# Patient Record
Sex: MALE | Race: WHITE | NOT HISPANIC OR LATINO | Employment: FULL TIME | URBAN - METROPOLITAN AREA
[De-identification: names, ages, dates, MRNs, and addresses within clinical notes are randomized per-mention and may not be internally consistent; named-entity substitution may affect disease eponyms.]

---

## 2021-01-21 ENCOUNTER — IN HOME VISIT (OUTPATIENT)
Dept: FAMILY MEDICINE CLINIC | Facility: CLINIC | Age: 56
End: 2021-01-21

## 2021-01-21 VITALS
TEMPERATURE: 98.1 F | BODY MASS INDEX: 27.59 KG/M2 | DIASTOLIC BLOOD PRESSURE: 94 MMHG | HEIGHT: 74 IN | OXYGEN SATURATION: 99 % | SYSTOLIC BLOOD PRESSURE: 162 MMHG | WEIGHT: 215 LBS | HEART RATE: 107 BPM

## 2021-01-21 DIAGNOSIS — Z00.00 ANNUAL PHYSICAL EXAM: Primary | ICD-10-CM

## 2021-01-21 PROBLEM — R03.0 ELEVATED BLOOD PRESSURE READING: Status: ACTIVE | Noted: 2021-01-21

## 2021-01-21 PROCEDURE — 99386 PREV VISIT NEW AGE 40-64: CPT | Performed by: FAMILY MEDICINE

## 2021-01-21 NOTE — PROGRESS NOTES
ADULT ANNUAL [facility]    NAME: Ishan Valdez  AGE: 63 y o  SEX: male  : 1965     DATE: 2021     Employee physical exam completed today at [facility]  The patient was advised to follow-up with their PCP, or establish with a PCP to address any acute complaints that we discussed today and to follow-up any chronic problems  The purpose of our visit today was to complete an annual physical only  Assessment and Plan:     Problem List Items Addressed This Visit     None      Visit Diagnoses     Annual physical exam    -  Primary          Immunizations and preventive care screenings were discussed with patient today  Appropriate education was printed on patient's after visit summary  Counseling:  Alcohol/drug use: discussed moderation in alcohol intake, the recommendations for healthy alcohol use, and avoidance of illicit drug use  Dental Health: discussed importance of regular tooth brushing, flossing, and dental visits  Injury prevention: discussed safety/seat belts, safety helmets, smoke detectors, carbon dioxide detectors, and smoking near bedding or upholstery  · Exercise: the importance of regular exercise/physical activity was discussed  Recommend exercise 3-5 times per week for at least 30 minutes  BMI Counseling: Body mass index is 27 6 kg/m²  The BMI is above normal  Nutrition recommendations include decreasing portion sizes, encouraging healthy choices of fruits and vegetables, decreasing fast food intake, consuming healthier snacks, moderation in carbohydrate intake and increasing intake of lean protein  Exercise recommendations include moderate physical activity 150 minutes/week  No follow-ups on file       Chief Complaint:     Chief Complaint   Patient presents with    Physical Exam      History of Present Illness:     Adult Annual Physical   Patient here for a comprehensive physical exam  The patient reports no problems  Follows with Dr Yen Dobbs PCP  Has not seen him recently  Recent shoulder/arm lifting injury but has been improving overall  Elevated blood pressure- Was given blood pressure medication, however ran out many months ago  He does not monitor his blood pressure at home  I advised him to do so and reach out to his PCP for an appointment  Diet and Physical Activity  · Diet/Nutrition: well balanced diet, limited junk food and consuming 3-5 servings of fruits/vegetables daily  · Exercise: teaches scuba diving  Depression Screening  PHQ-9 Depression Screening    PHQ-9:   Frequency of the following problems over the past two weeks:      Little interest or pleasure in doing things: 0 - not at all  Feeling down, depressed, or hopeless: 0 - not at all  PHQ-2 Score: 0       General Health  · Sleep: sleeps well  · Hearing: decreased - bilateral  For a few years trouble hearing, works as   · Vision: no vision problems and wears reading glasses  · Dental: regular dental visits  Review of Systems:     Review of Systems   Constitutional: Negative for chills and fever  HENT: Positive for hearing loss  Eyes: Negative for visual disturbance  Respiratory: Negative for cough, chest tightness and shortness of breath  Cardiovascular: Negative for chest pain and palpitations  Gastrointestinal: Negative for abdominal pain, blood in stool, constipation and diarrhea  Genitourinary: Negative for decreased urine volume, difficulty urinating and dysuria  Neurological: Negative for dizziness and headaches  Past Medical History:     No past medical history on file  Past Surgical History:     No past surgical history on file  Family History:     No family history on file     Social History:        Social History     Socioeconomic History    Marital status: Not on file     Spouse name: Not on file    Number of children: Not on file    Years of education: Not on file    Highest education level: Not on file   Occupational History    Not on file   Social Needs    Financial resource strain: Not on file    Food insecurity     Worry: Not on file     Inability: Not on file    Transportation needs     Medical: Not on file     Non-medical: Not on file   Tobacco Use    Smoking status: Current Every Day Smoker     Types: Cigars   Substance and Sexual Activity    Alcohol use: Yes    Drug use: Yes    Sexual activity: Not on file   Lifestyle    Physical activity     Days per week: Not on file     Minutes per session: Not on file    Stress: Not on file   Relationships    Social connections     Talks on phone: Not on file     Gets together: Not on file     Attends Oriental orthodox service: Not on file     Active member of club or organization: Not on file     Attends meetings of clubs or organizations: Not on file     Relationship status: Not on file    Intimate partner violence     Fear of current or ex partner: Not on file     Emotionally abused: Not on file     Physically abused: Not on file     Forced sexual activity: Not on file   Other Topics Concern    Not on file   Social History Narrative    Not on file      Current Medications:     No current outpatient medications on file  No current facility-administered medications for this visit  Allergies:     Not on File   Physical Exam:     /94   Pulse (!) 107   Temp 98 1 °F (36 7 °C)   Ht 6' 2" (1 88 m)   Wt 97 5 kg (215 lb)   SpO2 99%   BMI 27 60 kg/m²     Physical Exam  Vitals signs reviewed  Constitutional:       General: He is not in acute distress  Appearance: Normal appearance  He is not ill-appearing, toxic-appearing or diaphoretic  Cardiovascular:      Rate and Rhythm: Normal rate and regular rhythm  Heart sounds: Normal heart sounds  No murmur  No friction rub  No gallop  Pulmonary:      Effort: Pulmonary effort is normal  No respiratory distress        Breath sounds: Normal breath sounds  No stridor  No wheezing or rhonchi  Abdominal:      General: There is no distension  Palpations: Abdomen is soft  There is no mass  Tenderness: There is no abdominal tenderness  There is no guarding or rebound  Skin:     General: Skin is warm  Findings: No erythema or rash  Neurological:      Mental Status: He is alert and oriented to person, place, and time  Motor: No weakness     Psychiatric:         Mood and Affect: Mood normal          Behavior: Behavior normal           DO Moi Penaloza 73

## 2021-01-21 NOTE — PATIENT INSTRUCTIONS

## 2021-04-01 DIAGNOSIS — Z23 ENCOUNTER FOR IMMUNIZATION: ICD-10-CM

## 2022-02-02 ENCOUNTER — OFFICE VISIT (OUTPATIENT)
Dept: URGENT CARE | Facility: CLINIC | Age: 57
End: 2022-02-02
Payer: OTHER MISCELLANEOUS

## 2022-02-02 ENCOUNTER — APPOINTMENT (OUTPATIENT)
Dept: RADIOLOGY | Facility: CLINIC | Age: 57
End: 2022-02-02
Payer: OTHER MISCELLANEOUS

## 2022-02-02 ENCOUNTER — TELEPHONE (OUTPATIENT)
Dept: URGENT CARE | Facility: CLINIC | Age: 57
End: 2022-02-02

## 2022-02-02 VITALS
OXYGEN SATURATION: 96 % | WEIGHT: 215 LBS | SYSTOLIC BLOOD PRESSURE: 184 MMHG | TEMPERATURE: 99 F | RESPIRATION RATE: 18 BRPM | HEIGHT: 74 IN | BODY MASS INDEX: 27.59 KG/M2 | HEART RATE: 108 BPM | DIASTOLIC BLOOD PRESSURE: 118 MMHG

## 2022-02-02 DIAGNOSIS — S61.412A LACERATION OF LEFT HAND WITHOUT FOREIGN BODY, INITIAL ENCOUNTER: ICD-10-CM

## 2022-02-02 DIAGNOSIS — S69.92XA INJURY OF LEFT HAND, INITIAL ENCOUNTER: Primary | ICD-10-CM

## 2022-02-02 DIAGNOSIS — S69.92XA INJURY OF LEFT HAND, INITIAL ENCOUNTER: ICD-10-CM

## 2022-02-02 PROCEDURE — 73130 X-RAY EXAM OF HAND: CPT

## 2022-02-02 PROCEDURE — 12001 RPR S/N/AX/GEN/TRNK 2.5CM/<: CPT

## 2022-02-02 PROCEDURE — 99213 OFFICE O/P EST LOW 20 MIN: CPT

## 2022-02-02 RX ORDER — CEPHALEXIN 500 MG/1
500 CAPSULE ORAL EVERY 8 HOURS SCHEDULED
Qty: 15 CAPSULE | Refills: 0 | Status: SHIPPED | OUTPATIENT
Start: 2022-02-02 | End: 2022-02-07

## 2022-02-02 NOTE — TELEPHONE ENCOUNTER
Called patient to inform him of his positive x-ray findings  Patient reports he has a h/o metallic foreign bodies in that finger from a prior injury  We discussed fracture of his 2nd finger and Orthopedic referral  Patient feels it will get better on its own and declined a referral at this time  Instructed to monitor for any new or worsening symptoms  Patient verbalized understanding

## 2022-02-02 NOTE — PROGRESS NOTES
[facility]        NAME: China Ribera is a 61 y o  male  : 1965    MRN: [id number]  DATE: 2022  TIME: 2:30 PM    Assessment and Plan   Injury of left hand, initial encounter [S69 [id number]]  1  Injury of left hand, initial encounter  XR hand 3+ vw left   2  Laceration of left hand without foreign body, initial encounter  cephalexin (KEFLEX) 500 mg capsule    Laceration repair     X-ray of left hand shows irregularity at the base of the 2nd phalanx  Suspicious for fracture  Await final radiology report  Patient declined brace for his hand  Antibiotics given, bacitracin, nonstick, and Tori dressing were applied to the laceration  Patient's blood pressure elevated in office today  He denies headaches, dizziness, chest pain, shortness of breath or weakness  Reports he was evaluated for high blood pressure last year but never followed up  Recommended patient follow-up with his PCP  Discussed monitoring for any worsening signs or symptoms such as headaches, chest pain, or weakness  Patient verbalized understanding  Patient Instructions     Patient Instructions   Rest, ice, and elevate the area when possible  Tylenol and Motrin for pain  Start cephalexin  Change wound dressing daily or when wet or dirty   1  Wash lightly around sutures with soap and water   2  Apply small amount of topical antibiotic  Discontinue this when wound scabs over   3  Place non-adherent pad over the wound   4  Wrap gauze roll a few times over the non-adherent pad and secure with tape    Monitor for signs of infection   1  Redness   2  Increased pain   3  Increased swelling   4  Area feels hot to touch   5  Wound drainage   6  Fever    Return to clinic or follow up with your PCP for suture removal in 7-10 days  Follow up with Primary Care Physician  Return to clinic or go to the nearest Emergency Department with new or worsening symptoms, or if signs of infection occur      Laceration   WHAT YOU NEED TO KNOW:   A laceration is an injury to the skin and the soft tissue underneath it  Lacerations can happen anywhere on the body  DISCHARGE INSTRUCTIONS:   Return to the emergency department if:   · You have heavy bleeding or bleeding that does not stop after 10 minutes of holding firm, direct pressure over the wound  · Your wound opens up  Call your doctor if:   · You have a fever or chills  · Your laceration is red, warm, or swollen  · You have red streaks on your skin coming from your wound  · You have white or yellow drainage from the wound that smells bad  · You have pain that gets worse, even after treatment  · You have questions or concerns about your condition or care  Medicines: You may need any of the following:  · Prescription pain medicine  may be given  Ask your healthcare provider how to take this medicine safely  Some prescription pain medicines contain acetaminophen  Do not take other medicines that contain acetaminophen without talking to your healthcare provider  Too much acetaminophen may cause liver damage  Prescription pain medicine may cause constipation  Ask your healthcare provider how to prevent or treat constipation  · Antibiotics  help treat or prevent a bacterial infection  · Take your medicine as directed  Contact your healthcare provider if you think your medicine is not helping or if you have side effects  Tell him or her if you are allergic to any medicine  Keep a list of the medicines, vitamins, and herbs you take  Include the amounts, and when and why you take them  Bring the list or the pill bottles to follow-up visits  Carry your medicine list with you in case of an emergency  Care for your wound as directed:   · Do not get your wound wet  until your healthcare provider says it is okay  Do not soak your wound in water  Do not go swimming until your healthcare provider says it is okay  Carefully wash the wound with soap and water   Gently pat the area dry or allow it to air dry  · Change your bandages  when they get wet, dirty, or after washing  Apply new, clean bandages as directed  Do not apply elastic bandages or tape too tight  Do not put powders or lotions over your incision  · Apply antibiotic ointment as directed  Your healthcare provider may give you antibiotic ointment to put over your wound if you have stitches  If you have strips of tape over your incision, let them dry up and fall off on their own  If they do not fall off within 14 days, gently remove them  If you have glue over your wound, do not remove or pick at it  If your glue comes off, do not replace it with glue that you have at home  · Check your wound every day for signs of infection, such as swelling, redness, or pus  Self-care:   · Apply ice  on your wound for 15 to 20 minutes every hour or as directed  Use an ice pack, or put crushed ice in a plastic bag  Cover it with a towel  Ice helps prevent tissue damage and decreases swelling and pain  · Use a splint as directed  A splint will decrease movement and stress on your wound  It may help it heal faster  A splint may be used for lacerations over joints or areas of your body that bend  Ask your healthcare provider how to apply and remove a splint  · Decrease scarring of your wound  by applying ointments as directed  Do not apply ointments until your healthcare provider says it is okay  You may need to wait until your wound is healed  Ask which ointment to buy and how often to use it  After your wound is healed, use sunscreen over the area when you are out in the sun  You should do this for at least 6 months to 1 year after your injury  Follow up with your doctor as directed: You may need to follow up in 24 to 48 hours to have your wound checked for infection  You will need to return in 3 to 14 days if you have stitches or staples so they can be removed   Care for your wound as directed to prevent infection and help it heal  Write down your questions so you remember to ask them during your visits  © Copyright [employer] 2021 Information is for End User's use only and may not be sold, redistributed or otherwise used for commercial purposes  All illustrations and images included in CareNotes® are the copyrighted property of A D A M , Inc  or Adelina Amador  The above information is an  only  It is not intended as medical advice for individual conditions or treatments  Talk to your doctor, nurse or pharmacist before following any medical regimen to see if it is safe and effective for you  Follow up with PCP in 3-5 days  Proceed to  ER if symptoms worsen  Chief Complaint     Chief Complaint   Patient presents with    Hand Laceration     Patient reports smashing left hand on shaft at work that happened 45 minutes ago  History of Present Illness       HPI  Shadia Lang is a 61 y o  male who presents today with left hand pain and a laceration on the bottom of his left 2nd finger in the web space between the 2nd and 3rd finger  He reports his hand got hit with a metal shaft earlier today  The shaft hit him on the dorsal side of the 2nd finger by the proximal interphalangeal joint  He reports his last tetanus was 2 years ago  Denies being on any blood thinners  Review of Systems   Review of Systems   Constitutional: Negative for chills and fever  Respiratory: Negative  Cardiovascular: Negative  Musculoskeletal: Positive for joint swelling  Skin: Positive for color change and wound  Negative for rash  Hematological: Does not bruise/bleed easily           Current Medications       Current Outpatient Medications:     cephalexin (KEFLEX) 500 mg capsule, Take 1 capsule (500 mg total) by mouth every 8 (eight) hours for 5 days, Disp: 15 capsule, Rfl: 0    Current Allergies     Allergies as of 02/02/2022    (No Known Allergies)            The following portions of the patient's history were reviewed and updated as appropriate: allergies, current medications, past family history, past medical history, past social history, past surgical history and problem list      History reviewed  No pertinent past medical history  History reviewed  No pertinent surgical history  History reviewed  No pertinent family history  Medications have been verified  Objective   BP (!) 184/118 Comment: manual  Pulse (!) 108   Temp 99 °F (37 2 °C) (Temporal)   Resp 18   Ht 6' 2" (1 88 m)   Wt 97 5 kg (215 lb)   SpO2 96%   BMI 27 60 kg/m²        Physical Exam     Physical Exam  Vitals and nursing note reviewed  Constitutional:       General: He is not in acute distress  Appearance: Normal appearance  He is well-developed  Cardiovascular:      Rate and Rhythm: Normal rate and regular rhythm  Heart sounds: Normal heart sounds, S1 normal and S2 normal    Pulmonary:      Effort: Pulmonary effort is normal       Breath sounds: Normal breath sounds  Musculoskeletal:      Left hand: Laceration present  Normal capillary refill  Hands:       Comments: Patient has some numbness of the 2nd finger along both sides of finger  Able to bend finger without difficulty  Good cap refill  Skin:     General: Skin is warm and dry  Capillary Refill: Capillary refill takes less than 2 seconds  Findings: Erythema present  Psychiatric:         Behavior: Behavior is cooperative  Laceration repair    Date/Time: 2/2/2022 2:27 PM  Performed by: MEGHANN Hernandez  Authorized by: MEGHANN Hernandez   Consent: Verbal consent obtained    Risks and benefits: risks, benefits and alternatives were discussed  Consent given by: patient  Patient understanding: patient states understanding of the procedure being performed  Patient consent: the patient's understanding of the procedure matches consent given  Procedure consent: procedure consent matches procedure scheduled  Relevant documents: relevant documents present and verified  Test results: test results available and properly labeled  Site marked: the operative site was marked  Radiology Images displayed and confirmed  If images not available, report reviewed: imaging studies available  Patient identity confirmed: verbally with patient  Time out: Immediately prior to procedure a "time out" was called to verify the correct patient, procedure, equipment, support staff and site/side marked as required  Body area: upper extremity  Location details: left hand  Laceration length: 1 5 cm  Foreign bodies: no foreign bodies  Tendon involvement: none  Nerve involvement: none  Vascular damage: no  Anesthesia: local infiltration    Anesthesia:  Local Anesthetic: lidocaine 1% without epinephrine  Anesthetic total: 3 mL    Sedation:  Patient sedated: no      Wound Dehiscence:  Superficial Wound Dehiscence: simple closure      Procedure Details:  Preparation: Patient was prepped and draped in the usual sterile fashion    Irrigation solution: saline  Irrigation method: syringe  Amount of cleaning: standard  Debridement: none  Degree of undermining: none  Skin closure: 4-0 nylon  Number of sutures: 6  Technique: simple  Approximation: close  Approximation difficulty: simple  Dressing: antibiotic ointment and 4x4 sterile gauze  Patient tolerance: patient tolerated the procedure well with no immediate complications  Cleaning details: other organic matter

## 2022-02-02 NOTE — PATIENT INSTRUCTIONS
Rest, ice, and elevate the area when possible  Tylenol and Motrin for pain  Start cephalexin  Change wound dressing daily or when wet or dirty   1  Wash lightly around sutures with soap and water   2  Apply small amount of topical antibiotic  Discontinue this when wound scabs over   3  Place non-adherent pad over the wound   4  Wrap gauze roll a few times over the non-adherent pad and secure with tape    Monitor for signs of infection   1  Redness   2  Increased pain   3  Increased swelling   4  Area feels hot to touch   5  Wound drainage   6  Fever    Return to clinic or follow up with your PCP for suture removal in 7-10 days  Follow up with Primary Care Physician  Return to clinic or go to the nearest Emergency Department with new or worsening symptoms, or if signs of infection occur  Laceration   WHAT YOU NEED TO KNOW:   A laceration is an injury to the skin and the soft tissue underneath it  Lacerations can happen anywhere on the body  DISCHARGE INSTRUCTIONS:   Return to the emergency department if:   · You have heavy bleeding or bleeding that does not stop after 10 minutes of holding firm, direct pressure over the wound  · Your wound opens up  Call your doctor if:   · You have a fever or chills  · Your laceration is red, warm, or swollen  · You have red streaks on your skin coming from your wound  · You have white or yellow drainage from the wound that smells bad  · You have pain that gets worse, even after treatment  · You have questions or concerns about your condition or care  Medicines: You may need any of the following:  · Prescription pain medicine  may be given  Ask your healthcare provider how to take this medicine safely  Some prescription pain medicines contain acetaminophen  Do not take other medicines that contain acetaminophen without talking to your healthcare provider  Too much acetaminophen may cause liver damage   Prescription pain medicine may cause constipation  Ask your healthcare provider how to prevent or treat constipation  · Antibiotics  help treat or prevent a bacterial infection  · Take your medicine as directed  Contact your healthcare provider if you think your medicine is not helping or if you have side effects  Tell him or her if you are allergic to any medicine  Keep a list of the medicines, vitamins, and herbs you take  Include the amounts, and when and why you take them  Bring the list or the pill bottles to follow-up visits  Carry your medicine list with you in case of an emergency  Care for your wound as directed:   · Do not get your wound wet  until your healthcare provider says it is okay  Do not soak your wound in water  Do not go swimming until your healthcare provider says it is okay  Carefully wash the wound with soap and water  Gently pat the area dry or allow it to air dry  · Change your bandages  when they get wet, dirty, or after washing  Apply new, clean bandages as directed  Do not apply elastic bandages or tape too tight  Do not put powders or lotions over your incision  · Apply antibiotic ointment as directed  Your healthcare provider may give you antibiotic ointment to put over your wound if you have stitches  If you have strips of tape over your incision, let them dry up and fall off on their own  If they do not fall off within 14 days, gently remove them  If you have glue over your wound, do not remove or pick at it  If your glue comes off, do not replace it with glue that you have at home  · Check your wound every day for signs of infection, such as swelling, redness, or pus  Self-care:   · Apply ice  on your wound for 15 to 20 minutes every hour or as directed  Use an ice pack, or put crushed ice in a plastic bag  Cover it with a towel  Ice helps prevent tissue damage and decreases swelling and pain  · Use a splint as directed  A splint will decrease movement and stress on your wound   It may help it heal faster  A splint may be used for lacerations over joints or areas of your body that bend  Ask your healthcare provider how to apply and remove a splint  · Decrease scarring of your wound  by applying ointments as directed  Do not apply ointments until your healthcare provider says it is okay  You may need to wait until your wound is healed  Ask which ointment to buy and how often to use it  After your wound is healed, use sunscreen over the area when you are out in the sun  You should do this for at least 6 months to 1 year after your injury  Follow up with your doctor as directed: You may need to follow up in 24 to 48 hours to have your wound checked for infection  You will need to return in 3 to 14 days if you have stitches or staples so they can be removed  Care for your wound as directed to prevent infection and help it heal  Write down your questions so you remember to ask them during your visits  © Copyright [employer] 2021 Information is for End User's use only and may not be sold, redistributed or otherwise used for commercial purposes  All illustrations and images included in CareNotes® are the copyrighted property of A D A M , Inc  or Adelina Shi   The above information is an  only  It is not intended as medical advice for individual conditions or treatments  Talk to your doctor, nurse or pharmacist before following any medical regimen to see if it is safe and effective for you

## 2024-06-22 ENCOUNTER — APPOINTMENT (EMERGENCY)
Dept: CT IMAGING | Facility: HOSPITAL | Age: 59
End: 2024-06-22
Payer: COMMERCIAL

## 2024-06-22 ENCOUNTER — HOSPITAL ENCOUNTER (OUTPATIENT)
Facility: HOSPITAL | Age: 59
Setting detail: OBSERVATION
Discharge: HOME | End: 2024-06-23
Attending: SURGERY | Admitting: SURGERY
Payer: COMMERCIAL

## 2024-06-22 ENCOUNTER — APPOINTMENT (EMERGENCY)
Dept: RADIOLOGY | Facility: HOSPITAL | Age: 59
End: 2024-06-22
Payer: COMMERCIAL
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
## 2024-06-22 DIAGNOSIS — S62.102A CLOSED FRACTURE OF LEFT WRIST, INITIAL ENCOUNTER: ICD-10-CM

## 2024-06-22 DIAGNOSIS — S62.101A CLOSED FRACTURE OF RIGHT WRIST, INITIAL ENCOUNTER: ICD-10-CM

## 2024-06-22 DIAGNOSIS — S22.42XA CLOSED FRACTURE OF MULTIPLE RIBS OF LEFT SIDE, INITIAL ENCOUNTER: ICD-10-CM

## 2024-06-22 PROBLEM — G89.11 ACUTE PAIN DUE TO TRAUMA: Status: ACTIVE | Noted: 2024-06-22

## 2024-06-22 PROBLEM — E87.6 HYPOKALEMIA: Status: ACTIVE | Noted: 2024-06-22

## 2024-06-22 PROBLEM — Z86.79 HISTORY OF HYPERTENSION: Status: ACTIVE | Noted: 2024-06-22

## 2024-06-22 PROBLEM — S00.01XA SCALP ABRASION: Status: ACTIVE | Noted: 2024-06-22

## 2024-06-22 PROBLEM — N17.9 AKI (ACUTE KIDNEY INJURY) (HCC): Status: ACTIVE | Noted: 2024-06-22

## 2024-06-22 LAB
ABO GROUP BLD: NORMAL
ABO GROUP BLD: NORMAL
ANION GAP SERPL CALCULATED.3IONS-SCNC: 7 MMOL/L (ref 4–13)
ANION GAP SERPL CALCULATED.3IONS-SCNC: 7 MMOL/L (ref 4–13)
BASE EXCESS BLDA CALC-SCNC: 2 MMOL/L (ref -2–3)
BASE EXCESS BLDA CALC-SCNC: 2 MMOL/L (ref -2–3)
BASOPHILS # BLD AUTO: 0.04 THOUSANDS/ÂΜL (ref 0–0.1)
BASOPHILS # BLD AUTO: 0.04 THOUSANDS/ÂΜL (ref 0–0.1)
BASOPHILS NFR BLD AUTO: 1 % (ref 0–1)
BASOPHILS NFR BLD AUTO: 1 % (ref 0–1)
BLD GP AB SCN SERPL QL: NEGATIVE
BLD GP AB SCN SERPL QL: NEGATIVE
BUN SERPL-MCNC: 23 MG/DL (ref 5–25)
BUN SERPL-MCNC: 23 MG/DL (ref 5–25)
CA-I BLD-SCNC: 1.2 MMOL/L (ref 1.12–1.32)
CA-I BLD-SCNC: 1.2 MMOL/L (ref 1.12–1.32)
CALCIUM SERPL-MCNC: 9 MG/DL (ref 8.4–10.2)
CALCIUM SERPL-MCNC: 9 MG/DL (ref 8.4–10.2)
CHLORIDE SERPL-SCNC: 107 MMOL/L (ref 96–108)
CHLORIDE SERPL-SCNC: 107 MMOL/L (ref 96–108)
CO2 SERPL-SCNC: 26 MMOL/L (ref 21–32)
CO2 SERPL-SCNC: 26 MMOL/L (ref 21–32)
CREAT SERPL-MCNC: 1.58 MG/DL (ref 0.6–1.3)
CREAT SERPL-MCNC: 1.58 MG/DL (ref 0.6–1.3)
EOSINOPHIL # BLD AUTO: 0.12 THOUSAND/ÂΜL (ref 0–0.61)
EOSINOPHIL # BLD AUTO: 0.12 THOUSAND/ÂΜL (ref 0–0.61)
EOSINOPHIL NFR BLD AUTO: 2 % (ref 0–6)
EOSINOPHIL NFR BLD AUTO: 2 % (ref 0–6)
ERYTHROCYTE [DISTWIDTH] IN BLOOD BY AUTOMATED COUNT: 14.4 % (ref 11.6–15.1)
ERYTHROCYTE [DISTWIDTH] IN BLOOD BY AUTOMATED COUNT: 14.4 % (ref 11.6–15.1)
GFR SERPL CREATININE-BSD FRML MDRD: 47 ML/MIN/1.73SQ M
GFR SERPL CREATININE-BSD FRML MDRD: 47 ML/MIN/1.73SQ M
GLUCOSE SERPL-MCNC: 86 MG/DL (ref 65–140)
GLUCOSE SERPL-MCNC: 86 MG/DL (ref 65–140)
GLUCOSE SERPL-MCNC: 87 MG/DL (ref 65–140)
GLUCOSE SERPL-MCNC: 87 MG/DL (ref 65–140)
HCO3 BLDA-SCNC: 27.9 MMOL/L (ref 24–30)
HCO3 BLDA-SCNC: 27.9 MMOL/L (ref 24–30)
HCT VFR BLD AUTO: 37.7 % (ref 36.5–49.3)
HCT VFR BLD AUTO: 37.7 % (ref 36.5–49.3)
HCT VFR BLD CALC: 37 % (ref 36.5–49.3)
HCT VFR BLD CALC: 37 % (ref 36.5–49.3)
HGB BLD-MCNC: 12.8 G/DL (ref 12–17)
HGB BLD-MCNC: 12.8 G/DL (ref 12–17)
HGB BLDA-MCNC: 12.6 G/DL (ref 12–17)
HGB BLDA-MCNC: 12.6 G/DL (ref 12–17)
IMM GRANULOCYTES # BLD AUTO: 0.04 THOUSAND/UL (ref 0–0.2)
IMM GRANULOCYTES # BLD AUTO: 0.04 THOUSAND/UL (ref 0–0.2)
IMM GRANULOCYTES NFR BLD AUTO: 1 % (ref 0–2)
IMM GRANULOCYTES NFR BLD AUTO: 1 % (ref 0–2)
LYMPHOCYTES # BLD AUTO: 1.4 THOUSANDS/ÂΜL (ref 0.6–4.47)
LYMPHOCYTES # BLD AUTO: 1.4 THOUSANDS/ÂΜL (ref 0.6–4.47)
LYMPHOCYTES NFR BLD AUTO: 21 % (ref 14–44)
LYMPHOCYTES NFR BLD AUTO: 21 % (ref 14–44)
MAGNESIUM SERPL-MCNC: 2 MG/DL (ref 1.9–2.7)
MAGNESIUM SERPL-MCNC: 2 MG/DL (ref 1.9–2.7)
MCH RBC QN AUTO: 30.1 PG (ref 26.8–34.3)
MCH RBC QN AUTO: 30.1 PG (ref 26.8–34.3)
MCHC RBC AUTO-ENTMCNC: 34 G/DL (ref 31.4–37.4)
MCHC RBC AUTO-ENTMCNC: 34 G/DL (ref 31.4–37.4)
MCV RBC AUTO: 89 FL (ref 82–98)
MCV RBC AUTO: 89 FL (ref 82–98)
MONOCYTES # BLD AUTO: 0.78 THOUSAND/ÂΜL (ref 0.17–1.22)
MONOCYTES # BLD AUTO: 0.78 THOUSAND/ÂΜL (ref 0.17–1.22)
MONOCYTES NFR BLD AUTO: 12 % (ref 4–12)
MONOCYTES NFR BLD AUTO: 12 % (ref 4–12)
NEUTROPHILS # BLD AUTO: 4.39 THOUSANDS/ÂΜL (ref 1.85–7.62)
NEUTROPHILS # BLD AUTO: 4.39 THOUSANDS/ÂΜL (ref 1.85–7.62)
NEUTS SEG NFR BLD AUTO: 63 % (ref 43–75)
NEUTS SEG NFR BLD AUTO: 63 % (ref 43–75)
NRBC BLD AUTO-RTO: 0 /100 WBCS
NRBC BLD AUTO-RTO: 0 /100 WBCS
PCO2 BLD: 29 MMOL/L (ref 21–32)
PCO2 BLD: 29 MMOL/L (ref 21–32)
PCO2 BLD: 46.7 MM HG (ref 42–50)
PCO2 BLD: 46.7 MM HG (ref 42–50)
PH BLD: 7.38 [PH] (ref 7.3–7.4)
PH BLD: 7.38 [PH] (ref 7.3–7.4)
PHOSPHATE SERPL-MCNC: 3.2 MG/DL (ref 2.7–4.5)
PHOSPHATE SERPL-MCNC: 3.2 MG/DL (ref 2.7–4.5)
PLATELET # BLD AUTO: 247 THOUSANDS/UL (ref 149–390)
PLATELET # BLD AUTO: 247 THOUSANDS/UL (ref 149–390)
PMV BLD AUTO: 9.9 FL (ref 8.9–12.7)
PMV BLD AUTO: 9.9 FL (ref 8.9–12.7)
PO2 BLD: 36 MM HG (ref 35–45)
PO2 BLD: 36 MM HG (ref 35–45)
POTASSIUM BLD-SCNC: 3.2 MMOL/L (ref 3.5–5.3)
POTASSIUM BLD-SCNC: 3.2 MMOL/L (ref 3.5–5.3)
POTASSIUM SERPL-SCNC: 3.3 MMOL/L (ref 3.5–5.3)
POTASSIUM SERPL-SCNC: 3.3 MMOL/L (ref 3.5–5.3)
RBC # BLD AUTO: 4.25 MILLION/UL (ref 3.88–5.62)
RBC # BLD AUTO: 4.25 MILLION/UL (ref 3.88–5.62)
RH BLD: POSITIVE
RH BLD: POSITIVE
SAO2 % BLD FROM PO2: 67 % (ref 60–85)
SAO2 % BLD FROM PO2: 67 % (ref 60–85)
SODIUM BLD-SCNC: 143 MMOL/L (ref 136–145)
SODIUM BLD-SCNC: 143 MMOL/L (ref 136–145)
SODIUM SERPL-SCNC: 140 MMOL/L (ref 135–147)
SODIUM SERPL-SCNC: 140 MMOL/L (ref 135–147)
SPECIMEN EXPIRATION DATE: NORMAL
SPECIMEN EXPIRATION DATE: NORMAL
SPECIMEN SOURCE: ABNORMAL
SPECIMEN SOURCE: ABNORMAL
WBC # BLD AUTO: 6.77 THOUSAND/UL (ref 4.31–10.16)
WBC # BLD AUTO: 6.77 THOUSAND/UL (ref 4.31–10.16)

## 2024-06-22 PROCEDURE — 70498 CT ANGIOGRAPHY NECK: CPT

## 2024-06-22 PROCEDURE — 86850 RBC ANTIBODY SCREEN: CPT | Performed by: SURGERY

## 2024-06-22 PROCEDURE — 71045 X-RAY EXAM CHEST 1 VIEW: CPT

## 2024-06-22 PROCEDURE — 73080 X-RAY EXAM OF ELBOW: CPT

## 2024-06-22 PROCEDURE — 82947 ASSAY GLUCOSE BLOOD QUANT: CPT

## 2024-06-22 PROCEDURE — 72125 CT NECK SPINE W/O DYE: CPT

## 2024-06-22 PROCEDURE — 82803 BLOOD GASES ANY COMBINATION: CPT

## 2024-06-22 PROCEDURE — 99223 1ST HOSP IP/OBS HIGH 75: CPT | Performed by: SURGERY

## 2024-06-22 PROCEDURE — 85014 HEMATOCRIT: CPT

## 2024-06-22 PROCEDURE — 29125 APPL SHORT ARM SPLINT STATIC: CPT | Performed by: PHYSICIAN ASSISTANT

## 2024-06-22 PROCEDURE — 84295 ASSAY OF SERUM SODIUM: CPT

## 2024-06-22 PROCEDURE — 84100 ASSAY OF PHOSPHORUS: CPT | Performed by: SURGERY

## 2024-06-22 PROCEDURE — 90715 TDAP VACCINE 7 YRS/> IM: CPT | Performed by: SURGERY

## 2024-06-22 PROCEDURE — 74177 CT ABD & PELVIS W/CONTRAST: CPT

## 2024-06-22 PROCEDURE — 70450 CT HEAD/BRAIN W/O DYE: CPT

## 2024-06-22 PROCEDURE — 90471 IMMUNIZATION ADMIN: CPT

## 2024-06-22 PROCEDURE — 36415 COLL VENOUS BLD VENIPUNCTURE: CPT | Performed by: SURGERY

## 2024-06-22 PROCEDURE — 99244 OFF/OP CNSLTJ NEW/EST MOD 40: CPT | Performed by: PHYSICIAN ASSISTANT

## 2024-06-22 PROCEDURE — 73100 X-RAY EXAM OF WRIST: CPT

## 2024-06-22 PROCEDURE — 73110 X-RAY EXAM OF WRIST: CPT

## 2024-06-22 PROCEDURE — 80048 BASIC METABOLIC PNL TOTAL CA: CPT | Performed by: SURGERY

## 2024-06-22 PROCEDURE — 84132 ASSAY OF SERUM POTASSIUM: CPT

## 2024-06-22 PROCEDURE — 82330 ASSAY OF CALCIUM: CPT

## 2024-06-22 PROCEDURE — 86901 BLOOD TYPING SEROLOGIC RH(D): CPT | Performed by: SURGERY

## 2024-06-22 PROCEDURE — 96365 THER/PROPH/DIAG IV INF INIT: CPT

## 2024-06-22 PROCEDURE — EDAIR PR ED AIR: Performed by: EMERGENCY MEDICINE

## 2024-06-22 PROCEDURE — 85025 COMPLETE CBC W/AUTO DIFF WBC: CPT | Performed by: SURGERY

## 2024-06-22 PROCEDURE — NC001 PR NO CHARGE: Performed by: SURGERY

## 2024-06-22 PROCEDURE — 71260 CT THORAX DX C+: CPT

## 2024-06-22 PROCEDURE — 96375 TX/PRO/DX INJ NEW DRUG ADDON: CPT

## 2024-06-22 PROCEDURE — 83735 ASSAY OF MAGNESIUM: CPT | Performed by: SURGERY

## 2024-06-22 PROCEDURE — 99284 EMERGENCY DEPT VISIT MOD MDM: CPT

## 2024-06-22 PROCEDURE — 86900 BLOOD TYPING SEROLOGIC ABO: CPT | Performed by: SURGERY

## 2024-06-22 RX ORDER — HYDRALAZINE HYDROCHLORIDE 20 MG/ML
5 INJECTION INTRAMUSCULAR; INTRAVENOUS EVERY 4 HOURS PRN
Status: DISCONTINUED | OUTPATIENT
Start: 2024-06-22 | End: 2024-06-22

## 2024-06-22 RX ORDER — ACETAMINOPHEN 325 MG/1
975 TABLET ORAL EVERY 8 HOURS
Status: DISCONTINUED | OUTPATIENT
Start: 2024-06-22 | End: 2024-06-23 | Stop reason: HOSPADM

## 2024-06-22 RX ORDER — LIDOCAINE HYDROCHLORIDE 10 MG/ML
20 INJECTION, SOLUTION EPIDURAL; INFILTRATION; INTRACAUDAL; PERINEURAL ONCE
Status: COMPLETED | OUTPATIENT
Start: 2024-06-22 | End: 2024-06-22

## 2024-06-22 RX ORDER — OXYCODONE HYDROCHLORIDE 5 MG/1
5 TABLET ORAL EVERY 4 HOURS PRN
Status: DISCONTINUED | OUTPATIENT
Start: 2024-06-22 | End: 2024-06-23 | Stop reason: HOSPADM

## 2024-06-22 RX ORDER — HYDROMORPHONE HCL/PF 1 MG/ML
0.5 SYRINGE (ML) INJECTION EVERY 2 HOUR PRN
Status: DISCONTINUED | OUTPATIENT
Start: 2024-06-22 | End: 2024-06-23 | Stop reason: HOSPADM

## 2024-06-22 RX ORDER — HEPARIN SODIUM 5000 [USP'U]/ML
5000 INJECTION, SOLUTION INTRAVENOUS; SUBCUTANEOUS EVERY 8 HOURS SCHEDULED
Status: DISCONTINUED | OUTPATIENT
Start: 2024-06-22 | End: 2024-06-22

## 2024-06-22 RX ORDER — LOSARTAN POTASSIUM 100 MG/1
25 TABLET ORAL DAILY
COMMUNITY

## 2024-06-22 RX ORDER — HYDRALAZINE HYDROCHLORIDE 20 MG/ML
5 INJECTION INTRAMUSCULAR; INTRAVENOUS EVERY 6 HOURS PRN
Status: DISCONTINUED | OUTPATIENT
Start: 2024-06-22 | End: 2024-06-23 | Stop reason: HOSPADM

## 2024-06-22 RX ORDER — ENOXAPARIN SODIUM 100 MG/ML
30 INJECTION SUBCUTANEOUS EVERY 12 HOURS
Status: DISCONTINUED | OUTPATIENT
Start: 2024-06-22 | End: 2024-06-23 | Stop reason: HOSPADM

## 2024-06-22 RX ORDER — METHOCARBAMOL 500 MG/1
500 TABLET, FILM COATED ORAL EVERY 6 HOURS PRN
Status: DISCONTINUED | OUTPATIENT
Start: 2024-06-22 | End: 2024-06-23 | Stop reason: HOSPADM

## 2024-06-22 RX ORDER — SODIUM CHLORIDE, SODIUM GLUCONATE, SODIUM ACETATE, POTASSIUM CHLORIDE, MAGNESIUM CHLORIDE, SODIUM PHOSPHATE, DIBASIC, AND POTASSIUM PHOSPHATE .53; .5; .37; .037; .03; .012; .00082 G/100ML; G/100ML; G/100ML; G/100ML; G/100ML; G/100ML; G/100ML
75 INJECTION, SOLUTION INTRAVENOUS CONTINUOUS
Status: DISCONTINUED | OUTPATIENT
Start: 2024-06-22 | End: 2024-06-23

## 2024-06-22 RX ORDER — POTASSIUM CHLORIDE 20 MEQ/1
40 TABLET, EXTENDED RELEASE ORAL ONCE
Status: COMPLETED | OUTPATIENT
Start: 2024-06-22 | End: 2024-06-22

## 2024-06-22 RX ORDER — OXYCODONE HYDROCHLORIDE 10 MG/1
10 TABLET ORAL EVERY 4 HOURS PRN
Status: DISCONTINUED | OUTPATIENT
Start: 2024-06-22 | End: 2024-06-23 | Stop reason: HOSPADM

## 2024-06-22 RX ADMIN — POTASSIUM CHLORIDE 40 MEQ: 1500 TABLET, EXTENDED RELEASE ORAL at 18:55

## 2024-06-22 RX ADMIN — OXYCODONE HYDROCHLORIDE 5 MG: 5 TABLET ORAL at 18:55

## 2024-06-22 RX ADMIN — IOHEXOL 100 ML: 350 INJECTION, SOLUTION INTRAVENOUS at 16:52

## 2024-06-22 RX ADMIN — SODIUM CHLORIDE, SODIUM GLUCONATE, SODIUM ACETATE, POTASSIUM CHLORIDE, MAGNESIUM CHLORIDE, SODIUM PHOSPHATE, DIBASIC, AND POTASSIUM PHOSPHATE 75 ML/HR: .53; .5; .37; .037; .03; .012; .00082 INJECTION, SOLUTION INTRAVENOUS at 19:11

## 2024-06-22 RX ADMIN — ENOXAPARIN SODIUM 30 MG: 30 INJECTION SUBCUTANEOUS at 20:52

## 2024-06-22 RX ADMIN — HYDRALAZINE HYDROCHLORIDE 5 MG: 20 INJECTION INTRAMUSCULAR; INTRAVENOUS at 22:14

## 2024-06-22 RX ADMIN — HYDROMORPHONE HYDROCHLORIDE 0.5 MG: 1 INJECTION, SOLUTION INTRAMUSCULAR; INTRAVENOUS; SUBCUTANEOUS at 19:42

## 2024-06-22 RX ADMIN — TETANUS TOXOID, REDUCED DIPHTHERIA TOXOID AND ACELLULAR PERTUSSIS VACCINE, ADSORBED 0.5 ML: 5; 2.5; 8; 8; 2.5 SUSPENSION INTRAMUSCULAR at 16:33

## 2024-06-22 RX ADMIN — ACETAMINOPHEN 975 MG: 325 TABLET, FILM COATED ORAL at 18:55

## 2024-06-22 RX ADMIN — LIDOCAINE HYDROCHLORIDE 10 ML: 10 INJECTION, SOLUTION EPIDURAL; INFILTRATION; INTRACAUDAL; PERINEURAL at 19:26

## 2024-06-22 NOTE — ED PROVIDER NOTES
Emergency Department Airway Evaluation and Management Form    History  Obtained from: EMS  Patient has no allergy information on record.  Chief Complaint   Patient presents with    Trauma     Fall 10 feet. +neck pain +R wrist immobilized. 5-8 minutes unconscious after fall. Now GCS 15.      HPI    58-year-old male presenting after falling 10 feet out of a bucket truck onto a pile of loose gravel.  Per bystanders report to EMS, the patient reportedly lost consciousness for approximately 5 to 8 minutes after the fall and appeared to have difficulty breathing.  At the time of EMS arrival patient was GCS 14.  He is now amnestic to the event but has a GCS of 15.  His only complaint is pain in his right wrist.  No blood thinners.    No past medical history on file.  No past surgical history on file.  No family history on file.     I have reviewed and agree with the history as documented.    Review of Systems  Positive for pain in the right wrist  Please see trauma team note for full review of systems    Physical Exam  BP (!) 188/100   Pulse 102   Temp 98 °F (36.7 °C) (Oral)   Resp 18   Wt 97.6 kg (215 lb 2.7 oz)   SpO2 96%     Physical Exam  Airway intact with bilateral breath sounds present  No emergent airway intervention required  Please see trauma team note for full physical exam    ED Medications  Medications   tetanus-diphtheria-acellular pertussis (BOOSTRIX) IM injection 0.5 mL (0.5 mL Intramuscular Given 6/22/24 1633)   iohexol (OMNIPAQUE) 350 MG/ML injection (MULTI-DOSE) 100 mL (100 mL Intravenous Given 6/22/24 1652)       Intubation  Procedures    Notes  I performed a limited evaluation of this patient solely to ensure that the airway was intact prior to trauma surgery evaluation per trauma center ATLS protocol. My examination was focused solely on the airway exam, after which the patient was managed independently by the trauma team. Please see their documentation for full history, ROS, physical exam, and  medical decision making.       Final Diagnosis  Final diagnoses:   Fall, initial encounter       ED Provider  Electronically Signed by     Roshni Larry MD  06/22/24 2613

## 2024-06-22 NOTE — H&P
[facility]  H&P  Name: Ez Dsouza 58 y.o. male I MRN: [id number]  Unit/Bed#: ED-42 I Date of Admission: 6/22/2024   Date of Service: 6/22/2024 I Hospital Day: 0      Assessment & Plan   Hypokalemia  Assessment & Plan  - replete    -  Assessment & Plan  -Local wound care  -Tetanus updated    History of hypertension  Assessment & Plan  -Hold home losartan    MARIA TERESA (acute kidney injury) (HCC)  Assessment & Plan  -MARIA TERESA on presentation  -Continue IV fluids  -Hold nephrotoxic medications    Closed fracture of right wrist  Assessment & Plan  - left wrist fracture, present on admission.  - Appreciate Orthopedic surgery evaluation, recommendations and interventions as noted.  - Maintain non weightbearing status on the left upper extremity in splint.  - Monitor left upper extremity neurovascular exam.  - Continue multimodal analgesic regimen.  - Continue DVT prophylaxis.  - PT and OT evaluation and treatment as indicated.  - Outpatient follow up with Orthopedic surgery for re-evaluation.      Acute pain due to trauma  Assessment & Plan  - Acute pain secondary to traumatic injuries.  - Continue multimodal analgesic regimen.  - Bowel regimen as long as using opioids.  - Continue to monitor pain and adjust regimen as indicated.      Fall  Assessment & Plan  - Status post fall with the below noted injuries.  - Fall precautions.  - PT and OT evaluation and treatment as indicated.  - Case Management consultation for disposition planning.      * Closed fracture of multiple ribs of left side  Assessment & Plan  - Multiple left-sided rib fractures (3-4), present on admission.  - Continue rib fracture protocol.  - Continue to encourage incentive spirometer use and adequate pulmonary hygiene.   - PIC score is pending.  - Appreciate APS evaluation and recommendations.  - Continue multimodal analgesic regimen.  - Supplemental oxygen via nasal cannula as needed to maintain saturations greater than or equal to  "94%.  - PT and OT evaluation and treatment as indicated.  - Outpatient follow-up in the trauma clinic for re-evaluation in approximately 2 weeks.      DVT prophylaxis: SCDs and SQH  PT and OT: eval and treat    Disposition: Admit to trauma on MedSurg.  Refracture protocol.  Follow-up orthopedics recommendations for right wrist fracture.  Follow-up right elbow x-ray.    Trauma Alert: Level B   Model of Arrival: Ambulance    Trauma Team: Attending Jhony and CHASITY Toth PA-C  Consultants:     Orthopedics: routine consult; Epic consult order placed;     History of Present Illness     Chief Complaint: \" I fell 10 feet.\"  Mechanism:Fall     HPI:    Ez Dsouza is a 58 y.o. male who presents with status post fall approximately 10 feet.  Patient was cutting trees down and subsequently fell and blacked out on the ground.  He was then found by bystanders.  Reportedly the patient was not breathing however he quickly came through.  He improved GCS 14 to a GCS 15.  He came to the trauma bay with only complaints of right wrist and right elbow.  Otherwise no complaints on presentation.  Reports he only has a history of hypertension and he takes 1 medications.  He has no anticoagulants or antiplatelet medications.  He is otherwise moving all extremities equally.  No numbness or tingling.  No other chest pain or shortness of breath.  No nausea or vomiting.  No fevers, chills, sweats.    Review of Systems   All other systems reviewed and are negative.    12-point, complete review of systems was reviewed and negative except as stated above.     Historical Information     Past Medical History:   Diagnosis Date    Hypertension      History reviewed. No pertinent surgical history.     Social History     Tobacco Use    Smoking status: Never    Smokeless tobacco: Never   Substance Use Topics    Alcohol use: Not Currently    Drug use: Never     Immunization History   Administered Date(s) Administered    COVID-19 MODERNA VACC 0.5 ML IM " 03/25/2021, 04/22/2021, 12/29/2021    Tdap 06/22/2024     Last Tetanus: updated today  Family History: Non-contributory     Meds/Allergies   PTA meds:   None     Allergies have not been reviewed;  Not on File    Objective   Initial Vitals:   Temperature: 98 °F (36.7 °C) (06/22/24 1628)  Pulse: 105 (06/22/24 1628)  Respirations: 18 (06/22/24 1628)  Blood Pressure: (!) 197/99 (06/22/24 1628)    Primary Survey:   Airway:        Status: patent;        Pre-hospital Interventions: none        Hospital Interventions: none  Breathing:        Pre-hospital Interventions: none       Effort: normal       Right breath sounds: normal       Left breath sounds: normal  Circulation:        Rhythm: regular       Rate: regular   Right Pulses Left Pulses    R radial: 2+  R femoral: 2+  R pedal: 2+  R carotid: 2+  R popliteal: 2+ L radial: 2+  L femoral: 2+  L pedal: 2+  L carotid: 2+  L popliteal: 2+   Disability:        GCS: Eye: 4; Verbal: 5 Motor: 6 Total: 15       Right Pupil: round;  reactive         Left Pupil:  round;  reactive      R Motor Strength L Motor Strength    R : 5/5  R dorsiflex: 5/5  R plantarflex: 5/5 L : 5/5  L dorsiflex: 5/5  L plantarflex: 5/5        Sensory:  No sensory deficit  Exposure:       Completed: Yes      Secondary Survey:  Physical Exam  Vitals reviewed.   Constitutional:       Appearance: Normal appearance.   HENT:      Head:      Comments: Left-sided parietal scalp abrasion     Right Ear: External ear normal.      Left Ear: External ear normal.      Nose: Nose normal.      Mouth/Throat:      Mouth: Mucous membranes are dry.      Pharynx: Oropharynx is clear.   Eyes:      Pupils: Pupils are equal, round, and reactive to light.   Cardiovascular:      Rate and Rhythm: Normal rate and regular rhythm.      Pulses: Normal pulses.      Heart sounds: Normal heart sounds.   Pulmonary:      Effort: Pulmonary effort is normal. No respiratory distress.      Breath sounds: Normal breath sounds.    Abdominal:      General: There is no distension.      Palpations: Abdomen is soft.      Tenderness: There is no abdominal tenderness. There is no guarding.   Musculoskeletal:         General: Tenderness present. Normal range of motion.      Cervical back: Normal range of motion. No tenderness.      Comments: Tenderness to the right wrist.   Skin:     General: Skin is warm and dry.      Comments: Right elbow abrasion   Neurological:      General: No focal deficit present.      Mental Status: He is alert and oriented to person, place, and time.         Invasive Devices       Peripheral Intravenous Line  Duration             Peripheral IV 06/22/24 Left;Ventral (anterior) Forearm <1 day                  Lab Results: I have personally reviewed all pertinent laboratory/test results 06/22/24 and in the preceding 24 hours.  Recent Labs     06/22/24  1632 06/22/24  1633   WBC 6.77  --    HGB 12.8 12.6   HCT 37.7 37     --    SODIUM 140  --    K 3.3*  --      --    CO2 26 29   BUN 23  --    CREATININE 1.58*  --    GLUC 86  --    CAIONIZED  --  1.20   MG 2.0  --    PHOS 3.2  --        Imaging Results: I have personally reviewed pertinent images saved in PACS. CT scan findings (and other pertinent positive findings on images) were discussed with radiology. My interpretation of the images/reports are as follows:  Chest Xray(s): negative for acute findings   FAST exam(s): negative for acute findings   CT Scan(s): positive for acute findings: right wrist fracture, multiple rib fractures   Additional Xray(s): negative for acute findings     Other Studies: no other studies    Code Status: No Order  Advance Directive and Living Will:      Power of :    POLST:

## 2024-06-22 NOTE — PROGRESS NOTES
Cervical Collar Clearance:    The patient had a CT scan of the cervical spine demonstrating no acute injury. On exam, the patient had no midline point tenderness or paresthesias/numbness/weakness in the extremities. The patient had full range of motion (was then able to flex, extend, and rotate head laterally) without pain. There were no distracting injuries and the patient was not intoxicated.      The patient's cervical spine was cleared radiologically and clinically. Cervical collar removed at this time.     Lelia Booker MD  6/22/2024 5:55 PM

## 2024-06-22 NOTE — ASSESSMENT & PLAN NOTE
- Multiple left-sided rib fractures (3-4), present on admission.  - Continue rib fracture protocol.  - Continue to encourage incentive spirometer use and adequate pulmonary hygiene.   - PIC score is pending.  - Appreciate APS evaluation and recommendations.  - Continue multimodal analgesic regimen.  - Supplemental oxygen via nasal cannula as needed to maintain saturations greater than or equal to 94%.  - PT and OT evaluation and treatment as indicated.  - Outpatient follow-up in the trauma clinic for re-evaluation in approximately 2 weeks.

## 2024-06-22 NOTE — ASSESSMENT & PLAN NOTE
- Status post fall with the below noted injuries.  - Fall precautions.  - PT and OT evaluation and treatment as indicated.  - Case Management consultation for disposition planning.

## 2024-06-22 NOTE — PROCEDURES
POC FAST US    Date/Time: 6/22/2024 4:35 PM    Performed by: Wilbert Toth PA-C  Authorized by: Wilbert Toth PA-C    Patient location:  Trauma  Procedure details:     Exam Type:  Diagnostic    Indications: blunt abdominal trauma and blunt chest trauma      Assess for:  Intra-abdominal fluid, pericardial effusion and pneumothorax    Technique: extended FAST      Views obtained:  Heart - Pericardial sac, LUQ - Splenorenal space, Suprapubic - Pouch of Brian, RUQ - Leon's Pouch, Left thorax and Right thorax    Image quality: diagnostic      Image availability:  Images available in PACS and video obtained  FAST Findings:     RUQ (Hepatorenal) free fluid: absent      LUQ (Splenorenal) free fluid: absent      Suprapubic free fluid: absent      Cardiac wall motion: identified      Pericardial effusion: absent    extended FAST (Pulmonary) findings:     Left lung sliding: Present      Right lung sliding: Present    Interpretation:     Impressions: negative

## 2024-06-22 NOTE — ASSESSMENT & PLAN NOTE
- left wrist fracture, present on admission.  - Appreciate Orthopedic surgery evaluation, recommendations and interventions as noted.  - Maintain non weightbearing status on the left upper extremity in splint.  - Monitor left upper extremity neurovascular exam.  - Continue multimodal analgesic regimen.  - Continue DVT prophylaxis.  - PT and OT evaluation and treatment as indicated.  - Outpatient follow up with Orthopedic surgery for re-evaluation.

## 2024-06-22 NOTE — CONSULTS
Orthopedics   Ez Dsouza 58 y.o. male MRN: [id number]  Unit/Bed#: ED-42      Chief Complaint:   right wrist pain    HPI:   58 y.o. who presents today after falling out of the back of a truck while cutting down tree limbs. He noted immediate onset of right wrist pain. He has no pain in any other location. He has been found on admission to have rib fractures and MARIA TERESA and is being admitted to the hospital for observation. Orthopedics engaged for evaluation of his right wrist fracture.   He has no numbness/tingling.   No other complaints.     Review Of Systems:   Skin: Normal  Neuro: See HPI  Musculoskeletal: See HPI  14 point review of systems negative except as stated above     Past Medical History:   Past Medical History:   Diagnosis Date    Hypertension        Past Surgical History:   History reviewed. No pertinent surgical history.    Family History:  Family history reviewed and non-contributory  History reviewed. No pertinent family history.    Social History:  Social History     Socioeconomic History    Marital status: /Civil Union     Spouse name: None    Number of children: None    Years of education: None    Highest education level: None   Occupational History    None   Tobacco Use    Smoking status: Never    Smokeless tobacco: Never   Substance and Sexual Activity    Alcohol use: Not Currently    Drug use: Never    Sexual activity: None   Other Topics Concern    None   Social History Narrative    None     Social Determinants of Health     Financial Resource Strain: Not on file   Food Insecurity: Not on file   Transportation Needs: Not on file   Physical Activity: Not on file   Stress: Not on file   Social Connections: Not on file   Intimate Partner Violence: Not on file   Housing Stability: Not on file       Allergies:   Not on File        Labs:  0   Lab Value Date/Time    HCT 37 06/22/2024 1633    HCT 37.7 06/22/2024 1632    HGB 12.6 06/22/2024 1633    HGB 12.8 06/22/2024 1632    WBC 6.77  "06/22/2024 1632       Meds:    Current Facility-Administered Medications:     acetaminophen (TYLENOL) tablet 975 mg, 975 mg, Oral, Q8H, Wilbert Toth PA-C, 975 mg at 06/22/24 1855    heparin (porcine) subcutaneous injection 5,000 Units, 5,000 Units, Subcutaneous, Q8H TIEN, Wilbert Toth PA-C    hydrALAZINE (APRESOLINE) injection 5 mg, 5 mg, Intravenous, Q6H PRN, Wilbert Toth PA-C    HYDROmorphone (DILAUDID) injection 0.5 mg, 0.5 mg, Intravenous, Q2H PRN, Wilbert Toth PA-C    methocarbamol (ROBAXIN) tablet 500 mg, 500 mg, Oral, Q6H PRN, Wilbert Toth PA-C    multi-electrolyte (PLASMALYTE-A/ISOLYTE-S PH 7.4) IV solution, 75 mL/hr, Intravenous, Continuous, Wilbert Toth PA-C, Last Rate: 75 mL/hr at 06/22/24 1911, 75 mL/hr at 06/22/24 1911    oxyCODONE (ROXICODONE) immediate release tablet 10 mg, 10 mg, Oral, Q4H PRN, Wilbert Toth PA-C    oxyCODONE (ROXICODONE) IR tablet 5 mg, 5 mg, Oral, Q4H PRN, Wilbert Toth PA-C, 5 mg at 06/22/24 1855  No current outpatient medications on file.    Blood Culture:   No results found for: \"BLOODCX\"    Wound Culture:   No results found for: \"WOUNDCULT\"    Ins and Outs:  No intake/output data recorded.          Physical Exam:   BP (!) 200/115   Pulse 82   Temp 98 °F (36.7 °C) (Oral)   Resp 18   Wt 97.6 kg (215 lb 2.7 oz)   SpO2 96%   Gen: No acute distress, resting comfortably in bed  HEENT: Eyes clear, moist mucus membranes, hearing intact  Respiratory: No audible wheezing or stridor  Cardiovascular: Well Perfused peripherally, 2+ distal pulse  Abdomen: nondistended, no peritoneal signs  Musculoskeletal: right upper extremity  Skin intact  Palpable deformity over the distal right wrist with edema present. No significant ecchymosis.   Pain to palpation over the distal right wrist.   Sensation intact to median, radial, and ulnar distributions  Motor intact to ain/pin/m/r/u  Finger tips warm and well perfused, 2+ radial and ulnar pulse  Musculature is soft and " compressible, no pain with passive stretch  General MSK  No pain over bilateral clavicles, shoulders, upper arms or elbows. No pain to the left forearm, wrist or hand. ROM full and SILT to the left upper extremity.   No pain in the bilateral hips, femurs, knees, lower legs, feet or ankles. SILT, ROM full.     Radiology:   I personally reviewed the films.  X-rays AP/Lateral right wrist: distal radius fracture.     _*_*_*_*_*_*_*_*_*_*_*_*_*_*_*_*_*_*_*_*_*_*_*_*_*_*_*_*_*_*_*_*_*_*_*_*_*_*_*_*_*    Procedure: Distal radius reduction and splint application    A hematoma block was given with 10cc of 1% lidocaine without epi. Once adequate anesthesia was obtained, a gentle closed reduction maneuver was performed and pt was placed in a well padded sugartong splint. Pt tolerated the procedure well and was neurovascularly intact both pre and post procedure. Post reduction orthogonal x rays will be reviewed upon completion    Assessment:  58 y.o.male S/P fall with right distal radius fracture    Plan:   Non weight bearing right upper extremity in sugartong splint  No immediate orthopedic intervention planned.   Pain control per primary team  Medical management per primary team.   DVT ppx per primary team.   Dispo: Ok for discharge from ortho perspective. Ortho signing off at this time.   Patient should follow up with Dr. Kowalski upon discharge.   Case reviewed and discussed with Dr. Kowalski.     Carmelita Garrido PA-C

## 2024-06-23 VITALS
DIASTOLIC BLOOD PRESSURE: 98 MMHG | RESPIRATION RATE: 18 BRPM | OXYGEN SATURATION: 97 % | SYSTOLIC BLOOD PRESSURE: 163 MMHG | HEART RATE: 75 BPM | WEIGHT: 215.17 LBS | TEMPERATURE: 98.2 F

## 2024-06-23 PROBLEM — E87.6 HYPOKALEMIA: Status: RESOLVED | Noted: 2024-06-22 | Resolved: 2024-06-23

## 2024-06-23 LAB
ABO GROUP BLD: NORMAL
ANION GAP SERPL CALCULATED.3IONS-SCNC: 5 MMOL/L (ref 4–13)
BUN SERPL-MCNC: 20 MG/DL (ref 5–25)
CALCIUM SERPL-MCNC: 8.7 MG/DL (ref 8.4–10.2)
CHLORIDE SERPL-SCNC: 108 MMOL/L (ref 96–108)
CO2 SERPL-SCNC: 23 MMOL/L (ref 21–32)
CREAT SERPL-MCNC: 1.03 MG/DL (ref 0.6–1.3)
ERYTHROCYTE [DISTWIDTH] IN BLOOD BY AUTOMATED COUNT: 14.6 % (ref 11.6–15.1)
GFR SERPL CREATININE-BSD FRML MDRD: 79 ML/MIN/1.73SQ M
GLUCOSE P FAST SERPL-MCNC: 106 MG/DL (ref 65–99)
GLUCOSE SERPL-MCNC: 106 MG/DL (ref 65–140)
HCT VFR BLD AUTO: 40.4 % (ref 36.5–49.3)
HGB BLD-MCNC: 13.4 G/DL (ref 12–17)
MCH RBC QN AUTO: 30 PG (ref 26.8–34.3)
MCHC RBC AUTO-ENTMCNC: 33.2 G/DL (ref 31.4–37.4)
MCV RBC AUTO: 90 FL (ref 82–98)
PLATELET # BLD AUTO: 238 THOUSANDS/UL (ref 149–390)
PMV BLD AUTO: 10.2 FL (ref 8.9–12.7)
POTASSIUM SERPL-SCNC: 3.4 MMOL/L (ref 3.5–5.3)
RBC # BLD AUTO: 4.47 MILLION/UL (ref 3.88–5.62)
RH BLD: POSITIVE
SODIUM SERPL-SCNC: 136 MMOL/L (ref 135–147)
WBC # BLD AUTO: 7.55 THOUSAND/UL (ref 4.31–10.16)

## 2024-06-23 PROCEDURE — 99238 HOSP IP/OBS DSCHRG MGMT 30/<: CPT | Performed by: PHYSICIAN ASSISTANT

## 2024-06-23 PROCEDURE — 85027 COMPLETE CBC AUTOMATED: CPT | Performed by: NURSE PRACTITIONER

## 2024-06-23 PROCEDURE — NC001 PR NO CHARGE: Performed by: PHYSICIAN ASSISTANT

## 2024-06-23 PROCEDURE — 80048 BASIC METABOLIC PNL TOTAL CA: CPT | Performed by: NURSE PRACTITIONER

## 2024-06-23 RX ORDER — HYDRALAZINE HYDROCHLORIDE 20 MG/ML
10 INJECTION INTRAMUSCULAR; INTRAVENOUS ONCE
Status: COMPLETED | OUTPATIENT
Start: 2024-06-23 | End: 2024-06-23

## 2024-06-23 RX ORDER — OXYCODONE HYDROCHLORIDE 5 MG/1
5 TABLET ORAL EVERY 4 HOURS PRN
Qty: 18 TABLET | Refills: 0 | Status: SHIPPED | OUTPATIENT
Start: 2024-06-23 | End: 2024-07-03

## 2024-06-23 RX ORDER — METHOCARBAMOL 500 MG/1
500 TABLET, FILM COATED ORAL EVERY 6 HOURS PRN
Qty: 18 TABLET | Refills: 0 | Status: SHIPPED | OUTPATIENT
Start: 2024-06-23

## 2024-06-23 RX ORDER — ACETAMINOPHEN 325 MG/1
650 TABLET ORAL EVERY 6 HOURS PRN
Start: 2024-06-23

## 2024-06-23 RX ADMIN — ACETAMINOPHEN 975 MG: 325 TABLET, FILM COATED ORAL at 02:28

## 2024-06-23 RX ADMIN — OXYCODONE HYDROCHLORIDE 10 MG: 10 TABLET ORAL at 09:01

## 2024-06-23 RX ADMIN — OXYCODONE HYDROCHLORIDE 10 MG: 10 TABLET ORAL at 00:29

## 2024-06-23 RX ADMIN — ENOXAPARIN SODIUM 30 MG: 30 INJECTION SUBCUTANEOUS at 09:00

## 2024-06-23 RX ADMIN — HYDRALAZINE HYDROCHLORIDE 10 MG: 20 INJECTION, SOLUTION INTRAMUSCULAR; INTRAVENOUS at 00:28

## 2024-06-23 RX ADMIN — ACETAMINOPHEN 975 MG: 325 TABLET, FILM COATED ORAL at 09:42

## 2024-06-23 NOTE — INCIDENTAL FINDINGS
The following findings require follow up:  Radiographic finding   Findin. Atherosclerotic stenosis in bilateral proximal cervical ICAs, estimated 60% on the left and 40% on the right.     2.  Incidental 4 cm paramedian right dorsal neck subcutaneous cystic appearing lesion, likely a sebaceous cyst. Correlate with direct clinical assessment.     3. Mild bilateral gynecomastia     4. GALLBLADDER: Cholelithiasis without findings of acute cholecystitis     5. KIDNEYS/URETERS: 10 mm calculus in the left upper pole. No hydronephrosis. Subcentimeter hypoattenuating renal lesion(s), too small to characterize but statistically likely benign, which do not warrant follow-up (Radiology 2019).     6. REPRODUCTIVE ORGANS: Mild prostatomegaly     7. ABDOMINAL WALL/INGUINAL REGIONS: Small fat-containing umbilical hernia.     8. 1.4 cm sclerotic lesion in the posterior left first rib without aggressive features, likely an osteoma (series 308 image 36).    Follow up required: Yes   Follow up should be done within 2-4 week(s)    Please notify the following clinician to assist with the follow up:   Primary Care Provider.    Incidental finding results were discussed with the Patient and Patient's POA by Wilbert Toth PA-C on 24.   They expressed understanding and all questions answered.

## 2024-06-23 NOTE — PLAN OF CARE
Problem: PAIN - ADULT  Goal: Verbalizes/displays adequate comfort level or baseline comfort level  Description: Interventions:  - Encourage patient to monitor pain and request assistance  - Assess pain using appropriate pain scale  - Administer analgesics based on type and severity of pain and evaluate response  - Implement non-pharmacological measures as appropriate and evaluate response  - Consider cultural and social influences on pain and pain management  - Notify physician/advanced practitioner if interventions unsuccessful or patient reports new pain  Outcome: Progressing     Problem: INFECTION - ADULT  Goal: Absence or prevention of progression during hospitalization  Description: INTERVENTIONS:  - Assess and monitor for signs and symptoms of infection  - Monitor lab/diagnostic results  - Monitor all insertion sites, i.e. indwelling lines, tubes, and drains  - Monitor endotracheal if appropriate and nasal secretions for changes in amount and color  - [place] appropriate cooling/warming therapies per order  - Administer medications as ordered  - Instruct and encourage patient and family to use good hand hygiene technique  - Identify and instruct in appropriate isolation precautions for identified infection/condition  Outcome: Progressing     Problem: DISCHARGE PLANNING  Goal: Discharge to home or other facility with appropriate resources  Description: INTERVENTIONS:  - Identify barriers to discharge w/patient and caregiver  - Arrange for needed discharge resources and transportation as appropriate  - Identify discharge learning needs (meds, wound care, etc.)  - Arrange for interpretive services to assist at discharge as needed  - Refer to Case Management Department for coordinating discharge planning if the patient needs post-hospital services based on physician/advanced practitioner order or complex needs related to functional status, cognitive ability, or social support system  Outcome: Progressing      Problem: SAFETY ADULT  Goal: Patient will remain free of falls  Description: INTERVENTIONS:  - Educate patient/family on patient safety including physical limitations  - Instruct patient to call for assistance with activity   - Consult OT/PT to assist with strengthening/mobility   - Keep Call bell within reach  - Keep bed low and locked with side rails adjusted as appropriate  - Keep care items and personal belongings within reach  - Initiate and maintain comfort rounds  - Make Fall Risk Sign visible to staff  - Offer Toileting every 2 Hours, in advance of need  - Apply yellow socks and bracelet for high fall risk patients  - Consider moving patient to room near nurses station  Outcome: Progressing

## 2024-06-23 NOTE — ASSESSMENT & PLAN NOTE
- Multiple left-sided rib fractures (3-4), present on admission.  - Continue rib fracture protocol.  - Continue to encourage incentive spirometer use and adequate pulmonary hygiene.   - PIC score is pending.  - Appreciate APS evaluation and recommendations.  - Continue multimodal analgesic regimen.  - Supplemental oxygen via nasal cannula as needed to maintain saturations greater than or equal to 94%.  - PT and OT evaluation and treatment as indicated.

## 2024-06-23 NOTE — PROGRESS NOTES
[facility]  Progress Note  Name: Ez Dsouza I  MRN: [id number]  Unit/Bed#: W -01 I Date of Admission: 6/22/2024   Date of Service: 6/23/2024 I Hospital Day: 0    Assessment & Plan   Scalp abrasion  Assessment & Plan  -Local wound care  -Tetanus updated    History of hypertension  Assessment & Plan  -Hold home losartan    MARIA TERESA (acute kidney injury) (HCC)  Assessment & Plan  -MARIA TERESA on presentation, improved on 6/23  -Continue IV fluids  -Hold nephrotoxic medications    Closed fracture of right wrist  Assessment & Plan  - left wrist fracture, present on admission.  - Appreciate Orthopedic surgery evaluation, recommendations and interventions as noted.  - Maintain non weightbearing status on the left upper extremity in splint.  - Monitor left upper extremity neurovascular exam.  - Continue multimodal analgesic regimen.  - Continue DVT prophylaxis.  - PT and OT evaluation and treatment as indicated.  - Outpatient follow up with Orthopedic surgery for re-evaluation.      Acute pain due to trauma  Assessment & Plan  - Acute pain secondary to traumatic injuries.  - Continue multimodal analgesic regimen.  - Bowel regimen as long as using opioids.  - Continue to monitor pain and adjust regimen as indicated.      Fall  Assessment & Plan  - Status post fall with the below noted injuries.  - Fall precautions.  - PT and OT evaluation and treatment as indicated.  - Case Management consultation for disposition planning.      * Closed fracture of multiple ribs of left side  Assessment & Plan  - Multiple left-sided rib fractures (3-4), present on admission.  - Continue rib fracture protocol.  - Continue to encourage incentive spirometer use and adequate pulmonary hygiene.   - PIC score is pending.  - Appreciate APS evaluation and recommendations.  - Continue multimodal analgesic regimen.  - Supplemental oxygen via nasal cannula as needed to maintain saturations greater than or equal to 94%.  - PT and  OT evaluation and treatment as indicated.      Hypokalemia-resolved as of 6/23/2024  Assessment & Plan  - repleted       DVT prophylaxis: SCDs and SQH  PT and OT: eval and treat    Disposition: DC home today.  Outpatient follow-up with primary care provider and orthopedics. Can follow-up with trauma as needed.    TRAUMA TERTIARY SURVEY NOTE    Code status:  Level 1 - Full Code    Consultants: IP CONSULT TO ORTHOPEDIC SURGERY    Subjective   Transfer from: not a transfer    Mechanism of Injury:Fall     Chief Complaint: No complaints    HPI/Last 24 hour events: Patient is offering no new complaints he reports he is feeling well.  Denying any worsening pain.  Reports that he would like to go home.     Objective   Vitals:   Temp:  [98 °F (36.7 °C)-98.2 °F (36.8 °C)] 98.2 °F (36.8 °C)  HR:  [] 75  Resp:  [17-19] 18  BP: (141-204)/() 163/98    I/O       None             Physical Exam:   GENERAL APPEARANCE: NAD  NEURO: GCS 15, no focal deficits  HEENT: Normocephalic  CV: RRR  LUNGS: CTA b/l  GI: non-tender, non-distended  : no serrato  MSK: moving all extremities; neurovascularly intact  SKIN: warm, dry, intact    Invasive Devices       Peripheral Intravenous Line  Duration             Peripheral IV 06/22/24 Left;Ventral (anterior) Forearm <1 day                          PIC Score  PIC Pain Score: 2 (6/23/2024  9:42 AM)  PIC Incentive Spirometry Score: 4 (6/23/2024 12:29 AM)  PIC Cough Description: 3 (6/23/2024 12:29 AM)  PIC Total Score: 8 (6/23/2024 12:29 AM)       If the Total PIC Score </=5, did you consult APS and evaluate patient for further intervention?: no      Pain:    Incentive Spirometry  Cough  3 = Controlled  4 = Above goal volume 3 = Strong  2 = Moderate  3 = Goal to alert volume 2 = Weak  1 = Severe  2 = Below alert volume 1 = Absent     1 = Unable to perform IS      Lab Results: Results: I have personally reviewed all pertinent laboratory/tests results, BMP/CMP:   Lab Results   Component Value  Date    SODIUM 136 06/23/2024    K 3.4 (L) 06/23/2024     06/23/2024    CO2 23 06/23/2024    CO2 29 06/22/2024    BUN 20 06/23/2024    CREATININE 1.03 06/23/2024    GLUCOSE 87 06/22/2024    CALCIUM 8.7 06/23/2024    EGFR 79 06/23/2024   , and CBC:   Lab Results   Component Value Date    WBC 7.55 06/23/2024    HGB 13.4 06/23/2024    HCT 40.4 06/23/2024    MCV 90 06/23/2024     06/23/2024    RBC 4.47 06/23/2024    MCH 30.0 06/23/2024    MCHC 33.2 06/23/2024    RDW 14.6 06/23/2024    MPV 10.2 06/23/2024    NRBC 0 06/22/2024       Imaging Results: I have personally reviewed pertinent reports.    Chest Xray(s): negative for acute findings   FAST exam(s): negative for acute findings   CT Scan(s): positive for acute findings: Right wrist fracture, right rib fractures   Additional Xray(s): negative for acute findings     Other Studies: no other studies

## 2024-06-23 NOTE — DISCHARGE SUMMARY
"  Discharge Summary - Ez Dsouza 58 y.o. male MRN: [id number]    Unit/Bed#: W -01 Encounter: [id number]    Admission Date:   Admission Orders (From admission, onward)       Ordered        06/22/24 2018  Place in Observation  Once                            Admitting Diagnosis: Fall, initial encounter [W19.XXXA]  Multiple injuries due to trauma [T07.XXXA]  Closed fracture of left wrist, initial encounter [S62.102A]    HPI: Per Wilbert Toth, \"Ez Dsouza is a 58 y.o. male who presents with status post fall approximately 10 feet.  Patient was cutting trees down and subsequently fell and blacked out on the ground.  He was then found by bystanders.  Reportedly the patient was not breathing however he quickly came through.  He improved GCS 14 to a GCS 15.  He came to the trauma bay with only complaints of right wrist and right elbow.  Otherwise no complaints on presentation.  Reports he only has a history of hypertension and he takes 1 medications.  He has no anticoagulants or antiplatelet medications.  He is otherwise moving all extremities equally.  No numbness or tingling.  No other chest pain or shortness of breath.  No nausea or vomiting.  No fevers, chills, sweats\"    Procedures Performed:   Orders Placed This Encounter   Procedures    Fast Ultrasound       Summary of Hospital Course: Patient is a 58-year-old male comes in for evaluation for a fall approximately 10 feet.  Patient with significant past medical history of hypertension.  He was noted to have an MARIA TERESA and multiple left-sided rib fractures.  He required admission overnight.  He was also noted to have a right wrist fracture.  Splinted.  Orthopedics signed off and recommended outpatient follow-up in 1 to 2 weeks.  He was monitored overnight and his MARIA TERESA improved.  He was recommended to discharge home with outpatient follow-up with his primary care provider and orthopedics.  Incidental findings discussed extensively at bedside with patient and " patient wife.  Stressed the importance of establishing care with primary care provider to undergo preventative medicine testing as well as routine yearly labs.  He will follow-up with his PCP to discuss incidentals as well.    Significant Findings, Care, Treatment and Services Provided:   XR wrist 2 vw right    Result Date: 6/23/2024  Impression: Post reduction Workstation performed: [id number]     XR elbow 3+ vw right    Result Date: 6/23/2024  Impression: No acute osseous abnormality. Workstation performed: [id number]     CTA neck w wo contrast    Result Date: 6/22/2024  Impression: Somewhat limited assessment due to artifactually distorted image quality. 1.  No evidence of acute traumatic injury in the major cervical vasculature. 2.  Atherosclerotic stenosis in bilateral proximal cervical ICAs, estimated 60% on the left and 40% on the right. 3.  Patent dominant right vertebral artery without stenosis. 4.  Relatively hypoplastic left vertebral artery (normal variant). Atherosclerotic change at the origin. Cannot reliably evaluate the degree of stenosis at the origin and the vessel in the V1 segment due to artifact from motion and adjacent dense IV contrast. The remainder the vessel is patent throughout the neck. 5.  Incidental 4 cm paramedian right dorsal neck subcutaneous cystic appearing lesion, likely a sebaceous cyst. Correlate with direct clinical assessment. I personally discussed this study with Dr. Booker on 6/22/2024 5:35 PM. Workstation performed: [id number]     CT spine cervical wo contrast    Result Date: 6/22/2024  Impression: 1.  No acute cervical spine fracture or traumatic malalignment. 2.  Incidental 4 cm paramedian right dorsal neck subcutaneous cystic appearing lesion likely a sebaceous cyst. Correlate with direct clinical assessment. I personally discussed this study with Dr. Booker on 6/22/2024 5:35 PM. Workstation performed: [id number]     CT head wo contrast    Result Date:  6/22/2024  Impression: 1.  No acute intracranial CT abnormality. 2.  Nonspecific white matter change likely indicating chronic small vessel ischemic disease I personally discussed this study with Dr. Blunt on 6/22/2024 5:35 PM. Workstation performed: [id number]     XR Trauma multiple (SLB/SLRA trauma bay ONLY)    Result Date: 6/22/2024  Impression: No acute cardiopulmonary disease within limitations of supine imaging. Fractures of the left 3rd and 4th ribs seen on the concurrent CT scan are not well visualized on this study. Comminuted intra-articular fracture of the distal radius. Workstation performed: [id number]     CT chest abdomen pelvis w contrast    Result Date: 6/22/2024  Impression: Nondisplaced fractures of the left 3rd and 4th ribs. No other findings of acute traumatic injury in the chest, abdomen or pelvis. Cholelithiasis. Left nephrolithiasis. I personally discussed this study with HARRY BLUNT on 6/22/2024 5:35 PM. Workstation performed: [id number]        Complications: no complications    Discharge Diagnosis:   Patient Active Problem List   Diagnosis    Fall    Acute pain due to trauma    Closed fracture of multiple ribs of left side    Closed fracture of right wrist    MARIA TERESA (acute kidney injury) (HCC)    History of hypertension    Scalp abrasion         Medical Problems       Resolved Problems  Date Reviewed: 6/23/2024            Resolved    Hypokalemia 6/23/2024     Resolved by  Wilbert Toth PA-C          Condition at Discharge: good         Discharge instructions/Information to patient and family:   See after visit summary for information provided to patient and family.      Provisions for Follow-Up Care:  See after visit summary for information related to follow-up care and any pertinent home health orders.      PCP: No primary care provider on file.    Disposition: Home    Planned Readmission: No      Discharge Statement   I spent 23 minutes discharging the patient. This time was spent  on the day of discharge. I had direct contact with the patient on the day of discharge. Additional documentation is required if more than 30 minutes were spent on discharge.     Discharge Medications:  See after visit summary for reconciled discharge medications provided to patient and family.

## 2024-06-23 NOTE — UTILIZATION REVIEW
Initial Clinical Review    Admission: Date/Time/Statement:   Admission Orders (From admission, onward)       Ordered        06/22/24 2018  Place in Observation  Once                          Orders Placed This Encounter   Procedures    Place in Observation     Standing Status:   Standing     Number of Occurrences:   1     Order Specific Question:   Level of Care     Answer:   Med Surg [16]     ED Arrival Information       Expected   -    Arrival   6/22/2024 16:23    Acuity   Emergent              Means of arrival   Ambulance    Escorted by   [facility]    Service   Trauma    Admission type   Emergency              Arrival complaint   -             Chief Complaint   Patient presents with    Trauma     Fall 10 feet. +neck pain +R wrist immobilized. 5-8 minutes unconscious after fall. Now GCS 15.        Initial Presentation: 58 y.o. male with hx HTN who presents to ED via EMS s/p fall approximately 10 feet. Patient was cutting trees down and subsequently fell and blacked out on the ground. He was then found by bystanders. Reportedly the patient was not breathing however he quickly came to. He improved GCS 14 to a GCS 15. Not on AC/AP . Pt c/o right wrist and right elbow pain . On exam, BP elevated RUANO well, 2 + distal pulses .  Left-sided parietal scalp and r elbow abrasions. Tenderness R wrist . Dry mucous membranes. Alert, oriented.ON RA, normal breath sounds .  Labs  : Creat 1.58, K 3.3 . Imaging shows R wrist fx, Multiple L sided rib fx (3-4 ). Pt given IVF, IV Apresoline,K repletion ,  Iv analgesic in ED . Admitted as OBS  with multiple L sided rib fx, R wrist fx. S/p fall. MARIA TERESA . HTN . Plan - Ortho consult, pain control. NWB RUE in splint . Neurovasc checks .  IVF,  trend creat . Hold home losartan. Monitor BP . PT/OT . DVT ppx . O2 as needed to maintain sat >94 % . Pulm toilet with ICS/acapella      Orthopedic consult- Pt fell out of the back of a truck while cutting down tree limbs. He noted  immediate onset of right wrist pain. He has no pain in any other location . Palpable deformity over the distal right wrist with edema present. No significant ecchymosis. Pain R wrist, fingers warm, weel perfused . Pt given block and R distal radius reduced, splinted . Plan- LJ HUGHES in sugartong splint . No immediate orthopedic intervention planned.       Date: 6/23     ED Triage Vitals   Temperature Pulse Respirations Blood Pressure SpO2 Pain Score   06/22/24 1628 06/22/24 1628 06/22/24 1628 06/22/24 1628 06/22/24 1628 06/22/24 1855   98 °F (36.7 °C) 105 18 (!) 197/99 96 % 5     Weight (last 2 days)       Date/Time Weight    06/22/24 16:28:27 97.6 (215.17)            Vital Signs (last 3 days)       Date/Time Temp Pulse Resp BP MAP (mmHg) SpO2 O2 Device Patient Position - Orthostatic VS [place] Coma Scale Score Pain    06/23/24 0901 -- -- -- -- -- -- -- -- -- 7    06/23/24 07:19:59 98.2 °F (36.8 °C) 75 18 141/101 114 98 % -- -- -- --    06/23/24 0228 -- -- -- -- -- -- -- -- -- 5    06/23/24 02:27:37 98.2 °F (36.8 °C) 81 -- 152/93 113 96 % -- -- -- --    06/23/24 0029 -- -- -- -- -- -- -- -- -- 8    06/23/24 00:21:18 -- 67 -- 192/114 140 96 % -- -- -- --    06/22/24 2359 -- 75 18 195/117 148 99 % None (Room air) Sitting -- --    06/22/24 2200 -- 70 18 186/111 142 96 % None (Room air) Lying -- --    06/22/24 2130 -- 71 18 177/103 133 95 % None (Room air) Lying -- --    06/22/24 2115 -- 73 17 168/100 129 97 % None (Room air) Lying -- No Pain    06/22/24 2100 -- 74 -- 179/99 131 93 % None (Room air) -- -- --    06/22/24 2030 -- 75 18 168/92 122 93 % None (Room air) Lying -- --    06/22/24 1915 -- 80 18 181/109 139 96 % None (Room air) Lying -- --    06/22/24 1900 -- 82 18 200/115 151 96 % None (Room air) Lying 15 --    06/22/24 1855 -- -- -- -- -- -- -- -- -- 5    06/22/24 1830 -- 83 19 193/110 -- 96 % None (Room air) Lying 15 --    06/22/24 1800 -- 88 18 190/109 -- 95 % None (Room air) Lying 15 --    06/22/24 17:52:58  -- 100 18 192/109 -- 96 % None (Room air) -- 15 --    06/22/24 1745 -- 90 19 189/111 -- 97 % None (Room air) Lying 15 --    06/22/24 1730 -- 93 18 170/94 126 97 % None (Room air) Lying 15 --    06/22/24 1715 -- 99 19 204/111 -- 98 % None (Room air) Lying 15 --    06/22/24 1700 -- 100 18 195/114 -- 98 % None (Room air) Lying 15 --    06/22/24 1630 -- 102 18 188/100 -- 96 % -- Lying 15 --    06/22/24 16:28:27 98 °F (36.7 °C) 105 18 197/99 -- 96 % None (Room air) -- 15 --            Date and Time R Radial Pulse L Radial Pulse R Pedal Pulse L Pedal Pulse   06/23/24 0200 -- +2 +2 +2   06/22/24 1624 +2 +2 +2 +2       Pertinent Labs/Diagnostic Test Results:   Radiology:  CT chest abdomen pelvis w contrast   Final Interpretation by Armadno Macias MD (06/22 1735)      Nondisplaced fractures of the left 3rd and 4th ribs. No other findings of acute traumatic injury in the chest, abdomen or pelvis.      Cholelithiasis.      Left nephrolithiasis.         I personally discussed this study with HARRY BLUNT on 6/22/2024 5:35 PM.               Workstation performed: [id number]         CTA neck w wo contrast   Final Interpretation by Joel Combs MD (06/22 5356)   Somewhat limited assessment due to artifactually distorted image quality.      1.  No evidence of acute traumatic injury in the major cervical vasculature.   2.  Atherosclerotic stenosis in bilateral proximal cervical ICAs, estimated 60% on the left and 40% on the right.   3.  Patent dominant right vertebral artery without stenosis.   4.  Relatively hypoplastic left vertebral artery (normal variant). Atherosclerotic change at the origin. Cannot reliably evaluate the degree of stenosis at the origin and the vessel in the V1 segment due to artifact from motion and adjacent dense IV    contrast. The remainder the vessel is patent throughout the neck.   5.  Incidental 4 cm paramedian right dorsal neck subcutaneous cystic appearing lesion, likely a sebaceous cyst.  Correlate with direct clinical assessment.               I personally discussed this study with Dr. Booker on 6/22/2024 5:35 PM.                  Workstation performed: [id number]         CT head wo contrast   Final Interpretation by Joel Combs MD (06/22 1752)      1.  No acute intracranial CT abnormality.   2.  Nonspecific white matter change likely indicating chronic small vessel ischemic disease                  I personally discussed this study with Dr. Booker on 6/22/2024 5:35 PM.         Workstation performed: [id number]         CT spine cervical wo contrast   Final Interpretation by Joel Combs MD (06/22 1754)      1.  No acute cervical spine fracture or traumatic malalignment.   2.  Incidental 4 cm paramedian right dorsal neck subcutaneous cystic appearing lesion likely a sebaceous cyst. Correlate with direct clinical assessment.            I personally discussed this study with Dr. Booker on 6/22/2024 5:35 PM.                     Workstation performed: [id number]         XR Trauma multiple (SLB/RA trauma bay ONLY)   Final Interpretation by Armando Macias MD (06/22 1739)      No acute cardiopulmonary disease within limitations of supine imaging. Fractures of the left 3rd and 4th ribs seen on the concurrent CT scan are not well visualized on this study.         Comminuted intra-articular fracture of the distal radius.         Workstation performed: [id number]         XR chest 1 view    (Results Pending)   XR wrist 3+ vw right    (Results Pending)   XR elbow 3+ vw right    (Results Pending)   XR wrist 2 vw right    (Results Pending)     Cardiology:  No orders to display     GI:  No orders to display           Results from last 7 days   Lab Units 06/23/24  0524 06/22/24  1633 06/22/24  1632   WBC Thousand/uL 7.55  --  6.77   HEMOGLOBIN g/dL 13.4  --  12.8   I STAT HEMOGLOBIN g/dl  --  12.6  --    HEMATOCRIT % 40.4  --  37.7   HEMATOCRIT, ISTAT %  --  37  --    PLATELETS Thousands/uL 238  --  247  "  TOTAL NEUT ABS Thousands/µL  --   --  4.39         Results from last 7 days   Lab Units 06/23/24  0524 06/22/24  1633 06/22/24  1632   SODIUM mmol/L 136  --  140   POTASSIUM mmol/L 3.4*  --  3.3*   CHLORIDE mmol/L 108  --  107   CO2 mmol/L 23  --  26   CO2, I-STAT mmol/L  --  29  --    ANION GAP mmol/L 5  --  7   BUN mg/dL 20  --  23   CREATININE mg/dL 1.03  --  1.58*   EGFR ml/min/1.73sq m 79  --  47   CALCIUM mg/dL 8.7  --  9.0   CALCIUM, IONIZED, ISTAT mmol/L  --  1.20  --    MAGNESIUM mg/dL  --   --  2.0   PHOSPHORUS mg/dL  --   --  3.2             Results from last 7 days   Lab Units 06/23/24  0524 06/22/24  1632   GLUCOSE RANDOM mg/dL 106 86             No results found for: \"BETA-HYDROXYBUTYRATE\"           Results from last 7 days   Lab Units 06/22/24  1633   PH, LEIGH ANN I-STAT  7.384   PCO2, LEIGH ANN ISTAT mm HG 46.7   PO2, LEIGH ANN ISTAT mm HG 36.0   HCO3, LEIGH ANN ISTAT mmol/L 27.9   I STAT BASE EXC mmol/L 2   I STAT O2 SAT % 67                                                                                                                               ED Treatment-Medication Administration from 06/22/2024 1619 to 06/23/2024 0008         Date/Time Order Dose Route Action     06/22/2024 1633 tetanus-diphtheria-acellular pertussis (BOOSTRIX) IM injection 0.5 mL 0.5 mL Intramuscular Given     06/22/2024 1652 iohexol (OMNIPAQUE) 350 MG/ML injection (MULTI-DOSE) 100 mL 100 mL Intravenous Given     06/22/2024 1911 multi-electrolyte (PLASMALYTE-A/ISOLYTE-S PH 7.4) IV solution 75 mL/hr Intravenous New Bag     06/22/2024 1855 oxyCODONE (ROXICODONE) IR tablet 5 mg 5 mg Oral Given     06/22/2024 1942 HYDROmorphone (DILAUDID) injection 0.5 mg 0.5 mg Intravenous Given     06/22/2024 1855 acetaminophen (TYLENOL) tablet 975 mg 975 mg Oral Given     06/22/2024 1855 potassium chloride (Klor-Con M20) CR tablet 40 mEq 40 mEq Oral Given     06/22/2024 2214 hydrALAZINE (APRESOLINE) injection 5 mg 5 mg Intravenous Given     06/22/2024 1926 " lidocaine (PF) (XYLOCAINE-MPF) 1 % injection 20 mL 10 mL Infiltration Given by Other     06/22/2024 2052 enoxaparin (LOVENOX) subcutaneous injection 30 mg 30 mg Subcutaneous Given            Past Medical History:   Diagnosis Date    Hypertension      Present on Admission:  **None**      Admitting Diagnosis: Fall, initial encounter [W19.XXXA]  Multiple injuries due to trauma [T07.XXXA]  Closed fracture of left wrist, initial encounter [S62.102A]  Age/Sex: 58 y.o. male  Admission Orders:  Scheduled Medications:  acetaminophen, 975 mg, Oral, Q8H  enoxaparin, 30 mg, Subcutaneous, Q12H      hydrALAZINE (APRESOLINE) injection 10 mg  Dose: 10 mg  Freq: Once Route: IV x1 6/23 @ 0028      Continuous IV Infusions:   multi-electrolyte (PLASMALYTE-A/ISOLYTE-S PH 7.4) IV solution  Rate: 75 mL/hr Dose: 75 mL/hr  Freq: Continuous Route: IV  Indications of Use: IV Hydration,IV Resuscitation  Last Dose: 75 mL/hr (06/22/24 1911)  Start: 06/22/24 1800 End: 06/23/24 0706  PRN Meds:  hydrALAZINE, 5 mg, Intravenous, Q6H PRN  HYDROmorphone, 0.5 mg, Intravenous, Q2H PRN  methocarbamol, 500 mg, Oral, Q6H PRN  oxyCODONE, 10 mg, Oral, Q4H PRN x2 6/23   oxyCODONE, 5 mg, Oral, Q4H PRN    Reg diet    Rib fx monitoring      SCD   OOB to chair TID    spirometry    IP CONSULT TO ORTHOPEDIC SURGERY    Network Utilization Review Department  ATTENTION: Please call with any questions or concerns to [phone] and carefully listen to the prompts so that you are directed to the right person. All voicemails are confidential.   For Discharge needs, contact Care Management DC Support Team at [phone] opt. [phone]  Send all requests for admission clinical reviews, approved or denied determinations and any other requests to dedicated fax number below belonging to the campus where the patient is receiving treatment. List of dedicated fax numbers for the Facilities:  FACILITY NAME UR FAX NUMBER   ADMISSION DENIALS (Administrative/Medical Necessity) [phone]    DISCHARGE SUPPORT TEAM (NETWORK) [phone]   PARENT CHILD HEALTH (Maternity/NICU/Pediatrics) [phone]   [facility] [phone]   [facility] [phone]   [facility] [phone]   [facility] [phone]   [facility] [phone]   [facility] [phone]   [facility] [phone]   [facility] [phone]   [facility] [phone]   Critical access hospital [phone]   [facility] [phone]   [facility] [phone]

## 2024-06-23 NOTE — PLAN OF CARE
Problem: PAIN - ADULT  Goal: Verbalizes/displays adequate comfort level or baseline comfort level  Description: Interventions:  - Encourage patient to monitor pain and request assistance  - Assess pain using appropriate pain scale  - Administer analgesics based on type and severity of pain and evaluate response  - Implement non-pharmacological measures as appropriate and evaluate response  - Consider cultural and social influences on pain and pain management  - Notify physician/advanced practitioner if interventions unsuccessful or patient reports new pain  6/23/2024 0626 by Elizabeth Jose RN  Outcome: Progressing  6/23/2024 0626 by Elizabeth Jose RN  Outcome: Progressing     Problem: INFECTION - ADULT  Goal: Absence or prevention of progression during hospitalization  Description: INTERVENTIONS:  - Assess and monitor for signs and symptoms of infection  - Monitor lab/diagnostic results  - Monitor all insertion sites, i.e. indwelling lines, tubes, and drains  - Monitor endotracheal if appropriate and nasal secretions for changes in amount and color  - [place] appropriate cooling/warming therapies per order  - Administer medications as ordered  - Instruct and encourage patient and family to use good hand hygiene technique  - Identify and instruct in appropriate isolation precautions for identified infection/condition  6/23/2024 0626 by Elizabeth Jose RN  Outcome: Progressing  6/23/2024 0626 by Elizabeth Jose RN  Outcome: Progressing  Goal: Absence of fever/infection during neutropenic period  Description: INTERVENTIONS:  - Monitor WBC    6/23/2024 0626 by Elizabeth Jose RN  Outcome: Progressing  6/23/2024 0626 by Elizabeth Jose RN  Outcome: Progressing     Problem: SAFETY ADULT  Goal: Patient will remain free of falls  Description: INTERVENTIONS:  - Educate patient/family on patient safety including physical limitations  - Instruct patient to call for assistance with  activity   - Consult OT/PT to assist with strengthening/mobility   - Keep Call bell within reach  - Keep bed low and locked with side rails adjusted as appropriate  - Keep care items and personal belongings within reach  - Initiate and maintain comfort rounds  - Make Fall Risk Sign visible to staff  - Offer Toileting every  Hours, in advance of need  - Initiate/Maintain alarm  - Obtain necessary fall risk management equipment:   - Apply yellow socks and bracelet for high fall risk patients  - Consider moving patient to room near nurses station  6/23/2024 0626 by Elizabeth Jose RN  Outcome: Progressing  6/23/2024 0626 by Elizabeth Jose RN  Outcome: Progressing  Goal: Maintain or return to baseline ADL function  Description: INTERVENTIONS:  -  Assess patient's ability to carry out ADLs; assess patient's baseline for ADL function and identify physical deficits which impact ability to perform ADLs (bathing, care of mouth/teeth, toileting, grooming, dressing, etc.)  - Assess/evaluate cause of self-care deficits   - Assess range of motion  - Assess patient's mobility; develop plan if impaired  - Assess patient's need for assistive devices and provide as appropriate  - Encourage maximum independence but intervene and supervise when necessary  - Involve family in performance of ADLs  - Assess for home care needs following discharge   - Consider OT consult to assist with ADL evaluation and planning for discharge  - Provide patient education as appropriate  6/23/2024 0626 by Elizabeth Jose RN  Outcome: Progressing  6/23/2024 0626 by Elizabeth Jose RN  Outcome: Progressing  Goal: Maintains/Returns to pre admission functional level  Description: INTERVENTIONS:  - Perform AM-PAC 6 Click Basic Mobility/ Daily Activity assessment daily.  - Set and communicate daily mobility goal to care team and patient/family/caregiver.   - Collaborate with rehabilitation services on mobility goals if consulted  -  Perform Range of Motion 3 times a day.  - Reposition patient every 3 hours.  - Dangle patient 3 times a day  - Stand patient 3 times a day  - Ambulate patient 3 times a day  - Out of bed to chair 3 times a day   - Out of bed for meals 3 times a day  - Out of bed for toileting  - Record patient progress and toleration of activity level   6/23/2024 0626 by Elizabeth Jose, AMBER  Outcome: Progressing  6/23/2024 0626 by Elizabeth Jose, AMBER  Outcome: Progressing

## 2024-06-23 NOTE — DISCHARGE INSTR - AVS FIRST PAGE
Discharge Instructions - Orthopedics  Ez Dsouza 58 y.o. male MRN: [id number]  Unit/Bed#: ED-42    Weight Bearing Status:                                           Non weight bearing to the right upper extremity in splint.     Pain:  Continue analgesics as directed    Dressing Instructions:   Please keep clean, dry and intact until follow up     Appt Instructions:   If you do not have your appointment, please call the clinic at [phone] to schedule with Dr. Kowalski.   Otherwise follow up as scheduled.    Contact the office sooner if you experience any increased numbness/tingling in the extremities.      Rib fractures:  Seek medical attention if you develop worsening chest pain or shortness of breath, dizziness/lightheadness, fevers/chills or sweats.   No heavy lifting, pushing or pulling >10 pounds and no strenuous physical activity until cleared by trauma.   No work or driving while taking narcotic pain medications and until cleared by trauma.   Use your incentive spirometer at least hourly while awake.     Narcotic Pain medications:  Take narcotic pain medications only as prescribed.   Consult with your doctor prior to starting a new medication while on narcotic pain medications. Do not drink alcohol while taking narcotic pain medications.   No working, driving, or hazardous activity while taking narcotics.   If you need to request a refill, please contact the office 48 hours prior to running out of your medications.

## 2024-06-23 NOTE — ASSESSMENT & PLAN NOTE
-MARIA TEERSA on presentation, improved on 6/23  -Continue IV fluids  -Hold nephrotoxic medications

## 2024-06-24 ENCOUNTER — TELEPHONE (OUTPATIENT)
Age: 59
End: 2024-06-24

## 2024-06-24 ENCOUNTER — OFFICE VISIT (OUTPATIENT)
Dept: OBGYN CLINIC | Facility: CLINIC | Age: 59
End: 2024-06-24
Payer: COMMERCIAL

## 2024-06-24 VITALS
WEIGHT: 215 LBS | DIASTOLIC BLOOD PRESSURE: 100 MMHG | BODY MASS INDEX: 27.59 KG/M2 | HEIGHT: 74 IN | SYSTOLIC BLOOD PRESSURE: 150 MMHG

## 2024-06-24 DIAGNOSIS — S52.601A CLOSED FRACTURE OF DISTAL ENDS OF RIGHT RADIUS AND ULNA, INITIAL ENCOUNTER: Primary | ICD-10-CM

## 2024-06-24 DIAGNOSIS — S52.501A CLOSED FRACTURE OF DISTAL ENDS OF RIGHT RADIUS AND ULNA, INITIAL ENCOUNTER: Primary | ICD-10-CM

## 2024-06-24 PROCEDURE — 99203 OFFICE O/P NEW LOW 30 MIN: CPT | Performed by: SURGERY

## 2024-06-24 NOTE — TELEPHONE ENCOUNTER
Patient wife called the RX Refill Line. Message is being forwarded to the office.     Patients wife  is requesting the prescription for Tylenol be resent to the pharmacy due to it did not go through.  She was able to to  the other medication just not the Tylenol.       Please review and send if appropriate       Please contact patient directly at [phone]

## 2024-06-24 NOTE — PROGRESS NOTES
ORTHOPAEDIC HAND, WRIST, AND ELBOW OFFICE  VISIT       ASSESSMENT/PLAN:      58 y.o. year old male who presents with a Right Distal radius fracture DOI 6/22/2024    XR were reviewed and discussed with the patient.  Extraarticular fracture with reduced radial inclination, mild dorsal translation, dorsal tilt neutral  Discussed surgical and conservative options along with the long term data associated with each option.  Discussed treatment course and healing along with protocols for each option.     Patient would prefer to continue with closed immobilization in the sugar tong with repeat images in 7-10 days along with further discussion of treatment options based on stability of his fracture  NSAID's as needed for pain  Encourage finger motion    The patient verbalized understanding of exam findings and treatment plan. We engaged in the shared decision-making process and treatment options were discussed at length with the patient. Surgical and conservative management discussed today along with risks and benefits.    Diagnoses and all orders for this visit:    Closed fracture of distal ends of right radius and ulna, initial encounter        Follow Up:  Return in about 9 days (around 7/3/2024) for Recheck with X-rays.    To Do Next Visit:  Re-evaluation of current issue and X-rays of the  right  wrist Splint off      ____________________________________________________________________________________________________________________________________________      CHIEF COMPLAINT:  Chief Complaint   Patient presents with    Right Wrist - Fracture     Fall 6/22          SUBJECTIVE:  Ez Dsouza is a 58 y.o. year old  male who presents with Right wrist pain     Patient states he was cutting trees and branches down when he fell approx 10 feet on to the ground DOI 6/22/2024  He was seen in the ED. ED note was reviewed  He presents in a sugartong with minimal wrist pain      I have personally reviewed all the relevant PMH,  PSH, SH, FH, Medications and allergies      PAST MEDICAL HISTORY:  Past Medical History:   Diagnosis Date    Hypertension        PAST SURGICAL HISTORY:  Past Surgical History:   Procedure Laterality Date    MOLE REMOVAL  2024    Back       FAMILY HISTORY:  Family History   Problem Relation Age of Onset    Hypertension Mother     Hypertension Father     Heart disease Father        SOCIAL HISTORY:  Social History     Tobacco Use    Smoking status: Every Day     Current packs/day: 0.50     Types: Cigarettes    Smokeless tobacco: Never   Vaping Use    Vaping status: Never Used   Substance Use Topics    Alcohol use: Yes     Comment: social    Drug use: Never       MEDICATIONS:    Current Outpatient Medications:     acetaminophen (TYLENOL) 325 mg tablet, Take 2 tablets (650 mg total) by mouth every 6 (six) hours as needed for mild pain, Disp: , Rfl:     losartan (COZAAR) 100 MG tablet, Take 25 mg by mouth daily, Disp: , Rfl:     oxyCODONE (ROXICODONE) 5 immediate release tablet, Take 1 tablet (5 mg total) by mouth every 4 (four) hours as needed for moderate pain for up to 10 days Max Daily Amount: 30 mg, Disp: 18 tablet, Rfl: 0    methocarbamol (ROBAXIN) 500 mg tablet, Take 1 tablet (500 mg total) by mouth every 6 (six) hours as needed for muscle spasms (Patient not taking: Reported on 6/24/2024), Disp: 18 tablet, Rfl: 0    ALLERGIES:  No Known Allergies        REVIEW OF SYSTEMS:  Review of Systems   Constitutional:  Negative for chills and fever.   HENT:  Negative for ear pain and sore throat.    Eyes:  Negative for pain and visual disturbance.   Respiratory:  Negative for cough and shortness of breath.    Cardiovascular:  Negative for chest pain and palpitations.   Gastrointestinal:  Negative for abdominal pain and vomiting.   Genitourinary:  Negative for dysuria and hematuria.   Musculoskeletal:  Negative for arthralgias and back pain.   Skin:  Negative for color change and rash.   Neurological:  Negative for seizures  "and syncope.   All other systems reviewed and are negative.      VITALS:  Vitals:    06/24/24 1514   BP: 150/100       LABS:  HgA1c: No results found for: \"HGBA1C\"  BMP:   Lab Results   Component Value Date    GLUCOSE 87 06/22/2024    CALCIUM 8.7 06/23/2024    K 3.4 (L) 06/23/2024    CO2 23 06/23/2024     06/23/2024    BUN 20 06/23/2024    CREATININE 1.03 06/23/2024       _____________________________________________________  PHYSICAL EXAMINATION:  General: well developed and well nourished, alert, oriented times 3, and appears comfortable  Psychiatric: Normal  HEENT: Normocephalic, Atraumatic Trachea Midline, No torticollis  Pulmonary: No audible wheezing or respiratory distress   Abdomen/GI: Non tender, non distended   Cardiovascular: No pitting edema, 2+ radial pulse   Skin: No masses, erythema, lacerations, fluctation, ulcerations  Neurovascular: Sensation Intact to the Median, Ulnar, Radial Nerve, Motor Intact to the Median, Ulnar, Radial Nerve, and Pulses Intact  Musculoskeletal: Normal, except as noted in detailed exam and in HPI.      MUSCULOSKELETAL EXAMINATION:  Right hand:  SILT  Sugartong left on  Mild swelling of all digits  Able to move all fingers    Left hand:    ___________________________________________________  STUDIES REVIEWED:  I have personally reviewed AP lateral and oblique radiographs of Right wrist   which demonstrate        RIGHT WRIST     3 views reviewed.     Comminuted intra-articular fracture of the distal radius with slight posterior angulation and displacement. No dislocation.     Diffuse soft tissue swelling.    FINDINGS:     Distal radius intra-articular fracture post reduction improved alignment. Splint present.     No significant degenerative changes.     No lytic or blastic osseous lesion.     Unremarkable soft tissues.     IMPRESSION:     Post reduction           PROCEDURES PERFORMED:  Procedures  No Procedures performed " today    _____________________________________________________      Scribe Attestation      I,:  Trey Daugherty am acting as a scribe while in the presence of the attending physician.:       I,:  Andrade Kowalski MD personally performed the services described in this documentation    as scribed in my presence.:

## 2024-06-24 NOTE — TELEPHONE ENCOUNTER
Hello,  Please advise if the following patient can be forced onto the schedule:    Patient: Ez Dsouza    : 63    MRN: [id number]     Call back #: [phone] (N[place])     Insurance: [employer]     Reason for appointment: Closed fracture of right wrist     Requested doctor and/or location: [place]       Thank you.

## 2024-06-26 ENCOUNTER — APPOINTMENT (OUTPATIENT)
Dept: LAB | Facility: CLINIC | Age: 59
End: 2024-06-26
Payer: COMMERCIAL

## 2024-06-26 DIAGNOSIS — E78.2 MIXED HYPERLIPIDEMIA: ICD-10-CM

## 2024-06-26 DIAGNOSIS — I10 ESSENTIAL HYPERTENSION, MALIGNANT: ICD-10-CM

## 2024-06-26 LAB
ALBUMIN SERPL BCG-MCNC: 4.2 G/DL (ref 3.5–5)
ALP SERPL-CCNC: 98 U/L (ref 34–104)
ALT SERPL W P-5'-P-CCNC: 29 U/L (ref 7–52)
ANION GAP SERPL CALCULATED.3IONS-SCNC: 7 MMOL/L (ref 4–13)
AST SERPL W P-5'-P-CCNC: 29 U/L (ref 13–39)
BACTERIA UR QL AUTO: NORMAL /HPF
BASOPHILS # BLD AUTO: 0.07 THOUSANDS/ÂΜL (ref 0–0.1)
BASOPHILS NFR BLD AUTO: 1 % (ref 0–1)
BILIRUB SERPL-MCNC: 0.55 MG/DL (ref 0.2–1)
BILIRUB UR QL STRIP: NEGATIVE
BUN SERPL-MCNC: 22 MG/DL (ref 5–25)
CALCIUM SERPL-MCNC: 9 MG/DL (ref 8.4–10.2)
CHLORIDE SERPL-SCNC: 102 MMOL/L (ref 96–108)
CHOLEST SERPL-MCNC: 236 MG/DL
CLARITY UR: CLEAR
CO2 SERPL-SCNC: 28 MMOL/L (ref 21–32)
COLOR UR: ABNORMAL
CREAT SERPL-MCNC: 1.1 MG/DL (ref 0.6–1.3)
EOSINOPHIL # BLD AUTO: 0.19 THOUSAND/ÂΜL (ref 0–0.61)
EOSINOPHIL NFR BLD AUTO: 2 % (ref 0–6)
ERYTHROCYTE [DISTWIDTH] IN BLOOD BY AUTOMATED COUNT: 14.4 % (ref 11.6–15.1)
ERYTHROCYTE [SEDIMENTATION RATE] IN BLOOD: 64 MM/HOUR (ref 0–19)
GFR SERPL CREATININE-BSD FRML MDRD: 73 ML/MIN/1.73SQ M
GLUCOSE P FAST SERPL-MCNC: 113 MG/DL (ref 65–99)
GLUCOSE UR STRIP-MCNC: NEGATIVE MG/DL
HCT VFR BLD AUTO: 42.7 % (ref 36.5–49.3)
HDLC SERPL-MCNC: 39 MG/DL
HGB BLD-MCNC: 14 G/DL (ref 12–17)
HGB UR QL STRIP.AUTO: ABNORMAL
IMM GRANULOCYTES # BLD AUTO: 0.04 THOUSAND/UL (ref 0–0.2)
IMM GRANULOCYTES NFR BLD AUTO: 1 % (ref 0–2)
KETONES UR STRIP-MCNC: NEGATIVE MG/DL
LDH SERPL-CCNC: 200 U/L (ref 140–271)
LDLC SERPL CALC-MCNC: 171 MG/DL (ref 0–100)
LEUKOCYTE ESTERASE UR QL STRIP: NEGATIVE
LYMPHOCYTES # BLD AUTO: 1.22 THOUSANDS/ÂΜL (ref 0.6–4.47)
LYMPHOCYTES NFR BLD AUTO: 15 % (ref 14–44)
MCH RBC QN AUTO: 29.8 PG (ref 26.8–34.3)
MCHC RBC AUTO-ENTMCNC: 32.8 G/DL (ref 31.4–37.4)
MCV RBC AUTO: 91 FL (ref 82–98)
MONOCYTES # BLD AUTO: 0.79 THOUSAND/ÂΜL (ref 0.17–1.22)
MONOCYTES NFR BLD AUTO: 10 % (ref 4–12)
NEUTROPHILS # BLD AUTO: 5.63 THOUSANDS/ÂΜL (ref 1.85–7.62)
NEUTS SEG NFR BLD AUTO: 71 % (ref 43–75)
NITRITE UR QL STRIP: NEGATIVE
NON-SQ EPI CELLS URNS QL MICRO: NORMAL /HPF
NONHDLC SERPL-MCNC: 197 MG/DL
NRBC BLD AUTO-RTO: 0 /100 WBCS
PH UR STRIP.AUTO: 6.5 [PH]
PLATELET # BLD AUTO: 277 THOUSANDS/UL (ref 149–390)
PMV BLD AUTO: 10.4 FL (ref 8.9–12.7)
POTASSIUM SERPL-SCNC: 3.8 MMOL/L (ref 3.5–5.3)
PROT SERPL-MCNC: 7.6 G/DL (ref 6.4–8.4)
PROT UR STRIP-MCNC: ABNORMAL MG/DL
PSA SERPL-MCNC: 1.4 NG/ML (ref 0–4)
RBC # BLD AUTO: 4.7 MILLION/UL (ref 3.88–5.62)
RBC #/AREA URNS AUTO: NORMAL /HPF
SODIUM SERPL-SCNC: 137 MMOL/L (ref 135–147)
SP GR UR STRIP.AUTO: 1.02 (ref 1–1.03)
TRIGL SERPL-MCNC: 132 MG/DL
TSH SERPL DL<=0.05 MIU/L-ACNC: 0.97 UIU/ML (ref 0.45–4.5)
UROBILINOGEN UR STRIP-ACNC: <2 MG/DL
WBC # BLD AUTO: 7.94 THOUSAND/UL (ref 4.31–10.16)
WBC #/AREA URNS AUTO: NORMAL /HPF

## 2024-06-26 PROCEDURE — 36415 COLL VENOUS BLD VENIPUNCTURE: CPT

## 2024-06-26 PROCEDURE — G0103 PSA SCREENING: HCPCS

## 2024-06-26 PROCEDURE — 81001 URINALYSIS AUTO W/SCOPE: CPT

## 2024-06-26 PROCEDURE — 80053 COMPREHEN METABOLIC PANEL: CPT

## 2024-06-26 PROCEDURE — 83615 LACTATE (LD) (LDH) ENZYME: CPT

## 2024-06-26 PROCEDURE — 84443 ASSAY THYROID STIM HORMONE: CPT

## 2024-06-26 PROCEDURE — 85652 RBC SED RATE AUTOMATED: CPT

## 2024-06-26 PROCEDURE — 85025 COMPLETE CBC W/AUTO DIFF WBC: CPT

## 2024-06-26 PROCEDURE — 80061 LIPID PANEL: CPT

## 2024-07-03 ENCOUNTER — HOSPITAL ENCOUNTER (OUTPATIENT)
Dept: CT IMAGING | Facility: HOSPITAL | Age: 59
Discharge: HOME | End: 2024-07-03
Attending: INTERNAL MEDICINE
Payer: COMMERCIAL

## 2024-07-03 ENCOUNTER — OFFICE VISIT (OUTPATIENT)
Dept: OBGYN CLINIC | Facility: CLINIC | Age: 59
End: 2024-07-03
Payer: COMMERCIAL

## 2024-07-03 VITALS — WEIGHT: 215 LBS | HEIGHT: 74 IN | BODY MASS INDEX: 27.59 KG/M2

## 2024-07-03 DIAGNOSIS — S52.501A CLOSED FRACTURE OF DISTAL ENDS OF RIGHT RADIUS AND ULNA, INITIAL ENCOUNTER: Primary | ICD-10-CM

## 2024-07-03 DIAGNOSIS — S52.601A CLOSED FRACTURE OF DISTAL ENDS OF RIGHT RADIUS AND ULNA, INITIAL ENCOUNTER: Primary | ICD-10-CM

## 2024-07-03 DIAGNOSIS — S62.101D CLOSED FRACTURE OF RIGHT WRIST WITH ROUTINE HEALING, SUBSEQUENT ENCOUNTER: ICD-10-CM

## 2024-07-03 DIAGNOSIS — I25.10 ATHEROSCLEROTIC HEART DISEASE OF NATIVE CORONARY ARTERY WITHOUT ANGINA PECTORIS: ICD-10-CM

## 2024-07-03 PROCEDURE — 75571 CT HRT W/O DYE W/CA TEST: CPT

## 2024-07-03 PROCEDURE — 99213 OFFICE O/P EST LOW 20 MIN: CPT | Performed by: SURGERY

## 2024-07-03 NOTE — PROGRESS NOTES
Assessment    Right distal radius fracture  DOI:  6/22/2024  X-rays performed today demonstrate stable extra-articular fracture with shortening, unchanged alignment compared to prior      Plan    Continue non-operative management.  NWB RUE, transition to removable Velcro wrist brace dispensed in the office today which is to be removed for hygiene only.  Elevation for swelling.  Over-the-counter pain medications as needed.  Follow-up in 2 weeks for re-evaluation.        Subjective     HPI    Patient ID:  Ez Dsouza is a right hand dominant 58 y.o. male here for follow-up.  He states today he still has pain in the right wrist but overall it is better.  He has maintained splint and restrictions.  No numbness tingling into the fingers.  Has been taking Tylenol as needed for the pain.  No acute complaints offered today.    The following portions of the patient's history were reviewed and updated as appropriate: allergies, current medications, past family history, past medical history, past social history, past surgical history, and problem list.    Review of Systems     Objective    Imaging:  Right wrist x-rays performed in the office today 7/3/2024 demonstrate stable extra-articular distal radius fracture with shortening, largely unchanged compared to prior x-ray.    Physical Exam     General appearance:  NAD   Cardiac:  Regular rate  Lungs:  Unlabored breathing  Abdomen:  Non-distended    Orthopedic Examination:  Right wrist     Inspection: No open wounds or erythema.  There is dorsal wrist swelling and resolving ecchymosis.    Palpation: Tender to palpation distal radius after site    Range-of-motion: Deferred    Strength: Deferred    Sensation: Intact to light touch median radial ulnar nerve distribution    Special Tests: Palpable radial pulse

## 2024-07-12 ENCOUNTER — HOSPITAL ENCOUNTER (OUTPATIENT)
Dept: NON INVASIVE DIAGNOSTICS | Facility: CLINIC | Age: 59
Discharge: HOME | End: 2024-07-12
Attending: INTERNAL MEDICINE

## 2024-07-12 DIAGNOSIS — Z86.79 PERSONAL HISTORY OF CAROTID STENOSIS: ICD-10-CM

## 2024-07-17 ENCOUNTER — OFFICE VISIT (OUTPATIENT)
Dept: OBGYN CLINIC | Facility: CLINIC | Age: 59
End: 2024-07-17
Payer: COMMERCIAL

## 2024-07-17 VITALS — BODY MASS INDEX: 27.6 KG/M2 | SYSTOLIC BLOOD PRESSURE: 180 MMHG | DIASTOLIC BLOOD PRESSURE: 100 MMHG | WEIGHT: 215 LBS

## 2024-07-17 DIAGNOSIS — S62.101D CLOSED FRACTURE OF RIGHT WRIST WITH ROUTINE HEALING, SUBSEQUENT ENCOUNTER: Primary | ICD-10-CM

## 2024-07-17 PROCEDURE — 99213 OFFICE O/P EST LOW 20 MIN: CPT | Performed by: SURGERY

## 2024-07-17 NOTE — PROGRESS NOTES
Assessment    Right distal radius fracture  DOI:  6/22/2024  X-rays performed today demonstrate stable extra-articular fracture with shortening, unchanged alignment compared to prior, callous formation starting      Plan    May start increasing motion of the wrist. Exercises shown  May wean from the splint but should wear for sleep. After another week, may remove more often. Start activities as tolerated at that point  No heavy lifting, pushing, pulling or WB  NSAID's as needed  F/U 4 weeks        Subjective     HPI    Patient ID:  Ez Dsouza is a right hand dominant 58 y.o. male here for follow-up.    He states he is still doing well with minimal pain. He has been splint complaint. No numbness or tingling.      The following portions of the patient's history were reviewed and updated as appropriate: allergies, current medications, past family history, past medical history, past social history, past surgical history, and problem list.    Review of Systems     Objective    7/17/2024 Imagining: Stable alignment compared to prior films with callous bone formation    Imaging:  Right wrist x-rays performed in the office today 7/3/2024 demonstrate stable extra-articular distal radius fracture with shortening, largely unchanged compared to prior x-ray.    Physical Exam     General appearance:  NAD   Cardiac:  Regular rate  Lungs:  Unlabored breathing  Abdomen:  Non-distended    Orthopedic Examination:  Right wrist     Inspection: No open wounds or erythema.  There is dorsal wrist swelling and resolving ecchymosis.    Palpation: Tender to palpation distal radius after site    Range-of-motion: AROM Flexion: 30 deg  Extension 30 deg  Supination Full  Pronation Full    Strength: Deferred    Sensation: Intact to light touch median radial ulnar nerve distribution    Special Tests: Palpable radial pulse

## 2024-08-07 RX ORDER — METOPROLOL SUCCINATE 100 MG/1
100 TABLET, EXTENDED RELEASE ORAL DAILY
COMMUNITY
Start: 2024-07-29

## 2024-08-09 ENCOUNTER — HOSPITAL ENCOUNTER (OUTPATIENT)
Dept: CT IMAGING | Facility: HOSPITAL | Age: 59
Discharge: HOME | End: 2024-08-09

## 2024-08-14 ENCOUNTER — OFFICE VISIT (OUTPATIENT)
Dept: OBGYN CLINIC | Facility: CLINIC | Age: 59
End: 2024-08-14
Payer: COMMERCIAL

## 2024-08-14 VITALS
SYSTOLIC BLOOD PRESSURE: 168 MMHG | WEIGHT: 215 LBS | DIASTOLIC BLOOD PRESSURE: 110 MMHG | BODY MASS INDEX: 27.59 KG/M2 | HEIGHT: 74 IN

## 2024-08-14 DIAGNOSIS — S62.101D CLOSED FRACTURE OF RIGHT WRIST WITH ROUTINE HEALING, SUBSEQUENT ENCOUNTER: Primary | ICD-10-CM

## 2024-08-14 PROCEDURE — 99213 OFFICE O/P EST LOW 20 MIN: CPT | Performed by: SURGERY

## 2024-08-14 RX ORDER — VALSARTAN AND HYDROCHLOROTHIAZIDE 320; 25 MG/1; MG/1
TABLET, FILM COATED ORAL
COMMUNITY
Start: 2024-07-29 | End: 2024-08-21

## 2024-08-14 NOTE — PROGRESS NOTES
HPI:  Pt is a 59 yo male who sustained a right distal radius fracture on 6/22/24.  He has been working on ROM exercises, and has weaned the splint off.  He has been performing relatively normal activities of late without significant discomfort.     PE:  RUE:  mild edema of wrist, wrist flexion:  32 degrees, wrist extension:  52 degrees, normal pronation/supination, able to make a full fist complex, SILT    A/P:  Pt is a 59 yo male who sustained a right distal radius fracture on 6/22/24.   -Cont with activity as tolerated.  May advance.  -NSAIDs as needed for discomfort.  -Increase activities as tolerated.  -F/u PRN at patient preference.

## 2024-08-21 ENCOUNTER — OFFICE VISIT (OUTPATIENT)
Dept: CARDIOLOGY CLINIC | Facility: CLINIC | Age: 59
End: 2024-08-21
Payer: COMMERCIAL

## 2024-08-21 VITALS
HEIGHT: 74 IN | WEIGHT: 215 LBS | OXYGEN SATURATION: 100 % | SYSTOLIC BLOOD PRESSURE: 188 MMHG | BODY MASS INDEX: 27.59 KG/M2 | DIASTOLIC BLOOD PRESSURE: 108 MMHG | HEART RATE: 74 BPM

## 2024-08-21 DIAGNOSIS — I25.10 CORONARY ARTERY DISEASE INVOLVING NATIVE CORONARY ARTERY OF NATIVE HEART WITHOUT ANGINA PECTORIS: Primary | ICD-10-CM

## 2024-08-21 DIAGNOSIS — E78.2 MIXED HYPERLIPIDEMIA: ICD-10-CM

## 2024-08-21 DIAGNOSIS — F17.200 SMOKING: ICD-10-CM

## 2024-08-21 DIAGNOSIS — I1A.0 RESISTANT HYPERTENSION: ICD-10-CM

## 2024-08-21 DIAGNOSIS — I1A.0 RESISTANT HYPERTENSION: Primary | ICD-10-CM

## 2024-08-21 PROCEDURE — 93000 ELECTROCARDIOGRAM COMPLETE: CPT | Performed by: STUDENT IN AN ORGANIZED HEALTH CARE EDUCATION/TRAINING PROGRAM

## 2024-08-21 PROCEDURE — 99244 OFF/OP CNSLTJ NEW/EST MOD 40: CPT | Performed by: STUDENT IN AN ORGANIZED HEALTH CARE EDUCATION/TRAINING PROGRAM

## 2024-08-21 RX ORDER — AMLODIPINE BESYLATE 5 MG/1
TABLET ORAL
COMMUNITY
Start: 2024-07-29

## 2024-08-21 RX ORDER — ATORVASTATIN CALCIUM 40 MG/1
40 TABLET, FILM COATED ORAL DAILY
Qty: 90 TABLET | Refills: 3 | Status: SHIPPED | OUTPATIENT
Start: 2024-08-21

## 2024-08-21 RX ORDER — HYDROCHLOROTHIAZIDE 25 MG/1
25 TABLET ORAL DAILY
Qty: 90 TABLET | Refills: 3 | Status: SHIPPED | OUTPATIENT
Start: 2024-08-21

## 2024-08-21 RX ORDER — SPIRONOLACTONE 25 MG/1
25 TABLET ORAL DAILY
Qty: 90 TABLET | Refills: 3 | Status: SHIPPED | OUTPATIENT
Start: 2024-08-21

## 2024-08-21 NOTE — PATIENT INSTRUCTIONS
-Take 81 mg aspirin a day  -Take atorvastatin 40 mg every night  -Start spironolactone 25 mg a day  -Ultrasound of your kidneys  -Check labs in one week  -Stop valsartan  -Check your blood pressures at home      --------    Your coronary artery calcium score is significantly elevated.   This implies there is hardening of the coronary arteries noted with possible plaque buildup.  This suggests that there is an elevated future risk of adverse cardiovascular events (such as heart attack).  Cardiac risk can be lowered with lifestyle and risk factor modification (BP, glucose, cholesterol and weight management).  Regular exercise, Mediterranean style plant-based diet and cholesterol lowering with statin drugs has shown proven benefits in lowering risk of heart attack and stroke.    Medical therapy to lower risk should include:  Enteric coated aspirin 81mg daily.  Statins: Atorvastatin 40 mg at bedtime to lower risk of progression of coronary plaque.   Beta-blockers: Metoprolol succinate (Toprol XL) 100mg a day to lower risk of heart attack.

## 2024-08-21 NOTE — PROGRESS NOTES
[facility] [place]  [address] [facility]  [place] [address]  Phone#  [phone]  Fax#  [phone]  [facility] Consultation             NAME: Ez Dsouza  AGE: 59 y.o. SEX: male   : 1965   MRN: [id number]    DATE: 2024  TIME: 9:51 AM    Assessment and recommendations    1. Coronary artery disease involving native coronary artery of native heart without angina pectoris  POCT ECG    Echo complete w/ contrast if indicated      2. Resistant hypertension  spironolactone (ALDACTONE) 25 mg tablet    Basic metabolic panel    Magnesium    Echo complete w/ contrast if indicated    hydroCHLOROthiazide 25 mg tablet      3. Mixed hyperlipidemia  atorvastatin (LIPITOR) 40 mg tablet    Basic metabolic panel      4. Smoking  atorvastatin (LIPITOR) 40 mg tablet        Coronary artery disease based on elevated coronary artery calcium score  Patient presented for evaluation of elevated coronary artery calcium score as well as evidence of nonsignificant carotid artery stenosis found incidentally during hospitalization with trauma workup.  We discussed what coronary artery calcium scoring means in terms of atherosclerotic disease.  I recommended that he start aspirin 81 mg daily as well as a high intensity statin, atorvastatin 40 mg daily.  We will continue to do aggressively manage his blood pressure as below    Resistant hypertension  Patient reports that he has had a lot of medication changes recently regarding his elevated blood pressures that range anywhere from the 170s to 200s over 90s to 100s at home.  He does not believe that valsartan is doing anything for him.  Although I would not use metoprolol as a first-line medication for hypertension, I will keep it on given his atherosclerotic disease.  We will continue amlodipine 5 mg daily, HCTZ 25 mg daily, metoprolol succinate 100 mg daily.  I have stopped his valsartan, and I switched him to spironolactone 25 mg daily.   Will check BMP in 1 week to assess for electrolyte abnormalities.  Given his significant hypertension and evidence of LVH on EKG, I ordered a transthoracic echocardiogram, and encouraged him to keep his renal artery duplex ultrasound.  This is scheduled for 9/16/2024.  If blood pressure not well-controlled at next visit, we will consider additional workup for secondary causes of hypertension. I asked his to keep a log of his home blood presures.    Mixed hyperlipidemia  ASCVD risk score 38.4%  -Recommended lifestyle changes including healthy diet and exercise  -Start aspirin 81 mg daily and atorvastatin 40 mg daily as above    Smoking  We discussed that smoking is a risk factor for coronary artery disease, and I encouraged him to quit smoking.  Patient has successfully quit smoking cigarettes in the past.  He reports that he will try to quit smoking on his own, and he will send me a [employer] message if he needs prescription for nicotine replacement therapy or Chantix.    Total time spent caring for this patient today was 50 minutes.  This includes time spent for visit reviewing the chart, time spent during the visit addressing patient and his wife's questions and counseling on risk factor modification and smoking cessation.    Chief Complaint   Patient presents with    New Patient Visit     Per pcp for HTN and CAD. Had CT Cor. Calcium Score: 7/3.     Edema     Off and on, both ankles by the end of the day.        HPI:    Ez Dsouza is a 59 y.o.-year-old male who presents to the cardiology clinic for initial consultation.  He presents for hypertension and elevated coronary artery calcium score.  Past medical history is significant for elevated blood pressure and hypertension.    He reports that his blood pressures have been elevated to the 180s to 200s systolics since approximately June.  Has undergone a lot of medication changes with his primary care physician, and is unable to find me with details.  He states  that he has been increasing his dosage of valsartan, and he does not think that it is helping.  He very rarely checks his blood pressures at home.  It is unclear if he is taking all of his medications as prescribed.    He reports that this coronary artery calcium scan was done due to the incidental findings of carotid stenosis on his trauma scans from a hospitalization back in 2024.  He denies any chest pain, chest discomfort, shortness of breath at rest, or dyspnea on exertion, lightheadedness, dizziness, palpitations.  He reports that he is fairly active, and does not notice any limitations    I reviewed his coronary artery calcium scan, and included his summary report below.  I also reviewed his CTA is showing stenosis of his carotid arteries.    His maternal grandfather  of a heart attack at age 50.  He has uncles on his mother side that also had coronary artery disease.  His father had open heart surgery at age 72.    His EKG today shows normal sinus rhythm with LVH.        Coronary artery calcium score breakdown is as follows:     Left main coronary artery: 8  Left anterior descending coronary artery and diagonal branches: 564  Left circumflex coronary artery and left marginal branches: 124  Right coronary artery and right marginal branches: 0  Posterior descending coronary artery: 0     TOTAL coronary calcium score: 696    Past history, family history, social history, current medications, vital signs, recent lab and imaging studies and  prior cardiology studies reviewed independently on this visit.    No Known Allergies    Current Outpatient Medications:     amLODIPine (NORVASC) 5 mg tablet, , Disp: , Rfl:     atorvastatin (LIPITOR) 40 mg tablet, Take 1 tablet (40 mg total) by mouth daily, Disp: 90 tablet, Rfl: 3    hydroCHLOROthiazide 25 mg tablet, Take 1 tablet (25 mg total) by mouth daily, Disp: 90 tablet, Rfl: 3    metoprolol succinate (TOPROL-XL) 100 mg 24 hr tablet, Take 100 mg by mouth daily,  Disp: , Rfl:     spironolactone (ALDACTONE) 25 mg tablet, Take 1 tablet (25 mg total) by mouth daily, Disp: 90 tablet, Rfl: 3    Past Medical History:   Diagnosis Date    Hypertension      Past Surgical History:   Procedure Laterality Date    KNEE SURGERY  2008    MOLE REMOVAL  2024    Back     Family History   Problem Relation Age of Onset    Hypertension Mother     Hypertension Father     Heart disease Father        Social History   reports that he has been smoking cigars and cigarettes. He has a 7.5 pack-year smoking history. He has never used smokeless tobacco. He reports current alcohol use of about 2.0 standard drinks of alcohol per week. He reports that he does not currently use drugs after having used the following drugs: Marijuana.     Review of Systems   Constitutional: Negative.    HENT: Negative.     Respiratory: Negative.     Cardiovascular: Negative.    Gastrointestinal: Negative.    Genitourinary: Negative.    Musculoskeletal: Negative.    Skin: Negative.    Neurological: Negative.    Psychiatric/Behavioral: Negative.         Objective:     Vitals:    08/21/24 0846   BP: (!) 188/108   Pulse: 74   SpO2: 100%     Physical Exam  Vitals and nursing note reviewed.   Constitutional:       General: He is not in acute distress.     Appearance: Normal appearance. He is well-developed.   HENT:      Head: Normocephalic and atraumatic.   Cardiovascular:      Rate and Rhythm: Normal rate and regular rhythm.      Heart sounds: No murmur heard.  Pulmonary:      Effort: Pulmonary effort is normal. No respiratory distress.      Breath sounds: Normal breath sounds.   Abdominal:      General: There is no distension.   Musculoskeletal:         General: No swelling.      Cervical back: Neck supple.   Skin:     General: Skin is warm and dry.   Neurological:      Mental Status: He is alert.   Psychiatric:         Mood and Affect: Mood normal.         Pertinent Laboratory/Diagnostic Studies:    Laboratory studies reviewed  "personally by Eloise Mccurdy MD    BMP:   Lab Results   Component Value Date    SODIUM 137 06/26/2024    K 3.8 06/26/2024     06/26/2024    CO2 28 06/26/2024    BUN 22 06/26/2024    CREATININE 1.10 06/26/2024    GLUC 106 06/23/2024    CALCIUM 9.0 06/26/2024     CBC:  Lab Results   Component Value Date    WBC 7.94 06/26/2024    HGB 14.0 06/26/2024    HCT 42.7 06/26/2024    MCV 91 06/26/2024     06/26/2024     Lipid Profile:   Lab Results   Component Value Date    HDL 39 (L) 06/26/2024     Lab Results   Component Value Date    LDLCALC 171 (H) 06/26/2024     Lab Results   Component Value Date    TRIG 132 06/26/2024      Other labs:  Lab Results   Component Value Date    AIB1WIZNYXOC 0.967 06/26/2024     Lab Results   Component Value Date    ALT 29 06/26/2024    AST 29 06/26/2024       Imaging Studies:     Pertinent cardiac studies and imaging studies were personally reviewed on this visit and results summarized.    Eloise Mccurdy MD, PhD    Portions of the record may have been created with voice recognition software.  Occasional wrong word or \"sound alike\" substitutions may have occurred due to the inherent limitations of voice recognition software.  Read the chart carefully and recognize, using context, where substitutions have occurred. Please reach out to me directly for any clarifications.    "

## 2024-09-13 ENCOUNTER — HOSPITAL ENCOUNTER (OUTPATIENT)
Dept: NON INVASIVE DIAGNOSTICS | Facility: CLINIC | Age: 59
Discharge: HOME | End: 2024-09-13
Payer: COMMERCIAL

## 2024-09-13 VITALS
DIASTOLIC BLOOD PRESSURE: 110 MMHG | SYSTOLIC BLOOD PRESSURE: 168 MMHG | BODY MASS INDEX: 27.59 KG/M2 | HEART RATE: 74 BPM | HEIGHT: 74 IN | WEIGHT: 214.95 LBS

## 2024-09-13 DIAGNOSIS — I25.10 CORONARY ARTERY DISEASE INVOLVING NATIVE CORONARY ARTERY OF NATIVE HEART WITHOUT ANGINA PECTORIS: ICD-10-CM

## 2024-09-13 DIAGNOSIS — I1A.0 RESISTANT HYPERTENSION: ICD-10-CM

## 2024-09-13 LAB
AORTIC ROOT: 3.2 CM
APICAL FOUR CHAMBER EJECTION FRACTION: 69 %
ASCENDING AORTA: 3.7 CM
BSA FOR ECHO PROCEDURE: 2.24 M2
DOP CALC LVOT AREA: 3.46 CM2
DOP CALC LVOT DIAMETER: 2.1 CM
E WAVE DECELERATION TIME: 259 MS
E/A RATIO: 0.6
FRACTIONAL SHORTENING: 31 (ref 28–44)
INTERVENTRICULAR SEPTUM IN DIASTOLE (PARASTERNAL SHORT AXIS VIEW): 1 CM
INTERVENTRICULAR SEPTUM: 1 CM (ref 0.6–1.1)
LAAS-AP2: 23 CM2
LAAS-AP4: 21.9 CM2
LEFT ATRIUM AREA SYSTOLE SINGLE PLANE A4C: 19.9 CM2
LEFT ATRIUM SIZE: 5.1 CM
LEFT ATRIUM VOLUME (MOD BIPLANE): 74 ML
LEFT ATRIUM VOLUME INDEX (MOD BIPLANE): 33 ML/M2
LEFT INTERNAL DIMENSION IN SYSTOLE: 3.5 CM (ref 2.1–4)
LEFT VENTRICULAR INTERNAL DIMENSION IN DIASTOLE: 5.1 CM (ref 3.5–6)
LEFT VENTRICULAR POSTERIOR WALL IN END DIASTOLE: 1 CM
LEFT VENTRICULAR STROKE VOLUME: 71 ML
LVSV (TEICH): 71 ML
MV E'TISSUE VEL-SEP: 7 CM/S
MV PEAK A VEL: 0.89 M/S
MV PEAK E VEL: 53 CM/S
MV STENOSIS PRESSURE HALF TIME: 75 MS
MV VALVE AREA P 1/2 METHOD: 2.93
RIGHT ATRIUM AREA SYSTOLE A4C: 16.7 CM2
RIGHT VENTRICLE ID DIMENSION: 3 CM
SINOTUBULAR JUNCTION: 2.8 CM
SL CV LEFT ATRIUM LENGTH A2C: 6.2 CM
SL CV LV EF: 60
SL CV PED ECHO LEFT VENTRICLE DIASTOLIC VOLUME (MOD BIPLANE) 2D: 122 ML
SL CV PED ECHO LEFT VENTRICLE SYSTOLIC VOLUME (MOD BIPLANE) 2D: 51 ML
SL CV SINUS OF VALSALVA 2D: 3.4 CM
STJ: 2.8 CM
TRICUSPID ANNULAR PLANE SYSTOLIC EXCURSION: 2.3 CM

## 2024-09-13 PROCEDURE — 93306 TTE W/DOPPLER COMPLETE: CPT | Performed by: INTERNAL MEDICINE

## 2024-09-13 PROCEDURE — 93306 TTE W/DOPPLER COMPLETE: CPT

## 2024-09-16 ENCOUNTER — HOSPITAL ENCOUNTER (OUTPATIENT)
Dept: RADIOLOGY | Facility: HOSPITAL | Age: 59
Discharge: HOME | End: 2024-09-16
Payer: COMMERCIAL

## 2024-09-16 DIAGNOSIS — I10 HYPERTENSION, UNSPECIFIED TYPE: ICD-10-CM

## 2024-09-16 PROCEDURE — 93975 VASCULAR STUDY: CPT

## 2024-09-16 PROCEDURE — NC001 PR NO CHARGE: Performed by: INTERNAL MEDICINE

## 2024-11-20 DIAGNOSIS — E78.2 MIXED HYPERLIPIDEMIA: ICD-10-CM

## 2024-11-20 DIAGNOSIS — I1A.0 RESISTANT HYPERTENSION: ICD-10-CM

## 2024-11-20 DIAGNOSIS — F17.200 SMOKING: ICD-10-CM

## 2024-11-20 RX ORDER — ATORVASTATIN CALCIUM 40 MG/1
40 TABLET, FILM COATED ORAL DAILY
Qty: 90 TABLET | Refills: 0 | Status: CANCELLED | OUTPATIENT
Start: 2024-11-20

## 2024-11-20 RX ORDER — SPIRONOLACTONE 25 MG/1
25 TABLET ORAL DAILY
Qty: 90 TABLET | Refills: 0 | Status: CANCELLED | OUTPATIENT
Start: 2024-11-20

## 2024-11-20 RX ORDER — HYDROCHLOROTHIAZIDE 25 MG/1
25 TABLET ORAL DAILY
Qty: 90 TABLET | Refills: 0 | Status: CANCELLED | OUTPATIENT
Start: 2024-11-20

## 2024-11-20 NOTE — PROGRESS NOTES
[facility] [place]  [address] [facility]  [place] [address]  Phone#  [phone]  Fax#  [phone]  [facility] Cardiology Office Follow-up Visit             NAME: Ez Dsouza  AGE: 59 y.o. SEX: male   : 1965   MRN: [id number]    DATE: 2024  TIME: 9:19 AM      Assessment & Plan    Coronary artery disease based on elevated coronary artery calcium score  Stable. Patient presented for evaluation of elevated coronary artery calcium score as well as evidence of nonsignificant carotid artery stenosis found incidentally during hospitalization with trauma workup.  We discussed what coronary artery calcium scoring means in terms of atherosclerotic disease.    Continue aspirin 81 mg daily  Continue atorvastatin 40 mg daily     Resistant hypertension  Improving. Patient reports that he has had a lot of medication changes recently regarding his elevated blood pressures that range anywhere from the 170s to 200s over 90s to 100s at home.  He does not believe that valsartan is doing anything for him.  The importance of continuing to take blood pressure medications even though he feels amphetamine.  Discussed potential increased risk of developing (contributing to his blood pressure remains uncontrolled.  Continue amlodipine 5 mg daily  Continue HCTZ 25 mg daily,  Continue spironolactone 25 mg daily  Stop metoprolol   Start carvedilol 25 mg bis  If BP remain elevated in 2 weeks, will increase amlodipine to 10 mg daily     Mixed hyperlipidemia  ASCVD risk score 38.4%  Recommended lifestyle changes including healthy diet and exercise  Continue aspirin 81 mg daily and atorvastatin 40 mg daily as above  Recheck lipid panel     Smoking  We discussed that smoking is a risk factor for coronary artery disease, and I encouraged him to quit smoking.  Patient has successfully quit smoking cigarettes in the past.  He reports that he will try to quit smoking on his own, and he will send me a [employer] message  if he needs prescription for nicotine replacement therapy or Chantix.    Follow up 6 mo for BP, lipid follow up      -----    Chief Complaint   Patient presents with    Follow-up       HPI:    Ez Dsouza is a 59 y.o.-year-old male who presents to the cardiology clinic for follow up for the above-listed problems.     Past medical history significant for hypertension and CACS 696.    Initially established care in our office 2024. Blood pressures elevated to the 180s to 200s systolics.  Has undergone a lot of medication changes with his primary care physician, and is unable to find me with details.  He states that he has been increasing his dosage of valsartan, and he does not think that it is helping.  He very rarely checks his blood pressures at home.  It is unclear if he is taking all of his medications as prescribed.     He reports that this coronary artery calcium scan was done due to the incidental findings of carotid stenosis on his trauma scans from a hospitalization back in 2024.  He denies any chest pain, chest discomfort, shortness of breath at rest, or dyspnea on exertion, lightheadedness, dizziness, palpitations.  He reports that he is fairly active, and does not notice any limitations     His maternal grandfather  of a heart attack at age 50.  He has uncles on his mother side that also had coronary artery disease.  His father had open heart surgery at age 72.     Doing well today. No chest pain, chest discomfort.  He denies lightheadedness and dizziness.  States he does not understand why he needs to take blood pressure medication including the asymptomatic.      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    No Known Allergies    Current Outpatient Medications:     amLODIPine (NORVASC) 5 mg tablet, 5 mg daily, Disp: , Rfl:     atorvastatin (LIPITOR) 40 mg tablet, Take 1 tablet (40 mg total) by mouth daily, Disp: 90 tablet, Rfl: 3    carvedilol (COREG) 25 mg tablet, Take 1 tablet (25 mg total) by mouth 2 (two) times a day with meals, Disp: 60 tablet, Rfl: 11    hydroCHLOROthiazide 25 mg tablet, Take 1 tablet (25 mg total) by mouth daily, Disp: 90 tablet, Rfl: 3    spironolactone (ALDACTONE) 25 mg tablet, Take 1 tablet (25 mg total) by mouth daily, Disp: 90 tablet, Rfl: 3    Review of Systems   Respiratory:  Negative for chest tightness and shortness of breath.    Cardiovascular:  Negative for chest pain, palpitations and leg swelling.       Objective:     Vitals:    11/21/24 0815   BP: 152/82   Pulse: (!) 51   SpO2: 99%     Wt Readings from Last 3 Encounters:   11/21/24 100 kg (220 lb 8 oz)   09/13/24 97.5 kg (214 lb 15.2 oz)   08/21/24 97.5 kg (215 lb)     Pulse Readings from Last 3 Encounters:   11/21/24 (!) 51   09/13/24 74   08/21/24 74     BP Readings from Last 3 Encounters:   11/21/24 152/82   09/13/24 (!) 168/110   08/21/24 (!) 188/108     Physical Exam  Vitals reviewed.   Constitutional:       General: He is not in acute distress.     Appearance: Normal appearance. He is well-developed.   HENT:      Head: Normocephalic and atraumatic.   Cardiovascular:      Rate and Rhythm: Normal rate and regular rhythm.      Heart sounds: No murmur heard.  Pulmonary:      Effort: Pulmonary effort is normal. No respiratory distress.      Breath sounds: Normal breath sounds.   Abdominal:      General: There is no distension.   Musculoskeletal:         General: No swelling.      Cervical back: Neck supple.   Skin:     General: Skin is warm and dry.   Neurological:      Mental  "Status: He is alert.   Psychiatric:         Mood and Affect: Mood normal.         Pertinent Laboratory/Diagnostic Studies:    Laboratory studies reviewed personally by Eloise Mccurdy MD    BMP:   Lab Results   Component Value Date    SODIUM 137 06/26/2024    K 3.8 06/26/2024     06/26/2024    CO2 28 06/26/2024    BUN 22 06/26/2024    CREATININE 1.10 06/26/2024    GLUC 106 06/23/2024    CALCIUM 9.0 06/26/2024     CBC:  Lab Results   Component Value Date    WBC 7.94 06/26/2024    HGB 14.0 06/26/2024    HCT 42.7 06/26/2024    MCV 91 06/26/2024     06/26/2024     Lipid Profile:   Lab Results   Component Value Date    HDL 39 (L) 06/26/2024     Lab Results   Component Value Date    LDLCALC 171 (H) 06/26/2024     Lab Results   Component Value Date    TRIG 132 06/26/2024      Other labs:  Lab Results   Component Value Date    RPR0FQXQOZJE 0.967 06/26/2024     Lab Results   Component Value Date    ALT 29 06/26/2024    AST 29 06/26/2024     No results found for: \"HGBA1C\"      Imaging Studies:     Pertinent imaging studies and cardiac studies were independently reviewed on this visit and findings summarized.    Eloise Mccurdy MD, PhD    Portions of the record may have been created with voice recognition software.  Occasional wrong word or \"sound alike\" substitutions may have occurred due to the inherent limitations of voice recognition software.  Read the chart carefully and recognize, using context, where substitutions have occurred. Please reach out to me directly for any clarifications.   "

## 2024-11-21 ENCOUNTER — OFFICE VISIT (OUTPATIENT)
Dept: CARDIOLOGY CLINIC | Facility: CLINIC | Age: 59
End: 2024-11-21
Payer: COMMERCIAL

## 2024-11-21 VITALS
HEIGHT: 74 IN | DIASTOLIC BLOOD PRESSURE: 82 MMHG | BODY MASS INDEX: 28.3 KG/M2 | OXYGEN SATURATION: 99 % | SYSTOLIC BLOOD PRESSURE: 152 MMHG | WEIGHT: 220.5 LBS | HEART RATE: 51 BPM

## 2024-11-21 DIAGNOSIS — E78.2 MIXED HYPERLIPIDEMIA: ICD-10-CM

## 2024-11-21 DIAGNOSIS — I10 PRIMARY HYPERTENSION: Primary | ICD-10-CM

## 2024-11-21 PROCEDURE — 99214 OFFICE O/P EST MOD 30 MIN: CPT | Performed by: STUDENT IN AN ORGANIZED HEALTH CARE EDUCATION/TRAINING PROGRAM

## 2024-11-21 RX ORDER — CARVEDILOL 25 MG/1
25 TABLET ORAL 2 TIMES DAILY WITH MEALS
Qty: 60 TABLET | Refills: 11 | Status: SHIPPED | OUTPATIENT
Start: 2024-11-21

## 2024-11-21 NOTE — PATIENT INSTRUCTIONS
-stop metoprolol  -start carvedilol 25 mg twice daily  -call the office in 2 weeks  -if blood pressure still elevated, I'll send in a prescription for amlodipine 10 mg daily

## 2025-03-10 ENCOUNTER — APPOINTMENT (INPATIENT)
Dept: NON INVASIVE DIAGNOSTICS | Facility: HOSPITAL | Age: 60
DRG: 064 | End: 2025-03-10
Payer: COMMERCIAL

## 2025-03-10 ENCOUNTER — APPOINTMENT (EMERGENCY)
Dept: VASCULAR ULTRASOUND | Facility: HOSPITAL | Age: 60
DRG: 064 | End: 2025-03-10
Payer: COMMERCIAL

## 2025-03-10 ENCOUNTER — APPOINTMENT (EMERGENCY)
Dept: CT IMAGING | Facility: HOSPITAL | Age: 60
DRG: 064 | End: 2025-03-10
Payer: COMMERCIAL

## 2025-03-10 ENCOUNTER — HOSPITAL ENCOUNTER (INPATIENT)
Facility: HOSPITAL | Age: 60
LOS: 3 days | DRG: 064 | End: 2025-03-13
Attending: EMERGENCY MEDICINE | Admitting: INTERNAL MEDICINE
Payer: COMMERCIAL

## 2025-03-10 ENCOUNTER — APPOINTMENT (EMERGENCY)
Dept: MRI IMAGING | Facility: HOSPITAL | Age: 60
DRG: 064 | End: 2025-03-10
Payer: COMMERCIAL

## 2025-03-10 DIAGNOSIS — I63.9 CVA (CEREBRAL VASCULAR ACCIDENT) (HCC): ICD-10-CM

## 2025-03-10 DIAGNOSIS — I65.22 CAROTID STENOSIS, LEFT: ICD-10-CM

## 2025-03-10 DIAGNOSIS — R29.90 STROKE-LIKE SYMPTOMS: Primary | ICD-10-CM

## 2025-03-10 DIAGNOSIS — I10 PRIMARY HYPERTENSION: ICD-10-CM

## 2025-03-10 DIAGNOSIS — I63.232 ACUTE CEREBROVASCULAR ACCIDENT (CVA) DUE TO STENOSIS OF LEFT CAROTID ARTERY (HCC): ICD-10-CM

## 2025-03-10 PROBLEM — E78.5 HYPERLIPIDEMIA: Status: ACTIVE | Noted: 2025-03-10

## 2025-03-10 PROBLEM — R93.1 ELEVATED CORONARY ARTERY CALCIUM SCORE: Status: ACTIVE | Noted: 2025-03-10

## 2025-03-10 PROBLEM — Z01.810 PRE-OPERATIVE CARDIOVASCULAR EXAMINATION: Status: ACTIVE | Noted: 2025-03-10

## 2025-03-10 LAB
2HR DELTA HS TROPONIN: -1 NG/L
4HR DELTA HS TROPONIN: 0 NG/L
ANION GAP SERPL CALCULATED.3IONS-SCNC: 7 MMOL/L (ref 4–13)
AORTIC ROOT: 3.7 CM
APTT PPP: 31 SECONDS (ref 23–34)
ASCENDING AORTA: 3.3 CM
ATRIAL RATE: 68 BPM
BACTERIA UR QL AUTO: ABNORMAL /HPF
BILIRUB UR QL STRIP: NEGATIVE
BSA FOR ECHO PROCEDURE: 2.22 M2
BUN SERPL-MCNC: 27 MG/DL (ref 5–25)
CALCIUM SERPL-MCNC: 9.2 MG/DL (ref 8.4–10.2)
CARDIAC TROPONIN I PNL SERPL HS: 6 NG/L (ref ?–50)
CARDIAC TROPONIN I PNL SERPL HS: 7 NG/L (ref ?–50)
CARDIAC TROPONIN I PNL SERPL HS: 7 NG/L (ref ?–50)
CHLORIDE SERPL-SCNC: 102 MMOL/L (ref 96–108)
CLARITY UR: CLEAR
CO2 SERPL-SCNC: 26 MMOL/L (ref 21–32)
COLOR UR: ABNORMAL
CREAT SERPL-MCNC: 1.63 MG/DL (ref 0.6–1.3)
E WAVE DECELERATION TIME: 295 MS
E/A RATIO: 0.53
ERYTHROCYTE [DISTWIDTH] IN BLOOD BY AUTOMATED COUNT: 14 % (ref 11.6–15.1)
FRACTIONAL SHORTENING: 27 (ref 28–44)
GFR SERPL CREATININE-BSD FRML MDRD: 45 ML/MIN/1.73SQ M
GLUCOSE SERPL-MCNC: 117 MG/DL (ref 65–140)
GLUCOSE SERPL-MCNC: 140 MG/DL (ref 65–140)
GLUCOSE UR STRIP-MCNC: NEGATIVE MG/DL
HCT VFR BLD AUTO: 41.4 % (ref 36.5–49.3)
HGB BLD-MCNC: 13.8 G/DL (ref 12–17)
HGB UR QL STRIP.AUTO: ABNORMAL
HYALINE CASTS #/AREA URNS LPF: ABNORMAL /LPF
INR PPP: 1.03 (ref 0.85–1.19)
INTERVENTRICULAR SEPTUM IN DIASTOLE (PARASTERNAL SHORT AXIS VIEW): 1.1 CM
INTERVENTRICULAR SEPTUM: 1.1 CM (ref 0.6–1.1)
KETONES UR STRIP-MCNC: NEGATIVE MG/DL
LAAS-AP2: 20.5 CM2
LAAS-AP4: 22.1 CM2
LEFT ATRIUM SIZE: 3.7 CM
LEFT ATRIUM VOLUME (MOD BIPLANE): 64 ML
LEFT ATRIUM VOLUME INDEX (MOD BIPLANE): 28.8 ML/M2
LEFT INTERNAL DIMENSION IN SYSTOLE: 3.5 CM (ref 2.1–4)
LEFT VENTRICULAR INTERNAL DIMENSION IN DIASTOLE: 4.8 CM (ref 3.5–6)
LEFT VENTRICULAR POSTERIOR WALL IN END DIASTOLE: 1.1 CM
LEFT VENTRICULAR STROKE VOLUME: 58 ML
LEUKOCYTE ESTERASE UR QL STRIP: NEGATIVE
LV EF US.2D.A4C+ESTIMATED: 58 %
LVSV (TEICH): 58 ML
MCH RBC QN AUTO: 30.7 PG (ref 26.8–34.3)
MCHC RBC AUTO-ENTMCNC: 33.3 G/DL (ref 31.4–37.4)
MCV RBC AUTO: 92 FL (ref 82–98)
MUCOUS THREADS UR QL AUTO: ABNORMAL
MV E'TISSUE VEL-LAT: 14 CM/S
MV E'TISSUE VEL-SEP: 11 CM/S
MV PEAK A VEL: 0.87 M/S
MV PEAK E VEL: 46 CM/S
MV STENOSIS PRESSURE HALF TIME: 86 MS
MV VALVE AREA P 1/2 METHOD: 2.56
NITRITE UR QL STRIP: NEGATIVE
NON-SQ EPI CELLS URNS QL MICRO: ABNORMAL /HPF
P AXIS: 67 DEGREES
PH UR STRIP.AUTO: 5.5 [PH]
PLATELET # BLD AUTO: 278 THOUSANDS/UL (ref 149–390)
PMV BLD AUTO: 10.6 FL (ref 8.9–12.7)
POTASSIUM SERPL-SCNC: 4.2 MMOL/L (ref 3.5–5.3)
PR INTERVAL: 162 MS
PROT UR STRIP-MCNC: ABNORMAL MG/DL
PROTHROMBIN TIME: 14.2 SECONDS (ref 12.3–15)
QRS AXIS: 60 DEGREES
QRSD INTERVAL: 98 MS
QT INTERVAL: 428 MS
QTC INTERVAL: 455 MS
RBC # BLD AUTO: 4.5 MILLION/UL (ref 3.88–5.62)
RBC #/AREA URNS AUTO: ABNORMAL /HPF
RIGHT ATRIAL 2D VOLUME: 46 ML
RIGHT ATRIUM AREA SYSTOLE A4C: 17.9 CM2
RIGHT VENTRICLE ID DIMENSION: 4.1 CM
SL CV LEFT ATRIUM LENGTH A2C: 5.3 CM
SL CV LV EF: 60
SL CV PED ECHO LEFT VENTRICLE DIASTOLIC VOLUME (MOD BIPLANE) 2D: 108 ML
SL CV PED ECHO LEFT VENTRICLE SYSTOLIC VOLUME (MOD BIPLANE) 2D: 50 ML
SODIUM SERPL-SCNC: 135 MMOL/L (ref 135–147)
SP GR UR STRIP.AUTO: 1.04 (ref 1–1.03)
T WAVE AXIS: 71 DEGREES
TRICUSPID ANNULAR PLANE SYSTOLIC EXCURSION: 2.1 CM
TSH SERPL DL<=0.05 MIU/L-ACNC: 0.54 UIU/ML (ref 0.45–4.5)
UROBILINOGEN UR STRIP-ACNC: <2 MG/DL
VENTRICULAR RATE: 68 BPM
WBC # BLD AUTO: 8.7 THOUSAND/UL (ref 4.31–10.16)
WBC #/AREA URNS AUTO: ABNORMAL /HPF

## 2025-03-10 PROCEDURE — 99255 IP/OBS CONSLTJ NEW/EST HI 80: CPT | Performed by: PSYCHIATRY & NEUROLOGY

## 2025-03-10 PROCEDURE — 85610 PROTHROMBIN TIME: CPT | Performed by: EMERGENCY MEDICINE

## 2025-03-10 PROCEDURE — 84484 ASSAY OF TROPONIN QUANT: CPT

## 2025-03-10 PROCEDURE — 99285 EMERGENCY DEPT VISIT HI MDM: CPT

## 2025-03-10 PROCEDURE — 96360 HYDRATION IV INFUSION INIT: CPT

## 2025-03-10 PROCEDURE — 85027 COMPLETE CBC AUTOMATED: CPT | Performed by: EMERGENCY MEDICINE

## 2025-03-10 PROCEDURE — 70551 MRI BRAIN STEM W/O DYE: CPT

## 2025-03-10 PROCEDURE — 70498 CT ANGIOGRAPHY NECK: CPT

## 2025-03-10 PROCEDURE — 81001 URINALYSIS AUTO W/SCOPE: CPT | Performed by: EMERGENCY MEDICINE

## 2025-03-10 PROCEDURE — 84443 ASSAY THYROID STIM HORMONE: CPT

## 2025-03-10 PROCEDURE — 93880 EXTRACRANIAL BILAT STUDY: CPT | Performed by: SURGERY

## 2025-03-10 PROCEDURE — 70496 CT ANGIOGRAPHY HEAD: CPT

## 2025-03-10 PROCEDURE — 99223 1ST HOSP IP/OBS HIGH 75: CPT | Performed by: INTERNAL MEDICINE

## 2025-03-10 PROCEDURE — 36415 COLL VENOUS BLD VENIPUNCTURE: CPT | Performed by: EMERGENCY MEDICINE

## 2025-03-10 PROCEDURE — 93005 ELECTROCARDIOGRAM TRACING: CPT

## 2025-03-10 PROCEDURE — 83036 HEMOGLOBIN GLYCOSYLATED A1C: CPT

## 2025-03-10 PROCEDURE — 99291 CRITICAL CARE FIRST HOUR: CPT | Performed by: EMERGENCY MEDICINE

## 2025-03-10 PROCEDURE — NC001 PR NO CHARGE: Performed by: SURGERY

## 2025-03-10 PROCEDURE — 85730 THROMBOPLASTIN TIME PARTIAL: CPT | Performed by: EMERGENCY MEDICINE

## 2025-03-10 PROCEDURE — 82948 REAGENT STRIP/BLOOD GLUCOSE: CPT

## 2025-03-10 PROCEDURE — 94664 DEMO&/EVAL PT USE INHALER: CPT

## 2025-03-10 PROCEDURE — 93306 TTE W/DOPPLER COMPLETE: CPT | Performed by: INTERNAL MEDICINE

## 2025-03-10 PROCEDURE — 74177 CT ABD & PELVIS W/CONTRAST: CPT

## 2025-03-10 PROCEDURE — 84484 ASSAY OF TROPONIN QUANT: CPT | Performed by: EMERGENCY MEDICINE

## 2025-03-10 PROCEDURE — 99222 1ST HOSP IP/OBS MODERATE 55: CPT | Performed by: INTERNAL MEDICINE

## 2025-03-10 PROCEDURE — 80048 BASIC METABOLIC PNL TOTAL CA: CPT | Performed by: EMERGENCY MEDICINE

## 2025-03-10 PROCEDURE — 93880 EXTRACRANIAL BILAT STUDY: CPT

## 2025-03-10 PROCEDURE — 80061 LIPID PANEL: CPT

## 2025-03-10 PROCEDURE — 93010 ELECTROCARDIOGRAM REPORT: CPT | Performed by: INTERNAL MEDICINE

## 2025-03-10 PROCEDURE — 71260 CT THORAX DX C+: CPT

## 2025-03-10 PROCEDURE — 99205 OFFICE O/P NEW HI 60 MIN: CPT | Performed by: SURGERY

## 2025-03-10 PROCEDURE — 93306 TTE W/DOPPLER COMPLETE: CPT

## 2025-03-10 RX ORDER — ACETAMINOPHEN 10 MG/ML
1000 INJECTION, SOLUTION INTRAVENOUS ONCE
Status: COMPLETED | OUTPATIENT
Start: 2025-03-10 | End: 2025-03-10

## 2025-03-10 RX ORDER — ENOXAPARIN SODIUM 100 MG/ML
40 INJECTION SUBCUTANEOUS DAILY
Status: DISCONTINUED | OUTPATIENT
Start: 2025-03-10 | End: 2025-03-13

## 2025-03-10 RX ORDER — ASPIRIN 81 MG/1
81 TABLET, CHEWABLE ORAL DAILY
Status: DISCONTINUED | OUTPATIENT
Start: 2025-03-11 | End: 2025-03-13

## 2025-03-10 RX ORDER — LABETALOL HYDROCHLORIDE 5 MG/ML
10 INJECTION, SOLUTION INTRAVENOUS EVERY 6 HOURS PRN
Status: DISCONTINUED | OUTPATIENT
Start: 2025-03-10 | End: 2025-03-12

## 2025-03-10 RX ORDER — ACETAMINOPHEN 325 MG/1
650 TABLET ORAL EVERY 6 HOURS PRN
Status: DISCONTINUED | OUTPATIENT
Start: 2025-03-10 | End: 2025-03-13 | Stop reason: HOSPADM

## 2025-03-10 RX ORDER — SODIUM CHLORIDE, SODIUM GLUCONATE, SODIUM ACETATE, POTASSIUM CHLORIDE, MAGNESIUM CHLORIDE, SODIUM PHOSPHATE, DIBASIC, AND POTASSIUM PHOSPHATE .53; .5; .37; .037; .03; .012; .00082 G/100ML; G/100ML; G/100ML; G/100ML; G/100ML; G/100ML; G/100ML
75 INJECTION, SOLUTION INTRAVENOUS CONTINUOUS
Status: CANCELLED | OUTPATIENT
Start: 2025-03-10 | End: 2025-03-23

## 2025-03-10 RX ORDER — SPIRONOLACTONE 25 MG/1
25 TABLET ORAL DAILY
Status: DISCONTINUED | OUTPATIENT
Start: 2025-03-10 | End: 2025-03-13 | Stop reason: HOSPADM

## 2025-03-10 RX ORDER — CARVEDILOL 12.5 MG/1
25 TABLET ORAL 2 TIMES DAILY WITH MEALS
Status: DISCONTINUED | OUTPATIENT
Start: 2025-03-10 | End: 2025-03-13 | Stop reason: HOSPADM

## 2025-03-10 RX ORDER — ATORVASTATIN CALCIUM 40 MG/1
40 TABLET, FILM COATED ORAL
Status: DISCONTINUED | OUTPATIENT
Start: 2025-03-10 | End: 2025-03-13 | Stop reason: HOSPADM

## 2025-03-10 RX ORDER — ATORVASTATIN CALCIUM 40 MG/1
40 TABLET, FILM COATED ORAL EVERY EVENING
Status: DISCONTINUED | OUTPATIENT
Start: 2025-03-10 | End: 2025-03-10

## 2025-03-10 RX ORDER — ONDANSETRON 2 MG/ML
4 INJECTION INTRAMUSCULAR; INTRAVENOUS EVERY 6 HOURS PRN
Status: DISCONTINUED | OUTPATIENT
Start: 2025-03-10 | End: 2025-03-13 | Stop reason: HOSPADM

## 2025-03-10 RX ORDER — CLOPIDOGREL BISULFATE 75 MG/1
75 TABLET ORAL DAILY
Status: DISCONTINUED | OUTPATIENT
Start: 2025-03-11 | End: 2025-03-11

## 2025-03-10 RX ORDER — ASPIRIN 325 MG
325 TABLET ORAL ONCE
Status: COMPLETED | OUTPATIENT
Start: 2025-03-10 | End: 2025-03-10

## 2025-03-10 RX ORDER — HYDROCHLOROTHIAZIDE 25 MG/1
25 TABLET ORAL DAILY
Status: DISCONTINUED | OUTPATIENT
Start: 2025-03-10 | End: 2025-03-13 | Stop reason: HOSPADM

## 2025-03-10 RX ORDER — CLOPIDOGREL BISULFATE 75 MG/1
300 TABLET ORAL ONCE
Status: COMPLETED | OUTPATIENT
Start: 2025-03-10 | End: 2025-03-10

## 2025-03-10 RX ORDER — AMLODIPINE BESYLATE 5 MG/1
5 TABLET ORAL DAILY
Status: DISCONTINUED | OUTPATIENT
Start: 2025-03-10 | End: 2025-03-11

## 2025-03-10 RX ADMIN — ENOXAPARIN SODIUM 40 MG: 40 INJECTION SUBCUTANEOUS at 11:26

## 2025-03-10 RX ADMIN — ACETAMINOPHEN 650 MG: 325 TABLET, FILM COATED ORAL at 20:29

## 2025-03-10 RX ADMIN — CLOPIDOGREL BISULFATE 300 MG: 75 TABLET ORAL at 08:52

## 2025-03-10 RX ADMIN — ACETAMINOPHEN 1000 MG: 10 INJECTION INTRAVENOUS at 09:15

## 2025-03-10 RX ADMIN — IOHEXOL 85 ML: 350 INJECTION, SOLUTION INTRAVENOUS at 07:35

## 2025-03-10 RX ADMIN — ASPIRIN 325 MG ORAL TABLET 325 MG: 325 PILL ORAL at 08:52

## 2025-03-10 RX ADMIN — SODIUM CHLORIDE 1000 ML: 0.9 INJECTION, SOLUTION INTRAVENOUS at 08:07

## 2025-03-10 RX ADMIN — ATORVASTATIN CALCIUM 40 MG: 40 TABLET, FILM COATED ORAL at 16:17

## 2025-03-10 NOTE — ASSESSMENT & PLAN NOTE
Presenting with Right sided weakness and word finding difficulty. Presented after a fall this morning.     3/10 CTA neck/brain: Worsening severe stenosis in the proximal left internal carotid artery. No significant stenosis of the cervical right carotid or vertebral arteries No significant intracranial stenosis, large vessel occlusion or aneurysm.  3/10 MRI brain: Multiple small scattered acute infarcts in the left cerebral hemisphere. Several of the smaller infarcts are hyperintense on FLAIR.  Tiny focus of left parietal occipital petechial hemorrhage.  Tiny focus of mild DWI signal in the right centrum semiovale may also present recent infarct. No acute space-occupying hematoma.    3/10 VAS carotid duplex: RIGHT: <50% stenosis in the ICA. LEFT: >70% stenosis in the ICA.    -Discussed with  recommend carotid intervention when cleared by neurology  -Neurology following; recs appreciated  -Recommend cards consult for risk stratification   -Continue ASA/statin

## 2025-03-10 NOTE — PROGRESS NOTES
Progress Note - Vascular Surgery   Name: Ez Dsouza 59 y.o. male I MRN: [id number]  Unit/Bed#: ED-36 I Date of Admission: 3/10/2025   Date of Service: 3/10/2025 I Hospital Day: 0     Formal consultation by AP to follow:    Briefly, pt is a 60 yo M w/ HTN, CAD, HLD, tobacco abuse,  hx of fall '24 w/ rib fxs and R wrist fx, woke up this AM and noted R side weakness.  Presented by EMS w/ R side weakness, altered mental status and altered speech.    Vascular consultation for carotid stenosis.    Carotid DU: R ICA <50% stenosis, L ICA >70% stenosis w/ pilar 259/82 and ratio 3.2  MRI brain: multiple small acute CVAs in the L periventricular, subcotical, frontal and parietal/occipital lobes; small petechial hemorrhage  CTA neck: R ICA midl stenosis; L ICA w/ ~60-70% stenosis w/ soft plaque and increased irregularity compared to prior study in '24; there is a long area of irregularity, about 3.5cm in length  TTE: pending  EKG: NSR    Cr: 1.63  WBC: 8.7  Hgb: 13.8  Plt: 278  INR: 1.0    -acute L hemispheric strokes in the setting of significant L ICA stenosis and soft, irregular appearing plaque  -recommend L ICA intervention; anatomically, he is a candidate for CEA and carotid stent, although would likely require two stents due to length  -timing of intervention pending neurology recommendations, particularly given hemorrhage noted on MRI  -recommend cardiology consultation for risk stratification; TTE pending;   -cont home statin  -agree with starting ASA 81mg once daily  -okay with any other antiplatelet/anticoagulation regimen that neurology recommends/prefers  -recommend smoking cessation

## 2025-03-10 NOTE — ASSESSMENT & PLAN NOTE
Home regimen, amlodipine, carvedilol, hydrochlorothiazide, spironolactone.  Home BP generally around systolic 150's    Home antihypertensives held to allow permissive hypertension for 24 hours  PRN labetalol for SBP >200

## 2025-03-10 NOTE — ASSESSMENT & PLAN NOTE
-6 am stroke like symptoms with right sided weakness, and dysarthria.  -Stroke alert called, age determinate lacunar infarcts in the left centrum semiovale. Severe stenosis of left carotid artery.

## 2025-03-10 NOTE — CONSULTS
"Consultation - Vascular Surgery   Name: Ez Dsouza 59 y.o. male I MRN: [id number]  Unit/Bed#: ED-36 I Date of Admission: 3/10/2025   Date of Service: 3/10/2025 I Hospital Day: 0   Inpatient consult to Vascular Surgery  Consult performed by: Kristy Patel PA-C  Consult ordered by: Serenity Glass DO        Physician Requesting Evaluation: Erica Reinoso MD   Reason for Evaluation / Principal Problem: carotid stenosis    Assessment & Plan  Acute cerebrovascular accident (CVA) due to stenosis of left carotid artery (HCC)  Presenting with Right sided weakness and word finding difficulty. Presented after a fall this morning.     3/10 CTA neck/brain: Worsening severe stenosis in the proximal left internal carotid artery. No significant stenosis of the cervical right carotid or vertebral arteries No significant intracranial stenosis, large vessel occlusion or aneurysm.  3/10 MRI brain: Multiple small scattered acute infarcts in the left cerebral hemisphere. Several of the smaller infarcts are hyperintense on FLAIR.  Tiny focus of left parietal occipital petechial hemorrhage.  Tiny focus of mild DWI signal in the right centrum semiovale may also present recent infarct. No acute space-occupying hematoma.    3/10 VAS carotid duplex: RIGHT: <50% stenosis in the ICA. LEFT: >70% stenosis in the ICA.    -Discussed with  recommend carotid intervention when cleared by neurology  -Neurology following; recs appreciated  -Recommend cards consult for risk stratification   -Continue ASA/statin     Please contact the SecureChat role for the Vascular Surgery service with any questions/concerns.    History of Present Illness   Ez Dsouza is a 59 y.o. male with pmhx of HTN, CAD, smoking who presents with right sided weakness. Patient and wife bedside, patient reports \"I passed out\".  Patient reports he woke up this morning, does not clearly remember the episode, but reports he then came to and came to the ER.  Wife " "reports she woke up to large noise and found her  on the floor next to the bed.  She she had difficulty getting him up to a chair and she called EMS.  Patient does report he had blurry/loss of vision in his right eye which is improving since coming to the ER.  Wife reports he complained of a headache behind his left eye, which resolved with IV Tylenol here in the ER.  Patient also reports he has difficulty moving his right arm \"it is just not moving right and it is cold\".  He denies any lower extremity weakness.  He denies any changes in his speech, however he does have difficulty with word finding at times.  Wife reports it comes and goes.    Review of Systems   Constitutional:  Negative for activity change.   Neurological:  Positive for weakness.   Psychiatric/Behavioral:  Positive for confusion.      Historical Information   Past Medical History:   Diagnosis Date    Hypertension      Past Surgical History:   Procedure Laterality Date    KNEE SURGERY  2008    MOLE REMOVAL  2024    Back     Social History     Tobacco Use    Smoking status: Every Day     Current packs/day: 0.50     Average packs/day: 0.5 packs/day for 15.0 years (7.5 ttl pk-yrs)     Types: Cigars, Cigarettes    Smokeless tobacco: Never   Vaping Use    Vaping status: Never Used   Substance and Sexual Activity    Alcohol use: Yes     Alcohol/week: 2.0 standard drinks of alcohol     Types: 2 Standard drinks or equivalent per week     Comment: social    Drug use: Not Currently     Types: Marijuana    Sexual activity: Yes     Partners: Female     Birth control/protection: None     E-Cigarette/Vaping    E-Cigarette Use Never User      E-Cigarette/Vaping Substances     Family History   Problem Relation Age of Onset    Hypertension Mother     Hypertension Father     Heart disease Father        Objective :  Temp:  [98.3 °F (36.8 °C)] 98.3 °F (36.8 °C)  HR:  [65-81] 79  BP: (165-190)/() 187/96  Resp:  [18-20] 18  SpO2:  [97 %-100 %] 100 %  O2 " Device: None (Room air)    I/O       None            Physical Exam  Constitutional:       General: He is not in acute distress.     Appearance: He is normal weight. He is ill-appearing. He is not toxic-appearing.   Eyes:      Extraocular Movements: Extraocular movements intact.      Conjunctiva/sclera: Conjunctivae normal.   Cardiovascular:      Rate and Rhythm: Normal rate.   Pulmonary:      Effort: Pulmonary effort is normal. No respiratory distress.   Musculoskeletal:      Right lower leg: No edema.      Left lower leg: No edema.      Comments: RUE warm to touch, 2+ radial pulse.    Skin:     General: Skin is warm and dry.   Neurological:      Mental Status: He is alert.      Cranial Nerves: No dysarthria.      Sensory: Sensation is intact.      Motor: Weakness (RUE) present.      Comments: Somewhat delayed speech, difficulty with word finding. Following commands. Abnormal coordination with right upper extremity.    Psychiatric:         Mood and Affect: Mood normal.            Lab Results: I have reviewed the following results:  Recent Labs     03/10/25  0727 03/10/25  0915   WBC 8.70  --    HGB 13.8  --    HCT 41.4  --      --    SODIUM 135  --    K 4.2  --      --    CO2 26  --    BUN 27*  --    CREATININE 1.63*  --    GLUC 140  --    PTT 31  --    INR 1.03  --    HSTNI0 7  --    HSTNI2  --  6       Imaging Results Review: I reviewed radiology reports from this admission including: CT head, MRI brain, and Ultrasound(s).  Other Study Results Review: No additional pertinent studies reviewed.    VTE Prophylaxis: VTE covered by:  enoxaparin, Subcutaneous, 40 mg at 03/10/25 1126

## 2025-03-10 NOTE — ASSESSMENT & PLAN NOTE
Outpatient regiment carvedilol 25 mg twice daily, Norvasc 5 mg daily, hydrochlorothiazide 25 mg daily, spironolactone 25 mg daily  Has not taken his medications for the past 5 days; variable compliance otherwise.  Hypertensive on presentation with blood pressure systolic 1 80-1 90s in the emergency department  Most recent blood pressure 157/99.   allowing for permissive hypertension in the setting of acute CVA for 24 hours.  As needed labetalol available for systolic blood pressure greater than 200

## 2025-03-10 NOTE — CONSULTS
Consultation - Cardiology Team One  Ez Dsouza 59 y.o. male MRN: [id number]  Unit/Bed#: ED-36 Encounter: [id number]    Inpatient consult to Cardiology  Consult performed by: MEGHANN Grove  Consult ordered by: Pablo Ellison MD          Physician Requesting Consult: Erica Reinoso MD    Reason for Consult / Principal Problem: pre-operative exam      Assessment/ Plan:    Assessment & Plan  Acute cerebrovascular accident (CVA) due to stenosis of left carotid artery (HCC)  Reason for admission.  Patient had a fall with right-sided weakness and dysarthria today  MRI showed multiple small scattered acute infarcts in the left cerebral hemisphere, tiny focus of left parietal occipital petechial hemorrhage.  Carotid ultrasound showed greater than 70% stenosis of the left ICA with Hydro genus in the regular plaque.  Evaluated by vascular surgery and recommending left carotid intervention, likely CEA.  Timing to be determined per neurology  Loaded with aspirin and Plavix.  Continue statin.  MARIA TERESA (acute kidney injury) (HCC)  Possibly due to dehydration with recent illness  Primary hypertension  Outpatient regiment carvedilol 25 mg twice daily, Norvasc 5 mg daily, hydrochlorothiazide 25 mg daily, spironolactone 25 mg daily  Has not taken his medications for the past 5 days; variable compliance otherwise.  Hypertensive on presentation with blood pressure systolic 1 80-1 90s in the emergency department  Most recent blood pressure 157/99.   allowing for permissive hypertension in the setting of acute CVA for 24 hours.  As needed labetalol available for systolic blood pressure greater than 200  Hyperlipidemia  On atorvastatin 40 mg daily;   Last lipid panel 6/2024 with an LDL of 171, total cholesterol 236 triglycerides 132; this was prior to statin initiation.  Recheck lipid panel in a.m.  Elevated coronary artery calcium score  696.   Respecters of hypertension and tobacco use  On aspirin 81 mg daily and  atorvastatin 40 mg daily,  EKG today without ischemic changes  Echocardiogram 9/2024 with normal EF normal wall motion  He is asymptomatic.    Pre-operative cardiovascular examination  Cardiology asked to evaluate for preoperative clearance for possible left CEA in the setting of acute stroke.  Patient has an elevated coronary artery calcium score of 696  Echo 9/2024 showed normal EF with normal wall motion no significant valvular disease  EKG showing normal sinus rhythm without any ischemic changes  Asymptomatic.  No complaints of chest discomfort  He is not physically limited by any exertional chest discomfort or shortness of breath.  No signs or symptoms of heart failure on exam    Repeat echocardiogram was ordered by primary team and will be reviewed when available.  As long as echocardiogram does not show any significant changes patient can proceed with planned carotid intervention without any additional cardiac testing.      History of Present Illness     HPI: Ez Dsouza is a 59 y.o. year old male who has a history of HTN, HLD, carotid stenosis, tobacco use (long hx of 1 ppd more recent 8 cigars/day). He follows with cardiologist Dr. Eloise Mccurdy.             Pt presented to ED today after sustaining a fall at home and noted to have slurred speech and R sided weakness.   Prior episode similar in december that he did not seek medical treatment. Had been feeling unwell for 5-6 days and has not taken any of his medications since 3/7.  He has been found ot have acute left hemisphere strokes in the setting of significant L ICA stenosis.   Vascular surgery evaluated and recommending L ICA intervention; they are recommending CEA; timing to be determined pending neurology recommendations.     Cardiology asked to see for pre-operative clearance.     At time of my exam pt reports feeling ok.  He still has weakness in his right upper extremity.  He denies any chest pain pressure or discomfort.  No shortness of  breath.  He is not limited physically by any shortness of breath or chest discomfort.  He continues to work as a .  He has a full flight of stairs in his home when he can go up this without any difficulty.    He carries a past history of CAD based on a coronary artery calcium score of 696  Left main coronary artery: 8  Left anterior descending coronary artery and diagonal branches: 564  Left circumflex coronary artery and left marginal branches: 124  Right coronary artery and right marginal branches: 0  Posterior descending coronary artery: 0    This is ordered by his PCP he was referred to cardiology who placed him on aspirin and atorvastatin 40 mg at the time.  Echocardiogram 9/2024 showed normal LV systolic function with EF of 60%, grade 1 diastolic dysfunction, normal RV size and function without any significant valvular disease.    He also has difficult to control hypertension.  Renal artery duplex negative for any significant arterial occlusive disease.   His most recent outpatient regiment is amlodipine 5 mg daily, HCTZ 25 mg daily, spironolactone 25 mg daily, carvedilol 25 mg twice daily.   Patient has not been taking any of his antihypertensive medications for the past 5 days.  He and his wife do note that he has varied compliance and frequently misses doses.  They are unable to tell me which medications he is prescribed.    Longtime smoker; a pack a day for many years.  More recently he is smoking 6 cigars a day.  He has not had any cigar or cigarette use in the last 5 days.    No significant family history of early CAD.    EKG reviewed personally:   3/10/2025  Normal sinus rhythm    Telemetry reviewed personally:   Sinus rhythm, no significant events    Review of Systems   Constitutional: Negative for decreased appetite and fever.   Cardiovascular:  Negative for chest pain, dyspnea on exertion, leg swelling, orthopnea and palpitations.   Respiratory:  Negative for cough, shortness of breath and  "wheezing.    Gastrointestinal:  Negative for nausea and vomiting.   Genitourinary:  Negative for dysuria.   Neurological:  Negative for dizziness and light-headedness.   Psychiatric/Behavioral:  Negative for altered mental status.    All other systems reviewed and are negative.      Historical Information   Past Medical History:   Diagnosis Date    Hypertension      Past Surgical History:   Procedure Laterality Date    KNEE SURGERY  2008    MOLE REMOVAL  2024    Back     Social History     Substance and Sexual Activity   Alcohol Use Yes    Alcohol/week: 2.0 standard drinks of alcohol    Types: 2 Standard drinks or equivalent per week    Comment: social     Social History     Substance and Sexual Activity   Drug Use Not Currently    Types: Marijuana     Social History     Tobacco Use   Smoking Status Every Day    Current packs/day: 0.50    Average packs/day: 0.5 packs/day for 15.0 years (7.5 ttl pk-yrs)    Types: Cigars, Cigarettes   Smokeless Tobacco Never     Family History: Family history non-contributory    Meds/Allergies   current meds:   Current Facility-Administered Medications:     acetaminophen (TYLENOL) tablet 650 mg, Q6H PRN    [Held by provider] amLODIPine (NORVASC) tablet 5 mg, Daily    [START ON 3/11/2025] aspirin chewable tablet 81 mg, Daily    atorvastatin (LIPITOR) tablet 40 mg, Daily With Dinner    [Held by provider] carvedilol (COREG) tablet 25 mg, BID With Meals    [START ON 3/11/2025] clopidogrel (PLAVIX) tablet 75 mg, Daily    enoxaparin (LOVENOX) subcutaneous injection 40 mg, Daily    [Held by provider] hydroCHLOROthiazide tablet 25 mg, Daily    labetalol (NORMODYNE) injection 10 mg, Q6H PRN    ondansetron (ZOFRAN) injection 4 mg, Q6H PRN    [Held by provider] spironolactone (ALDACTONE) tablet 25 mg, Daily     No Known Allergies    Objective   Vitals: Blood pressure (!) 168/105, pulse 88, temperature 98.3 °F (36.8 °C), temperature source Oral, resp. rate 18, height 6' 2\" (1.88 m), weight 95.2 " kg (209 lb 14.1 oz), SpO2 99%.,     Body mass index is 26.95 kg/m².,     Systolic (24hrs), Av , Min:165 , Max:190     Diastolic (24hrs), Av, Min:89, Max:115    No intake or output data in the 24 hours ending 03/10/25 1343  Weight (last 2 days)       Date/Time Weight    03/10/25 1210 95.2 (209.88)    03/10/25 0722 95.2 (209.88)          Invasive Devices       Peripheral Intravenous Line  Duration             Peripheral IV 03/10/25 Left Antecubital <1 day                  Physical Exam  Vitals reviewed.   Constitutional:       Appearance: Normal appearance.   HENT:      Head: Normocephalic.      Mouth/Throat:      Mouth: Mucous membranes are moist.   Cardiovascular:      Rate and Rhythm: Normal rate and regular rhythm.      Heart sounds: S1 normal and S2 normal. No murmur heard.  Pulmonary:      Effort: Pulmonary effort is normal.      Breath sounds: Normal breath sounds. No wheezing or rales.   Abdominal:      Palpations: Abdomen is soft.   Musculoskeletal:      Right lower leg: No edema.      Left lower leg: No edema.   Skin:     General: Skin is warm.   Neurological:      Mental Status: He is alert and oriented to person, place, and time.      Comments: Full neuro exam not performed   Psychiatric:         Mood and Affect: Mood normal.       LABORATORY RESULTS:      CBC with diff:   Results from last 7 days   Lab Units 03/10/25  0727   WBC Thousand/uL 8.70   HEMOGLOBIN g/dL 13.8   HEMATOCRIT % 41.4   MCV fL 92   PLATELETS Thousands/uL 278   RBC Million/uL 4.50   MCH pg 30.7   MCHC g/dL 33.3   RDW % 14.0   MPV fL 10.6     CMP:  Results from last 7 days   Lab Units 03/10/25  0727   POTASSIUM mmol/L 4.2   CHLORIDE mmol/L 102   CO2 mmol/L 26   BUN mg/dL 27*   CREATININE mg/dL 1.63*   CALCIUM mg/dL 9.2   EGFR ml/min/1.73sq m 45     BMP:  Results from last 7 days   Lab Units 03/10/25  0727   POTASSIUM mmol/L 4.2   CHLORIDE mmol/L 102   CO2 mmol/L 26   BUN mg/dL 27*   CREATININE mg/dL 1.63*   CALCIUM mg/dL 9.2  "    Results from last 7 days   Lab Units 03/10/25  0727   INR  1.03     Lipid Profile:   No results found for: \"CHOL\"  Lab Results   Component Value Date    HDL 39 (L) 06/26/2024     Lab Results   Component Value Date    LDLCALC 171 (H) 06/26/2024     Lab Results   Component Value Date    TRIG 132 06/26/2024     Echo complete w/ contrast if indicated  Result Date: 3/10/2025  Narrative:   Left Ventricle: Left ventricular cavity size is normal. Wall thickness is upper normal. The left ventricular ejection fraction is 60%. Systolic function is normal. Wall motion is normal. Diastolic function is mildly abnormal, consistent with grade I (abnormal) relaxation.   Mitral Valve: There is mild annular calcification. There is trace regurgitation.   Prior TTE study available for comparison. Prior study date: 9/13/2024. No significant changes noted compared to the prior study.     VAS carotid complete study  Result Date: 3/10/2025  Narrative:  [facility] REPORT CLINICAL: Indications: Patient presents to evaluate for carotid artery stenosis s/p recent TIA/CVA. The patient admits symptoms of right sided weakness and confusion. Operative History: No prior cardiovascular surgeries Risk Factors The patient has history of HTN, Hyperlipidemia and smoking (current) 0.5 ppd. Clinical Right Pressure:  155/94 mm Hg, Left Pressure:  160/91 mm Hg.  FINDINGS:  Right        Impression  PSV  EDV (cm/s)  Direction of Flow  Ratio  Dist. ICA                 62          19                      0.76  Mid. ICA                  97          36                      1.18  Prox. ICA    1 - 49%      66          20                      0.80  Dist CCA                 100          29                            Mid CCA                   82          20                      0.67  Prox CCA                 122          31                      0.60  Ext Carotid              152          33                      1.85  Prox Vert                 48          " 16  Antegrade                 Subclavian               235           0                            Innominate               202          25                             Left         Impression  PSV  EDV (cm/s)  Direction of Flow  Ratio  Dist. ICA                 67          22                      0.85  Mid. ICA                  93          26                      1.17  Prox. ICA    70%+        259          82                      3.25  Dist CCA                  73          24                            Mid CCA                   80          25                      0.57  Prox CCA                 140          39                            Ext Carotid              136          36                      1.71  Prox Vert                 52          10  Antegrade                 Subclavian               175           0                               CONCLUSION: Impression RIGHT: There is <50% stenosis noted in the internal carotid artery. Plaque is heterogenous and irregular. Vertebral artery flow is antegrade. There is no significant subclavian artery disease.  LEFT: There is >70% stenosis noted in the internal carotid artery. Plaque is heterogenous and irregular. Vertebral artery flow is antegrade. There is no significant subclavian artery disease.  Recommend repeat testing in 6 months as per protocol unless otherwise indicated.  SIGNATURE: Electronically Signed by: ERNESTINA TAMAYO on 2025-03-10 01:07:16 PM    MRI brain wo contrast  Result Date: 3/10/2025  Narrative: MRI BRAIN WITHOUT CONTRAST INDICATION: wake up. COMPARISON:   CT/CTA from earlier today TECHNIQUE: Limited MRI of the brain was performed using the rapid stroke protocol with acquisition of axial diffusion, FLAIR and SWAN  sequences. IMAGE QUALITY:  Diagnostic. FINDINGS: BRAIN PARENCHYMA: Multiple small acute infarcts in the left periventricular and subcortical white matter with few small cortical foci of restricted diffusion in the left frontal and  parieto-occipital lobes. Some of the infarcts are hyperintense on FLAIR including the 2 lacunar infarcts in the left centrum semiovale identified on CT. Tiny focus of susceptibility within the left parieto-occipital infarct suspicious for petechial hemorrhage. There is also a punctate focus of mild DWI signal in the right corona radiata (image 8 series 3) also concerning for recent infarct. No acute space-occupying hematoma. No mass, mass effect, shift or herniation. Mild chronic microangiopathy. VENTRICLES:  Normal for the patient's age. SELLA AND PITUITARY GLAND: Limited ORBITS:  Normal. PARANASAL SINUSES: Unremarkable VASCULATURE: See earlier CTA CALVARIUM AND SKULL BASE:  Normal. EXTRACRANIAL SOFT TISSUES:  Normal.     Impression: Multiple small scattered acute infarcts in the left cerebral hemisphere. Several of the smaller infarcts are hyperintense on FLAIR. Tiny focus of left parietal occipital petechial hemorrhage. Tiny focus of mild DWI signal in the right centrum semiovale may also present recent infarct. No acute space-occupying hematoma. I personally discussed this study with Leo Burgos on 3/10/2025 8:28 AM. Workstation performed: [id number]     CT chest abdomen pelvis w contrast  Result Date: 3/10/2025  Narrative: CT CHEST, ABDOMEN AND PELVIS WITH IV CONTRAST INDICATION: Stroke like symptoms. Fall, abrasions overlying the right anterior chest.. COMPARISON: June 22, 2024 CT of the chest, abdomen, and pelvis TECHNIQUE: CT examination of the chest, abdomen and pelvis was performed. Multiplanar 2D reformatted images were created from the source data. This examination, like all CT scans performed in the [facility], was performed utilizing techniques to minimize radiation dose exposure, including the use of iterative reconstruction and automated exposure control. Radiation dose length product (DLP) for this visit: 687 mGy-cm IV Contrast: 85 mL of iohexol Enteric Contrast: Not  administered. FINDINGS: CHEST LUNGS: Slight paraseptal emphysema and scarring of the lung apices. No suspicious pulmonary nodule or mass. No evidence of pneumonia. No tracheal or endobronchial lesion. PLEURA: Unremarkable. HEART/GREAT VESSELS: Heart is unremarkable for patient's age. No thoracic aortic aneurysm. Moderate coronary artery calcifications. MEDIASTINUM AND ARELI: Unremarkable. CHEST WALL AND LOWER NECK: Moderate gynecomastia bilaterally. ABDOMEN LIVER/BILIARY TREE: Unremarkable. GALLBLADDER: Cholelithiasis without findings of acute cholecystitis. SPLEEN: Unremarkable. PANCREAS: Unremarkable. ADRENAL GLANDS: Unremarkable. KIDNEYS/URETERS: No hydronephrosis. Subcentimeter hypoattenuating renal lesion within the lower pole of the right kidney is too small to characterize but statistically likely benign, which do not warrant follow-up (Radiology June 2019). There is an approximately 9 mm nonobstructing calculus within the left renal pelvis. STOMACH AND BOWEL: Unremarkable. APPENDIX: Normal. ABDOMINOPELVIC CAVITY: No ascites. No pneumoperitoneum. No lymphadenopathy. VESSELS: Mild atherosclerosis. Normal caliber of the abdominal aorta. PELVIS REPRODUCTIVE ORGANS: Enlarged prostate. URINARY BLADDER: Unremarkable. ABDOMINAL WALL/INGUINAL REGIONS: Small fat-containing inguinal hernias bilaterally, right larger than left. Small fat-containing umbilical hernia. BONES: No acute fracture or suspicious osseous lesion. Healed chronic lateral left third and fourth rib fractures. Mild multilevel spinal degenerative changes.     Impression: 1.  No acute pathology within the chest, abdomen, or pelvis. 2.  Approximately 9 mm nonobstructing calculus within the left renal pelvis, previously within a left upper pole calyx. 3.  Chronic findings, detailed above. The study was marked in EPIC for immediate notification. Workstation performed: [id number]     CT stroke alert brain  Result Date: 3/10/2025  Narrative: CT BRAIN - STROKE  ALERT PROTOCOL INDICATION:   Stroke Alert. COMPARISON: CT dated 6/22/2024. TECHNIQUE:  CT examination of the brain was performed.  In addition to axial images, coronal reformatted images were created and submitted for interpretation. Radiation dose length product (DLP) for this visit:  1003 mGy-cm .  This examination, like all CT scans performed in the [facility], was performed utilizing techniques to minimize radiation dose exposure, including the use of iterative reconstruction and automated exposure control. IMAGE QUALITY:  Diagnostic. FINDINGS: PARENCHYMA: No acute large vessel distribution infarct, hemorrhage, mass effect, shift or herniation. 2 new age-indeterminate lacunar infarcts in the left centrum semiovale. Diminished attenuation in the periventricular and subcortical matter due to chronic microangiopathy. VENTRICLES AND EXTRA-AXIAL SPACES:  Normal for the patient's age. VISUALIZED ORBITS: Normal visualized orbits. PARANASAL SINUSES: Normal visualized paranasal sinuses. CALVARIUM AND EXTRACRANIAL SOFT TISSUES:   Normal.     Impression: No acute large vessel distribution infarct, hemorrhage or mass effect New age-indeterminate lacunar infarcts in the left centrum semiovale. Findings were directly discussed with Leo Burgos at 7:49 a.m. Workstation performed: [id number]     CTA stroke alert (head/neck)  Result Date: 3/10/2025  Narrative: CTA NECK AND BRAIN WITH CONTRAST INDICATION: Stroke Alert COMPARISON:   CTA neck dated 6/22/2024 TECHNIQUE:  Post contrast imaging was performed after administration of iodinated contrast through the neck and brain. Post contrast axial 0.625 mm images timed to opacify the arterial system.  3D rendering was performed on an independent workstation.   MIP reconstructions performed. Coronal and sagittal reconstructions were performed of the non contrast portion of the brain. Radiation dose length product (DLP) for this visit:  1381 mGy-cm .  This  examination, like all CT scans performed in the [facility], was performed utilizing techniques to minimize radiation dose exposure, including the use of iterative reconstruction and automated exposure control. IV Contrast:  85 mL of iohexol IMAGE QUALITY:   Diagnostic FINDINGS: CTA NECK ARCH AND GREAT VESSELS: Mild atherosclerotic changes with no severe stenosis. VERTEBRAL ARTERIES: Patent extracranial segments. Left vertebral is hypoplastic RIGHT CAROTID: Atherosclerotic plaque at the bifurcation and proximal ICA. Less than 50% stenosis.   No dissection. LEFT CAROTID: Worsening stenosis in the proximal left internal carotid artery due to increased noncalcified plaque with 70-99% stenosis.   No dissection. NASCET criteria was used to determine the degree of internal carotid artery diameter stenosis. CTA BRAIN: INTERNAL CAROTID ARTERIES: Calcified plaque in the cavernous and supraclinoid internal carotid arteries with mild narrowing. No high-grade stenosis. No stenosis or occlusion. ANTERIOR CEREBRAL ARTERY CIRCULATION:  No stenosis or occlusion. MIDDLE CEREBRAL ARTERY CIRCULATION:  No stenosis or occlusion. DISTAL VERTEBRAL ARTERIES:  No stenosis or occlusion. Left vertebral artery is hypoplastic. BASILAR ARTERY:  No stenosis or occlusion. POSTERIOR CEREBRAL ARTERIES: No stenosis or occlusion. VENOUS STRUCTURES:  Normal. NON VASCULAR ANATOMY BRAIN: Post contrast imaging in the arterial phase is unremarkable.  No delayed imaging of the brain was obtained. BONY STRUCTURES:  No acute osseous abnormality. SOFT TISSUES OF THE NECK: Stable subcutaneous sebaceous cyst in the posterior neck at the level of C2 measuring up to 3.8 cm THORACIC INLET:  Unremarkable.     Impression: Worsening severe stenosis in the proximal left internal carotid artery. No significant stenosis of the cervical right carotid or vertebral arteries No significant intracranial stenosis, large vessel occlusion or aneurysm. Findings  were directly discussed with Loe Burgos at 7:49 a.m. Workstation performed: [id number]     Assessment  Principal Problem:    Acute cerebrovascular accident (CVA) due to stenosis of left carotid artery (HCC)  Active Problems:    MARIA TERESA (acute kidney injury) (HCC)    Primary hypertension    Hyperlipidemia    Thank you for allowing us to participate in this patient's care. This pt will follow up with Dr Mccurdy once discharged.     Counseling / Coordination of Care  Total floor / unit time spent today 45 minutes.  Greater than 50% of total time was spent with the patient and / or family counseling and / or coordination of care.  A description of the counseling / coordination of care: Review of history, current assessment, development of a plan.      Code Status: Level 1 - Full Code    ** Please Note: Dragon 360 Dictation voice to text software may have been used in the creation of this document. **

## 2025-03-10 NOTE — H&P
H&P - Heart Failure   Name: Ez Dsouza 59 y.o. male I MRN: [id number]  Unit/Bed#: ED-36 I Date of Admission: 3/10/2025   Date of Service: 3/10/2025 I Hospital Day: 0     Assessment & Plan  Acute cerebrovascular accident (CVA) due to stenosis of left carotid artery (HCC)  -6 am stroke like symptoms with right sided weakness, and dysarthria.  -Stroke alert called, age determinate lacunar infarcts in the left centrum semiovale. Severe stenosis of left carotid artery. Patient loaded with aspirin and plavix. Was on aspirin outpatient but has not taken any of his meds for the past 5  days.  -MRI: Multiple small scattered acute infarct in the left cerebral hemisphere, tiny focus of left parietal occipital petechial hemorrhage.  Carotid US: >70% stenosis of left carotid with an heterogenous and irregular clot.    Plan:  Admit to Stroke pathway  Continue Aspirin and Plavix  continue atorvastatin 40 mg  Permissive HTN, labetalol if SBP >200  Neurology Consulted, appreciate recommendations  Vascular surgery consulted: recommend L ICA intervention, recommend cardiology consult for risk stratification.  Cardiology consulted, appreciate recommendations.  Echocardiogram, Continue telemetry  Lipid Panel, Hemoglobin A1c, TSH  PT/OT/ST  Stroke education  Frequent neuro checks. Continue to monitor and notify neurology with any changes.  STAT CT head for any acute change in neuro exam    Primary hypertension  Home regimen, amlodipine, carvedilol, hydrochlorothiazide, spironolactone.  Home BP generally around systolic 150's    Home antihypertensives held to allow permissive hypertension for 24 hours  PRN labetalol for SBP >200  Hyperlipidemia  Continue atorvastatin 40 mg daily.  MARIA TERESA (acute kidney injury) (HCC)  Patient has had poor oral intake over the last week.  Cr elevated on admission, baseline around 1.1.  UA: increased specific gravity, hyaline cast indicative of dehydration. Moderate occult blood RBC 30-35, likely in the  setting of non-obstructive renal stone and dehydration.  Received 1L NS    Plan:  Encourage oral hydration  Avoid nephrotoxic agents  Monitor Cr.      History of Present Illness   zE Dsouza is a 59 y.o. male with PMH of HTN, HLD, tobacco use and CAD who presents with right sided weakness. Patient went to sleep around 10 pm and woke up around 6 am, felt dizzy and weak on his right side resulting in a fall. His wife woke up to the noise and noted that the patient's speech was not clear and he was weak on his right side. She called EMS and after a few minutes he was able to get up with her support and walk to the couch.    Wife reports similar episode in December which self resolved and patient refused to go to the hospital at that time. As per wife patient has been fatigued and having low appetite since last Wednesday and has not taken his medications since then. He denies any nausea, vomiting, or abdominal pain, he is not clear why his appetite was decreased and stated he did not find it odd that he didn't feel like eating much. Patient has been smoking cigarettes about a pack a day since he was a teenager, he then switched to Cigars. He is currently smoking 8 cigars a day, last cigar was on Thursday. He denies any shortness of breath or chest pain. Currently reports improvement in symptoms but still has right sided weakness and mild dysarthria.      Review of Systems   Constitutional:  Negative for chills and fever.   HENT:  Positive for hearing loss (hard of hearing, chronic). Negative for sore throat and trouble swallowing.    Eyes:  Negative for pain and visual disturbance.   Respiratory:  Negative for cough and shortness of breath.    Cardiovascular:  Negative for chest pain, palpitations and leg swelling.   Gastrointestinal:  Negative for abdominal pain, blood in stool, constipation, diarrhea, nausea and vomiting.   Genitourinary:  Negative for dysuria and hematuria.   Musculoskeletal:  Negative for  arthralgias, back pain and myalgias.   Skin:  Negative for color change and rash.   Neurological:  Positive for speech difficulty (improving), weakness (right side, reports that it is more noticable in the RUE) and headaches (improved with tylenol). Negative for seizures and syncope.     Medical History Review: I have reviewed the patient's PMH, PSH, Social History, Family History, Meds, and Allergies   Historical Information   Past Medical History:   Diagnosis Date    Hypertension      Past Surgical History:   Procedure Laterality Date    KNEE SURGERY  2008    MOLE REMOVAL  2024    Back     Social History     Tobacco Use    Smoking status: Every Day     Current packs/day: 0.50     Average packs/day: 0.5 packs/day for 15.0 years (7.5 ttl pk-yrs)     Types: Cigars, Cigarettes    Smokeless tobacco: Never   Vaping Use    Vaping status: Never Used   Substance and Sexual Activity    Alcohol use: Yes     Alcohol/week: 2.0 standard drinks of alcohol     Types: 2 Standard drinks or equivalent per week     Comment: social    Drug use: Not Currently     Types: Marijuana    Sexual activity: Yes     Partners: Female     Birth control/protection: None     E-Cigarette/Vaping    E-Cigarette Use Never User      E-Cigarette/Vaping Substances     Family History   Problem Relation Age of Onset    Hypertension Mother     Hypertension Father     Heart disease Father      Social History     Tobacco Use    Smoking status: Every Day     Current packs/day: 0.50     Average packs/day: 0.5 packs/day for 15.0 years (7.5 ttl pk-yrs)     Types: Cigars, Cigarettes    Smokeless tobacco: Never   Vaping Use    Vaping status: Never Used   Substance and Sexual Activity    Alcohol use: Yes     Alcohol/week: 2.0 standard drinks of alcohol     Types: 2 Standard drinks or equivalent per week     Comment: social    Drug use: Not Currently     Types: Marijuana    Sexual activity: Yes     Partners: Female     Birth control/protection: None       Current  Facility-Administered Medications:     acetaminophen (TYLENOL) tablet 650 mg, Q6H PRN    [Held by provider] amLODIPine (NORVASC) tablet 5 mg, Daily    [START ON 3/11/2025] aspirin chewable tablet 81 mg, Daily    atorvastatin (LIPITOR) tablet 40 mg, Daily With Dinner    [Held by provider] carvedilol (COREG) tablet 25 mg, BID With Meals    [START ON 3/11/2025] clopidogrel (PLAVIX) tablet 75 mg, Daily    enoxaparin (LOVENOX) subcutaneous injection 40 mg, Daily    [Held by provider] hydroCHLOROthiazide tablet 25 mg, Daily    ondansetron (ZOFRAN) injection 4 mg, Q6H PRN    [Held by provider] spironolactone (ALDACTONE) tablet 25 mg, Daily  Prior to Admission Medications   Prescriptions Last Dose Informant Patient Reported? Taking?   amLODIPine (NORVASC) 5 mg tablet Past Week Self Yes Yes   Si mg daily   atorvastatin (LIPITOR) 40 mg tablet Past Week Self No Yes   Sig: Take 1 tablet (40 mg total) by mouth daily   carvedilol (COREG) 25 mg tablet Past Week  No Yes   Sig: Take 1 tablet (25 mg total) by mouth 2 (two) times a day with meals   hydroCHLOROthiazide 25 mg tablet Past Week Self No Yes   Sig: Take 1 tablet (25 mg total) by mouth daily   spironolactone (ALDACTONE) 25 mg tablet Past Week Self No Yes   Sig: Take 1 tablet (25 mg total) by mouth daily      Facility-Administered Medications: None     Patient has no known allergies.    Objective :  Temp:  [98.3 °F (36.8 °C)] 98.3 °F (36.8 °C)  HR:  [65-81] 79  BP: (165-190)/() 187/96  Resp:  [18-20] 18  SpO2:  [97 %-100 %] 100 %  O2 Device: None (Room air)    Physical Exam  Vitals reviewed.   Constitutional:       General: He is not in acute distress.     Appearance: He is well-developed. He is not ill-appearing.   HENT:      Head: Normocephalic and atraumatic.      Mouth/Throat:      Mouth: Mucous membranes are moist.   Eyes:      Extraocular Movements: Extraocular movements intact.      Conjunctiva/sclera: Conjunctivae normal.   Cardiovascular:      Rate and  Rhythm: Normal rate and regular rhythm.      Heart sounds: No murmur heard.  Pulmonary:      Effort: Pulmonary effort is normal. No respiratory distress.      Breath sounds: Normal breath sounds.   Abdominal:      General: Bowel sounds are normal.      Palpations: Abdomen is soft.      Tenderness: There is no abdominal tenderness.   Musculoskeletal:         General: No swelling.      Cervical back: Neck supple.      Right lower leg: No edema.      Left lower leg: No edema.   Skin:     General: Skin is warm and dry.      Capillary Refill: Capillary refill takes less than 2 seconds.   Neurological:      Mental Status: He is alert.      GCS: GCS eye subscore is 4. GCS verbal subscore is 5. GCS motor subscore is 6.      Cranial Nerves: Dysarthria present. No cranial nerve deficit or facial asymmetry.      Motor: Weakness (RUE 4/5) present. No tremor.   Psychiatric:         Mood and Affect: Mood normal.         Lab Results: I have reviewed the following results:CBC/BMP:   .     03/10/25  0727   WBC 8.70   HGB 13.8   HCT 41.4      SODIUM 135   K 4.2      CO2 26   BUN 27*   CREATININE 1.63*   GLUC 140    , LFTs: No new results in last 24 hours. , Urinalysis:   Lab Results   Component Value Date    COLORU Light Yellow 03/10/2025    CLARITYU Clear 03/10/2025    SPECGRAV 1.043 (H) 03/10/2025    PHUR 5.5 03/10/2025    LEUKOCYTESUR Negative 03/10/2025    NITRITE Negative 03/10/2025    GLUCOSEU Negative 03/10/2025    KETONESU Negative 03/10/2025    BILIRUBINUR Negative 03/10/2025    BLOODU Moderate (A) 03/10/2025     Imaging Results Review: I reviewed radiology reports from this admission including: CT chest, CT abdomen/pelvis, CT head, MRI brain, and Ultrasound(s).  Other Study Results Review: EKG was reviewed.

## 2025-03-10 NOTE — CONSULTS
"  NEUROLOGY RESIDENCY - STROKE CONSULT NOTE     Assessment & Plan  Acute cerebrovascular accident (CVA) due to stenosis of left carotid artery (HCC)  Ez Dsouza is a 58YO R-handed male with history of HTN and active smoker who presented to the hospital on 3/10 with RUE weakness and confusion. LKN 10pm on 3/9. Woke up this morning at 7am and tried to get up, but pt's wife heard a thud and was awoken from her sleep to find him on the ground, conscious, but with L-sided weakness, reportedly moreso arm > leg. At baseline, he has no neurological deficits, MRS 0. Initial /115. Glucose 117.   NIHSS 2 for month (\"December\") and moderate aphasia (receptive > expressive, no issue with repetition).   Neuro examination significant for RUE weakness 4+/5 compared to L.  CTH showed new age-indeterminate lacunar infarcts in L centrum semiovale.   CTA H/N showed worsening severe stenosis in the proximal L ICA.   CT CAP showed no acute pathology within chest, abdomen, pelvis; though there does appear to be 9mm nonobstructing calculus in L renal pelvis.   Wake-up stroke MRI showed multiple small scattered acute infarcts in L cerebral hemisphere. Several of the smaller infarcts are hyperintense on FLAIR. Tiny focus of L parietal occipital petechial hemorrhage. Tiny focus of mild DWI signal in R central semiovale may also represent recent infarct. No acute space-occupying hematoma.   Given evidence of acute strokes on DWI with FLAIR changes, pt was deemed not a candidate for TNK.   No thromboectomy due to no LVO.   Does take ASA 81mg daily at home. Pt was loaded with ASA 325mg x1 and Plavix 300mg x1 and admitted on stroke pathway.     Carotid US showed >70% stenosis in L ICA, <50% stenosis in R ICA. Vascular surgery team was consulted.   TTE: EF 60%, G1DD, LA normal size, no major valvular dysfunction, IVC normal size    Lab Results   Component Value Date    CHOLESTEROL 236 (H) 06/26/2024    TRIG 132 06/26/2024    HDL 39 (L) " "06/26/2024    LDLCALC 171 (H) 06/26/2024     No results found for: \"HGBA1C\"    Lab Results   Component Value Date    [id number] 0.543 03/10/2025     Assessment: Multifocal strokes primarily in L ICA territory but also in R hemisphere and possibly in L PCA territory suggestive of embolic strokes. No e/o malignancy on CT CAP. Carotid US significant for >70% stenosis of L ICA.    Plan:   Discussed with neurology attending, Dr. Burgos  TTE pending  Updated lipid panel, A1c pending  Permissive HTN <220/120 for first 24 hours, and then gradual return to normotension  Bedside swallow eval  S/p ASA and Plavix load, followed by ASA 81mg daily and Plavix 75mg daily  Given plan for surgical intervention, will defer duration of DAPT to vascular surgery team.  Pending P2Y12 level in the AM.  Atorvastatin 40mg QHS  Glucose <180   Neuro checks- Every 1 hour x 4 hours, then every 2 hours x 4 hours, then every 4 hours x 72 hours     Stat CT Head for change in neuro status.  Monitor on telemetry  DVT ppx per primary team, SCDs  PT/OT/ST/PM&R evaluations   Medical management as per primary team appreciated.   Vascular surgery consulted, recs appreciated    MARIA TERESA (acute kidney injury) (HCC)    Primary hypertension    Hyperlipidemia    Elevated coronary artery calcium score    Pre-operative cardiovascular examination         Thrombolytic Decision: Patient not a candidate. Bleeding risk.    Ez Dsouza will need follow-up in in 6 weeks with neurovascular team for Other in 60 minute appointment. They will not require outpatient neurological testing. Msg sent to scheduling team.      History of Present Illness   Hx and PE limited by: aphasia (receptive > expressive)  Patient last known well: 3/9 2200  Stroke alert called: 9124  Neurology time of arrival: immediate  HPI: Ez Dsouza is a 58YO R-handed male with history of HTN and active smoker who presented to the hospital on 3/10 with RUE weakness and confusion. LKN 10pm on 3/9. Woke " "up this morning at 7am and tried to get up, but pt's wife heard a thud and was awoken from her sleep to find him on the ground, conscious, but with L-sided weakness, reportedly moreso arm > leg. At baseline, he has no neurological deficits, MRS 0. Initial /115. Glucose 117. NIHSS 2 for month (\"December\") and moderate aphasia (receptive > expressive, no issue with repetition). Neuro examination significant for RUE weakness 4+/5 compared to L. CTH showed new age-indeterminate lacunar infarcts in L centrum semiovale. CTA H/N showed worsening severe stenosis in the proximal L ICA. CT CAP showed no acute pathology within chest, abdomen, pelvis; though there does appear to be 9mm nonobstructing calculus in L renal pelvis. Wake-up stroke MRI showed multiple small scattered acute infarcts in L cerebral hemisphere. Several of the smaller infarcts are hyperintense on FLAIR. Tiny focus of L parietal occipital petechial hemorrhage. Tiny focus of mild DWI signal in R central semiovale may also represent recent infarct. No acute space-occupying hematoma. Given evidence of acute strokes on DWI with FLAIR changes, pt was deemed not a candidate for TNK. No thromboectomy due to no LVO. Does take ASA 81mg daily at home. Pt was loaded with ASA 325mg x1 and Plavix 300mg x1 and admitted on stroke pathway.     Carotid US showed >70% stenosis in L ICA, <50% stenosis in R ICA. Vascular surgery team was consulted.     Review of Systems   Constitutional:  Negative for chills, diaphoresis and fever.   Eyes:  Negative for photophobia and visual disturbance.   Respiratory:  Negative for shortness of breath.    Cardiovascular:  Negative for chest pain.   Gastrointestinal:  Negative for abdominal pain.   Neurological:  Positive for speech difficulty and weakness. Negative for dizziness, facial asymmetry, light-headedness, numbness and headaches.   Psychiatric/Behavioral:  Positive for confusion.    All other systems reviewed and are " negative.    Medical History Review: I have reviewed the patient's PMH, PSH, Social History, Family History, Meds, and Allergies   Historical Information   Past Medical History:   Diagnosis Date    Hypertension      Past Surgical History:   Procedure Laterality Date    KNEE SURGERY  2008    MOLE REMOVAL  2024    Back     Social History     Tobacco Use    Smoking status: Every Day     Current packs/day: 0.50     Average packs/day: 0.5 packs/day for 15.0 years (7.5 ttl pk-yrs)     Types: Cigars, Cigarettes    Smokeless tobacco: Never   Vaping Use    Vaping status: Never Used   Substance and Sexual Activity    Alcohol use: Yes     Alcohol/week: 2.0 standard drinks of alcohol     Types: 2 Standard drinks or equivalent per week     Comment: social    Drug use: Not Currently     Types: Marijuana    Sexual activity: Yes     Partners: Female     Birth control/protection: None     E-Cigarette/Vaping    E-Cigarette Use Never User      E-Cigarette/Vaping Substances     Family History   Problem Relation Age of Onset    Hypertension Mother     Hypertension Father     Heart disease Father      Social History     Tobacco Use    Smoking status: Every Day     Current packs/day: 0.50     Average packs/day: 0.5 packs/day for 15.0 years (7.5 ttl pk-yrs)     Types: Cigars, Cigarettes    Smokeless tobacco: Never   Vaping Use    Vaping status: Never Used   Substance and Sexual Activity    Alcohol use: Yes     Alcohol/week: 2.0 standard drinks of alcohol     Types: 2 Standard drinks or equivalent per week     Comment: social    Drug use: Not Currently     Types: Marijuana    Sexual activity: Yes     Partners: Female     Birth control/protection: None       Current Facility-Administered Medications:     acetaminophen (TYLENOL) tablet 650 mg, Q6H PRN    [Held by provider] amLODIPine (NORVASC) tablet 5 mg, Daily    [START ON 3/11/2025] aspirin chewable tablet 81 mg, Daily    atorvastatin (LIPITOR) tablet 40 mg, Daily With Dinner    [Held by  provider] carvedilol (COREG) tablet 25 mg, BID With Meals    [START ON 3/11/2025] clopidogrel (PLAVIX) tablet 75 mg, Daily    enoxaparin (LOVENOX) subcutaneous injection 40 mg, Daily    [Held by provider] hydroCHLOROthiazide tablet 25 mg, Daily    labetalol (NORMODYNE) injection 10 mg, Q6H PRN    ondansetron (ZOFRAN) injection 4 mg, Q6H PRN    [Held by provider] spironolactone (ALDACTONE) tablet 25 mg, Daily  Prior to Admission Medications   Prescriptions Last Dose Informant Patient Reported? Taking?   ASPIRIN 81 PO 3/9/2025  Yes Yes   Sig: Take 81 mg by mouth in the morning   amLODIPine (NORVASC) 5 mg tablet 3/9/2025 Self Yes Yes   Si mg daily   atorvastatin (LIPITOR) 40 mg tablet 3/9/2025 Self No Yes   Sig: Take 1 tablet (40 mg total) by mouth daily   carvedilol (COREG) 25 mg tablet 3/9/2025  No Yes   Sig: Take 1 tablet (25 mg total) by mouth 2 (two) times a day with meals   hydroCHLOROthiazide 25 mg tablet 3/9/2025 Self No Yes   Sig: Take 1 tablet (25 mg total) by mouth daily   spironolactone (ALDACTONE) 25 mg tablet 3/9/2025 Self No Yes   Sig: Take 1 tablet (25 mg total) by mouth daily      Facility-Administered Medications: None     Patient has no known allergies.  I have reviewed the patient's PMH, PSH, Social History, Family History, Meds, and Allergies    Objective :    Temp:  [98 °F (36.7 °C)-98.3 °F (36.8 °C)] 98 °F (36.7 °C)  HR:  [65-88] 73  BP: (154-190)/() 159/100  Resp:  [18-20] 18  SpO2:  [96 %-100 %] 97 %  O2 Device: None (Room air)    Physical Exam   Vitals reviewed.   Constitutional:    Not in acute distress. Normal appearance. Not ill-appearing, toxic-appearing or diaphoretic.   HENT:   Normocephalic and atraumatic.  External ear normal b/l. Nose normal. No congestion or rhinorrhea. Mucous membranes are moist.  Oropharynx is clear. No oropharyngeal exudate or posterior oropharyngeal erythema.   Eyes:    No scleral icterus.  No discharge b/l.  Conjunctivae normal.   Cardiovascular: no  clear edema  Pulmonary:  No respiratory distress.   Musculoskeletal: no gross deformities  Skin:    Skin is not pale.     Neurologic Exam   MENTAL STATUS: AAOx3. Follows simple commands, though did struggle with comprehension.     LANGUAGE: Receptive > expressive aphasia, no issues with repetition. No dysarthria - normal volume and intonation.    CRANIAL NERVES:  CN II: Visual acuity grossly intact. No visual field deficit. PERRLA.   CN III, IV, VI: EOM's intact w/o nystagmus, gaze palsy, or preference.   CN V: Normal masseter bulk. V1-V3 sensation intact and symmetric bilaterally.   CN VII: Face movement symmetric. Smile, eyebrow raise, eyelid bury symmetric. Nasolabial folds and palpebral folds symmetric.   CN VIII: Hearing grossly intact bilaterally.   CN IX-X: No dysarthria. Palate elevates symmetrically. Uvula midline.   CN XI: Shoulder shrug symmetric.   CN XII: Tongue midline. No deviation, atrophy, or fasciculations.    MOTOR: Normal muscle bulk. Normal tone. No pronator drift or orbiting. No tremors or abnormal involuntary movements appreciated. Formal strength testing as follows:  Upper extremity:   Shoulder abduction Elbow flexion Elbow extension Wrist flexion Wrist extension Finger abduction   Right 4+/5 5-/5 5-/5 5-/5 5-/5 5-/5   Left 5/5 5/5 5/5 5/5 5/5 5/5     Lower extremity:   Hip flexion Knee flexion Knee extension Ankle dorsiflexion Ankle plantarflexion   Right 5/5 5/5 5/5 5/5 5/5   Left 5/5 5/5 5/5 5/5 5/5     SENSORY:  Light touch: Intact and symmetric throughout.  Pinprick: Intact and symmetric throughout.    REFLEXES:   Biceps Brachioradialis Patellar Plantar   Right 2+ 2+ 2+ Flexor   Left 2+ 2+ 2+ Flexor     COORDINATION: Pt too confused to understand command of FNF or HTS. However, no obvious ataxia or dysmetria noted while pt attempted to follow.    GAIT: Deferred    NIHSS:  1a.Level of Consciousness: 0 = Alert   1b. LOC Questions: 1 = Answers one correctly   1c. LOC Commands: 0 = Obeys  both correctly   2. Best Gaze: 0 = Normal   3. Visual: 0 = No visual field loss   4. Facial Palsy: 0=Normal symmetric movement   5a. Motor Right Arm: 0=No drift, limb holds 90 (or 45) degrees for full 10 seconds   5b. Motor Left Arm: 0=No drift, limb holds 90 (or 45) degrees for full 10 seconds   6a. Motor Right Le=No drift, limb holds 90 (or 45) degrees for full 10 seconds   6b. Motor Left Le=No drift, limb holds 90 (or 45) degrees for full 10 seconds   7. Limb Ataxia:  0=Absent   8. Sensory: 0=Normal; no sensory loss   9. Best Language:  1=Mild to moderate aphasia; some obvious loss of fluency or facility of comprehension without significant limitation on ideas expressed or form of expression.   10. Dysarthria: 0=Normal articulation   11. Extinction and Inattention (formerly Neglect): 0=No abnormality   Total Score: 2     Time NIHSS was completed: 0730    Modified Kerry Score:  0 (No baseline symptoms/disability)      Lab Results: I have reviewed the following results:  Results from last 7 days   Lab Units 03/10/25  0727   WBC Thousand/uL 8.70   HEMOGLOBIN g/dL 13.8   HEMATOCRIT % 41.4   PLATELETS Thousands/uL 278      Results from last 7 days   Lab Units 03/10/25  0727   POTASSIUM mmol/L 4.2   CHLORIDE mmol/L 102   CO2 mmol/L 26   BUN mg/dL 27*   CREATININE mg/dL 1.63*   CALCIUM mg/dL 9.2              Results from last 7 days   Lab Units 03/10/25  0727   INR  1.03   PTT seconds 31               VAS carotid complete study   Final Result by Hesham Jordan MD (03/10 6585)      MRI brain wo contrast   Final Result by E. Alec Schoenberger, MD (03/10 6350)      Multiple small scattered acute infarcts in the left cerebral hemisphere. Several of the smaller infarcts are hyperintense on FLAIR.   Tiny focus of left parietal occipital petechial hemorrhage.   Tiny focus of mild DWI signal in the right centrum semiovale may also present recent infarct.   No acute space-occupying hematoma.         I  personally discussed this study with Leo Burgos on 3/10/2025 8:28 AM.            Workstation performed: [id number]         CT stroke alert brain   Final Result by E. Alec Schoenberger, MD (03/10 0802)   No acute large vessel distribution infarct, hemorrhage or mass effect   New age-indeterminate lacunar infarcts in the left centrum semiovale.      Findings were directly discussed with Leo Burgos at 7:49 a.m.      Workstation performed: [id number]         CTA stroke alert (head/neck)   Final Result by E. Alec Schoenberger, MD (03/10 0801)   Worsening severe stenosis in the proximal left internal carotid artery.   No significant stenosis of the cervical right carotid or vertebral arteries   No significant intracranial stenosis, large vessel occlusion or aneurysm.               Findings were directly discussed with Leo Burgos at 7:49 a.m.      Workstation performed: [id number]         CT chest abdomen pelvis w contrast   Final Result by Presley Moore MD (03/10 0813)      1.  No acute pathology within the chest, abdomen, or pelvis.   2.  Approximately 9 mm nonobstructing calculus within the left renal pelvis, previously within a left upper pole calyx.   3.  Chronic findings, detailed above.      The study was marked in EPIC for immediate notification.      Workstation performed: [id number]             VTE Prophylaxis: VTE covered by:  enoxaparin, Subcutaneous, 40 mg at 03/10/25 1126       Code Status: Level 1 - Full Code  Advance Directive and Living Will:      Power of :    POLST:      Serenity Glass DO  [facility] Neurology Resident PGY3

## 2025-03-10 NOTE — ED PROVIDER NOTES
Time reflects when diagnosis was documented in both MDM as applicable and the Disposition within this note       Time User Action Codes Description Comment    3/10/2025  7:26 AM Marky Meyers Add [R29.90] Stroke-like symptoms     3/10/2025  8:53 AM patricia Liang Marky MCCLELLAND Add [I63.9] CVA (cerebral vascular accident) (HCC)     3/10/2025 11:53 AM Ellison, Pablo Add [I63.232] Acute cerebrovascular accident (CVA) due to stenosis of left carotid artery (HCC)     3/10/2025 11:53 AM Ellison, Pablo Add [I10] Primary hypertension           ED Disposition       ED Disposition   Admit    Condition   Stable    Date/Time   Mon Mar 10, 2025  8:53 AM    Comment   Case was discussed with Dr. Rios and the patient's admission status was agreed to be Admission Status: inpatient status to the service of Dr. Rios.               Assessment & Plan       Medical Decision Making  59-year-old male presents to the emergency department with concern for right-sided weakness and confusion.  Patient seen and examined noted to have right-sided  deficit as well as initial drift of the right upper extremity in addition to confusion.  Stroke alert called.  Differential diagnosis also includes but is not limited to traumatic injury given abrasion of the chest, arrhythmias.  laboratory analysis showed no acute pertinent findings.  EKG interpreted by myself as below.  New age-indeterminate lacunar infarcts were appreciated on CT imaging.  Discussed with neurology who recommended patient be admitted under stroke pathway, discussed with Dr. Reinoso who accepted the patient for further evaluation and management.    Amount and/or Complexity of Data Reviewed  Labs: ordered.  Radiology: ordered.    Risk  OTC drugs.  Prescription drug management.  Decision regarding hospitalization.             Medications   aspirin chewable tablet 81 mg (has no administration in time range)   clopidogrel (PLAVIX) tablet 75 mg (has no administration in time range)    amLODIPine (NORVASC) tablet 5 mg ( Oral Held Dose 3/10/25 1100)   atorvastatin (LIPITOR) tablet 40 mg (has no administration in time range)   carvedilol (COREG) tablet 25 mg ( Oral Held by provider 3/10/25 1055)   hydroCHLOROthiazide tablet 25 mg ( Oral Held Dose 3/10/25 1100)   spironolactone (ALDACTONE) tablet 25 mg ( Oral Held Dose 3/10/25 1100)   ondansetron (ZOFRAN) injection 4 mg (has no administration in time range)   enoxaparin (LOVENOX) subcutaneous injection 40 mg (40 mg Subcutaneous Given 3/10/25 1126)   acetaminophen (TYLENOL) tablet 650 mg (has no administration in time range)   labetalol (NORMODYNE) injection 10 mg (has no administration in time range)   iohexol (OMNIPAQUE) 350 MG/ML injection (MULTI-DOSE) 85 mL (85 mL Intravenous Given 3/10/25 0735)   sodium chloride 0.9 % bolus 1,000 mL (0 mL Intravenous Stopped 3/10/25 1130)   aspirin tablet 325 mg (325 mg Oral Given 3/10/25 0852)   clopidogrel (PLAVIX) tablet 300 mg (300 mg Oral Given 3/10/25 0852)   acetaminophen (Ofirmev) injection 1,000 mg (0 mg Intravenous Stopped 3/10/25 1034)       ED Risk Strat Scores         Stroke Assessment       Row Name 03/10/25 0750             NIH Stroke Scale    Interval Baseline      Level of Consciousness (1a.) 0      LOC Questions (1b.) 1      LOC Commands (1c.) 0      Best Gaze (2.) 0      Visual (3.) 0      Facial Palsy (4.) 0      Motor Arm, Left (5a.) 0      Motor Arm, Right (5b.) 1      Motor Leg, Left (6a.) 0      Motor Leg, Right (6b.) 0      Limb Ataxia (7.) 0      Sensory (8.) 0      Best Language (9.) 0      Dysarthria (10.) 0      Extinction and Inattention (11.) (Formerly Neglect) 0      Total 2                    Flowsheet Row Most Recent Value   Thrombolytic Decision Options    Thrombolytic Decision Patient not a candidate.   Patient is not a candidate options Unclear time of onset outside appropriate time window.                                                History of Present Illness       Chief  Complaint   Patient presents with    CVA/TIA-like Symptoms     Pt woke up this am and went to get oob and right side was totally weak. EMS arrived pt dazed and confused. Some confusion when putting shoes on. Wife is on route. AO to person and place only       Past Medical History:   Diagnosis Date    Hypertension       Past Surgical History:   Procedure Laterality Date    KNEE SURGERY  2008    MOLE REMOVAL  2024    Back      Family History   Problem Relation Age of Onset    Hypertension Mother     Hypertension Father     Heart disease Father       Social History     Tobacco Use    Smoking status: Every Day     Current packs/day: 0.50     Average packs/day: 0.5 packs/day for 15.0 years (7.5 ttl pk-yrs)     Types: Cigars, Cigarettes    Smokeless tobacco: Never   Vaping Use    Vaping status: Never Used   Substance Use Topics    Alcohol use: Yes     Alcohol/week: 2.0 standard drinks of alcohol     Types: 2 Standard drinks or equivalent per week     Comment: social    Drug use: Not Currently     Types: Marijuana      E-Cigarette/Vaping    E-Cigarette Use Never User       E-Cigarette/Vaping Substances      I have reviewed and agree with the history as documented.     (Ez Dsouza) Ez Dsouza is a 59 y.o. male     They presented to the emergency department on March 10, 2025. Patient presents with:  CVA/TIA-like Symptoms: Pt woke up this am and went to get oob and right side was totally weak. EMS arrived pt dazed and confused. Some confusion when putting shoes on. Wife is on route. AO to person and place only.    History is limited at this time secondary to patient's confusion.  EMS was called to patient's home by patient's wife after he reportedly woke up and fell.  Patient complained at that time of the he had right-sided arm weakness and appeared confused to EMS.  EMS also reports patient had difficulty with putting on his left shoe.  Patient was brought immediately to the emergency department.  Per EMS patient was  hypertensive for which she has a history of, and had a sugar of 94.                Review of Systems   Unable to perform ROS: Mental status change           Objective       ED Triage Vitals   Temperature Pulse Blood Pressure Respirations SpO2 Patient Position - Orthostatic VS   03/10/25 0725 03/10/25 0722 03/10/25 0722 03/10/25 0722 03/10/25 0722 03/10/25 0722   98.3 °F (36.8 °C) 67 (!) 180/115 20 99 % Lying      Temp Source Heart Rate Source BP Location FiO2 (%) Pain Score    03/10/25 0725 03/10/25 0730 03/10/25 0722 -- 03/10/25 1100    Oral Monitor Right arm  3      Vitals      Date and Time Temp Pulse SpO2 Resp BP Pain Score FACES Pain Rating User   03/10/25 1510 -- -- -- 18 -- -- -- CK   03/10/25 1510 98 °F (36.7 °C) 73 97 % -- 159/100 -- -- DII   03/10/25 1437 -- -- -- -- -- No Pain -- JR   03/10/25 1408 98.1 °F (36.7 °C) 73 96 % -- 157/99 -- -- DII   03/10/25 1400 98.1 °F (36.7 °C) 73 -- 18 157/99 -- -- JR   03/10/25 1355 -- 78 99 % 18 154/93 -- -- EV   03/10/25 1300 -- 88 -- 18 168/105 -- -- LA   03/10/25 1210 -- 81 -- -- 166/95 -- -- MT   03/10/25 1200 -- 83 99 % 18 166/95 3 -- LA   03/10/25 1100 -- 79 100 % 18 187/96 3 -- LA   03/10/25 1000 -- 72 100 % 18 169/96 -- -- TB   03/10/25 0900 -- 72 100 % -- 172/102 -- -- TB   03/10/25 0855 -- 74 99 % -- -- -- -- TB   03/10/25 0850 -- 77 98 % -- -- -- -- TB   03/10/25 0845 -- 81 100 % 18 172/93 -- -- TB   03/10/25 0840 -- 73 99 % -- -- -- -- TB   03/10/25 0839 -- -- -- -- 190/94 -- -- TB   03/10/25 0835 -- 72 99 % -- -- -- -- TB   03/10/25 0833 -- -- -- -- 165/106 -- -- TB   03/10/25 0830 -- 67 98 % 18 172/101 -- -- TB   03/10/25 0815 -- 70 99 % 18 180/89 -- -- TB   03/10/25 0810 -- 73 99 % -- -- -- -- TB   03/10/25 0806 -- -- 98 % -- -- -- -- TB   03/10/25 0805 -- 75 -- -- -- -- -- TB   03/10/25 0800 -- 75 100 % 18 183/100 -- -- TB   03/10/25 0755 -- 75 97 % -- -- -- -- TB   03/10/25 0750 -- 77 99 % -- -- -- -- TB   03/10/25 0745 -- 78 98 % 18 174/91 -- -- TB    03/10/25 0741 -- -- 99 % -- -- -- -- TB   03/10/25 0740 -- 73 -- -- -- -- -- TB   03/10/25 0735 -- 65 99 % -- -- -- -- TB   03/10/25 0730 -- 80 98 % 18 174/105 -- -- TB   03/10/25 0729 -- 71 -- -- -- -- -- TB   03/10/25 0726 -- -- -- -- 182/98 -- -- TB   03/10/25 0725 98.3 °F (36.8 °C) 66 99 % -- -- -- -- TB   03/10/25 0722 -- 67 99 % 20 180/115 -- -- TB            Physical Exam  Vitals and nursing note reviewed.   Constitutional:       General: He is in acute distress (Moderate).      Appearance: Normal appearance.   HENT:      Head: Normocephalic and atraumatic.      Right Ear: External ear normal.      Left Ear: External ear normal.      Nose: Nose normal.      Mouth/Throat:      Mouth: Mucous membranes are moist.   Eyes:      Conjunctiva/sclera: Conjunctivae normal.   Cardiovascular:      Rate and Rhythm: Normal rate and regular rhythm.   Pulmonary:      Effort: Pulmonary effort is normal. No respiratory distress.      Breath sounds: Normal breath sounds.   Abdominal:      General: Abdomen is flat. Bowel sounds are normal.      Tenderness: There is no abdominal tenderness. There is no guarding or rebound.   Musculoskeletal:         General: No tenderness.      Cervical back: Normal range of motion.   Skin:     General: Skin is warm and dry.      Capillary Refill: Capillary refill takes less than 2 seconds.      Comments: Large superficial abrasion overlying right anterior chest   Neurological:      Mental Status: He is alert.      GCS: GCS eye subscore is 4. GCS verbal subscore is 5. GCS motor subscore is 6.      Cranial Nerves: No dysarthria or facial asymmetry.      Sensory: No sensory deficit.      Motor: Weakness (Right upper extremity,  3 out of 5.  Left upper extremity without weakness,  5 out of 5) present.      Coordination: Coordination normal.      Comments: Oriented to self and place, unable to recall time   Psychiatric:         Mood and Affect: Mood normal.         Results Reviewed        Procedure Component Value Units Date/Time    TSH, 3rd generation with Free T4 reflex [[id number]]  (Normal) Collected: 03/10/25 1123    Lab Status: Final result Specimen: Blood from Arm, Left Updated: 03/10/25 1424     TSH 3RD GENERATON 0.543 uIU/mL     HS Troponin I 4hr [[id number]]  (Normal) Collected: 03/10/25 1123    Lab Status: Final result Specimen: Blood from Arm, Left Updated: 03/10/25 1203     hs TnI 4hr 7 ng/L      Delta 4hr hsTnI 0 ng/L     HS Troponin I 2hr [[id number]]  (Normal) Collected: 03/10/25 0915    Lab Status: Final result Specimen: Blood from Arm, Left Updated: 03/10/25 0947     hs TnI 2hr 6 ng/L      Delta 2hr hsTnI -1 ng/L     Urine Microscopic [[id number]]  (Abnormal) Collected: 03/10/25 0915    Lab Status: Final result Specimen: Urine, Other Updated: 03/10/25 0930     RBC, UA 30-50 /hpf      WBC, UA 2-4 /hpf      Epithelial Cells None Seen /hpf      Bacteria, UA None Seen /hpf      MUCUS THREADS Occasional     Hyaline Casts, UA 5-10 /lpf     UA w Reflex to Microscopic w Reflex to Culture [[id number]]  (Abnormal) Collected: 03/10/25 0915    Lab Status: Final result Specimen: Urine, Other Updated: 03/10/25 0928     Color, UA Light Yellow     Clarity, UA Clear     Specific Gravity, UA 1.043     pH, UA 5.5     Leukocytes, UA Negative     Nitrite, UA Negative     Protein, UA Trace mg/dl      Glucose, UA Negative mg/dl      Ketones, UA Negative mg/dl      Urobilinogen, UA <2.0 mg/dl      Bilirubin, UA Negative     Occult Blood, UA Moderate    Protime-INR [[id number]]  (Normal) Collected: 03/10/25 0727    Lab Status: Final result Specimen: Blood from Arm, Left Updated: 03/10/25 0846     Protime 14.2 seconds      INR 1.03    Narrative:      INR Therapeutic Range    Indication                                             INR Range      Atrial Fibrillation                                               2.0-3.0  Hypercoagulable State                                    2.0.2.3  Left Ventricular Asist Device                             2.0-3.0  Mechanical Heart Valve                                  -    Aortic(with afib, MI, embolism, HF, LA enlargement,    and/or coagulopathy)                                     2.0-3.0 (2.5-3.5)     Mitral                                                             2.5-3.5  Prosthetic/Bioprosthetic Heart Valve               2.0-3.0  Venous thromboembolism (VTE: VT, PE        2.0-3.0    APTT [[id number]]  (Normal) Collected: 03/10/25 0727    Lab Status: Final result Specimen: Blood from Arm, Left Updated: 03/10/25 0846     PTT 31 seconds     HS Troponin 0hr (reflex protocol) [[id number]]  (Normal) Collected: 03/10/25 0727    Lab Status: Final result Specimen: Blood from Arm, Left Updated: 03/10/25 0758     hs TnI 0hr 7 ng/L     Basic metabolic panel [[id number]]  (Abnormal) Collected: 03/10/25 0727    Lab Status: Final result Specimen: Blood from Arm, Left Updated: 03/10/25 0758     Sodium 135 mmol/L      Potassium 4.2 mmol/L      Chloride 102 mmol/L      CO2 26 mmol/L      ANION GAP 7 mmol/L      BUN 27 mg/dL      Creatinine 1.63 mg/dL      Glucose 140 mg/dL      Calcium 9.2 mg/dL      eGFR 45 ml/min/1.73sq m     Narrative:      [employer] guidelines for Chronic Kidney Disease (CKD):     Stage 1 with normal or high GFR (GFR > 90 mL/min/1.73 square meters)    Stage 2 Mild CKD (GFR = 60-89 mL/min/1.73 square meters)    Stage 3A Moderate CKD (GFR = 45-59 mL/min/1.73 square meters)    Stage 3B Moderate CKD (GFR = 30-44 mL/min/1.73 square meters)    Stage 4 Severe CKD (GFR = 15-29 mL/min/1.73 square meters)    Stage 5 End Stage CKD (GFR <15 mL/min/1.73 square meters)  Note: GFR calculation is accurate only with a steady state creatinine    CBC and Platelet [[id number]]  (Normal) Collected: 03/10/25 0727    Lab Status: Final result Specimen: Blood from Arm, Left Updated: 03/10/25 0736     WBC 8.70 Thousand/uL      RBC 4.50 Million/uL      Hemoglobin 13.8 g/dL       Hematocrit 41.4 %      MCV 92 fL      MCH 30.7 pg      MCHC 33.3 g/dL      RDW 14.0 %      Platelets 278 Thousands/uL      MPV 10.6 fL     Fingerstick Glucose (POCT) [[id number]]  (Normal) Collected: 03/10/25 0722    Lab Status: Final result Specimen: Blood Updated: 03/10/25 0723     POC Glucose 117 mg/dl             VAS carotid complete study   Final Interpretation by Hesham Jordan MD (03/10 1307)      MRI brain wo contrast   Final Interpretation by E. Alec Schoenberger, MD (03/10 0849)      Multiple small scattered acute infarcts in the left cerebral hemisphere. Several of the smaller infarcts are hyperintense on FLAIR.   Tiny focus of left parietal occipital petechial hemorrhage.   Tiny focus of mild DWI signal in the right centrum semiovale may also present recent infarct.   No acute space-occupying hematoma.         I personally discussed this study with Leo Burgos on 3/10/2025 8:28 AM.            Workstation performed: [id number]         CT stroke alert brain   Final Interpretation by E. Alec Schoenberger, MD (03/10 0802)   No acute large vessel distribution infarct, hemorrhage or mass effect   New age-indeterminate lacunar infarcts in the left centrum semiovale.      Findings were directly discussed with Leo Burgos at 7:49 a.m.      Workstation performed: [id number]         CTA stroke alert (head/neck)   Final Interpretation by E. Alec Schoenberger, MD (03/10 0801)   Worsening severe stenosis in the proximal left internal carotid artery.   No significant stenosis of the cervical right carotid or vertebral arteries   No significant intracranial stenosis, large vessel occlusion or aneurysm.               Findings were directly discussed with Leo Burgos at 7:49 a.m.      Workstation performed: [id number]         CT chest abdomen pelvis w contrast   Final Interpretation by Presley Moore MD (03/10 0813)      1.  No acute pathology within the chest, abdomen, or pelvis.   2.   Approximately 9 mm nonobstructing calculus within the left renal pelvis, previously within a left upper pole calyx.   3.  Chronic findings, detailed above.      The study was marked in EPIC for immediate notification.      Workstation performed: [id number]             ECG 12 Lead Documentation Only    Date/Time: 3/10/2025 3:59 PM    Performed by: Marky Meyers MD  Authorized by: Marky Meyers MD    ECG reviewed by me, the ED Provider: yes    Patient location:  ED  Previous ECG:     Previous ECG:  Compared to current    Similarity:  No change  Interpretation:     Interpretation: normal    Rate:     ECG rate:  68    ECG rate assessment: normal    Rhythm:     Rhythm: sinus rhythm    Ectopy:     Ectopy: none    QRS:     QRS axis:  Normal    QRS intervals:  Normal  Conduction:     Conduction: normal    ST segments:     ST segments:  Normal  T waves:     T waves: normal    Comments:      Normal Sinus rhythm at 68 BPM, no PAC, no PVC, no acute ischemic changes.      ED Medication and Procedure Management   Prior to Admission Medications   Prescriptions Last Dose Informant Patient Reported? Taking?   ASPIRIN 81 PO 3/9/2025  Yes Yes   Sig: Take 81 mg by mouth in the morning   amLODIPine (NORVASC) 5 mg tablet 3/9/2025 Self Yes Yes   Si mg daily   atorvastatin (LIPITOR) 40 mg tablet 3/9/2025 Self No Yes   Sig: Take 1 tablet (40 mg total) by mouth daily   carvedilol (COREG) 25 mg tablet 3/9/2025  No Yes   Sig: Take 1 tablet (25 mg total) by mouth 2 (two) times a day with meals   hydroCHLOROthiazide 25 mg tablet 3/9/2025 Self No Yes   Sig: Take 1 tablet (25 mg total) by mouth daily   spironolactone (ALDACTONE) 25 mg tablet 3/9/2025 Self No Yes   Sig: Take 1 tablet (25 mg total) by mouth daily      Facility-Administered Medications: None     Current Discharge Medication List        CONTINUE these medications which have NOT CHANGED    Details   amLODIPine (NORVASC) 5 mg tablet 5 mg daily      ASPIRIN 81 PO Take 81  mg by mouth in the morning      atorvastatin (LIPITOR) 40 mg tablet Take 1 tablet (40 mg total) by mouth daily  Qty: 90 tablet, Refills: 3    Associated Diagnoses: Mixed hyperlipidemia; Smoking      carvedilol (COREG) 25 mg tablet Take 1 tablet (25 mg total) by mouth 2 (two) times a day with meals  Qty: 60 tablet, Refills: 11    Associated Diagnoses: Primary hypertension      hydroCHLOROthiazide 25 mg tablet Take 1 tablet (25 mg total) by mouth daily  Qty: 90 tablet, Refills: 3    Associated Diagnoses: Resistant hypertension      spironolactone (ALDACTONE) 25 mg tablet Take 1 tablet (25 mg total) by mouth daily  Qty: 90 tablet, Refills: 3    Associated Diagnoses: Resistant hypertension           No discharge procedures on file.  ED SEPSIS DOCUMENTATION   Time reflects when diagnosis was documented in both MDM as applicable and the Disposition within this note       Time User Action Codes Description Comment    3/10/2025  7:26 AM Marky Meyers Add [R29.90] Stroke-like symptoms     3/10/2025  8:53 AM Marky Meyers Add [I63.9] CVA (cerebral vascular accident) (HCC)     3/10/2025 11:53 AM Pablo Ellison Add [I63.232] Acute cerebrovascular accident (CVA) due to stenosis of left carotid artery (HCC)     3/10/2025 11:53 AM Pablo Ellison Add [I10] Primary hypertension                  Marky Meyers MD  03/10/25 1600

## 2025-03-10 NOTE — ASSESSMENT & PLAN NOTE
Reason for admission.  Patient had a fall with right-sided weakness and dysarthria today  MRI showed multiple small scattered acute infarcts in the left cerebral hemisphere, tiny focus of left parietal occipital petechial hemorrhage.  Carotid ultrasound showed greater than 70% stenosis of the left ICA with Hydro genus in the regular plaque.  Evaluated by vascular surgery and recommending left carotid intervention, likely CEA.  Timing to be determined per neurology  Loaded with aspirin and Plavix.  Continue statin.

## 2025-03-10 NOTE — ASSESSMENT & PLAN NOTE
696.   Respecters of hypertension and tobacco use  On aspirin 81 mg daily and atorvastatin 40 mg daily,  EKG today without ischemic changes  Echocardiogram 9/2024 with normal EF normal wall motion  He is asymptomatic.

## 2025-03-10 NOTE — PLAN OF CARE
Problem: Neurological Deficit  Goal: Neurological status is stable or improving  Description: Interventions:  - Monitor and assess patient's level of consciousness, motor function, sensory function, and level of assistance needed for ADLs.   - Monitor and report changes from baseline. Collaborate with interdisciplinary team to initiate plan and implement interventions as ordered.   - Provide and maintain a safe environment.  - Consider seizure precautions.  - Consider fall precautions.  - Consider aspiration precautions.  - Consider bleeding precautions.  3/10/2025 1425 by Muriel Payton RN  Outcome: Progressing  3/10/2025 1424 by Muriel Payton RN  Outcome: Progressing     Problem: Activity Intolerance/Impaired Mobility  Goal: Mobility/activity is maintained at optimum level for patient  Description: Interventions:  - Assess and monitor patient  barriers to mobility and need for assistive/adaptive devices.  - Assess patient's emotional response to limitations.  - Collaborate with interdisciplinary team and initiate plans and interventions as ordered.  - Encourage independent activity per ability.  - Maintain proper body alignment.  - Perform active/passive rom as tolerated/ordered.  - Plan activities to conserve energy.  - Turn patient as appropriate  3/10/2025 1425 by Muriel Payton RN  Outcome: Progressing  3/10/2025 1424 by Muriel Payton RN  Outcome: Progressing     Problem: Communication Impairment  Goal: Ability to express needs and understand communication  Description: Assess patient's communication skills and ability to understand information.  Patient will demonstrate use of effective communication techniques, alternative methods of communication and understanding even if not able to speak.     - Encourage communication and provide alternate methods of communication as needed.  - Collaborate with case management/ for discharge needs.  - Include patient/family/caregiver in  decisions related to communication.  3/10/2025 1425 by Muriel Payton RN  Outcome: Progressing  3/10/2025 1424 by Muriel Payton RN  Outcome: Progressing     Problem: Potential for Aspiration  Goal: Non-ventilated patient's risk of aspiration is minimized  Description: Assess and monitor vital signs, respiratory status, and labs (WBC).  Monitor for signs of aspiration (tachypnea, cough, rales, wheezing, cyanosis, fever).    - Assess and monitor patient's ability to swallow.  - Place patient up in chair to eat if possible.  - HOB up at 90 degrees to eat if unable to get patient up into chair.  - Supervise patient during oral intake.   - Instruct patient/ family to take small bites.  - Instruct patient/ family to take small single sips when taking liquids.  - Follow patient-specific strategies generated by speech pathologist.  3/10/2025 1425 by Muriel Payton RN  Outcome: Progressing  3/10/2025 1424 by Muriel Payton RN  Outcome: Progressing  Goal: Ventilated patient's risk of aspiration is minimized  Description: Assess and monitor vital signs, respiratory status, airway cuff pressure, and labs (WBC).  Monitor for signs of aspiration (tachypnea, cough, rales, wheezing, cyanosis, fever).    - Elevate head of bed 30 degrees if patient has tube feeding.  - Monitor tube feeding.  3/10/2025 1425 by Muriel Payton RN  Outcome: Progressing  3/10/2025 1424 by Muriel Payton RN  Outcome: Progressing     Problem: Nutrition  Goal: Nutrition/Hydration status is improving  Description: Monitor and assess patient's nutrition/hydration status for malnutrition (ex- brittle hair, bruises, dry skin, pale skin and conjunctiva, muscle wasting, smooth red tongue, and disorientation). Collaborate with interdisciplinary team and initiate plan and interventions as ordered.  Monitor patient's weight and dietary intake as ordered or per policy. Utilize nutrition screening tool and intervene per policy. Determine patient's food  preferences and provide high-protein, high-caloric foods as appropriate.     - Assist patient with eating.  - Allow adequate time for meals.  - Encourage patient to take dietary supplement as ordered.  - Collaborate with clinical nutritionist.  - Include patient/family/caregiver in decisions related to nutrition.  3/10/2025 1425 by Muriel Payton RN  Outcome: Progressing  3/10/2025 1424 by Muriel Payton RN  Outcome: Progressing

## 2025-03-10 NOTE — ED ATTENDING ATTESTATION
3/10/2025  I, Jie Martínez MD, saw and evaluated the patient. I have discussed the patient with the resident/non-physician practitioner and agree with the resident's/non-physician practitioner's findings, Plan of Care, and MDM as documented in the resident's/non-physician practitioner's note, except where noted. All available labs and Radiology studies were reviewed.  I was present for key portions of any procedure(s) performed by the resident/non-physician practitioner and I was immediately available to provide assistance.       At this point I agree with the current assessment done in the Emergency Department.  I have conducted an independent evaluation of this patient a history and physical is as follows:    59-year-old male presenting to the ER due to confusion, right-sided arm weakness.  Had a fall today morning when he got out of bed because of right-sided weakness.  Not on blood thinners.  Last known normal last night.  No history of stroke.  Not on blood thinners.    Agree with stroke alert symptoms within 24 hours.    Patient seen by neurology, recommend stat MRI    Per neurology not TNK candidate, recommend dual antiplatelet load and admission on stroke pathway, vascular consult and ultrasound carotids    Critical Care Time Statement: Upon my evaluation, this patient had a high probability of imminent or life-threatening deterioration due to stroke, which required my direct attention, intervention, and personal management.  I spent a total of 37 minutes directly providing critical care services, including evaluating for the presence of life-threatening injuries or illnesses, management of organ system failure(s) , complex medical decision making (to support/prevent further life-threatening deterioration)., and interpretation of hemodynamic data. This time is exclusive of procedures, teaching, treating other patients, family meetings, and any prior time recorded by providers other than myself.           ED Course         Critical Care Time  Procedures

## 2025-03-10 NOTE — ASSESSMENT & PLAN NOTE
Patient has had poor oral intake over the last week.  Cr elevated on admission, baseline around 1.1.  UA: increased specific gravity, hyaline cast indicative of dehydration. Moderate occult blood RBC 30-35, likely in the setting of non-obstructive renal stone and dehydration.  Received 1L NS    Plan:  Encourage oral hydration  Avoid nephrotoxic agents  Monitor Cr.

## 2025-03-10 NOTE — ASSESSMENT & PLAN NOTE
Cardiology asked to evaluate for preoperative clearance for possible left CEA in the setting of acute stroke.  Patient has an elevated coronary artery calcium score of 696  Echo 9/2024 showed normal EF with normal wall motion no significant valvular disease  EKG showing normal sinus rhythm without any ischemic changes  Asymptomatic.  No complaints of chest discomfort  He is not physically limited by any exertional chest discomfort or shortness of breath.  No signs or symptoms of heart failure on exam    Repeat echocardiogram was ordered by primary team and will be reviewed when available.  As long as echocardiogram does not show any significant changes patient can proceed with planned carotid intervention without any additional cardiac testing.

## 2025-03-10 NOTE — ASSESSMENT & PLAN NOTE
-6 am stroke like symptoms with right sided weakness, and dysarthria.  -Stroke alert called, age determinate lacunar infarcts in the left centrum semiovale. Severe stenosis of left carotid artery. Patient loaded with aspirin and plavix. Was on aspirin outpatient but has not taken any of his meds for the past 5  days.  -MRI: Multiple small scattered acute infarct in the left cerebral hemisphere, tiny focus of left parietal occipital petechial hemorrhage.  Carotid US: >70% stenosis of left carotid with an heterogenous and irregular clot.    Plan:  Admit to Stroke pathway  Continue Aspirin and Plavix  continue atorvastatin 40 mg  Permissive HTN, labetalol if SBP >200  Neurology Consulted, appreciate recommendations  Vascular surgery consulted: recommend L ICA intervention, recommend cardiology consult for risk stratification.  Cardiology consulted, appreciate recommendations.  Echocardiogram, Continue telemetry  Lipid Panel, Hemoglobin A1c, TSH  PT/OT/ST  Stroke education  Frequent neuro checks. Continue to monitor and notify neurology with any changes.  STAT CT head for any acute change in neuro exam

## 2025-03-10 NOTE — ASSESSMENT & PLAN NOTE
On atorvastatin 40 mg daily;   Last lipid panel 6/2024 with an LDL of 171, total cholesterol 236 triglycerides 132; this was prior to statin initiation.  Recheck lipid panel in a.m.

## 2025-03-10 NOTE — ASSESSMENT & PLAN NOTE
"Ez Dsouza is a 58YO R-handed male with history of HTN and active smoker who presented to the hospital on 3/10 with RUE weakness and confusion. LKN 10pm on 3/9. Woke up this morning at 7am and tried to get up, but pt's wife heard a thud and was awoken from her sleep to find him on the ground, conscious, but with L-sided weakness, reportedly moreso arm > leg. At baseline, he has no neurological deficits, MRS 0. Initial /115. Glucose 117.   NIHSS 2 for month (\"December\") and moderate aphasia (receptive > expressive, no issue with repetition).   Neuro examination significant for RUE weakness 4+/5 compared to L.  CTH showed new age-indeterminate lacunar infarcts in L centrum semiovale.   CTA H/N showed worsening severe stenosis in the proximal L ICA.   CT CAP showed no acute pathology within chest, abdomen, pelvis; though there does appear to be 9mm nonobstructing calculus in L renal pelvis.   Wake-up stroke MRI showed multiple small scattered acute infarcts in L cerebral hemisphere. Several of the smaller infarcts are hyperintense on FLAIR. Tiny focus of L parietal occipital petechial hemorrhage. Tiny focus of mild DWI signal in R central semiovale may also represent recent infarct. No acute space-occupying hematoma.   Given evidence of acute strokes on DWI with FLAIR changes, pt was deemed not a candidate for TNK.   No thromboectomy due to no LVO.   Does take ASA 81mg daily at home. Pt was loaded with ASA 325mg x1 and Plavix 300mg x1 and admitted on stroke pathway.     Carotid US showed >70% stenosis in L ICA, <50% stenosis in R ICA. Vascular surgery team was consulted.   TTE: EF 60%, G1DD, LA normal size, no major valvular dysfunction, IVC normal size    Lab Results   Component Value Date    CHOLESTEROL 236 (H) 06/26/2024    TRIG 132 06/26/2024    HDL 39 (L) 06/26/2024    LDLCALC 171 (H) 06/26/2024     No results found for: \"HGBA1C\"    Lab Results   Component Value Date    [id number] 0.543 03/10/2025 "     Assessment: Multifocal strokes primarily in L ICA territory but also in R hemisphere and possibly in L PCA territory suggestive of embolic strokes. No e/o malignancy on CT CAP. Carotid US significant for >70% stenosis of L ICA.    Plan:   Discussed with neurology attending, Dr. Burgos  TTE pending  Updated lipid panel, A1c pending  Permissive HTN <220/120 for first 24 hours, and then gradual return to normotension  Bedside swallow eval  S/p ASA and Plavix load, followed by ASA 81mg daily and Plavix 75mg daily  Given plan for surgical intervention, will defer duration of DAPT to vascular surgery team.  Pending P2Y12 level in the AM.  Atorvastatin 40mg QHS  Glucose <180   Neuro checks- Every 1 hour x 4 hours, then every 2 hours x 4 hours, then every 4 hours x 72 hours     Stat CT Head for change in neuro status.  Monitor on telemetry  DVT ppx per primary team, SCDs  PT/OT/ST/PM&R evaluations   Medical management as per primary team appreciated.   Vascular surgery consulted, recs appreciated

## 2025-03-11 ENCOUNTER — TELEPHONE (OUTPATIENT)
Age: 60
End: 2025-03-11

## 2025-03-11 LAB
ANION GAP SERPL CALCULATED.3IONS-SCNC: 9 MMOL/L (ref 4–13)
BASOPHILS # BLD AUTO: 0.04 THOUSANDS/ÂΜL (ref 0–0.1)
BASOPHILS NFR BLD AUTO: 1 % (ref 0–1)
BUN SERPL-MCNC: 22 MG/DL (ref 5–25)
CALCIUM SERPL-MCNC: 9 MG/DL (ref 8.4–10.2)
CHLORIDE SERPL-SCNC: 106 MMOL/L (ref 96–108)
CHOLEST SERPL-MCNC: 188 MG/DL (ref ?–200)
CO2 SERPL-SCNC: 21 MMOL/L (ref 21–32)
CREAT SERPL-MCNC: 1.29 MG/DL (ref 0.6–1.3)
EOSINOPHIL # BLD AUTO: 0.1 THOUSAND/ÂΜL (ref 0–0.61)
EOSINOPHIL NFR BLD AUTO: 1 % (ref 0–6)
ERYTHROCYTE [DISTWIDTH] IN BLOOD BY AUTOMATED COUNT: 13.5 % (ref 11.6–15.1)
EST. AVERAGE GLUCOSE BLD GHB EST-MCNC: 151 MG/DL
GFR SERPL CREATININE-BSD FRML MDRD: 59 ML/MIN/1.73SQ M
GLUCOSE SERPL-MCNC: 121 MG/DL (ref 65–140)
HBA1C MFR BLD: 6.9 %
HCT VFR BLD AUTO: 39.2 % (ref 36.5–49.3)
HDLC SERPL-MCNC: 29 MG/DL
HGB BLD-MCNC: 13.3 G/DL (ref 12–17)
IMM GRANULOCYTES # BLD AUTO: 0.03 THOUSAND/UL (ref 0–0.2)
IMM GRANULOCYTES NFR BLD AUTO: 0 % (ref 0–2)
LDLC SERPL CALC-MCNC: 125 MG/DL (ref 0–100)
LYMPHOCYTES # BLD AUTO: 1.26 THOUSANDS/ÂΜL (ref 0.6–4.47)
LYMPHOCYTES NFR BLD AUTO: 17 % (ref 14–44)
MCH RBC QN AUTO: 30.9 PG (ref 26.8–34.3)
MCHC RBC AUTO-ENTMCNC: 33.9 G/DL (ref 31.4–37.4)
MCV RBC AUTO: 91 FL (ref 82–98)
MONOCYTES # BLD AUTO: 0.87 THOUSAND/ÂΜL (ref 0.17–1.22)
MONOCYTES NFR BLD AUTO: 11 % (ref 4–12)
NEUTROPHILS # BLD AUTO: 5.32 THOUSANDS/ÂΜL (ref 1.85–7.62)
NEUTS SEG NFR BLD AUTO: 70 % (ref 43–75)
NRBC BLD AUTO-RTO: 0 /100 WBCS
PA ADP BLD-ACNC: 140 PRU (ref 194–418)
PA ADP BLD-ACNC: 196 PRU (ref 194–418)
PLATELET # BLD AUTO: 255 THOUSANDS/UL (ref 149–390)
PMV BLD AUTO: 10.2 FL (ref 8.9–12.7)
POTASSIUM SERPL-SCNC: 3.7 MMOL/L (ref 3.5–5.3)
RBC # BLD AUTO: 4.31 MILLION/UL (ref 3.88–5.62)
SODIUM SERPL-SCNC: 136 MMOL/L (ref 135–147)
TRIGL SERPL-MCNC: 171 MG/DL (ref ?–150)
WBC # BLD AUTO: 7.62 THOUSAND/UL (ref 4.31–10.16)

## 2025-03-11 PROCEDURE — 85576 BLOOD PLATELET AGGREGATION: CPT

## 2025-03-11 PROCEDURE — 97167 OT EVAL HIGH COMPLEX 60 MIN: CPT

## 2025-03-11 PROCEDURE — 99232 SBSQ HOSP IP/OBS MODERATE 35: CPT | Performed by: PSYCHIATRY & NEUROLOGY

## 2025-03-11 PROCEDURE — 99232 SBSQ HOSP IP/OBS MODERATE 35: CPT | Performed by: PHYSICIAN ASSISTANT

## 2025-03-11 PROCEDURE — 80048 BASIC METABOLIC PNL TOTAL CA: CPT

## 2025-03-11 PROCEDURE — 92523 SPEECH SOUND LANG COMPREHEN: CPT

## 2025-03-11 PROCEDURE — 97163 PT EVAL HIGH COMPLEX 45 MIN: CPT

## 2025-03-11 PROCEDURE — 85025 COMPLETE CBC W/AUTO DIFF WBC: CPT

## 2025-03-11 PROCEDURE — 99232 SBSQ HOSP IP/OBS MODERATE 35: CPT | Performed by: INTERNAL MEDICINE

## 2025-03-11 RX ORDER — AMLODIPINE BESYLATE 5 MG/1
5 TABLET ORAL DAILY
Status: DISCONTINUED | OUTPATIENT
Start: 2025-03-11 | End: 2025-03-13 | Stop reason: HOSPADM

## 2025-03-11 RX ORDER — CLOPIDOGREL BISULFATE 75 MG/1
75 TABLET ORAL DAILY
Status: DISCONTINUED | OUTPATIENT
Start: 2025-03-12 | End: 2025-03-13 | Stop reason: HOSPADM

## 2025-03-11 RX ORDER — CLOPIDOGREL BISULFATE 75 MG/1
300 TABLET ORAL ONCE
Status: COMPLETED | OUTPATIENT
Start: 2025-03-11 | End: 2025-03-11

## 2025-03-11 RX ADMIN — AMLODIPINE BESYLATE 5 MG: 5 TABLET ORAL at 10:50

## 2025-03-11 RX ADMIN — CLOPIDOGREL BISULFATE 300 MG: 75 TABLET ORAL at 09:11

## 2025-03-11 RX ADMIN — ATORVASTATIN CALCIUM 40 MG: 40 TABLET, FILM COATED ORAL at 15:35

## 2025-03-11 RX ADMIN — CARVEDILOL 25 MG: 12.5 TABLET, FILM COATED ORAL at 15:35

## 2025-03-11 RX ADMIN — ENOXAPARIN SODIUM 40 MG: 40 INJECTION SUBCUTANEOUS at 09:12

## 2025-03-11 RX ADMIN — ASPIRIN 81 MG: 81 TABLET, CHEWABLE ORAL at 09:10

## 2025-03-11 NOTE — PLAN OF CARE
Problem: PHYSICAL THERAPY ADULT  Goal: Performs mobility at highest level of function for planned discharge setting.  See evaluation for individualized goals.  Description: Treatment/Interventions: Functional transfer training, LE strengthening/ROM, Elevations, Therapeutic exercise, Endurance training, Cognitive reorientation, Patient/family training, Equipment eval/education, Bed mobility, Gait training, Compensatory technique education          See flowsheet documentation for full assessment, interventions and recommendations.  3/11/2025 1337 by Stacey Garces PT  Note: Prognosis: Good  Problem List: Decreased strength, Impaired balance, Decreased mobility, Decreased cognition, Decreased coordination, Impaired judgement  Assessment: Ez Dsouza is a 59 y.o. Male who presents to [facility] on [facility]/10/25 due to CVA/TIA-like symptoms (R sided weak) and diagnosis of acute CVA due to stenosis of L carotid artery. Orders for PT eval and treat received, w/ activity orders of OOB to chair. Comorbidities affecting pt's functional mobility at time of evaluation include: HTN, MARIA TERESA, hyperlipidemia. Personal factors affecting DC include: lives in 2 story house, stairs to enter home, and positive fall history. At baseline, pt mobilizes independently w/ no AD, and w/ 1-4 fall(s) in the previous 6 months. Upon evaluation, pt presents w/ the following deficits: impaired strength, impaired balance, impaired cognition, decreased safety awareness, and gait deviations. Pt currently requires  supervision for bed mobility, supervision for transfers, supervision w/ no AD for ambulation. Pt's clinical presentation is unstable/unpredictable due to abnormal lab values, need for increased assistance w/ functional mobility compared to baseline, need for input for mobility technique, need for input for task focus, need to input for safety awareness, ongoing medical management. From a PT/mobility standpoint given the above findings, DC recommendation  is level: III (Minimum Rehab Resource Intensity). During current admission, pt will benefit from continued skilled inpatient PT in the acute care setting in order to address the above deficits and to maximize function and mobility prior to DC from acute care.        Rehab Resource Intensity Level, PT: III (Minimum Resource Intensity) (recommend outpatient Neuro-based PT)    See flowsheet documentation for full assessment.

## 2025-03-11 NOTE — PROGRESS NOTES
Progress Note - Hospitalist   Name: Ez Dsouza 59 y.o. male I MRN: [id number]  Unit/Bed#: S -01 I Date of Admission: 3/10/2025   Date of Service: 3/11/2025 I Hospital Day: 1    Assessment & Plan  Acute cerebrovascular accident (CVA) due to stenosis of left carotid artery (HCC)  Monitoring  - Neurochecks Q4H  - Telemetry to assess for atrial fibrillation or other dysrhythmias  - STAT CT Head for any changes in mental status or neuro exam  Diagnostic Studies:  - Labs HgbA1c, Lipid Panel, TSH, Troponin  - EKG  - MRI ad w/wo contrast   Multiple small scattered acute infarcts in the left cerebral hemisphere. Several of the smaller infarcts are hyperintense on FLAIR.   Tiny focus of left parietal occipital petechial hemorrhage.   Tiny focus of mild DWI signal in the right centrum semiovale may also present recent infarct.   No acute space-occupying hematoma.   - CTA head and Neck    No acute large vessel distribution infarct, hemorrhage or mass effect   New age-indeterminate lacunar infarcts in the left centrum semiovale.   - TTE   EF 60%, normal wall motion, grade I relaxation  Blood Pressure:  - Permissive HTN for 24-48 hours  - Treat BP if >220/110 mg Hg  - After permissive HTN, BP goal <140/90 mm Hg  Medication  Aspirin and plavix loaded   Continue dapt   Education  - Stroke education  - Smoking cessation education    - Weight loss education - low fat, low carbohydrate, low sodium (I.e Mediterranean or DASH) diet and daily exercise   - outpatient referral to sleep for possible SHANNEN   Rehab  - Nursing bedside dysphagia screen  - PT/OT/ST when appropriate      Assessment: Multi territory ischemia, suspect secondary to atheroembolic from left stenosed carotid artery.  Discussed with vascular surgery planning for CEA  MARIA TERESA (acute kidney injury) (HCC)  Creatinine   Date Value Ref Range Status   03/11/2025 1.29 0.60 - 1.30 mg/dL Final     Comment:     Standardized to IDMS reference method   03/10/2025 1.63 (H) 0.60  - 1.30 mg/dL Final     Comment:     Standardized to IDMS reference method   06/26/2024 1.10 0.60 - 1.30 mg/dL Final     Comment:     Standardized to IDMS reference method     Problem has resolved.  Primary hypertension  PTA medication-  Coreg 25 twice daily  Hydrochlorothiazide 25 mg daily  spironolactone 25 mg daily - Held   Hyperlipidemia  Continue with atorvastatin 40 mg qd   Elevated coronary artery calcium score  High coronary calcium score 696-without symptoms of ischemia, discussed his need to address his risk factors     Pre-operative cardiovascular examination  Cleared by cardiology    VTE Pharmacologic Prophylaxis: VTE Score: 7 High Risk (Score >/= 5) - Pharmacological DVT Prophylaxis Ordered: enoxaparin (Lovenox). Sequential Compression Devices Ordered.    Mobility:   Basic Mobility Inpatient Raw Score: 19  JH-HLM Goal: 6: Walk 10 steps or more  JH-HLM Achieved: 2: Bed activities/Dependent transfer  JH-HLM Goal NOT achieved. Continue with multidisciplinary rounding and encourage appropriate mobility to improve upon JH-HLM goals.    Patient Centered Rounds: I performed bedside rounds with nursing staff today.   Discussions with Specialists or Other Care Team Provider: vascular surgery, neurology    Education and Discussions with Family / Patient: Updated  (wife) at bedside.    Current Length of Stay: 1 day(s)  Current Patient Status: Inpatient   Certification Statement: The patient will continue to require additional inpatient hospital stay due to cea, stroke follow up  Discharge Plan: Anticipate discharge in 48-72 hrs to home.    Code Status: Level 1 - Full Code    Subjective   Overnight: No acute events over night     Complaints: No complaints. Still endorses of speech difficulties and arm weakness    Patient reports no new neurological symptoms at this time. He denies any deficits such as weakness, impaired coordinations, sensory abnormalities, alterations in voices, double vision, facial  asymmetry. Additionally, He denies headache and nausea.      Diet: Diet Regular; Regular House   Bowel regimen:       DVT/VTE Prophylaxis: VTE covered by:  enoxaparin, Subcutaneous, 40 mg at 03/11/25 0912        Objective :  Temp:  [97.9 °F (36.6 °C)-98.7 °F (37.1 °C)] 98.7 °F (37.1 °C)  HR:  [69-88] 76  BP: (154-187)/() 177/104  Resp:  [16-20] 20  SpO2:  [94 %-100 %] 94 %  O2 Device: None (Room air)    Body mass index is 26.95 kg/m².     Input and Output Summary (last 24 hours):     Intake/Output Summary (Last 24 hours) at 3/11/2025 0918  Last data filed at 3/11/2025 0801  Gross per 24 hour   Intake 560 ml   Output 650 ml   Net -90 ml       Physical Exam  Vitals and nursing note reviewed.   Constitutional:       General: He is not in acute distress.     Appearance: He is well-developed.   HENT:      Head: Normocephalic and atraumatic.   Eyes:      Conjunctiva/sclera: Conjunctivae normal.   Cardiovascular:      Rate and Rhythm: Normal rate and regular rhythm.      Heart sounds: No murmur heard.  Pulmonary:      Effort: Pulmonary effort is normal. No respiratory distress.      Breath sounds: Normal breath sounds.   Abdominal:      Palpations: Abdomen is soft.      Tenderness: There is no abdominal tenderness.   Musculoskeletal:         General: No swelling.      Cervical back: Neck supple.   Skin:     General: Skin is warm and dry.      Capillary Refill: Capillary refill takes less than 2 seconds.   Neurological:      Mental Status: He is alert.      Comments: Pertinent positives include  Diminished right upper quadrant visual field  Right upper extremity weakness with 4 out of 5 strength in elbow flexion, extension  Mild receptive aphasia greater than expressive aphasia     Psychiatric:         Mood and Affect: Mood normal.            Lines/Drains:        Telemetry:  Telemetry Orders (From admission, onward)               24 Hour Telemetry Monitoring  Continuous x 24 Hours (Telem)        Expiring   Question:   Reason for 24 Hour Telemetry  Answer:  TIA/Suspected CVA/ Confirmed CVA                     Telemetry Reviewed: Normal Sinus Rhythm  Indication for Continued Telemetry Use: Arrthymias requiring medical therapy               Lab Results: I have reviewed the following results:   Results from last 7 days   Lab Units 03/11/25  0609   WBC Thousand/uL 7.62   HEMOGLOBIN g/dL 13.3   HEMATOCRIT % 39.2   PLATELETS Thousands/uL 255   SEGS PCT % 70   LYMPHO PCT % 17   MONO PCT % 11   EOS PCT % 1     Results from last 7 days   Lab Units 03/11/25  0609   SODIUM mmol/L 136   POTASSIUM mmol/L 3.7   CHLORIDE mmol/L 106   CO2 mmol/L 21   BUN mg/dL 22   CREATININE mg/dL 1.29   ANION GAP mmol/L 9   CALCIUM mg/dL 9.0   GLUCOSE RANDOM mg/dL 121     Results from last 7 days   Lab Units 03/10/25  0727   INR  1.03     Results from last 7 days   Lab Units 03/10/25  0722   POC GLUCOSE mg/dl 117               Recent Cultures (last 7 days):               Last 24 Hours Medication List:     Current Facility-Administered Medications:     acetaminophen (TYLENOL) tablet 650 mg, Q6H PRN    [Held by provider] amLODIPine (NORVASC) tablet 5 mg, Daily    aspirin chewable tablet 81 mg, Daily    atorvastatin (LIPITOR) tablet 40 mg, Daily With Dinner    [Held by provider] carvedilol (COREG) tablet 25 mg, BID With Meals    [START ON 3/12/2025] clopidogrel (PLAVIX) tablet 75 mg, Daily    enoxaparin (LOVENOX) subcutaneous injection 40 mg, Daily    [Held by provider] hydroCHLOROthiazide tablet 25 mg, Daily    labetalol (NORMODYNE) injection 10 mg, Q6H PRN    ondansetron (ZOFRAN) injection 4 mg, Q6H PRN    [Held by provider] spironolactone (ALDACTONE) tablet 25 mg, Daily    Administrative Statements   Today, Patient Was Seen By: Krista Camilo MD      **Please Note: This note may have been constructed using a voice recognition system.**

## 2025-03-11 NOTE — ASSESSMENT & PLAN NOTE
"Ez Dsouza is a 58YO R-handed male with history of HTN and active smoker who presented to the hospital on 3/10 with RUE weakness and confusion. LKN 10pm on 3/9. Woke up this morning at 7am and tried to get up, but pt's wife heard a thud and was awoken from her sleep to find him on the ground, conscious, but with L-sided weakness, reportedly moreso arm > leg. At baseline, he has no neurological deficits, MRS 0. Initial /115. Glucose 117.   NIHSS 2 for month (\"December\") and moderate aphasia (receptive > expressive, no issue with repetition).   Neuro examination significant for RUE weakness 4+/5 compared to L.  CTH showed new age-indeterminate lacunar infarcts in L centrum semiovale.   CTA H/N showed worsening severe stenosis in the proximal L ICA.   CT CAP showed no acute pathology within chest, abdomen, pelvis; though there does appear to be 9mm nonobstructing calculus in L renal pelvis.   Wake-up stroke MRI showed multiple small scattered acute infarcts in L cerebral hemisphere. Several of the smaller infarcts are hyperintense on FLAIR. Tiny focus of L parietal occipital petechial hemorrhage. Tiny focus of mild DWI signal in R central semiovale may also represent recent infarct. No acute space-occupying hematoma.   Given evidence of acute strokes on DWI with FLAIR changes, pt was deemed not a candidate for TNK.   No thromboectomy due to no LVO.   Does take ASA 81mg daily at home. Pt was loaded with ASA 325mg x1 and Plavix 300mg x1 and admitted on stroke pathway.     Carotid US showed >70% stenosis in L ICA, <50% stenosis in R ICA. Vascular surgery team was consulted.   TTE: EF 60%, G1DD, LA normal size, no major valvular dysfunction, IVC normal size    Lab Results   Component Value Date    CHOLESTEROL 188 03/10/2025    TRIG 171 (H) 03/10/2025    HDL 29 (L) 03/10/2025    LDLCALC 125 (H) 03/10/2025     Lab Results   Component Value Date    HGBA1C 6.9 (H) 03/10/2025       Lab Results   Component Value Date    " [id number] 0.543 03/10/2025     Assessment: Multifocal strokes primarily in L ICA territory suggesting symptomatic L ICA (Carotid US significant for >70% stenosis of L ICA), but also in R hemisphere and possibly in L PCA territory suggestive of embolic strokes. No e/o malignancy on CT CAP. Telemetery w/o e/o afib/aflutter.    Plan:   Discussed with neurology attending, Dr. Burgos  A1c goal <6.5%, mgt per primary team appreciated  Gradual return to normotension, SBP <150  Bedside swallow eval  S/p ASA and Plavix load, followed by ASA 81mg daily and Plavix 75mg daily  Given plan for surgical intervention, will defer duration of DAPT to vascular surgery team.  P2Y12 demonstrating Plavix responder, ok to continue Plavix.  Atorvastatin 40mg QHS  Glucose <180   Did feel overwhelmed with information earlier this morning, but rest of the day, good mood. Moving forward, if there are concerns for post-stroke depression, may consider Lexapro 10mg daily.   Neuro checks- Every 1 hour x 4 hours, then every 2 hours x 4 hours, then every 4 hours x 72 hours     Stat CT Head for change in neuro status.  Monitor on telemetry  DVT ppx per primary team, SCDs  PT/OT/ST/PM&R evaluations   Will need outpatient prolonged cardiac monitoring (Holter monitor/Zio patch)  Medical management as per primary team appreciated.   Vascular surgery consulted, recs appreciated - currently post-stroke day 1. Recommend vascular surgical intervention anytime after post-stroke day 3.   Ez Dsouza will need follow-up in in 6 weeks with neurovascular team for Other in 60 minute appointment. They will not require outpatient neurological testing. Msg sent to scheduling team.  No further inpatient neurology recommendations at this time. Thank you for allowing us to be a part of his care. Please let us know if there are any acute questions or concerns.

## 2025-03-11 NOTE — CASE MANAGEMENT
Case Management Discharge Planning Note    Patient name Ez Dsouza  Location S /S -01 MRN [id number]  : 1965 Date 3/11/2025       Current Admission Date: 3/10/2025  Current Admission Diagnosis:Acute cerebrovascular accident (CVA) due to stenosis of left carotid artery (HCC)   Patient Active Problem List    Diagnosis Date Noted Date Diagnosed    Hyperlipidemia 03/10/2025     Acute cerebrovascular accident (CVA) due to stenosis of left carotid artery (HCC) 03/10/2025     Elevated coronary artery calcium score 03/10/2025     Pre-operative cardiovascular examination 03/10/2025     Closed fracture of right distal radius and ulna 2024     Closed fracture of distal ends of right radius and ulna, initial encounter 2024     Closed fracture of right wrist with routine healing, subsequent encounter 2024     Fall 2024     Acute pain due to trauma 2024     Closed fracture of multiple ribs of left side 2024     Closed fracture of right wrist 2024     MARIA TERESA (acute kidney injury) (HCC) 2024     Primary hypertension 2024     Scalp abrasion 2024     Elevated blood pressure reading 2021       LOS (days): 1  Geometric Mean LOS (GMLOS) (days):   Days to GMLOS:     OBJECTIVE:  Risk of Unplanned Readmission Score: 8.41         Current admission status: Inpatient   Preferred Pharmacy:   [facility] [place] - [address]  [address] [place] [address]  Phone: [phone] Fax: [phone]    Primary Care Provider: Jakob Higgins MD    Primary Insurance: [employer]  Secondary Insurance: [employer]    DISCHARGE DETAILS:    Discharge planning discussed with:: patient  Freedom of Choice: Yes  Comments - Freedom of Choice: Per PT/OT rcmd OP neuro rehab - patient aware of same. OP neuro rehab locations list provided to pt for his reference.                                         Treatment Team Recommendation:  Home  Discharge Destination Plan:: Home  Transport at Discharge : Family

## 2025-03-11 NOTE — PROGRESS NOTES
Progress Note - Vascular Surgery   Name: Ez Dsouza 59 y.o. male I MRN: [id number]  Unit/Bed#: S -01 I Date of Admission: 3/10/2025   Date of Service: 3/11/2025 I Hospital Day: 1    Assessment & Plan  Acute cerebrovascular accident (CVA) due to stenosis of left carotid artery (HCC)  Presenting with Right sided weakness and word finding difficulty. Presented after a fall this morning.     3/10 CTA neck/brain: Worsening severe stenosis in the proximal left internal carotid artery. No significant stenosis of the cervical right carotid or vertebral arteries No significant intracranial stenosis, large vessel occlusion or aneurysm.  3/10 MRI brain: Multiple small scattered acute infarcts in the left cerebral hemisphere. Several of the smaller infarcts are hyperintense on FLAIR.  Tiny focus of left parietal occipital petechial hemorrhage.  Tiny focus of mild DWI signal in the right centrum semiovale may also present recent infarct. No acute space-occupying hematoma.    3/10 VAS carotid duplex: RIGHT: <50% stenosis in the ICA. LEFT: >70% stenosis in the ICA.    -Discussing timing of intervention with attending for L ICA stenosis  -Neurology following; recs appreciated. Okay for intervention after hospital day 3  -Cardiology consult; no contraindications to proceed with vascular intervention. Would not pursue any further work up at this time preoperatively. Recs appreciated   -Continue ASA/statin   - continue neurovasc checks      Please contact the SecureChat role for the Vascular Surgery service with any questions/concerns.    Subjective   Reports improvement in his right arm and his speech. Was able to eat the majority of his lunch with his right hand.     Objective :  Temp:  [97.9 °F (36.6 °C)-98.7 °F (37.1 °C)] 97.9 °F (36.6 °C)  HR:  [69-88] 74  BP: (154-177)/() 161/98  Resp:  [16-20] 18  SpO2:  [94 %-99 %] 95 %  O2 Device: None (Room air)    I/O         03/09 0701  03/10 0700 03/10 0701  03/11 0700 03/11  0701  03/12 0700    P.O.  440 120    Total Intake(mL/kg)  440 (4.6) 120 (1.3)    Urine (mL/kg/hr)  650     Total Output  650     Net  -210 +120           Unmeasured Urine Occurrence  2 x             Physical Exam  Constitutional:       General: He is not in acute distress.     Appearance: He is normal weight. He is not toxic-appearing.   Cardiovascular:      Rate and Rhythm: Normal rate.   Pulmonary:      Effort: Pulmonary effort is normal. No respiratory distress.   Skin:     General: Skin is warm and dry.   Neurological:      Mental Status: He is alert.      Sensory: Sensation is intact.      Comments: RUE weakness (although improved from prior exam)  BLE strength equal    Some difficulty word finding           Lab Results: I have reviewed the following results:  Recent Labs     03/10/25  0727 03/10/25  0915 03/11/25  0609   WBC 8.70  --  7.62   HGB 13.8  --  13.3   HCT 41.4  --  39.2     --  255   SODIUM 135  --  136   K 4.2  --  3.7     --  106   CO2 26  --  21   BUN 27*  --  22   CREATININE 1.63*  --  1.29   GLUC 140  --  121   PTT 31  --   --    INR 1.03  --   --    HSTNI0 7  --   --    HSTNI2  --  6  --        Imaging Results Review: No pertinent imaging studies reviewed.  Other Study Results Review: No additional pertinent studies reviewed.    VTE Pharmacologic Prophylaxis: VTE covered by:  enoxaparin, Subcutaneous, 40 mg at 03/11/25 0912     VTE Mechanical Prophylaxis: sequential compression device

## 2025-03-11 NOTE — NURSING NOTE
Pt generates as high fall risk. Pt requested to sign refusal form. Provider made aware via secure chat that paper is in pt's chart to be signed.    Inga Casey

## 2025-03-11 NOTE — ASSESSMENT & PLAN NOTE
PTA medication-  Coreg 25 twice daily  Hydrochlorothiazide 25 mg daily  spironolactone 25 mg daily - Held

## 2025-03-11 NOTE — ASSESSMENT & PLAN NOTE
Presenting with Right sided weakness and word finding difficulty. Presented after a fall this morning.     3/10 CTA neck/brain: Worsening severe stenosis in the proximal left internal carotid artery. No significant stenosis of the cervical right carotid or vertebral arteries No significant intracranial stenosis, large vessel occlusion or aneurysm.  3/10 MRI brain: Multiple small scattered acute infarcts in the left cerebral hemisphere. Several of the smaller infarcts are hyperintense on FLAIR.  Tiny focus of left parietal occipital petechial hemorrhage.  Tiny focus of mild DWI signal in the right centrum semiovale may also present recent infarct. No acute space-occupying hematoma.    3/10 VAS carotid duplex: RIGHT: <50% stenosis in the ICA. LEFT: >70% stenosis in the ICA.    -Discussing timing of intervention with attending for L ICA stenosis  -Neurology following; recs appreciated. Okay for intervention after hospital day 3  -Cardiology consult; no contraindications to proceed with vascular intervention. Would not pursue any further work up at this time preoperatively. Recs appreciated   -Continue ASA/statin   - continue neurovasc checks

## 2025-03-11 NOTE — HOSPITAL COURSE
59-year-old male presented to emergency department due to right-sided weakness and dysarthria. Stroke alert in ED. MRI confirmed multiple small scattered infarct in left cerebral hemisphere with tiny focus of left parietal occipital petechial hemorrhages. Left carotid stenosis more than 70% noted. Vascular surgery was consulted in ED. Pt admitted under stroke pathway. Received aspirin and Plavix load. On initial exam mild dysarthria. Mild right upper extremity weakness 4 out of 5. Pt also seen by neurology, recommended permissive HTN for 24 hours, ASA and Plavix daily, atorvastatin 40 mg QHS, telemetry. Cardio consulted, cleared patient for potential surgery. Vascular surgery recommended carotid intervention within 2 weeks of stroke, neuro cleared pt for CEA after hospital day 3.

## 2025-03-11 NOTE — PHYSICAL THERAPY NOTE
PHYSICAL THERAPY EVALUATION NOTE          Patient Name: Ez Dsouza  Today's Date: 3/11/2025          AGE:   59 y.o.  Mrn:   [id number]  ADMIT DX:  Primary hypertension [I10]  CVA (cerebral vascular accident) (HCC) [I63.9]  Stroke-like symptoms [R29.90]  Symptoms of cerebrovascular accident (CVA) [R09.89]  Acute cerebrovascular accident (CVA) due to stenosis of left carotid artery (HCC) [I63.232]    Past Medical History:  Past Medical History:   Diagnosis Date    Hypertension        Past Surgical History:  Past Surgical History:   Procedure Laterality Date    KNEE SURGERY      MOLE REMOVAL      Back     Length Of Stay: 1        PHYSICAL THERAPY EVALUATION:    Patient's identity confirmed via 2 patient identifiers (full name and ) at start of session       25 0916   PT Last Visit   PT Visit Date 25   Note Type   Note type Evaluation   Pain Assessment   Pain Assessment Tool 0-10   Pain Score No Pain   Restrictions/Precautions   Weight Bearing Precautions Per Order No   Other Precautions Chair Alarm;Bed Alarm;Fall Risk;Cognitive  (word finding difficulty vs expressive aphasia)   Home Living   Type of Home House   Home Layout Two level;Stairs to enter with rails;Bed/bath upstairs  (2 [address], 12 steps to 2nd floor bedroom and bathroom)   Bathroom Shower/Tub Tub/shower unit   Bathroom Toilet Standard   Bathroom Equipment   (none)   Home Equipment   (none)   Prior Function   Level of [place] Independent with functional mobility;Independent with ADLs   Lives With Spouse   Receives Help From Family   IADLs Independent with driving;Independent with medication management;Family/Friend/Other provides meals   Falls in the last 6 months 1 to 4  (1 fall in December per family)   Vocational Full time employment  ( at [employer])   Comments Home set-up and baseline mobility obtained from pt and verified by pt's wife at bedside (pt providing  "inconsistent information at times)   General   Family/Caregiver Present Yes  (pt's wife)   Cognition   Overall Cognitive Status Impaired   Arousal/Participation Cooperative   Attention Attends with cues to redirect   Orientation Level Oriented to person;Oriented to place;Oriented to situation;Disoriented to time  (pt initially stated \"\" when asked for his birthday, able to correct w/ cue/time)   Memory Decreased short term memory;Decreased recall of recent events   Following Commands Follows one step commands with increased time or repetition   Comments Pt ID via name and ; pt agreeable to PT eval and mobility - requires repetition of cues/commands at times, admits to word finding difficulty   RLE Assessment   RLE Assessment X   Strength RLE   R Hip Flexion 4/5   R Knee Flexion 5/5   R Knee Extension 4/5   R Ankle Dorsiflexion 4/5   R Ankle Plantar Flexion 5/5   LLE Assessment   LLE Assessment X   Strength LLE   L Hip Flexion 5/5   L Knee Flexion 5/5   L Knee Extension 5/5   L Ankle Dorsiflexion 5/5   L Ankle Plantar Flexion 5/5   Vision-Basic Assessment   Current Vision No visual deficits  (pt denies diplopia, blurry vision)   Coordination   Movements are Fluid and Coordinated 0   Finger to Nose & Finger to Finger  Impaired  (increased time w/ decreased accuracy on RUE)   Heel to Shin Intact  (VC w/ visual demonstration required)   Bed Mobility   Supine to Sit 5  Supervision   Additional items Assist x 1;HOB elevated;Increased time required;Verbal cues   Sit to Supine 5  Supervision   Additional items Assist x 1;HOB elevated;Increased time required   Transfers   Sit to Stand 5  Supervision   Additional items Assist x 1;Verbal cues   Stand to Sit 5  Supervision   Additional items Assist x 1;Verbal cues   Ambulation/Elevation   Gait pattern Improper Weight shift;Decreased foot clearance;Decreased hip extension  (increase in R foot drag w/ increased ambulation distance/fatigue - pt reports he can feel his leg " dragging slightly, educated on increasing awareness and compensation technique to increase R foot clearance from floor while stepping)   Gait Assistance 5  Supervision   Additional items Assist x 1;Verbal cues   Assistive Device None   Distance 140'   Ambulation/Elevation Additional Comments Pt's modified DGI score 8/12 indicates pt is at an increased risk of falls. Pt able to perform standing bilateral marching in place x10 w/o UE support w/ supervision and no evidence of LOB - demonstrating ability to weight-shift into single leg stance similar to negotiating stairs   Balance   Static Sitting Good   Dynamic Sitting Fair +   Static Standing Fair   Dynamic Standing Fair   Ambulatory Fair -   Activity Tolerance   Activity Tolerance Patient tolerated treatment well   Medical Staff Made Aware Pt benefited from PT/OT care coordination w/ OT Indira due to allow for challenge of pt's activity tolerance, PT and OT goals were addressed individually during session; ELDER Piper; Resident Dr. Street, Resident Dr. Krista Camilo   Nurse Made Aware RN Dex   Assessment   Prognosis Good   Problem List Decreased strength;Impaired balance;Decreased mobility;Decreased cognition;Decreased coordination;Impaired judgement   Assessment Ez Dsouza is a 59 y.o. Male who presents to [facility] on 3/10/25 due to CVA/TIA-like symptoms (R sided weak) and diagnosis of acute CVA due to stenosis of L carotid artery. Orders for PT eval and treat received, w/ activity orders of OOB to chair. Comorbidities affecting pt's functional mobility at time of evaluation include: HTN, MARIA TERESA, hyperlipidemia. Personal factors affecting DC include: lives in 2 story house, stairs to enter home, and positive fall history. At baseline, pt mobilizes independently w/ no AD, and w/ 1-4 fall(s) in the previous 6 months. Upon evaluation, pt presents w/ the following deficits: impaired strength, impaired balance, impaired cognition, decreased safety awareness, and gait  deviations. Pt currently requires  supervision for bed mobility, supervision for transfers, supervision w/ no AD for ambulation. Pt's clinical presentation is unstable/unpredictable due to abnormal lab values, need for increased assistance w/ functional mobility compared to baseline, need for input for mobility technique, need for input for task focus, need to input for safety awareness, ongoing medical management. From a PT/mobility standpoint given the above findings, DC recommendation is level: III (Minimum Rehab Resource Intensity). During current admission, pt will benefit from continued skilled inpatient PT in the acute care setting in order to address the above deficits and to maximize function and mobility prior to DC from acute care.   Goals   Patient Goals to get back to normal   STG Expiration Date 03/21/25   Short Term Goal #1 Pt will: perform bed mobility w/ mod I to decrease pt's burden of care and increase pt's independence w/ repositioning in bed; perform transfers w/ mod I to promote OOB mobility; ambulate 250' w/ LRAD and mod I to increase pt's ambulatory endurance/tolerance; negotiate 12 stair(s) w/ UE support and mod I to facilitate pt returning to previous living environment; increase all balance ratings by at least 1 grade to decrease pt's risk of falls   PT Treatment Day 0   Plan   Treatment/Interventions Functional transfer training;LE strengthening/ROM;Elevations;Therapeutic exercise;Endurance training;Cognitive reorientation;Patient/family training;Equipment eval/education;Bed mobility;Gait training;Compensatory technique education   PT Frequency 3-5x/wk   Discharge Recommendation   Rehab Resource Intensity Level, PT III (Minimum Resource Intensity)  (recommend outpatient Neuro-based PT)   AM-PAC Basic Mobility Inpatient   Turning in Flat Bed Without Bedrails 4   Lying on Back to Sitting on Edge of Flat Bed Without Bedrails 3   Moving Bed to Chair 3   Standing Up From Chair Using Arms 3    Walk in Room 3   Climb 3-5 Stairs With Railing 3   Basic Mobility Inpatient Raw Score 19   Basic Mobility Standardized Score 42.48   [facility] Highest Level Of Mobility   -HLM Goal 6: Walk 10 steps or more   -HLM Achieved 7: Walk 25 feet or more   End of Consult   Patient Position at End of Consult Supine;Bed/Chair alarm activated;All needs within reach         Modified Dynamic Gait Index (DGI):   Gait level surface: 3/3   Change in gait speed: 2/3   Gait with horizontal head turns: 1/3   Gait with vertical head turns: 2/3  Total: 8/12 (<10/12 indicates increased risk of falls)          The patient's AM-PAC Basic Mobility Inpatient Short Form Raw Score is 19. A Raw score of greater than 16 suggests the patient may benefit from discharge to home. Please also refer to the recommendation of the Physical Therapist for safe discharge planning.    Pt will benefit from skilled inpatient PT during this admission in order to facilitate progress towards goals and to maximize functional independence prior to DC      DC rec: level III (Minimum Rehab Resource Intensity), recommend outpatient Neuro-based PT        Stacey Garces, PT, DPT  03/11/25

## 2025-03-11 NOTE — ASSESSMENT & PLAN NOTE
Creatinine   Date Value Ref Range Status   03/11/2025 1.29 0.60 - 1.30 mg/dL Final     Comment:     Standardized to IDMS reference method   03/10/2025 1.63 (H) 0.60 - 1.30 mg/dL Final     Comment:     Standardized to IDMS reference method   06/26/2024 1.10 0.60 - 1.30 mg/dL Final     Comment:     Standardized to IDMS reference method     Problem has resolved.

## 2025-03-11 NOTE — ASSESSMENT & PLAN NOTE
Monitoring  - Neurochecks Q4H  - Telemetry to assess for atrial fibrillation or other dysrhythmias  - STAT CT Head for any changes in mental status or neuro exam  Diagnostic Studies:  - Labs HgbA1c, Lipid Panel, TSH, Troponin  - EKG  - MRI ad w/wo contrast   Multiple small scattered acute infarcts in the left cerebral hemisphere. Several of the smaller infarcts are hyperintense on FLAIR.   Tiny focus of left parietal occipital petechial hemorrhage.   Tiny focus of mild DWI signal in the right centrum semiovale may also present recent infarct.   No acute space-occupying hematoma.   - CTA head and Neck    No acute large vessel distribution infarct, hemorrhage or mass effect   New age-indeterminate lacunar infarcts in the left centrum semiovale.   - TTE   EF 60%, normal wall motion, grade I relaxation  Blood Pressure:  - Permissive HTN for 24-48 hours  - Treat BP if >220/110 mg Hg  - After permissive HTN, BP goal <140/90 mm Hg  Medication  Aspirin and plavix loaded   Continue dapt   Education  - Stroke education  - Smoking cessation education    - Weight loss education - low fat, low carbohydrate, low sodium (I.e Mediterranean or DASH) diet and daily exercise   - outpatient referral to sleep for possible SHANNEN   Rehab  - Nursing bedside dysphagia screen  - PT/OT/ST when appropriate      Assessment: Multi territory ischemia, suspect secondary to atheroembolic from left stenosed carotid artery.  Discussed with vascular surgery planning for CEA

## 2025-03-11 NOTE — OCCUPATIONAL THERAPY NOTE
Occupational Therapy Evaluation     Patient Name: Ez Dsouza  Today's Date: 3/11/2025  Problem List  Principal Problem:    Acute cerebrovascular accident (CVA) due to stenosis of left carotid artery (HCC)  Active Problems:    MARIA TERESA (acute kidney injury) (HCC)    Primary hypertension    Hyperlipidemia    Elevated coronary artery calcium score    Pre-operative cardiovascular examination    Past Medical History  Past Medical History:   Diagnosis Date    Hypertension      Past Surgical History  Past Surgical History:   Procedure Laterality Date    KNEE SURGERY  2008    MOLE REMOVAL  2024    Back             03/11/25 1018   OT Last Visit   OT Visit Date 03/11/25   Note Type   Note type Evaluation   Pain Assessment   Pain Assessment Tool 0-10   Pain Score No Pain   Restrictions/Precautions   Weight Bearing Precautions Per Order No   Other Precautions Cognitive;Chair Alarm;Bed Alarm;Fall Risk  (word finding vs expressive aphasia)   Home Living   Type of Home House   Home Layout Two level;Stairs to enter with rails;Bed/bath upstairs  (2 [address]N, FF to 2nd floor)   Bathroom Shower/Tub Tub/shower unit   Bathroom Toilet Standard   Bathroom Equipment   (none)   Bathroom Accessibility Accessible   Home Equipment   (none)   Additional Comments no use of AD at baseline   Prior Function   Level of [place] Independent with ADLs   Lives With Spouse   Receives Help From Family   IADLs Independent with driving;Independent with meal prep;Independent with medication management  (shares household tasks with wife)   Falls in the last 6 months 1 to 4  (1)   Vocational Full time employment  ( at [employer])   Comments Pt an inconsistent historian at times (questionable expressive aphasia vs word finding difficulties)- wife present to provide accurate home set up information   Lifestyle   Autonomy PTA pt living with wife in 2SH, pt (I) with ADLS and IADLS, (+)fall, (+)drives, (+)works   Reciprocal Relationships supportive  "wife   Service to Others works full time at [employer]   Additional Pertinent History Admit due to R sided weakness + confusion. Admit on stroke pathway. MRI: Multiple small scattered acute infarct in the left cerebral hemisphere, tiny focus of left parietal occipital petechial hemorrhage. PMH: HTN, CAD, HLD, tobacco abuse,  hx of fall '24 w/ rib fxs and R wrist fx   Family/Caregiver Present Yes  (wife at bedside)   Subjective   Subjective \"Will I ever get back to normal?\"   ADL   Eating Assistance 5  Supervision/Setup   Eating Deficit Increased time to complete  (encouragement to use RUE for feeding tasks)   Grooming Assistance 5  Supervision/Setup   Grooming Deficit   (able to use of RUE for simulated brushing teeth)   UB Bathing Assistance 5  Supervision/Setup   LB Bathing Assistance 5  Supervision/Setup   UB Dressing Assistance 5  Supervision/Setup   LB Dressing Assistance 5  Supervision/Setup   LB Dressing Deficit Don/doff R shoe;Don/doff L shoe   Toileting Assistance  5  Supervision/Setup   Bed Mobility   Supine to Sit 5  Supervision   Additional items Increased time required;Verbal cues;LE management   Sit to Supine 5  Supervision   Additional items Increased time required;Verbal cues;LE management   Transfers   Sit to Stand 5  Supervision   Additional items Increased time required;Verbal cues   Stand to Sit 5  Supervision   Additional items Increased time required;Verbal cues   Additional Comments no use of AD   Functional Mobility   Functional Mobility 5  Supervision   Additional Comments functional household distance. With increased distance pt noted with increased dragging of RLE - with encouragement/cuing able to  RLE more effectively   Additional items   (no use of AD)   Balance   Static Sitting Good   Dynamic Sitting Fair +   Static Standing Fair -   Dynamic Standing Fair -   Ambulatory Fair -   Activity Tolerance   Activity Tolerance Patient tolerated treatment well   Medical Staff " Made Aware PT AMBER Ibarra, ELDER Piper, spoke to SLIM team   RUE Assessment   RUE Assessment X  (3+/5 strength grossly)   LUE Assessment   LUE Assessment WFL   Hand Function   Gross Motor Coordination Impaired  (Pt is R handed)   Fine Motor Coordination Impaired   Hand Function Comments impaired RUE with coordination tasks including finger to nose  (edu provided to pt and wife on importance of using RUE as much as possible in functional tasks ie. eating/grooming)   Sensation   Light Touch No apparent deficits   Proprioception   Proprioception Partial deficits in the RUE   Vision-Basic Assessment   Current Vision No visual deficits   Vision - Complex Assessment   Ocular Range of Motion Intact   Tracking Intact   Acuity Able to read clock/calendar on wall without difficulty;Able to read employee name badge without difficulty   Additional Comments periphreal vision appears WFL - questionable inattention to R side vs dual tasking deficit?   Cognition   Overall Cognitive Status Impaired   Arousal/Participation Alert;Cooperative   Attention Attends with cues to redirect   Orientation Level Oriented to person;Oriented to place;Oriented to situation;Disoriented to time  (requiring x2 repetition of question for response of his birthday - pt initially stating 2026)   Memory Decreased short term memory;Decreased recall of recent events   Following Commands Follows one step commands with increased time or repetition   Comments Requiring repetition of cuing for correct responses, noted slurred speech vs use of incorrect words at times - with cuing able to correct. Edu provided to pt and wife regarding repeating correct answers out loud for improved future responses   Assessment   Limitation Decreased ADL status;Decreased Safe judgement during ADL;Decreased cognition;Decreased endurance;Decreased self-care trans;Decreased high-level ADLs  (impaired balance, fxnl mobility, act timo, FM coord, GM coord, fxnl reach, standing timo,  strength, attention to task, safety awareness, insight, pacing, problem solving, learning new tasks, response time)   Prognosis Good   Assessment Pt is a 59 y.o. male seen for OT evaluation s/p admission to [facility] on 3/10/2025 due to R sided weakness. Diagnosed with Acute cerebrovascular accident (CVA) due to stenosis of left carotid artery (HCC). Personal and env factors supporting pt at time of IE include (I) PLOF, supportive wife, and attitude towards recovery. Personal and env factors inhibiting engagement in occupations include  [employer] home . Performance deficits that affect the pt’s occupational performance can be seen above. Due to pt's current functional limitations and medical complications pt is functioning below baseline. Pt would benefit from continued skilled OT treatment in order to maximize safety, independence and overall performance with ADLs, functional mobility, functional transfers, and cognition in order to achieve highest level of function.   Goals   Patient Goals to get back to normal   LTG Time Frame 10-14   Long Term Goal see goals listed below   Plan   Treatment Interventions ADL retraining;Functional transfer training;Endurance training;Cognitive reorientation;Patient/family training;Equipment evaluation/education;Compensatory technique education;Energy conservation;Activityengagement;Fine motor coordination activities   Goal Expiration Date 03/21/25   OT Treatment Day 0   OT Frequency 3-5x/wk   Discharge Recommendation   Rehab Resource Intensity Level, OT III (Minimum Resource Intensity)  (OP neuro day program)   AM-PAC Daily Activity Inpatient   Lower Body Dressing 3   Bathing 3   Toileting 3   Upper Body Dressing 3   Grooming 3   Eating 3   Daily Activity Raw Score 18   Daily Activity Standardized Score (Calc for Raw Score >=11) 38.66   AM-PAC Applied Cognition Inpatient   Following a Speech/Presentation 3   Understanding Ordinary Conversation 3   Taking Medications 2   Remembering Where  Things Are Placed or Put Away 3   Remembering List of 4-5 Errands 2   Taking Care of Complicated Tasks 2   Applied Cognition Raw Score 15   Applied Cognition Standardized Score 33.54   End of Consult   Patient Position at End of Consult Supine;All needs within reach;Bed/Chair alarm activated     GOALS:      -Patient will perform grooming tasks standing at sink with overall Mod I in order to increase overall independence    -Patient will be Mod I with UB dressing using AE and AD as needed in order to increase (I) with ADLs    -Patient will be Mod I with UB bathing using AE and AD as needed in order to increase (I) with ADLs    -Patient will be Mod I with LB dressing with use of AE and AD as needed in order to increase (I) with ADLs    -Patient will be Mod I with LB bathing with use of AE and AD as needed in order to increase (I) with ADLs    -Patient will complete toileting w/ Mod I w/ G hygiene/thoroughness in order to reduce caregiver burden    -Patient will demonstrate Mod I with bed mobility for ability to manage own comfort and initiate OOB tasks.     -Patient will perform functional transfers with Mod I to/from all surfaces using DME as needed in order to increase (I) with functional tasks    -Patient will be Mod I with functional mobility to/from bathroom for increased independence with toileting tasks    -Patient will tolerate therapeutic activities for greater than 30 min, in order to increase tolerance for functional activities.     -Patient will engage in ongoing cognitive assessment in order to assist with safe discharge planning/recommendations.    -Patient will participate in 10m UE therex to increase overall stamina/activity tolerance for purposeful tasks        The patient's raw score on the -PAC Daily Activity Inpatient Short Form is 18. A raw score of less than 19 suggests the patient may benefit from discharge to post-acute rehabilitation services. HOWEVER please refer to the recommendation of  the Occupational Therapist for safe discharge planning.    This session, pt required and most appropriately benefited from skilled OT/PT co-eval due to decreased activity tolerance and unpredictable medical and/or functional status. OT and PT goals were addressed separately as seen in documentation.     Indira Mckinnon MS, OTR/L

## 2025-03-11 NOTE — UTILIZATION REVIEW
Initial Clinical Review    Admission: Date/Time/Statement:   Admission Orders (From admission, onward)       Ordered        03/10/25 0921  INPATIENT ADMISSION  Once                          Orders Placed This Encounter   Procedures    INPATIENT ADMISSION     Standing Status:   Standing     Number of Occurrences:   1     Level of Care:   Med Surg [16]     Estimated length of stay:   More than 2 Midnights     Certification:   I certify that inpatient services are medically necessary for this patient for a duration of greater than two midnights. See H&P and MD Progress Notes for additional information about the patient's course of treatment.     ED Arrival Information       Expected   -    Arrival   3/10/2025 07:20    Acuity   Emergent              Means of arrival   Ambulance    Escorted by   [facility]    Service   Hospitalist    Admission type   Emergency              Arrival complaint   Weakness             Chief Complaint   Patient presents with    CVA/TIA-like Symptoms     Pt woke up this am and went to get oob and right side was totally weak. EMS arrived pt dazed and confused. Some confusion when putting shoes on. Wife is on route. AO to person and place only       Initial Presentation: 59 y.o. male with PMH of HTN, HLD, tobacco use and CAD who presents with right sided weakness. Patient went to sleep around 10 pm and woke up around 6 am, felt dizzy and weak on his right side resulting in a fall. His wife woke up to the noise and noted that the patient's speech was not clear and he was weak on his right side. She called EMS and after a few minutes he was able to get up with her support and walk to the couch. Wife reports similar episode in December which self resolved and patient refused to go to the hospital at that time. As per wife patient has been fatigued and having low appetite since last Wednesday and has not taken his medications since then. Patient has been smoking cigarettes about a pack a day since  he was a teenager, he then switched to Cigars. He is currently smoking 8 cigars a day, last cigar was on Thursday. Currently reports improvement in symptoms but still has right sided weakness and mild dysarthria. Plan: Inpatient admission for evaluation and treatment of CVA, HTN, HLD, MARIA TERESA: stroke pathway, ASA and Plavix, atorvastatin, Neurology consult, Vascular Surgery consult, Cardiology consult, Echo, HgbA1c, lipid panel, TSH, neuro checks, hold amlodipine, carvedilol, hydrochlorothiazide, spironolactone for permissive HTN, monitor creatinine.     Vascular Surgery consult: recommend carotid intervention when cleared by neurology. Continue ASA and statin.     Cardiology consult: temporary permissive hypertension in the context of acute CVA. Continue ASA and Plavix. Preop cardiovascular exam-no contraindications to proceed with vascular intervention. Would not pursue any further workup at this time preoperatively.     Neurology consult: TTE pending. Lipid panel and HgbA1c. Permissive HTN. Bedside swallow eval. ASA and Plavix load followed by ASA 81 mg and Plavix 75 mg. Atorvastatin 40 mg. Neuro checks. Telemetry. PT/OT/ST evals.     Date: 3/11   Day 2:     Internal medicine: neuro checks q 4 hrs. MRI brain showing Multiple small scattered acute infarcts in the left cerebral hemisphere. Several of the smaller infarcts are hyperintense on FLAIR. Tiny focus of left parietal occipital petechial hemorrhage. Tiny focus of mild DWI signal in the right centrum semiovale may also present recent infarct. No acute space-occupying hematoma. TTE EF 60%, normal wall motion, grade I relaxation. Continue permissive HTN. Continue DAPT with ASA and Plavix. PT/OT/ST evals.     ED Treatment-Medication Administration from 03/10/2025 0720 to 03/10/2025 1405         Date/Time Order Dose Route Action     03/10/2025 0735 iohexol (OMNIPAQUE) 350 MG/ML injection (MULTI-DOSE) 85 mL 85 mL Intravenous Given     03/10/2025 0807 sodium chloride  0.9 % bolus 1,000 mL 1,000 mL Intravenous New Bag     03/10/2025 0852 aspirin tablet 325 mg 325 mg Oral Given     03/10/2025 0852 clopidogrel (PLAVIX) tablet 300 mg 300 mg Oral Given     03/10/2025 0915 acetaminophen (Ofirmev) injection 1,000 mg 1,000 mg Intravenous New Bag     03/10/2025 1100 amLODIPine (NORVASC) tablet 5 mg -- Oral Held Dose     03/10/2025 1100 hydroCHLOROthiazide tablet 25 mg -- Oral Held Dose     03/10/2025 1100 spironolactone (ALDACTONE) tablet 25 mg -- Oral Held Dose     03/10/2025 1126 enoxaparin (LOVENOX) subcutaneous injection 40 mg 40 mg Subcutaneous Given            Scheduled Medications:  amLODIPine, 5 mg, Oral, Daily  aspirin, 81 mg, Oral, Daily  atorvastatin, 40 mg, Oral, Daily With Dinner  carvedilol, 25 mg, Oral, BID With Meals  [START ON 3/12/2025] clopidogrel, 75 mg, Oral, Daily  enoxaparin, 40 mg, Subcutaneous, Daily  hydroCHLOROthiazide, 25 mg, Oral, Daily  [Held by provider] spironolactone, 25 mg, Oral, Daily      Continuous IV Infusions:     PRN Meds:  acetaminophen, 650 mg, Oral, Q6H PRN  labetalol, 10 mg, Intravenous, Q6H PRN  ondansetron, 4 mg, Intravenous, Q6H PRN      ED Triage Vitals   Temperature Pulse Respirations Blood Pressure SpO2 Pain Score   03/10/25 0725 03/10/25 0722 03/10/25 0722 03/10/25 0722 03/10/25 0722 03/10/25 1100   98.3 °F (36.8 °C) 67 20 (!) 180/115 99 % 3     Weight (last 2 days)       Date/Time Weight    03/10/25 1210 95.2 (209.88)    03/10/25 0722 95.2 (209.88)            Vital Signs (last 3 days)       Date/Time Temp Pulse Resp BP MAP (mmHg) SpO2 O2 Device Patient Position - Orthostatic VS Kinza Coma Scale Score Pain    03/11/25 12:04:46 -- 74 -- 161/98 119 95 % -- -- -- --    03/11/25 1115 97.9 °F (36.6 °C) 70 18 154/99 117 95 % None (Room air) Lying -- --    03/11/25 11:13:43 97.9 °F (36.6 °C) 77 18 154/99 117 96 % -- -- -- --    03/11/25 1018 -- -- -- -- -- -- -- -- -- No Pain    03/11/25 0800 -- -- -- -- -- -- -- -- 14 No Pain    03/11/25  07:30:37 98.7 °F (37.1 °C) 76 20 177/104 128 94 % None (Room air) Lying -- --    03/11/25 0400 97.9 °F (36.6 °C) 80 18 160/96 117 96 % None (Room air) Lying -- --    03/11/25 0000 98.2 °F (36.8 °C) -- 16 166/104 125 -- None (Room air) Lying -- --    03/10/25 2230 -- -- 18 -- -- -- None (Room air) Lying -- --    03/10/25 2029 -- -- -- -- -- -- -- -- -- 3    03/10/25 20:04:30 -- 69 -- 165/96 119 97 % -- -- -- --    03/10/25 2000 98 °F (36.7 °C) 69 18 165/96 119 -- None (Room air) Lying 14 --    03/10/25 1904 98.2 °F (36.8 °C) -- 20 174/96 122 -- None (Room air) Lying -- --    03/10/25 1800 -- -- -- -- -- -- -- -- 14 --    03/10/25 1600 -- 71 18 159/100 120 97 % None (Room air) Lying 14 --    03/10/25 15:10:44 98 °F (36.7 °C) 73 18 159/100 120 97 % None (Room air) Lying -- --    03/10/25 1437 -- -- -- -- -- -- -- -- -- No Pain    03/10/25 14:08:34 98.1 °F (36.7 °C) 73 -- 157/99 118 96 % -- -- -- --    03/10/25 1400 98.1 °F (36.7 °C) 73 18 157/99 118 -- -- Lying 14 --    03/10/25 1355 -- 78 18 154/93 119 99 % None (Room air) Sitting -- --    03/10/25 1300 -- 88 18 168/105 130 -- -- -- -- --    03/10/25 1210 -- 81 -- 166/95 -- -- -- -- -- --    03/10/25 1200 -- 83 18 166/95 125 99 % None (Room air) -- 14 3    03/10/25 1100 -- 79 18 187/96 135 100 % None (Room air) -- 14 3    03/10/25 1000 -- 72 18 169/96 126 100 % None (Room air) Lying 14 --    03/10/25 0900 -- 72 -- 172/102 130 100 % -- -- 14 --    03/10/25 0855 -- 74 -- -- -- 99 % -- -- -- --    03/10/25 0850 -- 77 -- -- -- 98 % -- -- -- --    03/10/25 0845 -- 81 18 172/93 -- 100 % None (Room air) Lying 14 --    03/10/25 0840 -- 73 -- -- -- 99 % -- -- -- --    03/10/25 0839 -- -- -- 190/94 -- -- -- -- -- --    03/10/25 0835 -- 72 -- -- -- 99 % -- -- -- --    03/10/25 0833 -- -- -- 165/106 -- -- -- -- -- --    03/10/25 0830 -- 67 18 172/101 -- 98 % None (Room air) Lying 14 --    03/10/25 0815 -- 70 18 180/89 -- 99 % None (Room air) Lying 14 --    03/10/25 0810 -- 73 --  -- -- 99 % -- -- -- --    03/10/25 0806 -- -- -- -- -- 98 % -- -- -- --    03/10/25 0805 -- 75 -- -- -- -- -- -- -- --    03/10/25 0800 -- 75 18 183/100 -- 100 % None (Room air) -- 14 --    03/10/25 0755 -- 75 -- -- -- 97 % -- -- -- --    03/10/25 0750 -- 77 -- -- -- 99 % -- -- -- --    03/10/25 0749 -- -- -- -- -- -- -- -- 14 --    03/10/25 0745 -- 78 18 174/91 -- 98 % None (Room air) Lying 14 --    03/10/25 0741 -- -- -- -- -- 99 % -- -- -- --    03/10/25 0740 -- 73 -- -- -- -- -- -- -- --    03/10/25 0735 -- 65 -- -- -- 99 % -- -- -- --    03/10/25 0730 -- 80 18 174/105 -- 98 % None (Room air) Lying 14 --    03/10/25 0729 -- 71 -- -- -- -- -- -- -- --    03/10/25 0726 -- -- -- 182/98 -- -- -- -- -- --    03/10/25 0725 98.3 °F (36.8 °C) 66 -- -- -- 99 % -- -- -- --    03/10/25 0722 -- 67 20 180/115 139 99 % None (Room air) Lying -- --              Pertinent Labs/Diagnostic Test Results:   Radiology:  VAS carotid complete study   Final Interpretation by Hesham Jordan MD (03/10 2957)      MRI brain wo contrast   Final Interpretation by E. Alec Schoenberger, MD (03/10 0849)      Multiple small scattered acute infarcts in the left cerebral hemisphere. Several of the smaller infarcts are hyperintense on FLAIR.   Tiny focus of left parietal occipital petechial hemorrhage.   Tiny focus of mild DWI signal in the right centrum semiovale may also present recent infarct.   No acute space-occupying hematoma.         I personally discussed this study with Leo Burgos on 3/10/2025 8:28 AM.            Workstation performed: [id number]         CT stroke alert brain   Final Interpretation by E. Alec Schoenberger, MD (03/10 0802)   No acute large vessel distribution infarct, hemorrhage or mass effect   New age-indeterminate lacunar infarcts in the left centrum semiovale.      Findings were directly discussed with Leo Burgos at 7:49 a.m.      Workstation performed: [id number]         CTA stroke alert  (head/neck)   Final Interpretation by E. Alec Schoenberger, MD (03/10 0801)   Worsening severe stenosis in the proximal left internal carotid artery.   No significant stenosis of the cervical right carotid or vertebral arteries   No significant intracranial stenosis, large vessel occlusion or aneurysm.               Findings were directly discussed with Leo Burgos at 7:49 a.m.      Workstation performed: [id number]         CT chest abdomen pelvis w contrast   Final Interpretation by Presley Moore MD (03/10 0813)      1.  No acute pathology within the chest, abdomen, or pelvis.   2.  Approximately 9 mm nonobstructing calculus within the left renal pelvis, previously within a left upper pole calyx.   3.  Chronic findings, detailed above.      The study was marked in EPIC for immediate notification.      Workstation performed: [id number]           Cardiology:  Echo complete w/ contrast if indicated   Final Result by Ez Benítez MD (03/10 1317)        Left Ventricle: Left ventricular cavity size is normal. Wall thickness    is upper normal. The left ventricular ejection fraction is 60%. Systolic    function is normal. Wall motion is normal. Diastolic function is mildly    abnormal, consistent with grade I (abnormal) relaxation.     Mitral Valve: There is mild annular calcification. There is trace    regurgitation.     Prior TTE study available for comparison. Prior study date: 9/13/2024.    No significant changes noted compared to the prior study.         ECG 12 lead   Final Result by Taj Perez MD (03/10 0852)   Normal sinus rhythm   Normal ECG   No previous ECGs available   Confirmed by Taj Perez ([id number]) on 3/10/2025 8:52:52 AM        GI:  No orders to display           Results from last 7 days   Lab Units 03/11/25  0609 03/10/25  0727   WBC Thousand/uL 7.62 8.70   HEMOGLOBIN g/dL 13.3 13.8   HEMATOCRIT % 39.2 41.4   PLATELETS Thousands/uL 255 278   TOTAL NEUT ABS Thousands/µL 5.32  --         "  Results from last 7 days   Lab Units 03/11/25  0609 03/10/25  0727   SODIUM mmol/L 136 135   POTASSIUM mmol/L 3.7 4.2   CHLORIDE mmol/L 106 102   CO2 mmol/L 21 26   ANION GAP mmol/L 9 7   BUN mg/dL 22 27*   CREATININE mg/dL 1.29 1.63*   EGFR ml/min/1.73sq m 59 45   CALCIUM mg/dL 9.0 9.2         Results from last 7 days   Lab Units 03/10/25  0722   POC GLUCOSE mg/dl 117     Results from last 7 days   Lab Units 03/11/25  0609 03/10/25  0727   GLUCOSE RANDOM mg/dL 121 140         Results from last 7 days   Lab Units 03/10/25  0727   HEMOGLOBIN A1C % 6.9*   EAG mg/dl 151     No results found for: \"BETA-HYDROXYBUTYRATE\"                   Results from last 7 days   Lab Units 03/10/25  1123 03/10/25  0915 03/10/25  0727   HS TNI 0HR ng/L  --   --  7   HS TNI 2HR ng/L  --  6  --    HSTNI D2 ng/L  --  -1  --    HS TNI 4HR ng/L 7  --   --    HSTNI D4 ng/L 0  --   --          Results from last 7 days   Lab Units 03/10/25  0727   PROTIME seconds 14.2   INR  1.03   PTT seconds 31     Results from last 7 days   Lab Units 03/10/25  1123   TSH 3RD GENERATON uIU/mL 0.543                                                         Results from last 7 days   Lab Units 03/10/25  0915   CLARITY UA  Clear   COLOR UA  Light Yellow   SPEC GRAV UA  1.043*   PH UA  5.5   GLUCOSE UA mg/dl Negative   KETONES UA mg/dl Negative   BLOOD UA  Moderate*   PROTEIN UA mg/dl Trace*   NITRITE UA  Negative   BILIRUBIN UA  Negative   UROBILINOGEN UA (BE) mg/dl <2.0   LEUKOCYTES UA  Negative   WBC UA /hpf 2-4*   RBC UA /hpf 30-50*   BACTERIA UA /hpf None Seen   EPITHELIAL CELLS WET PREP /hpf None Seen   MUCUS THREADS  Occasional*                                                   Past Medical History:   Diagnosis Date    Hypertension      Present on Admission:   MARIA TERESA (acute kidney injury) ([facility])   Primary hypertension      Admitting Diagnosis: Primary hypertension [I10]  CVA (cerebral vascular accident) ([facility]) [I63.9]  Stroke-like symptoms [R29.90]  Symptoms of " cerebrovascular accident (CVA) [R09.89]  Acute cerebrovascular accident (CVA) due to stenosis of left carotid artery (HCC) [I63.232]  Age/Sex: 59 y.o. male    Network Utilization Review Department  ATTENTION: Please call with any questions or concerns to [phone] and carefully listen to the prompts so that you are directed to the right person. All voicemails are confidential.   For Discharge needs, contact Care Management DC Support Team at [phone] opt. 2  Send all requests for admission clinical reviews, approved or denied determinations and any other requests to dedicated fax number below belonging to the campus where the patient is receiving treatment. List of dedicated fax numbers for the Facilities:  FACILITY NAME UR FAX NUMBER   ADMISSION DENIALS (Administrative/Medical Necessity) [phone]   DISCHARGE SUPPORT TEAM (NETWORK) [phone]   PARENT CHILD HEALTH (Maternity/NICU/Pediatrics) [phone]   [facility] [phone]   [facility] [phone]   [facility] [phone]   General acute hospital [phone]   [facility] [phone]   [facility] [phone]   [facility] [phone]   [facility] [phone]   [facility] [phone]   [facility] [phone]   [facility] [phone]   [facility] [phone]

## 2025-03-11 NOTE — SPEECH THERAPY NOTE
Speech Language/Pathology  Speech/Language Pathology  Assessment    Patient Name: Ez Dsouza  Today's Date: 3/11/2025     Problem List  Principal Problem:    Acute cerebrovascular accident (CVA) due to stenosis of left carotid artery (HCC)  Active Problems:    MARIA TERESA (acute kidney injury) (HCC)    Primary hypertension    Hyperlipidemia    Elevated coronary artery calcium score    Pre-operative cardiovascular examination    Past Medical History  Past Medical History:   Diagnosis Date    Hypertension      Past Surgical History  Past Surgical History:   Procedure Laterality Date    KNEE SURGERY  2008    MOLE REMOVAL  2024    Back   Speech-Language Evaluation    Impression:  Pt presents with mild expressive aphasia. Per pt report, word finding difficulty improved from past day. Receptive language grossly WNL.     Recommendation:  Recommend OP f/u for expressive language deficits if pt still continuing to report word finding difficulty. No further f/u required in the acute care setting at this time. Discussed in detail with pt.      Therapy Prognosis: Excellent  Prognosis considerations: CVA  Eval only, No f/u tx indicated- can f/u with OP if pt still reporting word finding difficulty      Goals:  None at this time- recommended OP f/u      Patient's goal: None stated       H&P/Admit info, pertinent provider notes:(PMH noted above)  59-year-old male presents to the emergency department with concern for right-sided weakness and confusion.  Patient seen and examined noted to have right-sided  deficit as well as initial drift of the right upper extremity in addition to confusion.  Stroke alert called.  Differential diagnosis also includes but is not limited to traumatic injury given abrasion of the chest, arrhythmias.  laboratory analysis showed no acute pertinent findings.  EKG interpreted by myself as below.  New age-indeterminate lacunar infarcts were appreciated on CT imaging.  Discussed with neurology who  recommended patient be admitted under stroke pathway, discussed with Dr. Reinoso who accepted the patient for further evaluation and management.       Special Studies:  MRI Brain 3/10/25:  IMPRESSION:     Multiple small scattered acute infarcts in the left cerebral hemisphere. Several of the smaller infarcts are hyperintense on FLAIR.  Tiny focus of left parietal occipital petechial hemorrhage.  Tiny focus of mild DWI signal in the right centrum semiovale may also present recent infarct.  No acute space-occupying hematoma.      Did the pt report pain?  If yes,was nursing notified/was it addressed?      Social:  Lives at home with wife    Orientation:  Place: +  City:+  Year:+  Month:+  FERNANDO:-  Time of Day:+  Hospital:+  Reason for hospitalization:+    Auditory Comprehension:  One step commands:+  Two step commands:+  Complex or 3 step commands:+  Personal y/n ?'s:+  Basic y/n ?'s:+  Complex y/n ?'s:+      Oral Expression:  Auto Sequences:   FERNANDO: 90% accuracy    MAYRA: 80% accuracy   Word Repetition: 100%  Phrase Completion: 100%  Responsive Namin%  Object Namin%  Divergent Naming: Moderate difficulty, able to name 8 concrete items within 1 minute   Conversation: No difficulty, mild occasional word finding sometimes able to self correct   Able to make basic needs known? Yes     Motor Speech:  Dysarthria: None noted      Cognitive -linguistic skills:  Grossly intact    Also noted:  Aware of deficits:

## 2025-03-11 NOTE — PLAN OF CARE
Problem: OCCUPATIONAL THERAPY ADULT  Goal: Performs self-care activities at highest level of function for planned discharge setting.  See evaluation for individualized goals.  Description: Treatment Interventions: ADL retraining, Functional transfer training, Endurance training, Cognitive reorientation, Patient/family training, Equipment evaluation/education, Compensatory technique education, Energy conservation, Activityengagement, Fine motor coordination activities          See flowsheet documentation for full assessment, interventions and recommendations.   Note: Limitation: Decreased ADL status, Decreased Safe judgement during ADL, Decreased cognition, Decreased endurance, Decreased self-care trans, Decreased high-level ADLs (impaired balance, fxnl mobility, act timo, FM coord, GM coord, fxnl reach, standing timo, strength, attention to task, safety awareness, insight, pacing, problem solving, learning new tasks, response time)  Prognosis: Good  Assessment: Pt is a 59 y.o. male seen for OT evaluation s/p admission to [facility] on 3/10/2025 due to R sided weakness. Diagnosed with Acute cerebrovascular accident (CVA) due to stenosis of left carotid artery (HCC). Personal and env factors supporting pt at time of IE include (I) PLOF, supportive wife, and attitude towards recovery. Personal and env factors inhibiting engagement in occupations include  [employer] home . Performance deficits that affect the pt’s occupational performance can be seen above. Due to pt's current functional limitations and medical complications pt is functioning below baseline. Pt would benefit from continued skilled OT treatment in order to maximize safety, independence and overall performance with ADLs, functional mobility, functional transfers, and cognition in order to achieve highest level of function.     Rehab Resource Intensity Level, OT: III (Minimum Resource Intensity) (OP neuro day program)

## 2025-03-11 NOTE — CASE MANAGEMENT
Case Management Assessment    Patient name Ez Dsouza  [facility] S /S -01 MRN [id number]  : 1965 Date 3/11/2025       Current Admission Date: 3/10/2025  Current Admission Diagnosis:Acute cerebrovascular accident (CVA) due to stenosis of left carotid artery (HCC)   Patient Active Problem List    Diagnosis Date Noted Date Diagnosed    Hyperlipidemia 03/10/2025     Acute cerebrovascular accident (CVA) due to stenosis of left carotid artery (HCC) 03/10/2025     Elevated coronary artery calcium score 03/10/2025     Pre-operative cardiovascular examination 03/10/2025     Closed fracture of right distal radius and ulna 2024     Closed fracture of distal ends of right radius and ulna, initial encounter 2024     Closed fracture of right wrist with routine healing, subsequent encounter 2024     Fall 2024     Acute pain due to trauma 2024     Closed fracture of multiple ribs of left side 2024     Closed fracture of right wrist 2024     MARIA TERESA (acute kidney injury) (HCC) 2024     Primary hypertension 2024     Scalp abrasion 2024     Elevated blood pressure reading 2021       LOS (days): 1  Geometric Mean LOS (GMLOS) (days):   Days to GMLOS:     OBJECTIVE:    Risk of Unplanned Readmission Score: 8.41         Current admission status: Inpatient       Preferred Pharmacy:   [facility] [place] - [address]  [address] [place] [address]  Phone: [phone] Fax: [phone]    Primary Care Provider: Jakob Higgins MD    Primary Insurance: BLUE CROSS  Secondary Insurance: [employer]    ASSESSMENT:  Active Health Care Proxies    There are no active Health Care Proxies on file.                 Readmission Root Cause  30 Day Readmission: No    Patient Information  Admitted from:: Home  Mental Status: Alert  During Assessment patient was accompanied by: Spouse  Assessment information provided by::  Patient, Spouse (Nery)  Primary Caregiver: Self  Support Systems: Spouse/significant other  County of Residence: [place]  What city do you live in?: [place]  Home entry access options. Select all that apply.: No steps to enter home  Type of Current Residence: 2 story home (two steps to enter the first floor)  Upon entering residence, is there a bedroom on the main floor (no further steps)?: No  A bedroom is located on the following floor levels of residence (select all that apply):: 2nd Floor  Upon entering residence, is there a bathroom on the main floor (no further steps)?: No  Indicate which floors of current residence have a bathroom (select all the apply):: 2nd Floor  Number of steps to 2nd floor from main floor: One Flight  Living Arrangements: Lives w/ Spouse/significant other  Is patient a ?: No    Activities of Daily Living Prior to Admission  Functional Status: Independent  Completes ADLs independently?: Yes  Ambulates independently?: Yes  Does patient currently own DME?: No  Does patient have a history of Outpatient Therapy (PT/OT)?: No  Does the patient have a history of Short-Term Rehab?: No  Does patient have a history of HHC?: No  Does patient currently have HHC?: No         Patient Information Continued  Income Source: Employed  Does patient have prescription coverage?: Yes  Does patient receive dialysis treatments?: No  Does patient have a history of substance abuse?: No  Does patient have a history of Mental Health Diagnosis?: No         Means of Transportation  Means of Transport to Appts:: Drives Self

## 2025-03-11 NOTE — TELEPHONE ENCOUNTER
STILL ADMITTED;3/10/2025 - present (1 day)  [facility]      1ST ATTEMPT,     VIA [employer]     Thank you,     Syeda CANO/ SHANNA CASAS/Acute cerebrovascular accident (CVA) due to stenosis of left carotid artery     DC-     ----- Message from Serenity Glass DO sent at 3/10/2025  6:23 PM EDT -----  Ez Dsouza will need follow-up in in 6 weeks with neurovascular team for Other= ATTENDING  in 60 minute appointment. They will not require outpatient neurological testing.

## 2025-03-11 NOTE — PROGRESS NOTES
"Progress Note - Neurology   Name: Ez Dsouza 59 y.o. male I MRN: [id number]  Unit/Bed#: S -01 I Date of Admission: 3/10/2025   Date of Service: 3/11/2025 I Hospital Day: 1     Assessment & Plan  Acute cerebrovascular accident (CVA) due to stenosis of left carotid artery (HCC)  Ez Dsouza is a 58YO R-handed male with history of HTN and active smoker who presented to the hospital on 3/10 with RUE weakness and confusion. LKN 10pm on 3/9. Woke up this morning at 7am and tried to get up, but pt's wife heard a thud and was awoken from her sleep to find him on the ground, conscious, but with L-sided weakness, reportedly moreso arm > leg. At baseline, he has no neurological deficits, MRS 0. Initial /115. Glucose 117.   NIHSS 2 for month (\"December\") and moderate aphasia (receptive > expressive, no issue with repetition).   Neuro examination significant for RUE weakness 4+/5 compared to L.  CTH showed new age-indeterminate lacunar infarcts in L centrum semiovale.   CTA H/N showed worsening severe stenosis in the proximal L ICA.   CT CAP showed no acute pathology within chest, abdomen, pelvis; though there does appear to be 9mm nonobstructing calculus in L renal pelvis.   Wake-up stroke MRI showed multiple small scattered acute infarcts in L cerebral hemisphere. Several of the smaller infarcts are hyperintense on FLAIR. Tiny focus of L parietal occipital petechial hemorrhage. Tiny focus of mild DWI signal in R central semiovale may also represent recent infarct. No acute space-occupying hematoma.   Given evidence of acute strokes on DWI with FLAIR changes, pt was deemed not a candidate for TNK.   No thromboectomy due to no LVO.   Does take ASA 81mg daily at home. Pt was loaded with ASA 325mg x1 and Plavix 300mg x1 and admitted on stroke pathway.     Carotid US showed >70% stenosis in L ICA, <50% stenosis in R ICA. Vascular surgery team was consulted.   TTE: EF 60%, G1DD, LA normal size, no major valvular " dysfunction, IVC normal size    Lab Results   Component Value Date    CHOLESTEROL 188 03/10/2025    TRIG 171 (H) 03/10/2025    HDL 29 (L) 03/10/2025    LDLCALC 125 (H) 03/10/2025     Lab Results   Component Value Date    HGBA1C 6.9 (H) 03/10/2025       Lab Results   Component Value Date    IOX3ACMLSEYH 0.543 03/10/2025     Assessment: Multifocal strokes primarily in L ICA territory suggesting symptomatic L ICA (Carotid US significant for >70% stenosis of L ICA), but also in R hemisphere and possibly in L PCA territory suggestive of embolic strokes. No e/o malignancy on CT CAP. Telemetery w/o e/o afib/aflutter.    Plan:   Discussed with neurology attending, Dr. Burgos  A1c goal <6.5%, mgt per primary team appreciated  Gradual return to normotension, SBP <150  Bedside swallow eval  S/p ASA and Plavix load, followed by ASA 81mg daily and Plavix 75mg daily  Given plan for surgical intervention, will defer duration of DAPT to vascular surgery team.  P2Y12 demonstrating Plavix responder, ok to continue Plavix.  Atorvastatin 40mg QHS  Glucose <180   Did feel overwhelmed with information earlier this morning, but rest of the day, good mood. Moving forward, if there are concerns for post-stroke depression, may consider Lexapro 10mg daily.   Neuro checks- Every 1 hour x 4 hours, then every 2 hours x 4 hours, then every 4 hours x 72 hours     Stat CT Head for change in neuro status.  Monitor on telemetry  DVT ppx per primary team, SCDs  PT/OT/ST/PM&R evaluations   Will need outpatient prolonged cardiac monitoring (Holter monitor/Zio patch)  Medical management as per primary team appreciated.   Vascular surgery consulted, recs appreciated - currently post-stroke day 1. Recommend vascular surgical intervention anytime after post-stroke day 3.   Ez Lianna will need follow-up in in 6 weeks with neurovascular team for Other in 60 minute appointment. They will not require outpatient neurological testing. Msg sent to scheduling  team.  No further inpatient neurology recommendations at this time. Thank you for allowing us to be a part of his care. Please let us know if there are any acute questions or concerns.    MARIA TERESA (acute kidney injury) (HCC)    Primary hypertension    Hyperlipidemia    Elevated coronary artery calcium score    Pre-operative cardiovascular examination      Subjective   Pt seen and examined at bedside. Pt's wife reports still some confusion, but overall getting closer to baseline. No acute concerns at this time.     Review of Systems   Constitutional:  Negative for chills, diaphoresis, fatigue and fever.   Eyes:  Negative for photophobia and visual disturbance.   Respiratory:  Negative for shortness of breath.    Cardiovascular:  Negative for chest pain.   Gastrointestinal:  Negative for abdominal pain.   Neurological:  Positive for speech difficulty and weakness. Negative for dizziness, tremors, seizures, syncope, facial asymmetry, light-headedness, numbness and headaches.   Psychiatric/Behavioral:  Positive for confusion.    All other systems reviewed and are negative.      Objective :  Temp:  [97.9 °F (36.6 °C)-98.7 °F (37.1 °C)] 97.9 °F (36.6 °C)  HR:  [69-83] 83  BP: (141-177)/() 141/79  Resp:  [16-20] 18  SpO2:  [94 %-97 %] 96 %  O2 Device: None (Room air)    Physical Exam   Vitals reviewed.   Constitutional:    Not in acute distress. Normal appearance. Not ill-appearing, toxic-appearing or diaphoretic.   HENT:   Normocephalic and atraumatic.  External ear normal b/l. Nose normal. No congestion or rhinorrhea. Mucous membranes are moist.  Oropharynx is clear. No oropharyngeal exudate or posterior oropharyngeal erythema.   Eyes:    No scleral icterus.  No discharge b/l.  Conjunctivae normal.   Cardiovascular: no clear edema  Pulmonary:  No respiratory distress.   Musculoskeletal: no gross deformities  Skin:    Skin is not pale.   Psychiatric:      Mood normal. Affect congruent    Neurologic Exam   MENTAL STATUS:  AAOx3. Follows simple commands, but did have issues with commands crossing midline. Affect normal w/ congruent mood.    LANGUAGE: Speech clear, spontaneous, and fluent. Receptive >> expressive aphasia, no issue with repetition. No dysarthria - normal volume and intonation.    CRANIAL NERVES:  CN II: Visual acuity grossly intact. No visual field deficit. PERRLA.   CN III, IV, VI: EOM's intact w/o nystagmus, gaze palsy, or preference.   CN V: Normal masseter bulk. V1-V3 sensation intact and symmetric bilaterally.   CN VII: Face movement symmetric. Smile, eyebrow raise, eyelid bury symmetric. Nasolabial folds and palpebral folds symmetric.   CN VIII: Hearing grossly intact bilaterally.   CN IX-X: No dysarthria. Palate elevates symmetrically. Uvula midline.   CN XI: Shoulder shrug symmetric.   CN XII: Tongue midline. No deviation, atrophy, or fasciculations.    MOTOR: Normal muscle bulk. Normal tone. No pronator drift or orbiting. No tremors or abnormal involuntary movements appreciated. Formal strength testing as follows:  Upper extremity:   Shoulder abduction Elbow flexion Elbow extension Wrist flexion Wrist extension  strength   Right 4/5 4/5 4/5 4/5 4/5 4/5   Left 5/5 5/5 5/5 5/5 5/5 5/5     Lower extremity:   Hip flexion Knee flexion Knee extension Ankle dorsiflexion Ankle plantarflexion   Right 5/5 5/5 5/5 5/5 5/5   Left 5/5 5/5 5/5 5/5 5/5     SENSORY:  Light touch: Intact and symmetric throughout.    REFLEXES: Deferred    COORDINATION: Finger-to-nose w/ eyes open intact bilaterally w/o dysmetria or ataxia.     GAIT: Deferred        Lab Results: I have reviewed the following results:  Results from last 7 days   Lab Units 03/11/25  0609 03/10/25  0727   WBC Thousand/uL 7.62 8.70   HEMOGLOBIN g/dL 13.3 13.8   HEMATOCRIT % 39.2 41.4   PLATELETS Thousands/uL 255 278   SEGS PCT % 70  --    MONO PCT % 11  --    EOS PCT % 1  --       Results from last 7 days   Lab Units 03/11/25  0609 03/10/25  0727   POTASSIUM  mmol/L 3.7 4.2   CHLORIDE mmol/L 106 102   CO2 mmol/L 21 26   BUN mg/dL 22 27*   CREATININE mg/dL 1.29 1.63*   CALCIUM mg/dL 9.0 9.2              Results from last 7 days   Lab Units 03/10/25  0727   INR  1.03   PTT seconds 31               VAS carotid complete study   Final Result by Hesham Jordan MD (03/10 1307)      MRI brain wo contrast   Final Result by E. Alec Schoenberger, MD (03/10 0849)      Multiple small scattered acute infarcts in the left cerebral hemisphere. Several of the smaller infarcts are hyperintense on FLAIR.   Tiny focus of left parietal occipital petechial hemorrhage.   Tiny focus of mild DWI signal in the right centrum semiovale may also present recent infarct.   No acute space-occupying hematoma.         I personally discussed this study with Leo Burgos on 3/10/2025 8:28 AM.            Workstation performed: [id number]         CT stroke alert brain   Final Result by E. Alec Schoenberger, MD (03/10 0802)   No acute large vessel distribution infarct, hemorrhage or mass effect   New age-indeterminate lacunar infarcts in the left centrum semiovale.      Findings were directly discussed with Leo Burgos at 7:49 a.m.      Workstation performed: [id number]         CTA stroke alert (head/neck)   Final Result by E. Alec Schoenberger, MD (03/10 0801)   Worsening severe stenosis in the proximal left internal carotid artery.   No significant stenosis of the cervical right carotid or vertebral arteries   No significant intracranial stenosis, large vessel occlusion or aneurysm.               Findings were directly discussed with Leo Burgos at 7:49 a.m.      Workstation performed: [id number]         CT chest abdomen pelvis w contrast   Final Result by Presley Moore MD (03/10 0813)      1.  No acute pathology within the chest, abdomen, or pelvis.   2.  Approximately 9 mm nonobstructing calculus within the left renal pelvis, previously within a left upper pole calyx.   3.   Chronic findings, detailed above.      The study was marked in EPIC for immediate notification.      Workstation performed: [id number]           VTE Pharmacologic Prophylaxis: VTE covered by:  enoxaparin, Subcutaneous, 40 mg at 03/11/25 0912        Serenity Glass DO  [facility] Neurology Resident PGY3

## 2025-03-11 NOTE — ASSESSMENT & PLAN NOTE
High coronary calcium score 696-without symptoms of ischemia, discussed his need to address his risk factors

## 2025-03-12 ENCOUNTER — TELEPHONE (OUTPATIENT)
Dept: VASCULAR SURGERY | Facility: CLINIC | Age: 60
End: 2025-03-12

## 2025-03-12 LAB
ANION GAP SERPL CALCULATED.3IONS-SCNC: 8 MMOL/L (ref 4–13)
BASOPHILS # BLD AUTO: 0.06 THOUSANDS/ÂΜL (ref 0–0.1)
BASOPHILS NFR BLD AUTO: 1 % (ref 0–1)
BUN SERPL-MCNC: 26 MG/DL (ref 5–25)
CALCIUM SERPL-MCNC: 9.1 MG/DL (ref 8.4–10.2)
CHLORIDE SERPL-SCNC: 105 MMOL/L (ref 96–108)
CO2 SERPL-SCNC: 24 MMOL/L (ref 21–32)
CREAT SERPL-MCNC: 1.41 MG/DL (ref 0.6–1.3)
EOSINOPHIL # BLD AUTO: 0.18 THOUSAND/ÂΜL (ref 0–0.61)
EOSINOPHIL NFR BLD AUTO: 2 % (ref 0–6)
ERYTHROCYTE [DISTWIDTH] IN BLOOD BY AUTOMATED COUNT: 13.8 % (ref 11.6–15.1)
GFR SERPL CREATININE-BSD FRML MDRD: 53 ML/MIN/1.73SQ M
GLUCOSE SERPL-MCNC: 117 MG/DL (ref 65–140)
HCT VFR BLD AUTO: 39.9 % (ref 36.5–49.3)
HGB BLD-MCNC: 13.5 G/DL (ref 12–17)
IMM GRANULOCYTES # BLD AUTO: 0.03 THOUSAND/UL (ref 0–0.2)
IMM GRANULOCYTES NFR BLD AUTO: 0 % (ref 0–2)
LYMPHOCYTES # BLD AUTO: 1.95 THOUSANDS/ÂΜL (ref 0.6–4.47)
LYMPHOCYTES NFR BLD AUTO: 25 % (ref 14–44)
MCH RBC QN AUTO: 31 PG (ref 26.8–34.3)
MCHC RBC AUTO-ENTMCNC: 33.8 G/DL (ref 31.4–37.4)
MCV RBC AUTO: 92 FL (ref 82–98)
MONOCYTES # BLD AUTO: 0.94 THOUSAND/ÂΜL (ref 0.17–1.22)
MONOCYTES NFR BLD AUTO: 12 % (ref 4–12)
NEUTROPHILS # BLD AUTO: 4.78 THOUSANDS/ÂΜL (ref 1.85–7.62)
NEUTS SEG NFR BLD AUTO: 60 % (ref 43–75)
NRBC BLD AUTO-RTO: 0 /100 WBCS
PLATELET # BLD AUTO: 261 THOUSANDS/UL (ref 149–390)
PMV BLD AUTO: 10.2 FL (ref 8.9–12.7)
POTASSIUM SERPL-SCNC: 3.6 MMOL/L (ref 3.5–5.3)
RBC # BLD AUTO: 4.36 MILLION/UL (ref 3.88–5.62)
SODIUM SERPL-SCNC: 137 MMOL/L (ref 135–147)
WBC # BLD AUTO: 7.94 THOUSAND/UL (ref 4.31–10.16)

## 2025-03-12 PROCEDURE — 85025 COMPLETE CBC W/AUTO DIFF WBC: CPT

## 2025-03-12 PROCEDURE — 80048 BASIC METABOLIC PNL TOTAL CA: CPT

## 2025-03-12 PROCEDURE — 99232 SBSQ HOSP IP/OBS MODERATE 35: CPT | Performed by: SURGERY

## 2025-03-12 PROCEDURE — 99232 SBSQ HOSP IP/OBS MODERATE 35: CPT | Performed by: INTERNAL MEDICINE

## 2025-03-12 RX ORDER — LABETALOL HYDROCHLORIDE 5 MG/ML
10 INJECTION, SOLUTION INTRAVENOUS EVERY 6 HOURS PRN
Status: DISCONTINUED | OUTPATIENT
Start: 2025-03-12 | End: 2025-03-12

## 2025-03-12 RX ORDER — LABETALOL HYDROCHLORIDE 5 MG/ML
10 INJECTION, SOLUTION INTRAVENOUS EVERY 6 HOURS PRN
Status: DISCONTINUED | OUTPATIENT
Start: 2025-03-12 | End: 2025-03-13 | Stop reason: HOSPADM

## 2025-03-12 RX ADMIN — ENOXAPARIN SODIUM 40 MG: 40 INJECTION SUBCUTANEOUS at 08:51

## 2025-03-12 RX ADMIN — CARVEDILOL 25 MG: 12.5 TABLET, FILM COATED ORAL at 16:01

## 2025-03-12 RX ADMIN — ATORVASTATIN CALCIUM 40 MG: 40 TABLET, FILM COATED ORAL at 16:01

## 2025-03-12 RX ADMIN — CLOPIDOGREL BISULFATE 75 MG: 75 TABLET ORAL at 08:51

## 2025-03-12 RX ADMIN — AMLODIPINE BESYLATE 5 MG: 5 TABLET ORAL at 08:52

## 2025-03-12 RX ADMIN — ASPIRIN 81 MG: 81 TABLET, CHEWABLE ORAL at 08:51

## 2025-03-12 RX ADMIN — CARVEDILOL 25 MG: 12.5 TABLET, FILM COATED ORAL at 08:52

## 2025-03-12 NOTE — PLAN OF CARE
Problem: Potential for Aspiration  Goal: Non-ventilated patient's risk of aspiration is minimized  Description: Assess and monitor vital signs, respiratory status, and labs (WBC).  Monitor for signs of aspiration (tachypnea, cough, rales, wheezing, cyanosis, fever).    - Assess and monitor patient's ability to swallow.  - Place patient up in chair to eat if possible.  - HOB up at 90 degrees to eat if unable to get patient up into chair.  - Supervise patient during oral intake.   - Instruct patient/ family to take small bites.  - Instruct patient/ family to take small single sips when taking liquids.  - Follow patient-specific strategies generated by speech pathologist.  Outcome: Progressing  Goal: Ventilated patient's risk of aspiration is minimized  Description: Assess and monitor vital signs, respiratory status, airway cuff pressure, and labs (WBC).  Monitor for signs of aspiration (tachypnea, cough, rales, wheezing, cyanosis, fever).    - Elevate head of bed 30 degrees if patient has tube feeding.  - Monitor tube feeding.  Outcome: Progressing     Problem: Nutrition  Goal: Nutrition/Hydration status is improving  Description: Monitor and assess patient's nutrition/hydration status for malnutrition (ex- brittle hair, bruises, dry skin, pale skin and conjunctiva, muscle wasting, smooth red tongue, and disorientation). Collaborate with interdisciplinary team and initiate plan and interventions as ordered.  Monitor patient's weight and dietary intake as ordered or per policy. Utilize nutrition screening tool and intervene per policy. Determine patient's food preferences and provide high-protein, high-caloric foods as appropriate.     - Assist patient with eating.  - Allow adequate time for meals.  - Encourage patient to take dietary supplement as ordered.  - Collaborate with clinical nutritionist.  - Include patient/family/caregiver in decisions related to nutrition.  Outcome: Progressing

## 2025-03-12 NOTE — CONSULTS
Vascular Surgery    Pt seen and examined.  Admitted with left hemispheric CVA, severe left ICA lesion.  Currently still experiencing expressive aphasia.  Right arm weakness resolved although he did appear to have another TIA effecting his RUE this afternoon.  He has a soft arge volume plaque.  I recommend left CEA no later than 3/24.  If he has further neurologic events case will need to be scheduled more urgently.  Continue DAP, statin.  OR scheduling pending and cardiology preop evaluation complete.  Will update on scheduling tomorrow.

## 2025-03-12 NOTE — ASSESSMENT & PLAN NOTE
Creatinine   Date Value Ref Range Status   03/12/2025 1.41 (H) 0.60 - 1.30 mg/dL Final     Comment:     Standardized to IDMS reference method   03/11/2025 1.29 0.60 - 1.30 mg/dL Final     Comment:     Standardized to IDMS reference method   03/10/2025 1.63 (H) 0.60 - 1.30 mg/dL Final     Comment:     Standardized to IDMS reference method     Problem has resolved.

## 2025-03-12 NOTE — ASSESSMENT & PLAN NOTE
PTA medication-  Coreg 25 twice daily  Hydrochlorothiazide 25 mg daily  spironolactone 25 mg daily -resume

## 2025-03-12 NOTE — PROGRESS NOTES
Progress Note - Hospitalist   Name: Ez Dsouza 59 y.o. male I MRN: [id number]  Unit/Bed#: S -01 I Date of Admission: 3/10/2025   Date of Service: 3/12/2025 I Hospital Day: 2    Assessment & Plan  Acute cerebrovascular accident (CVA) due to stenosis of left carotid artery (HCC)  Monitoring  - Neurochecks Q4H  - Telemetry to assess for atrial fibrillation or other dysrhythmias  - STAT CT Head for any changes in mental status or neuro exam  Diagnostic Studies:  - Labs HgbA1c, Lipid Panel, TSH, Troponin  - EKG  - MRI ad w/wo contrast   Multiple small scattered acute infarcts in the left cerebral hemisphere. Several of the smaller infarcts are hyperintense on FLAIR.   Tiny focus of left parietal occipital petechial hemorrhage.   Tiny focus of mild DWI signal in the right centrum semiovale may also present recent infarct.   No acute space-occupying hematoma.   - CTA head and Neck    No acute large vessel distribution infarct, hemorrhage or mass effect   New age-indeterminate lacunar infarcts in the left centrum semiovale.   - TTE   EF 60%, normal wall motion, grade I relaxation  Blood Pressure:  - Permissive HTN for 24-48 hours  - Treat BP if >220/110 mg Hg  - After permissive HTN, BP goal <140/90 mm Hg  Medication  Aspirin and plavix loaded   Continue dapt   Education  - Stroke education  - Smoking cessation education    - Weight loss education - low fat, low carbohydrate, low sodium (I.e Mediterranean or DASH) diet and daily exercise   - outpatient referral to sleep for possible SHANNEN   Rehab  - Nursing bedside dysphagia screen  - PT/OT/ST when appropriate      Assessment: Multi territory ischemia, suspect secondary to atheroembolic from left stenosed carotid artery.  Discussed with vascular surgery planning for CEA  MARIA TERESA (acute kidney injury) ([facility])  Creatinine   Date Value Ref Range Status   03/12/2025 1.41 (H) 0.60 - 1.30 mg/dL Final     Comment:     Standardized to IDMS reference method   03/11/2025 1.29 0.60  - 1.30 mg/dL Final     Comment:     Standardized to IDMS reference method   03/10/2025 1.63 (H) 0.60 - 1.30 mg/dL Final     Comment:     Standardized to IDMS reference method     Problem has resolved.  Primary hypertension  PTA medication-  Coreg 25 twice daily  Hydrochlorothiazide 25 mg daily  spironolactone 25 mg daily -resume  Hyperlipidemia  Continue with atorvastatin 40 mg qd   Elevated coronary artery calcium score  High coronary calcium score 696-without symptoms of ischemia, discussed his need to address his risk factors     Pre-operative cardiovascular examination  Cleared by cardiology    VTE Pharmacologic Prophylaxis: VTE Score: 7 High Risk (Score >/= 5) - Pharmacological DVT Prophylaxis Ordered: enoxaparin (Lovenox). Sequential Compression Devices Ordered.    Mobility:   Basic Mobility Inpatient Raw Score: 19  JH-HLM Goal: 6: Walk 10 steps or more  JH-HLM Achieved: 7: Walk 25 feet or more  JH-HLM Goal NOT achieved. Continue with multidisciplinary rounding and encourage appropriate mobility to improve upon JH-HLM goals.    Patient Centered Rounds: I performed bedside rounds with nursing staff today.   Discussions with Specialists or Other Care Team Provider: vascular surgery, neurology    Education and Discussions with Family / Patient: Updated  (wife) at bedside.Called in the noon regarding scheduling, no answers, voice mail was left. Will call again in the evening. Called again at 2: 52 PM, wife answered, questions were answered. Updated.    Current Length of Stay: 2 day(s)  Current Patient Status: Inpatient   Certification Statement: The patient will continue to require additional inpatient hospital stay due to cea, stroke follow up  Discharge Plan: Anticipate discharge in 48-72 hrs to home.    Code Status: Level 1 - Full Code    Subjective   Overnight: No acute events over night     Complaints: No complaints. Still endorses of speech difficulties and arm weakness    Patient reports no  new neurological symptoms at this time. He denies any deficits such as weakness, impaired coordinations, sensory abnormalities, alterations in voices, double vision, facial asymmetry. Additionally, He denies headache and nausea.      Diet: Diet Regular; Regular House   Bowel regimen:       DVT/VTE Prophylaxis: VTE covered by:  enoxaparin, Subcutaneous, 40 mg at 03/11/25 0912         Objective :  Temp:  [97.9 °F (36.6 °C)-98.4 °F (36.9 °C)] 98.1 °F (36.7 °C)  HR:  [67-83] 67  BP: (141-161)/(79-99) 152/90  Resp:  [18] 18  SpO2:  [91 %-97 %] 95 %  O2 Device: None (Room air)    Body mass index is 26.95 kg/m².     Input and Output Summary (last 24 hours):     Intake/Output Summary (Last 24 hours) at 3/12/2025 0838  Last data filed at 3/12/2025 0600  Gross per 24 hour   Intake 80 ml   Output --   Net 80 ml       Physical Exam  Vitals and nursing note reviewed.   Constitutional:       General: He is not in acute distress.     Appearance: He is well-developed.   HENT:      Head: Normocephalic and atraumatic.   Eyes:      Conjunctiva/sclera: Conjunctivae normal.   Cardiovascular:      Rate and Rhythm: Normal rate and regular rhythm.      Heart sounds: No murmur heard.  Pulmonary:      Effort: Pulmonary effort is normal. No respiratory distress.      Breath sounds: Normal breath sounds.   Abdominal:      Palpations: Abdomen is soft.      Tenderness: There is no abdominal tenderness.   Musculoskeletal:         General: No swelling.      Cervical back: Neck supple.   Skin:     General: Skin is warm and dry.      Capillary Refill: Capillary refill takes less than 2 seconds.   Neurological:      Mental Status: He is alert.      Cranial Nerves: Cranial nerve deficit (right upper quadrant diminished field) present.      Sensory: Sensation is intact.      Motor: Weakness (drift with right arm, 4+/5 with elbow flexion) present.      Coordination: Coordination is intact.      Comments: Right upper extremity weakness has improved  markedly from 3/11, degree of drift is decreased and less of utilization of accessory muscles with elbow flexion  Mild receptive aphasia greater than expressive aphasia this has improved as well     Psychiatric:         Mood and Affect: Mood normal.            Lines/Drains:        Telemetry:  Telemetry Orders (From admission, onward)               24 Hour Telemetry Monitoring  Continuous x 24 Hours (Telem)        Expiring   Question:  Reason for 24 Hour Telemetry  Answer:  TIA/Suspected CVA/ Confirmed CVA                     Telemetry Reviewed: Normal Sinus Rhythm  Indication for Continued Telemetry Use: Arrthymias requiring medical therapy               Lab Results: I have reviewed the following results:   Results from last 7 days   Lab Units 03/12/25  0515   WBC Thousand/uL 7.94   HEMOGLOBIN g/dL 13.5   HEMATOCRIT % 39.9   PLATELETS Thousands/uL 261   SEGS PCT % 60   LYMPHO PCT % 25   MONO PCT % 12   EOS PCT % 2     Results from last 7 days   Lab Units 03/12/25  0515   SODIUM mmol/L 137   POTASSIUM mmol/L 3.6   CHLORIDE mmol/L 105   CO2 mmol/L 24   BUN mg/dL 26*   CREATININE mg/dL 1.41*   ANION GAP mmol/L 8   CALCIUM mg/dL 9.1   GLUCOSE RANDOM mg/dL 117     Results from last 7 days   Lab Units 03/10/25  0727   INR  1.03     Results from last 7 days   Lab Units 03/10/25  0722   POC GLUCOSE mg/dl 117     Results from last 7 days   Lab Units 03/10/25  0727   HEMOGLOBIN A1C % 6.9*           Recent Cultures (last 7 days):               Last 24 Hours Medication List:     Current Facility-Administered Medications:     acetaminophen (TYLENOL) tablet 650 mg, Q6H PRN    amLODIPine (NORVASC) tablet 5 mg, Daily    aspirin chewable tablet 81 mg, Daily    atorvastatin (LIPITOR) tablet 40 mg, Daily With Dinner    carvedilol (COREG) tablet 25 mg, BID With Meals    clopidogrel (PLAVIX) tablet 75 mg, Daily    enoxaparin (LOVENOX) subcutaneous injection 40 mg, Daily    hydroCHLOROthiazide tablet 25 mg, Daily    labetalol (NORMODYNE)  injection 10 mg, Q6H PRN    ondansetron (ZOFRAN) injection 4 mg, Q6H PRN    [Held by provider] spironolactone (ALDACTONE) tablet 25 mg, Daily    Administrative Statements   Today, Patient Was Seen By: Krista Camilo MD      **Please Note: This note may have been constructed using a voice recognition system.**

## 2025-03-12 NOTE — TELEPHONE ENCOUNTER
Ez Dsouza inpatient at [facility] needs to be scheduled for a Left CEA no later than 3/24.  Already has cards clearance         thanks         Uday Swenson DO, FACS, RPVI    Vascular Surgeon

## 2025-03-12 NOTE — TELEPHONE ENCOUNTER
STILL ADMITTED:3/10/2025 - present (2 days)  [facility]      DO GAST[facility] Discharge  Ez Dsouza will need follow-up in in 6 weeks with neurovascular team for Other = ATTENDING in 60 minute appointment. They will not require outpatient neurological testing.

## 2025-03-12 NOTE — QUICK NOTE
Received message from nurse around 4:45 PM that patient had numbness in his right upper extremity and worsening expressive aphasia.  Patient reports that around 4 PM today he was straining while in the bathroom and experienced a sudden onset of right upper extremity weakness and complete loss of sensation in RUE from shoulder to fingertips. Reports he was unable to move arm or wiggle fingers.  Patient also reported expressive aphasia.  Patient reports that he informed nurse regarding expressive aphasia, but did not let nurse know about right upper extremity symptoms until after vascular surgery's evaluation approximately 30 minutes later.  With the additional information of the worsening expressive aphasia and the concurrent right upper extremity symptoms, nursing reached out.  Patient reports symptoms lasted less than 10 minutes and completely resolved.  Per both patient and nurse, expressive aphasia and right upper extremity symptoms were his presenting stroke symptoms on 3/10/2025.  MRI brain significant for multiple small scattered acute infarcts in the left cerebral hemisphere and tiny focus of left parietal occipital petechial hemorrhage.  VAS carotid significant for greater than 70% stenosis in ICA.  Per vascular surgery, would like to perform CEA no later than 3/24/2025.  However, vascular surgery does note that if patient has worsening neurological symptoms, they would recommend CEA sooner.  Given that vascular surgery was already aware of this episode, did not personally reach out but encouraged patient to inform us of any new or worsening neurological symptoms.  Will sign out to call team to have low threshold to reach out to vascular surgery if patient experiences any neurological symptoms for further recommendations.    Evaluated patient shortly after vascular surgery's evaluation.  During my assessment NIHSS 0.  Patient has intact fluency, comprehension, and repetition.  Patient also has 5 out of 5  strength bilaterally in both upper and lower extremities and intact sensation bilaterally.  Suspect symptoms were likely secondary to recrudescence event in the setting of straining while using the restroom.

## 2025-03-13 ENCOUNTER — APPOINTMENT (INPATIENT)
Dept: MRI IMAGING | Facility: HOSPITAL | Age: 60
DRG: 064 | End: 2025-03-13
Payer: COMMERCIAL

## 2025-03-13 ENCOUNTER — HOSPITAL ENCOUNTER (INPATIENT)
Facility: HOSPITAL | Age: 60
LOS: 8 days | DRG: 037 | End: 2025-03-21
Attending: INTERNAL MEDICINE | Admitting: INTERNAL MEDICINE
Payer: COMMERCIAL

## 2025-03-13 ENCOUNTER — ANESTHESIA EVENT (INPATIENT)
Dept: PERIOP | Facility: HOSPITAL | Age: 60
DRG: 037 | End: 2025-03-13
Payer: COMMERCIAL

## 2025-03-13 ENCOUNTER — APPOINTMENT (INPATIENT)
Dept: VASCULAR ULTRASOUND | Facility: HOSPITAL | Age: 60
DRG: 064 | End: 2025-03-13
Payer: COMMERCIAL

## 2025-03-13 ENCOUNTER — APPOINTMENT (INPATIENT)
Dept: CT IMAGING | Facility: HOSPITAL | Age: 60
DRG: 064 | End: 2025-03-13
Payer: COMMERCIAL

## 2025-03-13 VITALS
RESPIRATION RATE: 19 BRPM | OXYGEN SATURATION: 96 % | SYSTOLIC BLOOD PRESSURE: 152 MMHG | HEIGHT: 74 IN | HEART RATE: 67 BPM | TEMPERATURE: 98.2 F | DIASTOLIC BLOOD PRESSURE: 90 MMHG | WEIGHT: 209.88 LBS | BODY MASS INDEX: 26.94 KG/M2

## 2025-03-13 DIAGNOSIS — I65.22 CAROTID STENOSIS, ASYMPTOMATIC, LEFT: ICD-10-CM

## 2025-03-13 DIAGNOSIS — R29.90 STROKE-LIKE SYMPTOMS: ICD-10-CM

## 2025-03-13 DIAGNOSIS — Z98.890 S/P CAROTID ENDARTERECTOMY: ICD-10-CM

## 2025-03-13 DIAGNOSIS — I63.232 ACUTE CEREBROVASCULAR ACCIDENT (CVA) DUE TO STENOSIS OF LEFT CAROTID ARTERY (HCC): Primary | ICD-10-CM

## 2025-03-13 DIAGNOSIS — K59.00 CONSTIPATION: ICD-10-CM

## 2025-03-13 DIAGNOSIS — I63.9 CVA (CEREBRAL VASCULAR ACCIDENT) (HCC): ICD-10-CM

## 2025-03-13 LAB
ANION GAP SERPL CALCULATED.3IONS-SCNC: 8 MMOL/L (ref 4–13)
APTT PPP: 35 SECONDS (ref 23–34)
APTT PPP: 47 SECONDS (ref 23–34)
APTT PPP: 83 SECONDS (ref 23–34)
BUN SERPL-MCNC: 26 MG/DL (ref 5–25)
CALCIUM SERPL-MCNC: 8.8 MG/DL (ref 8.4–10.2)
CHLORIDE SERPL-SCNC: 106 MMOL/L (ref 96–108)
CO2 SERPL-SCNC: 24 MMOL/L (ref 21–32)
CREAT SERPL-MCNC: 1.28 MG/DL (ref 0.6–1.3)
GFR SERPL CREATININE-BSD FRML MDRD: 60 ML/MIN/1.73SQ M
GLUCOSE SERPL-MCNC: 111 MG/DL (ref 65–140)
INR PPP: 1.09 (ref 0.85–1.19)
INR PPP: 1.14 (ref 0.85–1.19)
POTASSIUM SERPL-SCNC: 3.6 MMOL/L (ref 3.5–5.3)
PROTHROMBIN TIME: 14.8 SECONDS (ref 12.3–15)
PROTHROMBIN TIME: 14.8 SECONDS (ref 12.3–15)
SODIUM SERPL-SCNC: 138 MMOL/L (ref 135–147)

## 2025-03-13 PROCEDURE — 85730 THROMBOPLASTIN TIME PARTIAL: CPT

## 2025-03-13 PROCEDURE — 99223 1ST HOSP IP/OBS HIGH 75: CPT | Performed by: STUDENT IN AN ORGANIZED HEALTH CARE EDUCATION/TRAINING PROGRAM

## 2025-03-13 PROCEDURE — NC001 PR NO CHARGE: Performed by: INTERNAL MEDICINE

## 2025-03-13 PROCEDURE — 85610 PROTHROMBIN TIME: CPT

## 2025-03-13 PROCEDURE — 70551 MRI BRAIN STEM W/O DYE: CPT

## 2025-03-13 PROCEDURE — 80048 BASIC METABOLIC PNL TOTAL CA: CPT

## 2025-03-13 PROCEDURE — 70498 CT ANGIOGRAPHY NECK: CPT

## 2025-03-13 PROCEDURE — 93970 EXTREMITY STUDY: CPT

## 2025-03-13 PROCEDURE — 93970 EXTREMITY STUDY: CPT | Performed by: SURGERY

## 2025-03-13 PROCEDURE — 99233 SBSQ HOSP IP/OBS HIGH 50: CPT | Performed by: PSYCHIATRY & NEUROLOGY

## 2025-03-13 PROCEDURE — 99233 SBSQ HOSP IP/OBS HIGH 50: CPT | Performed by: SURGERY

## 2025-03-13 PROCEDURE — 99233 SBSQ HOSP IP/OBS HIGH 50: CPT | Performed by: INTERNAL MEDICINE

## 2025-03-13 PROCEDURE — 70496 CT ANGIOGRAPHY HEAD: CPT

## 2025-03-13 RX ORDER — HEPARIN SODIUM 1000 [USP'U]/ML
2000 INJECTION, SOLUTION INTRAVENOUS; SUBCUTANEOUS EVERY 6 HOURS PRN
Status: DISCONTINUED | OUTPATIENT
Start: 2025-03-13 | End: 2025-03-13

## 2025-03-13 RX ORDER — LABETALOL HYDROCHLORIDE 5 MG/ML
10 INJECTION, SOLUTION INTRAVENOUS EVERY 6 HOURS PRN
Status: CANCELLED | OUTPATIENT
Start: 2025-03-13

## 2025-03-13 RX ORDER — HEPARIN SODIUM 1000 [USP'U]/ML
4000 INJECTION, SOLUTION INTRAVENOUS; SUBCUTANEOUS EVERY 6 HOURS PRN
Status: DISCONTINUED | OUTPATIENT
Start: 2025-03-13 | End: 2025-03-13

## 2025-03-13 RX ORDER — HEPARIN SODIUM 1000 [USP'U]/ML
4000 INJECTION, SOLUTION INTRAVENOUS; SUBCUTANEOUS EVERY 6 HOURS PRN
Status: DISCONTINUED | OUTPATIENT
Start: 2025-03-13 | End: 2025-03-13 | Stop reason: HOSPADM

## 2025-03-13 RX ORDER — HEPARIN SODIUM 10000 [USP'U]/100ML
3-20 INJECTION, SOLUTION INTRAVENOUS
Status: DISCONTINUED | OUTPATIENT
Start: 2025-03-13 | End: 2025-03-17

## 2025-03-13 RX ORDER — HEPARIN SODIUM 10000 [USP'U]/100ML
3-20 INJECTION, SOLUTION INTRAVENOUS
Status: DISCONTINUED | OUTPATIENT
Start: 2025-03-13 | End: 2025-03-13

## 2025-03-13 RX ORDER — ONDANSETRON 2 MG/ML
4 INJECTION INTRAMUSCULAR; INTRAVENOUS EVERY 6 HOURS PRN
Status: DISCONTINUED | OUTPATIENT
Start: 2025-03-13 | End: 2025-03-21 | Stop reason: HOSPADM

## 2025-03-13 RX ORDER — HEPARIN SODIUM 1000 [USP'U]/ML
4000 INJECTION, SOLUTION INTRAVENOUS; SUBCUTANEOUS ONCE
Status: DISCONTINUED | OUTPATIENT
Start: 2025-03-13 | End: 2025-03-13

## 2025-03-13 RX ORDER — CLOPIDOGREL BISULFATE 75 MG/1
75 TABLET ORAL DAILY
Status: CANCELLED | OUTPATIENT
Start: 2025-03-14

## 2025-03-13 RX ORDER — ACETAMINOPHEN 325 MG/1
650 TABLET ORAL EVERY 6 HOURS PRN
Status: CANCELLED | OUTPATIENT
Start: 2025-03-13

## 2025-03-13 RX ORDER — CHLORHEXIDINE GLUCONATE ORAL RINSE 1.2 MG/ML
15 SOLUTION DENTAL EVERY 12 HOURS SCHEDULED
Status: CANCELLED | OUTPATIENT
Start: 2025-03-14

## 2025-03-13 RX ORDER — HEPARIN SODIUM 1000 [USP'U]/ML
2000 INJECTION, SOLUTION INTRAVENOUS; SUBCUTANEOUS EVERY 6 HOURS PRN
Status: DISCONTINUED | OUTPATIENT
Start: 2025-03-13 | End: 2025-03-13 | Stop reason: HOSPADM

## 2025-03-13 RX ORDER — HEPARIN SODIUM 1000 [USP'U]/ML
4000 INJECTION, SOLUTION INTRAVENOUS; SUBCUTANEOUS EVERY 6 HOURS PRN
Status: DISCONTINUED | OUTPATIENT
Start: 2025-03-13 | End: 2025-03-17

## 2025-03-13 RX ORDER — HEPARIN SODIUM 1000 [USP'U]/ML
4000 INJECTION, SOLUTION INTRAVENOUS; SUBCUTANEOUS EVERY 6 HOURS PRN
Status: CANCELLED | OUTPATIENT
Start: 2025-03-13

## 2025-03-13 RX ORDER — HEPARIN SODIUM 10000 [USP'U]/100ML
3-20 INJECTION, SOLUTION INTRAVENOUS
Status: CANCELLED | OUTPATIENT
Start: 2025-03-13

## 2025-03-13 RX ORDER — HEPARIN SODIUM 1000 [USP'U]/ML
2000 INJECTION, SOLUTION INTRAVENOUS; SUBCUTANEOUS EVERY 6 HOURS PRN
Status: DISCONTINUED | OUTPATIENT
Start: 2025-03-13 | End: 2025-03-17

## 2025-03-13 RX ORDER — HEPARIN SODIUM 10000 [USP'U]/100ML
3-20 INJECTION, SOLUTION INTRAVENOUS
Status: DISCONTINUED | OUTPATIENT
Start: 2025-03-13 | End: 2025-03-13 | Stop reason: HOSPADM

## 2025-03-13 RX ORDER — HEPARIN SODIUM 1000 [USP'U]/ML
2000 INJECTION, SOLUTION INTRAVENOUS; SUBCUTANEOUS EVERY 6 HOURS PRN
Status: CANCELLED | OUTPATIENT
Start: 2025-03-13

## 2025-03-13 RX ORDER — ATORVASTATIN CALCIUM 40 MG/1
40 TABLET, FILM COATED ORAL
Status: DISCONTINUED | OUTPATIENT
Start: 2025-03-14 | End: 2025-03-21 | Stop reason: HOSPADM

## 2025-03-13 RX ORDER — LABETALOL HYDROCHLORIDE 5 MG/ML
10 INJECTION, SOLUTION INTRAVENOUS EVERY 6 HOURS PRN
Status: DISCONTINUED | OUTPATIENT
Start: 2025-03-13 | End: 2025-03-19

## 2025-03-13 RX ORDER — SODIUM CHLORIDE 9 MG/ML
100 INJECTION, SOLUTION INTRAVENOUS CONTINUOUS
Status: DISCONTINUED | OUTPATIENT
Start: 2025-03-13 | End: 2025-03-13

## 2025-03-13 RX ORDER — CHLORHEXIDINE GLUCONATE ORAL RINSE 1.2 MG/ML
15 SOLUTION DENTAL EVERY 12 HOURS SCHEDULED
Status: DISCONTINUED | OUTPATIENT
Start: 2025-03-14 | End: 2025-03-17

## 2025-03-13 RX ORDER — ONDANSETRON 2 MG/ML
4 INJECTION INTRAMUSCULAR; INTRAVENOUS EVERY 6 HOURS PRN
Status: CANCELLED | OUTPATIENT
Start: 2025-03-13

## 2025-03-13 RX ORDER — CLOPIDOGREL BISULFATE 75 MG/1
75 TABLET ORAL DAILY
Status: DISCONTINUED | OUTPATIENT
Start: 2025-03-14 | End: 2025-03-21 | Stop reason: HOSPADM

## 2025-03-13 RX ORDER — CHLORHEXIDINE GLUCONATE ORAL RINSE 1.2 MG/ML
15 SOLUTION DENTAL EVERY 12 HOURS SCHEDULED
Status: DISCONTINUED | OUTPATIENT
Start: 2025-03-13 | End: 2025-03-13 | Stop reason: HOSPADM

## 2025-03-13 RX ORDER — ATORVASTATIN CALCIUM 40 MG/1
40 TABLET, FILM COATED ORAL
Status: CANCELLED | OUTPATIENT
Start: 2025-03-14

## 2025-03-13 RX ORDER — ASPIRIN 81 MG/1
81 TABLET ORAL DAILY
Status: DISCONTINUED | OUTPATIENT
Start: 2025-03-14 | End: 2025-03-17

## 2025-03-13 RX ORDER — ACETAMINOPHEN 325 MG/1
650 TABLET ORAL EVERY 6 HOURS PRN
Status: DISCONTINUED | OUTPATIENT
Start: 2025-03-13 | End: 2025-03-17

## 2025-03-13 RX ORDER — SODIUM CHLORIDE, SODIUM GLUCONATE, SODIUM ACETATE, POTASSIUM CHLORIDE, MAGNESIUM CHLORIDE, SODIUM PHOSPHATE, DIBASIC, AND POTASSIUM PHOSPHATE .53; .5; .37; .037; .03; .012; .00082 G/100ML; G/100ML; G/100ML; G/100ML; G/100ML; G/100ML; G/100ML
500 INJECTION, SOLUTION INTRAVENOUS ONCE
Status: DISCONTINUED | OUTPATIENT
Start: 2025-03-13 | End: 2025-03-13

## 2025-03-13 RX ORDER — CHLORHEXIDINE GLUCONATE ORAL RINSE 1.2 MG/ML
15 SOLUTION DENTAL EVERY 12 HOURS SCHEDULED
Status: CANCELLED | OUTPATIENT
Start: 2025-03-13

## 2025-03-13 RX ADMIN — ENOXAPARIN SODIUM 40 MG: 40 INJECTION SUBCUTANEOUS at 08:10

## 2025-03-13 RX ADMIN — ASPIRIN 81 MG: 81 TABLET, CHEWABLE ORAL at 08:09

## 2025-03-13 RX ADMIN — CLOPIDOGREL BISULFATE 75 MG: 75 TABLET ORAL at 08:10

## 2025-03-13 RX ADMIN — ATORVASTATIN CALCIUM 40 MG: 40 TABLET, FILM COATED ORAL at 17:05

## 2025-03-13 RX ADMIN — SODIUM CHLORIDE 1000 ML: 0.9 INJECTION, SOLUTION INTRAVENOUS at 10:06

## 2025-03-13 RX ADMIN — HEPARIN SODIUM 13.1 UNITS/KG/HR: 10000 INJECTION, SOLUTION INTRAVENOUS at 22:35

## 2025-03-13 RX ADMIN — HEPARIN SODIUM 11.1 UNITS/KG/HR: 10000 INJECTION, SOLUTION INTRAVENOUS at 12:13

## 2025-03-13 RX ADMIN — IOHEXOL 85 ML: 350 INJECTION, SOLUTION INTRAVENOUS at 08:43

## 2025-03-13 RX ADMIN — HEPARIN SODIUM 2000 UNITS: 1000 INJECTION INTRAVENOUS; SUBCUTANEOUS at 19:35

## 2025-03-13 RX ADMIN — SODIUM CHLORIDE 100 ML/HR: 0.9 INJECTION, SOLUTION INTRAVENOUS at 11:21

## 2025-03-13 RX ADMIN — CARVEDILOL 25 MG: 12.5 TABLET, FILM COATED ORAL at 08:18

## 2025-03-13 RX ADMIN — AMLODIPINE BESYLATE 5 MG: 5 TABLET ORAL at 08:09

## 2025-03-13 NOTE — ASSESSMENT & PLAN NOTE
Monitoring  - Neurochecks Q4H  - Telemetry to assess for atrial fibrillation or other dysrhythmias  - STAT CT Head for any changes in mental status or neuro exam  Diagnostic Studies:  - Labs HgbA1c, Lipid Panel, TSH, Troponin  - EKG  - MRI ad w/wo contrast   Multiple small scattered acute infarcts in the left cerebral hemisphere. Several of the smaller infarcts are hyperintense on FLAIR.   Tiny focus of left parietal occipital petechial hemorrhage.   Tiny focus of mild DWI signal in the right centrum semiovale may also present recent infarct.   No acute space-occupying hematoma.   - CTA head and Neck    No acute large vessel distribution infarct, hemorrhage or mass effect   New age-indeterminate lacunar infarcts in the left centrum semiovale.   - TTE   EF 60%, normal wall motion, grade I relaxation  Blood Pressure:  - Permissive HTN for 24-48 hours  - Treat BP if >220/110 mg Hg  - After permissive HTN, BP goal <140/90 mm Hg  Medication  Aspirin and plavix loaded   Continue dapt   Education  - Stroke education  - Smoking cessation education    - Weight loss education - low fat, low carbohydrate, low sodium (I.e Mediterranean or DASH) diet and daily exercise   - outpatient referral to sleep for possible SHANNEN   Rehab  - Nursing bedside dysphagia screen  - PT/OT/ST when appropriate      Assessment: Multi territory ischemia, suspect secondary to atheroembolic from left stenosed carotid artery.  Discussed with vascular surgery planning for CEA, plan is for no later than 3/24, will be scheduled more urgently if pt has further neurologic events.     3/13: Overnight events noted for nonsustained right sided numbness, expressive aphasia.  On examination on 3/13, pronator drift is more profound, unequal shrugging of shoulders, strength discrepancy with right side weaker than left, more profound expressive aphasia. NIHSS 2 (for aphasia, pronator drift).  This can be recrudescence.  Noncontrast CT head, CTA head and neck  stat.  Reached out to vascular surgery for possibility of early procedure.

## 2025-03-13 NOTE — PROGRESS NOTES
Progress Note - Vascular Surgery   Name: Ez Dsouza 59 y.o. male I MRN: [id number]  Unit/Bed#: S -01 I Date of Admission: 3/10/2025   Date of Service: 3/13/2025 I Hospital Day: 3    Assessment & Plan  Acute cerebrovascular accident (CVA) due to stenosis of left carotid artery (HCC)  Presenting with Right sided weakness and word finding difficulty. Presented after a fall 3/10    3/10 CTA neck/brain: Worsening severe stenosis in the proximal left internal carotid artery. No significant stenosis of the cervical right carotid or vertebral arteries No significant intracranial stenosis, large vessel occlusion or aneurysm.  3/10 MRI brain: Multiple small scattered acute infarcts in the left cerebral hemisphere. Several of the smaller infarcts are hyperintense on FLAIR.  Tiny focus of left parietal occipital petechial hemorrhage.  Tiny focus of mild DWI signal in the right centrum semiovale may also present recent infarct. No acute space-occupying hematoma.    3/10 VAS carotid duplex: RIGHT: <50% stenosis in the ICA. LEFT: >70% stenosis in the ICA.    3/13 worsening RUE weakness and expressive aphasia this morning, repeat CT ordered by [facility]    -Discussing timing of intervention with attending for L ICA stenosis. If recurrent neurological events carotid intervention will need to be more urgent  -Will follow up new imaging ordered this morning  -Neurology following; recs appreciated. Okay for intervention after hospital day 3  -Cardiology consult; no contraindications to proceed with vascular intervention. Would not pursue any further work up at this time preoperatively. Recs appreciated   -Continue ASA/statin/plavix  - continue neurovasc checks  -Will discuss with surgeon on call      Please contact the SecureChat role for the Vascular Surgery service with any questions/concerns.    Subjective   Per patient feeling worse today, feels is took a jump backwards yesterday afternoon. Patient's wife reports he was feeling  great yesterday and took a walk around the halls. He reports yesterday at 4 pm he started to get worse as far as his right arm weakness and his speech. He is frustrated that he is not getting better.     Per [facility] resident, patient had a episodes of RUE numbness and tingling with expressive aphasia yesterday afternoon when he was in the bathroom urinating.     Objective :  Temp:  [97.6 °F (36.4 °C)-98.3 °F (36.8 °C)] 97.6 °F (36.4 °C)  HR:  [55-74] 74  BP: (114-164)/(79-97) 114/79  Resp:  [16-20] 20  SpO2:  [94 %-97 %] 94 %  O2 Device: None (Room air)    I/O         03/11 0701 03/12 0700 03/12 0701 03/13 0700 03/13 0701 03/14 0700    P.O. 200 900     Total Intake(mL/kg) 200 (2.1) 900 (9.5)     Urine (mL/kg/hr)  1 (0)     Stool  0     Total Output  1     Net +200 +899            Unmeasured Urine Occurrence 1 x 350 x     Unmeasured Stool Occurrence  0 x             Physical Exam  Constitutional:       General: He is not in acute distress.     Appearance: He is normal weight. He is not ill-appearing or toxic-appearing.   Cardiovascular:      Rate and Rhythm: Normal rate.   Pulmonary:      Effort: Pulmonary effort is normal. No respiratory distress.   Musculoskeletal:      Right lower leg: No edema.      Left lower leg: No edema.   Skin:     General: Skin is warm and dry.   Neurological:      Mental Status: He is alert.      Cranial Nerves: No facial asymmetry.      Comments: Following commands   Expressive aphasia  RUE strength 3/5 RLE strength 5/5  LUE/LLE strength 5/5            Lab Results: I have reviewed the following results:  Recent Labs     03/10/25  0915 03/11/25  0609 03/12/25  0515 03/13/25  0457   WBC  --    < > 7.94  --    HGB  --    < > 13.5  --    HCT  --    < > 39.9  --    PLT  --    < > 261  --    SODIUM  --    < > 137 138   K  --    < > 3.6 3.6   CL  --    < > 105 106   CO2  --    < > 24 24   BUN  --    < > 26* 26*   CREATININE  --    < > 1.41* 1.28   GLUC  --    < > 117 111   HSTNI2 6  --   --   --      < > = values in this interval not displayed.       Imaging Results Review: No pertinent imaging studies reviewed.  Other Study Results Review: No additional pertinent studies reviewed.    VTE Pharmacologic Prophylaxis: VTE covered by:  enoxaparin, Subcutaneous, 40 mg at 03/13/25 0810     VTE Mechanical Prophylaxis: sequential compression device

## 2025-03-13 NOTE — CONSULTS
Consultation - Critical Care/ICU   Name: Ez Dsouza 59 y.o. male I MRN: [id number]  Unit/Bed#: ICU 16 I Date of Admission: 3/10/2025   Date of Service: 3/13/2025 I Hospital Day: 3   Inpatient consult to Medical Critical Care  Consult performed by: Antonio Lam MD  Consult ordered by: Antonio Lam MD        Physician Requesting Evaluation: Sami Bonilla,*   Reason for Evaluation / Principal Problem: CVA        Assessment & Plan  Acute cerebrovascular accident (CVA) due to stenosis of left carotid artery (HCC)  DX:  Multifocal strokes primarily in L ICA territory suggesting symptomatic L ICA (Carotid US significant for >70% stenosis of L ICA), but also in R hemisphere and possibly   ADMISSION: 60 yo M admitted to [facility] [id number] for stroke like sxs  DEFICITS  BASELINE: None  27ELW96: 3/10 RUE Weakness, encephalopathy (confusion vs expressive aphagia)  80LYO26: Symptoms resolved   30KXP60: Worsening RUE weakness 4 Expressive aphagia: Position dependent: Not a new stroke this is a perfusion issue.   20NBQ05: ICU 15:20: No expressive aphagia, possible some word finding but wife notes improvement. RUE 3/5 RLE 4/5    Lab Results   Component Value Date    CHOLESTEROL 188 03/10/2025    TRIG 171 (H) 03/10/2025    HDL 29 (L) 03/10/2025    LDLCALC 125 (H) 03/10/2025     Lab Results   Component Value Date    HGBA1C 6.9 (H) 03/10/2025     Lab Results   Component Value Date    [id number] 0.543 03/10/2025       TELE: w/o Afib/flutter.    IMAGINMAR25  CTA head and neck w wo contrast: No acute intracranial hemorrhage, edema, or mass effect. Scattered left hemispheric infarctions as above, seen better on recent MRI. 2.  No new large vessel occlusion in the head or neck. 3.  Unchanged high-grade stenosis proximal left ICA. Stable pedunculated plaque and/or thrombus in the proximal left ICA lumen which may be the source of distal embolization.     MRI brain wo contrast  Multiple small  scattered acute infarcts in the left cerebral hemisphere. Several of the smaller infarcts are hyperintense on FLAIR. Tiny focus of left parietal occipital petechial hemorrhage. Tiny focus of mild DWI signal in the right centrum semiovale may also present recent infarct. No acute space-occupying hematoma. I personally discussed this study with Leo Burgos on 3/10/2025 8:28 AM.   CT stroke alert brain No acute large vessel distribution infarct, hemorrhage or mass effect New age-indeterminate lacunar infarcts in the left centrum semiovale. Findings were directly discussed with Leo Burgos at 7:49 a.m. Workstation performed: [id number]   CTA stroke alert (head/neck) Worsening severe stenosis in the proximal left internal carotid artery. No significant stenosis of the cervical right carotid or vertebral arteries No significant intracranial stenosis, large vessel occlusion or aneurysm. Findings were directly discussed with Leo Burgos at 7:49 a.m.   VAS Carotid   RIGHT:: <50% stenosis noted in the internal carotid artery.   LEFT: >70% stenosis noted in the internal carotid artery.  Echo complete w/ contrast if indicated  LV: EF 60% Diastolic func mildly abnormal Grade 1 relaxation  MV: trace MR  No change from 39ZTQ09      TNK: Not TNK/thrombectomy canadate.      PLAN:   NEURO ON BOARD  BP GOAL: Min , MAP>100  Levophed gtt   Trelenberg as pt becomes symptomatic when raised  Can slowly increase HOB angle so long as neuro exam stable.  Atorvastatin 40mg QHS  Glucose <180  Neuro checks- Every 1 hour x 4 hours, then every 2 hours x 4 hours, then every 4 hours x 72 hours     Stat CT Head for change in neuro status.  Monitor on telemetry  S/P ASA Clopidogrel load    HOLD: Amlodipine, carvedilol, hydrochlorothiazide, spironolactone in setting of permissive HTN.   Vascular on board:   Start heparin gtt: re boluses okay ACS low   Continue Clopidogrel and heprin gtt  Transfer to [facility] for surgery 17MAR25: Transfer  orders in place    Pre-operative cardiovascular examination  Current plan to transfer to [facility] Transfer order in     Primary hypertension  PLAN  Amlodipine, carvedilol, hydrochlorothiazide, spironolactone on hold for permissive HTN.   Hyperlipidemia  Atorvastatin 40mg PO daily   Elevated coronary artery calcium score    MARIA TERESA (acute kidney injury) (HCC)    Disposition: Critical care    History of Present Illness   Ez Dsouza is a 59 y.o. who presented to [facility] ED [id number] d/t right sided weakness, numbness, dysarthria.  Stroke alert in ED. MRI showed mult small scattered infarct in left cerebral hemisphere w/ tiny focus of left parietal occipital petechial hemorrhages. Started on stroke pathway and admitted to [facility]. Patient was improving with resolution of symptoms by 35UMR66 however on 62ZLS45 patient developed worsening of right sided weakness, numbness and expressive aphagia.  Symptoms were position dependant suggesting they were more likley perfusion dependant defects of the previous stroke and not a new stroke.  Due to need to keep MAP >100 patient was transferred to ICU for pressor support.     History obtained from spouse and chart review.  Review of Systems: Review of Systems   Neurological:  Positive for speech difficulty, weakness and numbness.   Psychiatric/Behavioral:  The patient is nervous/anxious.        Historical Information   Past Medical History:  No date: Hypertension Past Surgical History:  2008: KNEE SURGERY  2024: MOLE REMOVAL      Comment:  Back   Current Outpatient Medications   Medication Instructions    amLODIPine (NORVASC) 5 mg, Daily    ASPIRIN 81 PO 81 mg, Daily    atorvastatin (LIPITOR) 40 mg, Oral, Daily    carvedilol (COREG) 25 mg, Oral, 2 times daily with meals    hydroCHLOROthiazide 25 mg, Oral, Daily    spironolactone (ALDACTONE) 25 mg, Oral, Daily    No Known Allergies   Social History     Tobacco Use    Smoking status: Every Day     Current packs/day: 0.50     Average packs/day:  0.5 packs/day for 15.0 years (7.5 ttl pk-yrs)     Types: Cigars, Cigarettes    Smokeless tobacco: Never   Vaping Use    Vaping status: Never Used   Substance Use Topics    Alcohol use: Yes     Alcohol/week: 2.0 standard drinks of alcohol     Types: 2 Standard drinks or equivalent per week     Comment: social    Drug use: Not Currently     Types: Marijuana    Family History   Problem Relation Age of Onset    Hypertension Mother     Hypertension Father     Heart disease Father           Objective :                   Vitals I/O      Most Recent Min/Max in 24hrs   Temp 98 °F (36.7 °C) Temp  Min: 97.6 °F (36.4 °C)  Max: 98.3 °F (36.8 °C)   Pulse 62 Pulse  Min: 51  Max: 75   Resp 18 Resp  Min: 14  Max: 20   BP (!) 184/102 BP  Min: 114/79  Max: 184/102   O2 Sat 95 % SpO2  Min: 94 %  Max: 97 %      Intake/Output Summary (Last 24 hours) at 3/13/2025 1434  Last data filed at 3/13/2025 1213  Gross per 24 hour   Intake 1480 ml   Output 976 ml   Net 504 ml       Diet NPO; Sips with meds    Invasive Monitoring           Physical Exam   Physical Exam  Vitals and nursing note reviewed.   Eyes:      General: Vision grossly intact. No visual field deficit.     Extraocular Movements: Extraocular movements intact.      Conjunctiva/sclera: Conjunctivae normal.      Pupils: Pupils are equal, round, and reactive to light.   HENT:      Head: Normocephalic.      Mouth/Throat:      Mouth: Mucous membranes are moist.   Neck:   no midline tenderness Cardiovascular:      Rate and Rhythm: Normal rate and regular rhythm.      Pulses: Normal pulses.   Abdominal:      Palpations: Abdomen is soft.      Tenderness: There is no guarding.   Constitutional:       General: He is not in acute distress.     Appearance: He is well-developed. He is not ill-appearing or toxic-appearing.      Interventions: He is not sedated, not intubated and not restrained.  Pulmonary:      Effort: Pulmonary effort is normal. No accessory muscle usage, respiratory distress  or accessory muscle usage. He is not intubated.   Psychiatric:         Speech: Speech is not no receptive aphasia.   Neurological:      Mental Status: He is alert, oriented to person, place, and time and oriented to person, place and time.      Cranial Nerves: No cranial nerve deficit or facial asymmetry.      Sensory: Sensory deficit present.      Motor: Weakness and motor deficit.      Comments: Expressive aphagia: Some word finding difficulty but was not saying inappropriate words besides reading clock as quarter of 3 when time was 3:15.  Receptive aphagia did have some difficulty with instructions especially right vs left.     Some sensation deficit on right forearm lateral round dermatome C6    Cannot lift right arm off bed  Hand  intact    Right leg 4/5  Left leg 5/5    Shin heel normal bilaterally             Diagnostic Studies        Lab Results: I have reviewed the following results:     Medications:  Scheduled PRN   [Held by provider] amLODIPine, 5 mg, Daily  atorvastatin, 40 mg, Daily With Dinner  [Held by provider] carvedilol, 25 mg, BID With Meals  chlorhexidine, 15 mL, Q12H TIEN  clopidogrel, 75 mg, Daily  [Held by provider] hydroCHLOROthiazide, 25 mg, Daily  [Held by provider] spironolactone, 25 mg, Daily      acetaminophen, 650 mg, Q6H PRN  heparin (porcine), 2,000 Units, Q6H PRN  heparin (porcine), 4,000 Units, Q6H PRN  labetalol, 10 mg, Q6H PRN  ondansetron, 4 mg, Q6H PRN       Continuous    heparin (porcine), 3-20 Units/kg/hr (Order-Specific), Last Rate: 11.1 Units/kg/hr (03/13/25 1213)  norepinephrine, 1-30 mcg/min, Last Rate: Stopped (03/13/25 1432)  sodium chloride, 100 mL/hr, Last Rate: 100 mL/hr (03/13/25 1121)         Labs:   CBC    Recent Labs     03/12/25  0515   WBC 7.94   HGB 13.5   HCT 39.9        BMP    Recent Labs     03/12/25  0515 03/13/25  0457   SODIUM 137 138   K 3.6 3.6    106   CO2 24 24   AGAP 8 8   BUN 26* 26*   CREATININE 1.41* 1.28   CALCIUM 9.1 8.8        Coags    Recent Labs     03/13/25  1209   INR 1.09   PTT 35*        Additional Electrolytes  No recent results       Blood Gas    No recent results  No recent results LFTs  No recent results    Infectious  No recent results  Glucose  Recent Labs     03/12/25  0515 03/13/25  0457   GLUC 117 111

## 2025-03-13 NOTE — ASSESSMENT & PLAN NOTE
"Ez Dsouza is a 60YO R-handed male with history of HTN and active smoker who presented to the hospital on 3/10 with RUE weakness and confusion. LKN 10pm on 3/9. Woke up this morning at 7am and tried to get up, but pt's wife heard a thud and was awoken from her sleep to find him on the ground, conscious, but with L-sided weakness, reportedly moreso arm > leg. At baseline, he has no neurological deficits, MRS 0. Initial /115. Glucose 117.   NIHSS 2 for month (\"December\") and moderate aphasia (receptive > expressive, no issue with repetition).   Neuro examination significant for RUE weakness 4+/5 compared to L.  CTH showed new age-indeterminate lacunar infarcts in L centrum semiovale.   CTA H/N showed worsening severe stenosis in the proximal L ICA.   CT CAP showed no acute pathology within chest, abdomen, pelvis; though there does appear to be 9mm nonobstructing calculus in L renal pelvis.   Wake-up stroke MRI showed multiple small scattered acute infarcts in L cerebral hemisphere. Several of the smaller infarcts are hyperintense on FLAIR. Tiny focus of L parietal occipital petechial hemorrhage. Tiny focus of mild DWI signal in R central semiovale may also represent recent infarct. No acute space-occupying hematoma.   Given evidence of acute strokes on DWI with FLAIR changes, pt was deemed not a candidate for TNK.   No thromboectomy due to no LVO.   Does take ASA 81mg daily at home. Pt was loaded with ASA 325mg x1 and Plavix 300mg x1 and admitted on stroke pathway.     Carotid US showed >70% stenosis in L ICA, <50% stenosis in R ICA. Vascular surgery team was consulted.   TTE: EF 60%, G1DD, LA normal size, no major valvular dysfunction, IVC normal size    Lab Results   Component Value Date    CHOLESTEROL 188 03/10/2025    TRIG 171 (H) 03/10/2025    HDL 29 (L) 03/10/2025    LDLCALC 125 (H) 03/10/2025     Lab Results   Component Value Date    HGBA1C 6.9 (H) 03/10/2025       Lab Results   Component Value Date    " [id number] 0.543 03/10/2025     Repeat CTA H/N 3/13: Stable.     Assessment: Multifocal strokes primarily in L ICA territory suggesting symptomatic L ICA (Carotid US significant for >70% stenosis of L ICA), but also in R hemisphere and possibly in L PCA territory suggestive of embolic strokes. No e/o malignancy on CT CAP. Telemetery w/o e/o afib/aflutter.  3/13AM concern for worsening R-sided weakness and aphasia, see 3/13 Quick Note.  When muscles isolated in [place] position, pt required great effort to lift R deltoids antigravity, however, once when he got R deltoids up, he was actually able to full strength in RUE and RLE but does take a lot of effort moreso than with LUE/LLE, witnessed by wife at bedside. Pt did need a lot of encouragement. Realistically. RUE/RLE fluctuating between 4 to 5-/5 suspected due to swelling, perfusion dependence, and anxiety.    Plan:   Discussed with neurology attending, Dr. Burgos  A1c goal <6.5%, mgt per primary team appreciated  Minimum  (can lower floor SBP if neuro exam remains stable), MAP>100  Currently receiving normal saline 1L bolus, followed by 100cc/hour  Can slowly increase HOB angle so long as neuro exam stable.  Vascular surgery team following - plan to start heparin gtt (d/w'ed with Dr. Burgos - no initial bolus, reboluses ok, consider low ACS protocol). AP/AC per vascular surgery team - currently on ASA/Plavix.  Anticipate likely plan for surgery on Monday . Cleared to undergo carotid revascularization on post stroke day 3, which is 3/13/25.  Update: Discussed with attending following repeated examination, OK to hold off on repeat MRI brain so long as neuro exam stable. If continues to progress, would be reasonable to repeat MRI brain w/o contrast.   Atorvastatin 40mg QHS  Glucose <180   If there are concerns for post-stroke depression, may consider Lexapro 10mg daily.   Neuro checks- Every 1 hour x 4 hours, then every 2 hours x 4 hours, then every  4 hours x 72 hours     Stat CT Head for change in neuro status.  Monitor on telemetry  DVT ppx per primary team, SCDs  PT/OT/ST/PM&R evaluations   Will need outpatient prolonged cardiac monitoring (Holter monitor/Zio patch), given evidence of stroke also in R hemisphere  Medical management as per primary team appreciated.   Ez Dsouza will need follow-up in in 6 weeks with neurovascular team for Other in 60 minute appointment. They will not require outpatient neurological testing. Msg sent to scheduling team.  Neuro team will follow.

## 2025-03-13 NOTE — ASSESSMENT & PLAN NOTE
Creatinine   Date Value Ref Range Status   03/13/2025 1.28 0.60 - 1.30 mg/dL Final     Comment:     Standardized to IDMS reference method   03/12/2025 1.41 (H) 0.60 - 1.30 mg/dL Final     Comment:     Standardized to IDMS reference method   03/11/2025 1.29 0.60 - 1.30 mg/dL Final     Comment:     Standardized to IDMS reference method     Problem has resolved.

## 2025-03-13 NOTE — ASSESSMENT & PLAN NOTE
DX:  Multifocal strokes primarily in L ICA territory suggesting symptomatic L ICA (Carotid US significant for >70% stenosis of L ICA), but also in R hemisphere and possibly   ADMISSION: 60 yo M admitted to [facility] [id number] for stroke like sxs  DEFICITS  BASELINE: None  07SPA13: 3/10 RUE Weakness, encephalopathy (confusion vs expressive aphagia)  84AMT10: Symptoms resolved   68EGU64: Worsening RUE weakness 4 Expressive aphagia: Position dependent: Not a new stroke this is a perfusion issue.   21LKH22: ICU 15:20: No expressive aphagia, possible some word finding but wife notes improvement. RUE 3/5 RLE 4/5    Lab Results   Component Value Date    CHOLESTEROL 188 03/10/2025    TRIG 171 (H) 03/10/2025    HDL 29 (L) 03/10/2025    LDLCALC 125 (H) 03/10/2025     Lab Results   Component Value Date    HGBA1C 6.9 (H) 03/10/2025     Lab Results   Component Value Date    [id number] 0.543 03/10/2025       TELE: w/o Afib/flutter.    IMAGINMAR25  CTA head and neck w wo contrast: No acute intracranial hemorrhage, edema, or mass effect. Scattered left hemispheric infarctions as above, seen better on recent MRI. 2.  No new large vessel occlusion in the head or neck. 3.  Unchanged high-grade stenosis proximal left ICA. Stable pedunculated plaque and/or thrombus in the proximal left ICA lumen which may be the source of distal embolization.     MRI brain wo contrast  Multiple small scattered acute infarcts in the left cerebral hemisphere. Several of the smaller infarcts are hyperintense on FLAIR. Tiny focus of left parietal occipital petechial hemorrhage. Tiny focus of mild DWI signal in the right centrum semiovale may also present recent infarct. No acute space-occupying hematoma. I personally discussed this study with Leo Burgos on 3/10/2025 8:28 AM.   CT stroke alert brain No acute large vessel distribution infarct, hemorrhage or mass effect New age-indeterminate lacunar infarcts in the left centrum semiovale.  Findings were directly discussed with Leo Burgos at 7:49 a.m. Workstation performed: [id number]   CTA stroke alert (head/neck) Worsening severe stenosis in the proximal left internal carotid artery. No significant stenosis of the cervical right carotid or vertebral arteries No significant intracranial stenosis, large vessel occlusion or aneurysm. Findings were directly discussed with Leo Burgos at 7:49 a.m.   VAS Carotid   RIGHT:: <50% stenosis noted in the internal carotid artery.   LEFT: >70% stenosis noted in the internal carotid artery.  Echo complete w/ contrast if indicated  LV: EF 60% Diastolic func mildly abnormal Grade 1 relaxation  MV: trace MR  No change from 12WSK83      TNK: Not TNK/thrombectomy canadate.      PLAN:   NEURO ON BOARD  BP GOAL: Min , MAP>100  Levophed gtt   Trel[place] as pt becomes symptomatic when raised  Can slowly increase HOB angle so long as neuro exam stable.  Atorvastatin 40mg QHS  Glucose <180  Neuro checks- Every 1 hour x 4 hours, then every 2 hours x 4 hours, then every 4 hours x 72 hours     Stat CT Head for change in neuro status.  Monitor on telemetry  S/P ASA Clopidogrel load    HOLD: Amlodipine, carvedilol, hydrochlorothiazide, spironolactone in setting of permissive HTN.   Vascular on board:   Start heparin gtt: re boluses okay ACS low   Continue Clopidogrel and heprin gtt  Transfer to [facility] for surgery [id number]: Transfer orders in place

## 2025-03-13 NOTE — PROGRESS NOTES
"Progress Note - Neurology   Name: Ez Dsouza 59 y.o. male I MRN: [id number]  Unit/Bed#: S -01 I Date of Admission: 3/10/2025   Date of Service: 3/13/2025 I Hospital Day: 3     Assessment & Plan  Acute cerebrovascular accident (CVA) due to stenosis of left carotid artery (HCC)  Ez Dsouza is a 60YO R-handed male with history of HTN and active smoker who presented to the hospital on 3/10 with RUE weakness and confusion. LKN 10pm on 3/9. Woke up this morning at 7am and tried to get up, but pt's wife heard a thud and was awoken from her sleep to find him on the ground, conscious, but with L-sided weakness, reportedly moreso arm > leg. At baseline, he has no neurological deficits, MRS 0. Initial /115. Glucose 117.   NIHSS 2 for month (\"December\") and moderate aphasia (receptive > expressive, no issue with repetition).   Neuro examination significant for RUE weakness 4+/5 compared to L.  CTH showed new age-indeterminate lacunar infarcts in L centrum semiovale.   CTA H/N showed worsening severe stenosis in the proximal L ICA.   CT CAP showed no acute pathology within chest, abdomen, pelvis; though there does appear to be 9mm nonobstructing calculus in L renal pelvis.   Wake-up stroke MRI showed multiple small scattered acute infarcts in L cerebral hemisphere. Several of the smaller infarcts are hyperintense on FLAIR. Tiny focus of L parietal occipital petechial hemorrhage. Tiny focus of mild DWI signal in R central semiovale may also represent recent infarct. No acute space-occupying hematoma.   Given evidence of acute strokes on DWI with FLAIR changes, pt was deemed not a candidate for TNK.   No thromboectomy due to no LVO.   Does take ASA 81mg daily at home. Pt was loaded with ASA 325mg x1 and Plavix 300mg x1 and admitted on stroke pathway.     Carotid US showed >70% stenosis in L ICA, <50% stenosis in R ICA. Vascular surgery team was consulted.   TTE: EF 60%, G1DD, LA normal size, no major valvular " dysfunction, IVC normal size    Lab Results   Component Value Date    CHOLESTEROL 188 03/10/2025    TRIG 171 (H) 03/10/2025    HDL 29 (L) 03/10/2025    LDLCALC 125 (H) 03/10/2025     Lab Results   Component Value Date    HGBA1C 6.9 (H) 03/10/2025       Lab Results   Component Value Date    XVB0CSYCMATO 0.543 03/10/2025     Repeat CTA H/N 3/13: Stable.     Assessment: Multifocal strokes primarily in L ICA territory suggesting symptomatic L ICA (Carotid US significant for >70% stenosis of L ICA), but also in R hemisphere and possibly in L PCA territory suggestive of embolic strokes. No e/o malignancy on CT CAP. Telemetery w/o e/o afib/aflutter.  3/13AM concern for worsening R-sided weakness and aphasia, see 3/13 Quick Note.  When muscles isolated in [place] position, pt required great effort to lift R deltoids antigravity, however, once when he got R deltoids up, he was actually able to full strength in RUE and RLE but does take a lot of effort moreso than with LUE/LLE, witnessed by wife at bedside. Pt did need a lot of encouragement. Realistically. RUE/RLE fluctuating between 4 to 5-/5 suspected due to swelling, perfusion dependence, and anxiety.    Plan:   Discussed with neurology attending, Dr. Burgos  A1c goal <6.5%, mgt per primary team appreciated  Minimum  (can lower floor SBP if neuro exam remains stable), MAP>100  Currently receiving normal saline 1L bolus, followed by 100cc/hour  Can slowly increase HOB angle so long as neuro exam stable.  Vascular surgery team following - plan to start heparin gtt (d/w'ed with Dr. Burgos - no initial bolus, reboluses ok, consider low ACS protocol). AP/AC per vascular surgery team - currently on ASA/Plavix.  Anticipate likely plan for surgery on Monday . Cleared to undergo carotid revascularization on post stroke day 3, which is 3/13/25.  Update: Discussed with attending following repeated examination, OK to hold off on repeat MRI brain so long as neuro exam  stable. If continues to progress, would be reasonable to repeat MRI brain w/o contrast.   Atorvastatin 40mg QHS  Glucose <180   If there are concerns for post-stroke depression, may consider Lexapro 10mg daily.   Neuro checks- Every 1 hour x 4 hours, then every 2 hours x 4 hours, then every 4 hours x 72 hours     Stat CT Head for change in neuro status.  Monitor on telemetry  DVT ppx per primary team, SCDs  PT/OT/ST/PM&R evaluations   Will need outpatient prolonged cardiac monitoring (Holter monitor/Zio patch), given evidence of stroke also in R hemisphere  Medical management as per primary team appreciated.   Ez Dsouza will need follow-up in in 6 weeks with neurovascular team for Other in 60 minute appointment. They will not require outpatient neurological testing. Msg sent to scheduling team.  Neuro team will follow.    MARIA TERESA (acute kidney injury) (HCC)    Primary hypertension    Hyperlipidemia    Elevated coronary artery calcium score    Pre-operative cardiovascular examination      Subjective   Pt seen and examined at bedside.  See quick note on 3/12.    Review of Systems   Constitutional:  Negative for chills, diaphoresis, fatigue and fever.   Eyes:  Negative for photophobia and visual disturbance.   Respiratory:  Negative for shortness of breath.    Cardiovascular:  Negative for chest pain.   Gastrointestinal:  Negative for abdominal pain.   Neurological:  Positive for speech difficulty and weakness. Negative for dizziness, tremors, seizures, syncope, facial asymmetry, light-headedness, numbness and headaches.   All other systems reviewed and are negative.      Objective :  Temp:  [97.6 °F (36.4 °C)-98.3 °F (36.8 °C)] 97.6 °F (36.4 °C)  HR:  [55-75] 75  BP: (114-164)/(79-97) 137/90  Resp:  [14-20] 14  SpO2:  [94 %-97 %] 96 %  O2 Device: None (Room air)    Physical Exam   Vitals reviewed.   Constitutional:    Emotionally distressed given worsening neurological symptoms. Nondiaphoretic, not  ill-appearing.  HENT:   Normocephalic and atraumatic.  External ear normal b/l. Nose normal. No congestion or rhinorrhea. Mucous membranes are moist.  Oropharynx is clear. No oropharyngeal exudate or posterior oropharyngeal erythema.   Eyes:    No scleral icterus.  No discharge b/l.  Conjunctivae normal.   Cardiovascular: no clear edema  Pulmonary:  No respiratory distress.   Musculoskeletal: no gross deformities  Skin:    Skin is not pale.   Psychiatric:      Anxious, tearful    Neurologic Exam   MENTAL STATUS: AAOx3. Follows simple commands, difficulty with crossing midline.     LANGUAGE: Speech clear, spontaneous, and fluent. Receptive > expressive aphasia, no issue with repetition. Able to name objects (low & high freq), able to express self - has to calm self down. No dysarthria - normal volume and intonation.    CRANIAL NERVES:  CN II: Visual acuity grossly intact. No visual field deficit. PERRLA.   CN III, IV, VI: EOM's intact w/o nystagmus, gaze palsy, or preference.   CN V: Normal masseter bulk. V1-V3 sensation intact and symmetric bilaterally.   CN VII: R lower facial weakness at rest > activation.   CN VIII: Hearing grossly intact bilaterally.   CN IX-X: No dysarthria. Palate elevates symmetrically. Uvula midline.   CN XI: Shoulder shrug symmetric.   CN XII: Tongue midline. No deviation, atrophy, or fasciculations.    MOTOR: Normal muscle bulk. Normal tone. No pronator drift or orbiting. No tremors or abnormal involuntary movements appreciated. Formal strength testing as follows:  Upper extremity:   Shoulder abduction Elbow flexion Elbow extension  strength   Right 4 to 5- (better when in [place])/5 4 to 5- (better when in [place])/5 4 to 5- (better when in [place])/5 4/5   Left 5/5 5/5 5/5 5/5     Lower extremity:   Hip flexion Knee flexion Knee extension Ankle dorsiflexion Ankle plantarflexion   Right 4-5 (better in [place])/5 4-5 (better in [place])/5 4-5 (better  in [place])/5 4-5 (better in [place])/5 4-5 (better in [place])/5   Left 5/5 5/5 5/5 5/5 5/5   Unclear if RLE weakness was effort-dependent; did appear to give me full strength while in [place] position, but appeared weaker during Dr. Burgos's evaluation later that morning - possibly also compromised position at that time as pt was awkwardly bent in bed during Dr. Burgos's evaluation before we readjusted him.    SENSORY:  Light touch: Intact and symmetric throughout.  Pinprick: Intact and symmetric throughout.  Temperature: Intact and symmetric throughout.  Vibration: Intact and symmetric throughout.    REFLEXES:   Plantar   Right Flexor   Left Flexor         Lab Results: I have reviewed the following results:  Results from last 7 days   Lab Units 03/12/25  0515 03/11/25  0609 03/10/25  0727   WBC Thousand/uL 7.94 7.62 8.70   HEMOGLOBIN g/dL 13.5 13.3 13.8   HEMATOCRIT % 39.9 39.2 41.4   PLATELETS Thousands/uL 261 255 278   SEGS PCT % 60 70  --    MONO PCT % 12 11  --    EOS PCT % 2 1  --       Results from last 7 days   Lab Units 03/13/25  0457 03/12/25  0515 03/11/25  0609   POTASSIUM mmol/L 3.6 3.6 3.7   CHLORIDE mmol/L 106 105 106   CO2 mmol/L 24 24 21   BUN mg/dL 26* 26* 22   CREATININE mg/dL 1.28 1.41* 1.29   CALCIUM mg/dL 8.8 9.1 9.0              Results from last 7 days   Lab Units 03/10/25  0727   INR  1.03   PTT seconds 31               CTA head and neck w wo contrast   Final Result by Fede Wheatley MD (03/13 0915)   1.  No acute intracranial hemorrhage, edema, or mass effect. Scattered left hemispheric infarctions as above, seen better on recent MRI.   2.  No new large vessel occlusion in the head or neck.   3.  Unchanged high-grade stenosis proximal left ICA. Stable pedunculated plaque and/or thrombus in the proximal left ICA lumen which may be the source of distal embolization.            Workstation performed: [id number]         VAS carotid complete study   Final  Result by Hesham Jordan MD (03/10 1307)      MRI brain wo contrast   Final Result by E. Alec Schoenberger, MD (03/10 0849)      Multiple small scattered acute infarcts in the left cerebral hemisphere. Several of the smaller infarcts are hyperintense on FLAIR.   Tiny focus of left parietal occipital petechial hemorrhage.   Tiny focus of mild DWI signal in the right centrum semiovale may also present recent infarct.   No acute space-occupying hematoma.         I personally discussed this study with Leo Burgos on 3/10/2025 8:28 AM.            Workstation performed: [id number]         CT stroke alert brain   Final Result by E. Alec Schoenberger, MD (03/10 0802)   No acute large vessel distribution infarct, hemorrhage or mass effect   New age-indeterminate lacunar infarcts in the left centrum semiovale.      Findings were directly discussed with Leo Burgos at 7:49 a.m.      Workstation performed: [id number]         CTA stroke alert (head/neck)   Final Result by E. Alec Schoenberger, MD (03/10 0801)   Worsening severe stenosis in the proximal left internal carotid artery.   No significant stenosis of the cervical right carotid or vertebral arteries   No significant intracranial stenosis, large vessel occlusion or aneurysm.               Findings were directly discussed with Leo Brugos at 7:49 a.m.      Workstation performed: [id number]         CT chest abdomen pelvis w contrast   Final Result by Presley Moore MD (03/10 0813)      1.  No acute pathology within the chest, abdomen, or pelvis.   2.  Approximately 9 mm nonobstructing calculus within the left renal pelvis, previously within a left upper pole calyx.   3.  Chronic findings, detailed above.      The study was marked in EPIC for immediate notification.      Workstation performed: [id number]           VTE Pharmacologic Prophylaxis: VTE covered by:  enoxaparin, Subcutaneous, 40 mg at 03/13/25 0810       DO Marjorie MullinsSt[facility]  Neurology Resident PGY3

## 2025-03-13 NOTE — PROGRESS NOTES
Progress Note - Hospitalist   Name: Ez Dsouza 59 y.o. male I MRN: [id number]  Unit/Bed#: S -01 I Date of Admission: 3/10/2025   Date of Service: 3/13/2025 I Hospital Day: 3    Assessment & Plan  Acute cerebrovascular accident (CVA) due to stenosis of left carotid artery (HCC)  Monitoring  - Neurochecks Q4H  - Telemetry to assess for atrial fibrillation or other dysrhythmias  - STAT CT Head for any changes in mental status or neuro exam  Diagnostic Studies:  - Labs HgbA1c, Lipid Panel, TSH, Troponin  - EKG  - MRI ad w/wo contrast   Multiple small scattered acute infarcts in the left cerebral hemisphere. Several of the smaller infarcts are hyperintense on FLAIR.   Tiny focus of left parietal occipital petechial hemorrhage.   Tiny focus of mild DWI signal in the right centrum semiovale may also present recent infarct.   No acute space-occupying hematoma.   - CTA head and Neck    No acute large vessel distribution infarct, hemorrhage or mass effect   New age-indeterminate lacunar infarcts in the left centrum semiovale.   - TTE   EF 60%, normal wall motion, grade I relaxation  Blood Pressure:  - Permissive HTN for 24-48 hours  - Treat BP if >220/110 mg Hg  - After permissive HTN, BP goal <140/90 mm Hg  Medication  Aspirin and plavix loaded   Continue dapt   Education  - Stroke education  - Smoking cessation education    - Weight loss education - low fat, low carbohydrate, low sodium (I.e Mediterranean or DASH) diet and daily exercise   - outpatient referral to sleep for possible SHANNEN   Rehab  - Nursing bedside dysphagia screen  - PT/OT/ST when appropriate      Assessment: Multi territory ischemia, suspect secondary to atheroembolic from left stenosed carotid artery.  Discussed with vascular surgery planning for CEA, plan is for no later than 3/24, will be scheduled more urgently if pt has further neurologic events.     3/13: Overnight events noted for nonsustained right sided numbness, expressive aphasia.  On  examination on 3/13, pronator drift is more profound, unequal shrugging of shoulders, strength discrepancy with right side weaker than left, more profound expressive aphasia. NIHSS 2 (for aphasia, pronator drift).  This can be recrudescence.  Noncontrast CT head, CTA head and neck stat.  Reached out to vascular surgery for possibility of early procedure.  MARIA TERESA (acute kidney injury) (HCC)  Creatinine   Date Value Ref Range Status   03/13/2025 1.28 0.60 - 1.30 mg/dL Final     Comment:     Standardized to IDMS reference method   03/12/2025 1.41 (H) 0.60 - 1.30 mg/dL Final     Comment:     Standardized to IDMS reference method   03/11/2025 1.29 0.60 - 1.30 mg/dL Final     Comment:     Standardized to IDMS reference method     Problem has resolved.  Primary hypertension  PTA medication-  Coreg 25 twice daily  Hydrochlorothiazide 25 mg daily  spironolactone 25 mg daily -resume  Hyperlipidemia  Continue with atorvastatin 40 mg qd   Elevated coronary artery calcium score  High coronary calcium score 696-without symptoms of ischemia, discussed his need to address his risk factors     Pre-operative cardiovascular examination  Cleared by cardiology    VTE Pharmacologic Prophylaxis: VTE Score: 7 High Risk (Score >/= 5) - Pharmacological DVT Prophylaxis Ordered: enoxaparin (Lovenox). Sequential Compression Devices Ordered.    Mobility:   Basic Mobility Inpatient Raw Score: 19  JH-HLM Goal: 6: Walk 10 steps or more  JH-HLM Achieved: 8: Walk 250 feet ot more  JH-HLM Goal achieved. Continue to encourage appropriate mobility.    Patient Centered Rounds: I performed bedside rounds with nursing staff today.   Discussions with Specialists or Other Care Team Provider: Neurology, vascular surgery    Education and Discussions with Family / Patient: Updated  (wife) at bedside.    Current Length of Stay: 3 day(s)  Current Patient Status: Inpatient   Certification Statement: The patient will continue to require additional  inpatient hospital stay due to possible carotid endarterectomy during this admission  Discharge Plan: Anticipate discharge in 48-72 hrs to discharge location to be determined pending rehab evaluations.    Code Status: Level 1 - Full Code    Subjective   Patient mentions that overnight he had significant episodes.  He was urinating and while straining he developed right-sided upper extremity numbness and tingling along with word finding difficulty.  This episode sustained for about 30 minutes.  He mentions that he also felt a little weaker but then that resolved spontaneously and does not know the timeline for that.  Denies any dizziness, headaches, lightheadedness, or any other complaints.    Objective :  Temp:  [97.6 °F (36.4 °C)-98.3 °F (36.8 °C)] 97.6 °F (36.4 °C)  HR:  [55-74] 74  BP: (114-164)/(79-97) 114/79  Resp:  [16-20] 20  SpO2:  [94 %-97 %] 94 %  O2 Device: None (Room air)    Body mass index is 26.95 kg/m².     Input and Output Summary (last 24 hours):     Intake/Output Summary (Last 24 hours) at 3/13/2025 0814  Last data filed at 3/13/2025 0630  Gross per 24 hour   Intake 900 ml   Output 1 ml   Net 899 ml       Physical Exam  Vitals and nursing note reviewed.   Constitutional:       General: He is not in acute distress.     Appearance: He is well-developed.   HENT:      Head: Normocephalic and atraumatic.   Eyes:      Conjunctiva/sclera: Conjunctivae normal.   Cardiovascular:      Rate and Rhythm: Normal rate and regular rhythm.      Heart sounds: No murmur heard.  Pulmonary:      Effort: Pulmonary effort is normal. No respiratory distress.      Breath sounds: Normal breath sounds.   Abdominal:      Palpations: Abdomen is soft.      Tenderness: There is no abdominal tenderness.   Musculoskeletal:         General: No swelling.      Cervical back: Neck supple.   Skin:     General: Skin is warm and dry.      Capillary Refill: Capillary refill takes less than 2 seconds.   Neurological:      Mental Status: He  is alert and oriented to person, place, and time.      GCS: GCS eye subscore is 4. GCS verbal subscore is 5. GCS motor subscore is 6.      Cranial Nerves: Cranial nerve deficit (CN 11 deficit noted on R side.) present.      Sensory: Sensation is intact.      Motor: Pronator drift (R) present.   Psychiatric:         Mood and Affect: Mood normal.           Lines/Drains:        Telemetry:  Telemetry Orders (From admission, onward)               24 Hour Telemetry Monitoring  Continuous x 24 Hours (Telem)        Expiring   Question:  Reason for 24 Hour Telemetry  Answer:  TIA/Suspected CVA/ Confirmed CVA                     Telemetry Reviewed: Sinus Bradycardia  Indication for Continued Telemetry Use: Acute CVA               Lab Results: I have reviewed the following results:   Results from last 7 days   Lab Units 03/12/25  0515   WBC Thousand/uL 7.94   HEMOGLOBIN g/dL 13.5   HEMATOCRIT % 39.9   PLATELETS Thousands/uL 261   SEGS PCT % 60   LYMPHO PCT % 25   MONO PCT % 12   EOS PCT % 2     Results from last 7 days   Lab Units 03/13/25  0457   SODIUM mmol/L 138   POTASSIUM mmol/L 3.6   CHLORIDE mmol/L 106   CO2 mmol/L 24   BUN mg/dL 26*   CREATININE mg/dL 1.28   ANION GAP mmol/L 8   CALCIUM mg/dL 8.8   GLUCOSE RANDOM mg/dL 111     Results from last 7 days   Lab Units 03/10/25  0727   INR  1.03     Results from last 7 days   Lab Units 03/10/25  0722   POC GLUCOSE mg/dl 117     Results from last 7 days   Lab Units 03/10/25  0727   HEMOGLOBIN A1C % 6.9*           Recent Cultures (last 7 days):               Last 24 Hours Medication List:     Current Facility-Administered Medications:     acetaminophen (TYLENOL) tablet 650 mg, Q6H PRN    amLODIPine (NORVASC) tablet 5 mg, Daily    aspirin chewable tablet 81 mg, Daily    atorvastatin (LIPITOR) tablet 40 mg, Daily With Dinner    carvedilol (COREG) tablet 25 mg, BID With Meals    clopidogrel (PLAVIX) tablet 75 mg, Daily    enoxaparin (LOVENOX) subcutaneous injection 40 mg, Daily     [Held by provider] hydroCHLOROthiazide tablet 25 mg, Daily    labetalol (NORMODYNE) injection 10 mg, Q6H PRN    multi-electrolyte (Plasmalyte-A/Isolyte-S PH 7.4/Normosol-R) IV bolus 500 mL, Once    ondansetron (ZOFRAN) injection 4 mg, Q6H PRN    [Held by provider] spironolactone (ALDACTONE) tablet 25 mg, Daily    Administrative Statements   Today, Patient Was Seen By: Jad Elise MD      **Please Note: This note may have been constructed using a voice recognition system.**

## 2025-03-13 NOTE — QUICK NOTE
Asked by primary team to reevaluate pt due to concern of worsening neurological symptoms.  Reportedly with 10-30 minutes of expressive aphasia on 3/12 around 1700.  This AM, RN concerned for pt unable to lift RUE. Stat CTA H/N personally reviewed, pending radiology read, appears stable compared to 3/10, will review with attending.    Evaluated pt at bedside, noting BP earlier today was 114/79.  Placed pt in [place] position and then evaluated.  5/5 strength proximally and distally in LUE/RLE/LLE.  At first stated he could not move RUE, however with encouragement, he was able to give nearly full effort - would rate 5-/5 deltoids, biceps, triceps.  Same for R biceps/triceps.   Sensation intact and symmetric b/l.  Able to name objects - low and high frequency, no issue with repetition. Had a hard time with commands crossing midline, able to show me two fingers.  R lower facial weakness at rest, less so with activation.    Plan:  Discussed with primary team  Maintain HOB flat for now.  Recommend maintain SBP minimum 140 at this time, can lower if pt able to tolerate  Ordered 1L NSS bolus, followed by 100cc/hour x 10 hours.  If pt continues to be stable, recommend elevating HOB and seeing if exam remains stable.  Recommend encouraging and reassuring pt. He is understandably anxious.  Vascular surgery team has been updated regarding this exam change by primary team  OK for surgical intervention after post-stroke day 3 as discussed prior  Recommend vital signs q30min for the next couple of hours, and only draw it out after pt is able to maintain stable neuro exam while sitting up or ambulating. Recommend PT/OT evaluation today. Recommend neuro exam q2h for next 4 hours to assure pt's exam is stable.   Will f/u on CTA H/N reads and review with my attending  Please reach out immediately if any other acute questions or concerns.

## 2025-03-13 NOTE — ASSESSMENT & PLAN NOTE
Presenting with Right sided weakness and word finding difficulty. Presented after a fall 3/10    3/10 CTA neck/brain: Worsening severe stenosis in the proximal left internal carotid artery. No significant stenosis of the cervical right carotid or vertebral arteries No significant intracranial stenosis, large vessel occlusion or aneurysm.  3/10 MRI brain: Multiple small scattered acute infarcts in the left cerebral hemisphere. Several of the smaller infarcts are hyperintense on FLAIR.  Tiny focus of left parietal occipital petechial hemorrhage.  Tiny focus of mild DWI signal in the right centrum semiovale may also present recent infarct. No acute space-occupying hematoma.    3/10 VAS carotid duplex: RIGHT: <50% stenosis in the ICA. LEFT: >70% stenosis in the ICA.    3/13 worsening RUE weakness and expressive aphasia this morning, repeat CT ordered by SLIM    -Discussing timing of intervention with attending for L ICA stenosis. If recurrent neurological events carotid intervention will need to be more urgent  -Will follow up new imaging ordered this morning  -Neurology following; recs appreciated. Okay for intervention after hospital day 3  -Cardiology consult; no contraindications to proceed with vascular intervention. Would not pursue any further work up at this time preoperatively. Recs appreciated   -Continue ASA/statin/plavix  - continue neurovasc checks  -Will discuss with surgeon on call

## 2025-03-13 NOTE — PROGRESS NOTES
Progress Note - Hospitalist   Name: Ez Dsouza 59 y.o. male I MRN: [id number]  Unit/Bed#: S -01 I Date of Admission: 3/10/2025   Date of Service: 3/13/2025 I Hospital Day: 3  { ?Quick Links I Problem List I PORCH I Billing Tip:50512}  Assessment & Plan  Acute cerebrovascular accident (CVA) due to stenosis of left carotid artery (HCC)  Monitoring  - Neurochecks Q4H  - Telemetry to assess for atrial fibrillation or other dysrhythmias  - STAT CT Head for any changes in mental status or neuro exam  Diagnostic Studies:  - Labs HgbA1c, Lipid Panel, TSH, Troponin  - EKG  - MRI ad w/wo contrast   Multiple small scattered acute infarcts in the left cerebral hemisphere. Several of the smaller infarcts are hyperintense on FLAIR.   Tiny focus of left parietal occipital petechial hemorrhage.   Tiny focus of mild DWI signal in the right centrum semiovale may also present recent infarct.   No acute space-occupying hematoma.   - CTA head and Neck    No acute large vessel distribution infarct, hemorrhage or mass effect   New age-indeterminate lacunar infarcts in the left centrum semiovale.   - TTE   EF 60%, normal wall motion, grade I relaxation  Blood Pressure:  - Permissive HTN for 24-48 hours  - Treat BP if >220/110 mg Hg  - After permissive HTN, BP goal <140/90 mm Hg  Medication  Aspirin and plavix loaded   Continue dapt   Education  - Stroke education  - Smoking cessation education    - Weight loss education - low fat, low carbohydrate, low sodium (I.e Mediterranean or DASH) diet and daily exercise   - outpatient referral to sleep for possible SHANNEN   Rehab  - Nursing bedside dysphagia screen  - PT/OT/ST when appropriate      Assessment: Multi territory ischemia, suspect secondary to atheroembolic from left stenosed carotid artery.  Discussed with vascular surgery planning for CEA, plan is for no later than 3/24, will be scheduled more urgently if pt has further neurologic events.   MARIA TERESA (acute kidney injury)  (HCC)  Creatinine   Date Value Ref Range Status   03/13/2025 1.28 0.60 - 1.30 mg/dL Final     Comment:     Standardized to IDMS reference method   03/12/2025 1.41 (H) 0.60 - 1.30 mg/dL Final     Comment:     Standardized to IDMS reference method   03/11/2025 1.29 0.60 - 1.30 mg/dL Final     Comment:     Standardized to IDMS reference method     Problem has resolved.  Primary hypertension  PTA medication-  Coreg 25 twice daily  Hydrochlorothiazide 25 mg daily  spironolactone 25 mg daily -resume  Hyperlipidemia  Continue with atorvastatin 40 mg qd   Elevated coronary artery calcium score  High coronary calcium score 696-without symptoms of ischemia, discussed his need to address his risk factors     Pre-operative cardiovascular examination  Cleared by cardiology    VTE Pharmacologic Prophylaxis: VTE Score: 7 High Risk (Score >/= 5) - Pharmacological DVT Prophylaxis Ordered: enoxaparin (Lovenox). Sequential Compression Devices Ordered.    Mobility:   Basic Mobility Inpatient Raw Score: 19  JH-HLM Goal: 6: Walk 10 steps or more  JH-HLM Achieved: 8: Walk 250 feet ot more  JH-HLM Goal achieved. Continue to encourage appropriate mobility.    Patient Centered Rounds: I performed bedside rounds with nursing staff today.   Discussions with Specialists or Other Care Team Provider: ***    Education and Discussions with Family / Patient: {Family Communication:01600}    Current Length of Stay: 3 day(s)  Current Patient Status: Inpatient   Certification Statement: The patient will continue to require additional inpatient hospital stay due to pending CEA intervention, stroke follow up.  Discharge Plan: Anticipate discharge in 48-72 hrs to home.    Code Status: Level 1 - Full Code    Subjective   Overnight pt had numbness in his RUE and worsening expressive aphasia. Pt reported he was straining while in the bathroom and experienced sudden onset of RUE weakness and complete loss of sensation in RUE from shoulder to fingertips and  expressive aphasia. Symptoms lasted less than 10 minutes. Vascular surgery evaluated 30 minutes after the event. Pt seen and examined this morning at bedside.     Objective :{?Quick Links I ICU Summary I Vitals I I/Os I LDAs I Mobility (PT/OT) I Code Status / ACP   ?Quick Links I Active Meds I Pain Meds I Antibiotics I Anticoagulants:34897}  Temp:  [97.6 °F (36.4 °C)-98.3 °F (36.8 °C)] 97.6 °F (36.4 °C)  HR:  [55-74] 74  BP: (114-164)/(79-97) 114/79  Resp:  [16-20] 20  SpO2:  [94 %-97 %] 94 %  O2 Device: None (Room air)    Body mass index is 26.95 kg/m².     Input and Output Summary (last 24 hours):     Intake/Output Summary (Last 24 hours) at 3/13/2025 0733  Last data filed at 3/13/2025 0630  Gross per 24 hour   Intake 900 ml   Output 1 ml   Net 899 ml       Physical Exam  ***    Lines/Drains:        Telemetry:  Telemetry Orders (From admission, onward)               24 Hour Telemetry Monitoring  Continuous x 24 Hours (Telem)        Expiring   Question:  Reason for 24 Hour Telemetry  Answer:  TIA/Suspected CVA/ Confirmed CVA                     Telemetry Reviewed: {GINA Tele Review:28672}  Indication for Continued Telemetry Use: {Tele Indications:79025}             {?Quick Links I Lab Review I Micro Results I Radiology I Cardiology:14961}  Lab Results: I have reviewed the following results:   Results from last 7 days   Lab Units 03/12/25  0515   WBC Thousand/uL 7.94   HEMOGLOBIN g/dL 13.5   HEMATOCRIT % 39.9   PLATELETS Thousands/uL 261   SEGS PCT % 60   LYMPHO PCT % 25   MONO PCT % 12   EOS PCT % 2     Results from last 7 days   Lab Units 03/13/25  0457   SODIUM mmol/L 138   POTASSIUM mmol/L 3.6   CHLORIDE mmol/L 106   CO2 mmol/L 24   BUN mg/dL 26*   CREATININE mg/dL 1.28   ANION GAP mmol/L 8   CALCIUM mg/dL 8.8   GLUCOSE RANDOM mg/dL 111     Results from last 7 days   Lab Units 03/10/25  0727   INR  1.03     Results from last 7 days   Lab Units 03/10/25  0722   POC GLUCOSE mg/dl 117     Results from last 7 days    Lab Units 03/10/25  0727   HEMOGLOBIN A1C % 6.9*           Recent Cultures (last 7 days):         Imaging Results Review: No pertinent imaging studies reviewed.  Other Study Results Review: No additional pertinent studies reviewed.    Last 24 Hours Medication List:     Current Facility-Administered Medications:     acetaminophen (TYLENOL) tablet 650 mg, Q6H PRN    amLODIPine (NORVASC) tablet 5 mg, Daily    aspirin chewable tablet 81 mg, Daily    atorvastatin (LIPITOR) tablet 40 mg, Daily With Dinner    carvedilol (COREG) tablet 25 mg, BID With Meals    clopidogrel (PLAVIX) tablet 75 mg, Daily    enoxaparin (LOVENOX) subcutaneous injection 40 mg, Daily    [Held by provider] hydroCHLOROthiazide tablet 25 mg, Daily    labetalol (NORMODYNE) injection 10 mg, Q6H PRN    ondansetron (ZOFRAN) injection 4 mg, Q6H PRN    [Held by provider] spironolactone (ALDACTONE) tablet 25 mg, Daily    Administrative Statements   Today, Patient Was Seen By: Brenna Angelo  {Time Spent (Optional):55213}    **Please Note: This note may have been constructed using a voice recognition system.**

## 2025-03-13 NOTE — PHYSICAL THERAPY NOTE
Physical Therapy Cancellation Note                   03/13/25 1440   PT Last Visit   PT Visit Date 03/13/25   Note Type   Note Type Cancelled Session   Cancel Reasons Medical status   Assessment   Assessment PT intervention as pt was transferred to ICU. PT will follow and attempt to see pt when PT schedule will allow     Miguel Henry

## 2025-03-14 LAB
ANION GAP SERPL CALCULATED.3IONS-SCNC: 7 MMOL/L (ref 4–13)
APTT PPP: 52 SECONDS (ref 23–34)
APTT PPP: 76 SECONDS (ref 23–34)
APTT PPP: 85 SECONDS (ref 23–34)
BASOPHILS # BLD AUTO: 0.05 THOUSANDS/ÂΜL (ref 0–0.1)
BASOPHILS NFR BLD AUTO: 1 % (ref 0–1)
BUN SERPL-MCNC: 22 MG/DL (ref 5–25)
CA-I BLD-SCNC: 1.13 MMOL/L (ref 1.12–1.32)
CALCIUM SERPL-MCNC: 8.7 MG/DL (ref 8.4–10.2)
CHLORIDE SERPL-SCNC: 106 MMOL/L (ref 96–108)
CO2 SERPL-SCNC: 25 MMOL/L (ref 21–32)
CREAT SERPL-MCNC: 1.33 MG/DL (ref 0.6–1.3)
EOSINOPHIL # BLD AUTO: 0.14 THOUSAND/ÂΜL (ref 0–0.61)
EOSINOPHIL NFR BLD AUTO: 2 % (ref 0–6)
ERYTHROCYTE [DISTWIDTH] IN BLOOD BY AUTOMATED COUNT: 13.7 % (ref 11.6–15.1)
GFR SERPL CREATININE-BSD FRML MDRD: 57 ML/MIN/1.73SQ M
GLUCOSE SERPL-MCNC: 115 MG/DL (ref 65–140)
HCT VFR BLD AUTO: 36.7 % (ref 36.5–49.3)
HGB BLD-MCNC: 12.4 G/DL (ref 12–17)
IMM GRANULOCYTES # BLD AUTO: 0.04 THOUSAND/UL (ref 0–0.2)
IMM GRANULOCYTES NFR BLD AUTO: 1 % (ref 0–2)
LYMPHOCYTES # BLD AUTO: 1.69 THOUSANDS/ÂΜL (ref 0.6–4.47)
LYMPHOCYTES NFR BLD AUTO: 20 % (ref 14–44)
MAGNESIUM SERPL-MCNC: 1.9 MG/DL (ref 1.9–2.7)
MCH RBC QN AUTO: 30.5 PG (ref 26.8–34.3)
MCHC RBC AUTO-ENTMCNC: 33.8 G/DL (ref 31.4–37.4)
MCV RBC AUTO: 90 FL (ref 82–98)
MONOCYTES # BLD AUTO: 1.01 THOUSAND/ÂΜL (ref 0.17–1.22)
MONOCYTES NFR BLD AUTO: 12 % (ref 4–12)
NEUTROPHILS # BLD AUTO: 5.63 THOUSANDS/ÂΜL (ref 1.85–7.62)
NEUTS SEG NFR BLD AUTO: 64 % (ref 43–75)
NRBC BLD AUTO-RTO: 0 /100 WBCS
PHOSPHATE SERPL-MCNC: 3.4 MG/DL (ref 2.3–4.1)
PLATELET # BLD AUTO: 263 THOUSANDS/UL (ref 149–390)
PMV BLD AUTO: 10.1 FL (ref 8.9–12.7)
POTASSIUM SERPL-SCNC: 3.8 MMOL/L (ref 3.5–5.3)
RBC # BLD AUTO: 4.06 MILLION/UL (ref 3.88–5.62)
SODIUM SERPL-SCNC: 138 MMOL/L (ref 135–147)
WBC # BLD AUTO: 8.56 THOUSAND/UL (ref 4.31–10.16)

## 2025-03-14 PROCEDURE — 85730 THROMBOPLASTIN TIME PARTIAL: CPT | Performed by: INTERNAL MEDICINE

## 2025-03-14 PROCEDURE — 83735 ASSAY OF MAGNESIUM: CPT

## 2025-03-14 PROCEDURE — NC001 PR NO CHARGE: Performed by: STUDENT IN AN ORGANIZED HEALTH CARE EDUCATION/TRAINING PROGRAM

## 2025-03-14 PROCEDURE — 99232 SBSQ HOSP IP/OBS MODERATE 35: CPT | Performed by: SURGERY

## 2025-03-14 PROCEDURE — 99232 SBSQ HOSP IP/OBS MODERATE 35: CPT | Performed by: INTERNAL MEDICINE

## 2025-03-14 PROCEDURE — 92523 SPEECH SOUND LANG COMPREHEN: CPT

## 2025-03-14 PROCEDURE — 97163 PT EVAL HIGH COMPLEX 45 MIN: CPT

## 2025-03-14 PROCEDURE — NC001 PR NO CHARGE: Performed by: SURGERY

## 2025-03-14 PROCEDURE — 82330 ASSAY OF CALCIUM: CPT

## 2025-03-14 PROCEDURE — 97167 OT EVAL HIGH COMPLEX 60 MIN: CPT

## 2025-03-14 PROCEDURE — 80048 BASIC METABOLIC PNL TOTAL CA: CPT

## 2025-03-14 PROCEDURE — 84100 ASSAY OF PHOSPHORUS: CPT

## 2025-03-14 PROCEDURE — 85025 COMPLETE CBC W/AUTO DIFF WBC: CPT

## 2025-03-14 PROCEDURE — 99223 1ST HOSP IP/OBS HIGH 75: CPT | Performed by: STUDENT IN AN ORGANIZED HEALTH CARE EDUCATION/TRAINING PROGRAM

## 2025-03-14 RX ORDER — CLOPIDOGREL BISULFATE 75 MG/1
75 TABLET ORAL ONCE
Status: COMPLETED | OUTPATIENT
Start: 2025-03-14 | End: 2025-03-14

## 2025-03-14 RX ORDER — SODIUM CHLORIDE, SODIUM GLUCONATE, SODIUM ACETATE, POTASSIUM CHLORIDE, MAGNESIUM CHLORIDE, SODIUM PHOSPHATE, DIBASIC, AND POTASSIUM PHOSPHATE .53; .5; .37; .037; .03; .012; .00082 G/100ML; G/100ML; G/100ML; G/100ML; G/100ML; G/100ML; G/100ML
500 INJECTION, SOLUTION INTRAVENOUS ONCE
Status: COMPLETED | OUTPATIENT
Start: 2025-03-14 | End: 2025-03-14

## 2025-03-14 RX ADMIN — CHLORHEXIDINE GLUCONATE 15 ML: 1.2 SOLUTION ORAL at 09:40

## 2025-03-14 RX ADMIN — CLOPIDOGREL BISULFATE 75 MG: 75 TABLET ORAL at 16:40

## 2025-03-14 RX ADMIN — SODIUM CHLORIDE, SODIUM GLUCONATE, SODIUM ACETATE, POTASSIUM CHLORIDE, MAGNESIUM CHLORIDE, SODIUM PHOSPHATE, DIBASIC, AND POTASSIUM PHOSPHATE 500 ML: .53; .5; .37; .037; .03; .012; .00082 INJECTION, SOLUTION INTRAVENOUS at 21:45

## 2025-03-14 RX ADMIN — PHENYLEPHRINE HYDROCHLORIDE 180 MCG/MIN: 50 INJECTION INTRAVENOUS at 21:46

## 2025-03-14 RX ADMIN — CHLORHEXIDINE GLUCONATE 15 ML: 1.2 SOLUTION ORAL at 20:36

## 2025-03-14 RX ADMIN — HEPARIN SODIUM 15.1 UNITS/KG/HR: 10000 INJECTION, SOLUTION INTRAVENOUS at 07:28

## 2025-03-14 RX ADMIN — HEPARIN SODIUM 2000 UNITS: 1000 INJECTION INTRAVENOUS; SUBCUTANEOUS at 05:57

## 2025-03-14 RX ADMIN — ATORVASTATIN CALCIUM 40 MG: 40 TABLET, FILM COATED ORAL at 15:53

## 2025-03-14 RX ADMIN — PHENYLEPHRINE HYDROCHLORIDE 25 MCG/MIN: 50 INJECTION INTRAVENOUS at 16:41

## 2025-03-14 NOTE — HOSPITAL COURSE
60 y/o M hx HTN, CAD, HLD, tobacco abuse who presented stroke alert to [facility] ED on 3/10 w/ R sided weakness and word finding difficultly and was found to have b/l carotid stenosis (L>R). MRI brain showed multipe small acute L CVAs. On 3/13 he had worsening neurologic exam w/ lower BP in the 110s suspected secondary to hypoperfusion and cerebral edema. Started heparin gtt and Plavix and he was transferred to critical care for pressors to maintain -160 for 24-48 hours. Vascular surgery planning for L CEA on Monday 3/17. CHANCE ordered to rule out central embolic source of stroke.

## 2025-03-14 NOTE — UTILIZATION REVIEW
Initial Clinical Review    Admission: Date/Time/Statement:   Admission Orders (From admission, onward)       Ordered        03/13/25 2216  Inpatient Admission  Once                          Orders Placed This Encounter   Procedures    Inpatient Admission     Standing Status:   Standing     Number of Occurrences:   1     Level of Care:   Level 1 Stepdown [13]     Estimated length of stay:   More than 2 Midnights     Certification:   I certify that inpatient services are medically necessary for this patient for a duration of greater than two midnights. See H&P and MD Progress Notes for additional information about the patient's course of treatment.     ED Arrival Information       Patient not seen in ED                           Initial Presentation: 59 y.o. male HX 3/10 Acute CVA with carotid artery stenosis, On 3/13  had worsening neurologic exam (worsening of right sided weakness, numbness and expressive aphagia) w/ lower BP in the 110s suspected secondary to hypoperfusion and cerebral edema. Started heparin gtt and ASA/Plavix load transferred to critical care for pressors to maintain -160 for 24-48 hours.  Per vascular surgery transfer from [facility] to [place] for intervention: L CEA on Monday 3/17.   Inpatient ICU admission due to acute CVA due to stenosis of L carotid artery.  PLAN ICU management, CHANCE ordered to rule out central embolic source of stroke, Q 4 HR  neuro checks, consult Vascular surgery, cont anticoagulation IV Heparin GTT, Lipitor, BP management: permissive HTN > 100 MAP, use GRACE PRN & HOLD amlodipine, carvedilol, hydrochlorothiazide, spironolactone for permissive hypertension , PT/OT/SPEECH   Date: 3/14/2025   Day 2: ICU  Vasopressors were never required, he auto mapped for MAP > 100.  no requirement for vasopressors.   No acute events overnight . Cont ICU level of care  Consult vascular surgery  Continue anticoagulation  ED Treatment-Medication Administration - No Administrations  Displayed (No Start Event Found)       None            Scheduled Medications:  [Held by provider] aspirin, 81 mg, Oral, Daily  atorvastatin, 40 mg, Oral, Daily With Dinner  chlorhexidine, 15 mL, Mouth/Throat, Q12H TIEN  clopidogrel, 75 mg, Oral, Daily      Continuous IV Infusions:  heparin (porcine), 3-20 Units/kg/hr (Order-Specific), Intravenous, Titrated      PRN Meds:  acetaminophen, 650 mg, Oral, Q6H PRN  heparin (porcine), 2,000 Units, Intravenous, Q6H PRN  heparin (porcine), 4,000 Units, Intravenous, Q6H PRN  labetalol, 10 mg, Intravenous, Q6H PRN  ondansetron, 4 mg, Intravenous, Q6H PRN      ED Triage Vitals   Temperature Pulse Respirations Blood Pressure SpO2 Pain Score   03/13/25 2215 03/13/25 2217 03/13/25 2217 03/13/25 2217 03/13/25 2217 03/13/25 2217   98.8 °F (37.1 °C) 60 20 157/91 96 % No Pain     Weight (last 2 days)       Date/Time Weight    03/14/25 0532 92.7 (204.37)    03/13/25 2217 92.7 (204.37)    03/13/25 2215 92.7 (204.37)            Vital Signs (last 3 days)       Date/Time Temp Pulse Resp BP MAP (mmHg) SpO2 O2 Device Patient Position - Orthostatic VS [place] Light Coma Scale Score Pain    03/14/25 0800 -- 56 17 151/81 101 96 % None (Room air) -- -- No Pain    03/14/25 0700 -- 70 16 151/86 108 96 % -- -- -- --    03/14/25 0445 -- 64 17 159/92 109 95 % -- -- -- --    03/14/25 0430 -- 56 17 165/86 112 95 % -- -- -- --    03/14/25 0415 -- 52 16 141/95 113 95 % -- -- -- --    03/14/25 0400 98.8 °F (37.1 °C) 76 26 161/89 109 96 % None (Room air) Lying -- No Pain    03/14/25 0359 98.8 °F (37.1 °C) -- -- -- -- -- -- -- -- --    03/14/25 0315 -- 62 17 150/87 103 94 % -- -- -- --    03/14/25 0300 -- 72 25 145/85 103 94 % -- -- -- --    03/14/25 0245 -- 68 17 153/90 109 95 % -- -- -- --    03/14/25 0230 98.5 °F (36.9 °C) 66 16 155/95 115 95 % -- -- 15 No Pain    03/14/25 0215 -- 52 17 138/76 104 96 % -- -- -- --    03/14/25 0200 -- 54 17 137/84 102 96 % -- -- -- --    03/14/25 0145 -- 68 19 147/89 106 97 % --  -- -- --    03/14/25 0130 -- 64 19 139/89 106 95 % -- -- -- --    03/14/25 0115 -- 70 15 152/93 111 96 % -- -- -- --    03/14/25 0100 -- 54 17 148/91 108 95 % -- -- -- --    03/14/25 0045 -- 58 18 150/101 120 96 % -- -- -- --    03/14/25 0030 -- 54 17 157/90 127 97 % -- -- -- --    03/14/25 0015 -- 56 18 132/90 102 93 % -- -- -- --    03/14/25 0000 98.8 °F (37.1 °C) 52 17 145/89 108 95 % None (Room air) Lying -- No Pain    03/13/25 2345 -- 72 19 155/91 110 97 % -- -- -- --    03/13/25 2330 -- 62 19 145/92 117 94 % -- -- -- --    03/13/25 2315 -- 66 19 142/92 108 94 % -- -- -- --    03/13/25 2300 -- 60 19 157/90 116 94 % -- -- -- --    03/13/25 2245 -- 66 19 153/94 115 94 % -- -- -- --    03/13/25 2230 98.7 °F (37.1 °C) 62 18 156/91 108 94 % None (Room air) Lying 15 No Pain    03/13/25 2217 98.8 °F (37.1 °C) 60 20 157/91 113 96 % None (Room air) Lying -- No Pain    03/13/25 2215 98.8 °F (37.1 °C) -- -- -- -- -- -- -- -- --              Pertinent Labs/Diagnostic Test Results:   Imaging  CTA head 3/10: Worsening severe stenosis in the proximal left ICA, no significant intracranial stenosis or LVO  CT head 3/10: No LVO   MRI brain 3/10: Multiple small scattered acute infarcts in the left cerebral hemisphere  CTA H/N 3/13 scattered left hemispheric infarctions  MRI brain 3/13: Multiple small foci of acute/recent ischemia in the left MCA territory   VAS carotid: Right< 50% stenosis, left > 70 stenosis of ICA  Radiology:  No orders to display     Cardiology:  No orders to display     GI:  No orders to display           Results from last 7 days   Lab Units 03/14/25  0453 03/12/25  0515 03/11/25  0609 03/10/25  0727   WBC Thousand/uL 8.56 7.94 7.62 8.70   HEMOGLOBIN g/dL 12.4 13.5 13.3 13.8   HEMATOCRIT % 36.7 39.9 39.2 41.4   PLATELETS Thousands/uL 263 261 255 278   TOTAL NEUT ABS Thousands/µL 5.63 4.78 5.32  --          Results from last 7 days   Lab Units 03/14/25 0453 03/13/25  0457 03/12/25 0515 03/11/25  0609  "03/10/25  0727   SODIUM mmol/L 138 138 137 136 135   POTASSIUM mmol/L 3.8 3.6 3.6 3.7 4.2   CHLORIDE mmol/L 106 106 105 106 102   CO2 mmol/L 25 24 24 21 26   ANION GAP mmol/L 7 8 8 9 7   BUN mg/dL 22 26* 26* 22 27*   CREATININE mg/dL 1.33* 1.28 1.41* 1.29 1.63*   EGFR ml/min/1.73sq m 57 60 53 59 45   CALCIUM mg/dL 8.7 8.8 9.1 9.0 9.2   CALCIUM, IONIZED mmol/L 1.13  --   --   --   --    MAGNESIUM mg/dL 1.9  --   --   --   --    PHOSPHORUS mg/dL 3.4  --   --   --   --          Results from last 7 days   Lab Units 03/10/25  0722   POC GLUCOSE mg/dl 117     Results from last 7 days   Lab Units 03/14/25  0453 03/13/25  0457 03/12/25  0515 03/11/25  0609 03/10/25  0727   GLUCOSE RANDOM mg/dL 115 111 117 121 140         Results from last 7 days   Lab Units 03/10/25  0727   HEMOGLOBIN A1C % 6.9*   EAG mg/dl 151     No results found for: \"BETA-HYDROXYBUTYRATE\"                   Results from last 7 days   Lab Units 03/10/25  1123 03/10/25  0915 03/10/25  0727   HS TNI 0HR ng/L  --   --  7   HS TNI 2HR ng/L  --  6  --    HSTNI D2 ng/L  --  -1  --    HS TNI 4HR ng/L 7  --   --    HSTNI D4 ng/L 0  --   --          Results from last 7 days   Lab Units 03/14/25  0532 03/13/25  2256 03/13/25  1817 03/13/25  1209 03/10/25  0727   PROTIME seconds  --  14.8  --  14.8 14.2   INR   --  1.14  --  1.09 1.03   PTT seconds 52* 83* 47* 35* 31     Results from last 7 days   Lab Units 03/10/25  1123   TSH 3RD GENERATON uIU/mL 0.543                                                         Results from last 7 days   Lab Units 03/10/25  0915   CLARITY UA  Clear   COLOR UA  Light Yellow   SPEC GRAV UA  1.043*   PH UA  5.5   GLUCOSE UA mg/dl Negative   KETONES UA mg/dl Negative   BLOOD UA  Moderate*   PROTEIN UA mg/dl Trace*   NITRITE UA  Negative   BILIRUBIN UA  Negative   UROBILINOGEN UA (BE) mg/dl <2.0   LEUKOCYTES UA  Negative   WBC UA /hpf 2-4*   RBC UA /hpf 30-50*   BACTERIA UA /hpf None Seen   EPITHELIAL CELLS WET PREP /hpf None Seen   MUCUS " THREADS  Occasional*             Past Medical History:   Diagnosis Date    Hypertension      Present on Admission:   Primary hypertension   Hyperlipidemia   Acute cerebrovascular accident (CVA) due to stenosis of left carotid artery (HCC)      Admitting Diagnosis: Acute cerebrovascular accident (CVA) due to stenosis of left carotid artery (HCC) [I63.232]  Age/Sex: 59 y.o. male    Network Utilization Review Department  ATTENTION: Please call with any questions or concerns to [phone] and carefully listen to the prompts so that you are directed to the right person. All voicemails are confidential.   For Discharge needs, contact Care Management DC Support Team at [phone] opt. [phone]  Send all requests for admission clinical reviews, approved or denied determinations and any other requests to dedicated fax number below belonging to the campus where the patient is receiving treatment. List of dedicated fax numbers for the Facilities:  FACILITY NAME UR FAX NUMBER   ADMISSION DENIALS (Administrative/Medical Necessity) [phone]   DISCHARGE SUPPORT TEAM (NETWORK) [phone]   PARENT CHILD HEALTH (Maternity/NICU/Pediatrics) [phone]   [facility] [phone]   [facility] [phone]   [facility] [phone]   [facility] [phone]   [facility] [phone]   [facility] [phone]   [facility] [phone]   [facility] [phone]   [facility] [phone]   Count includes the [facility] [phone]   [facility] [phone]   [facility] [phone]

## 2025-03-14 NOTE — PROGRESS NOTES
Progress Note - Critical Care/ICU   Name: Ez Dsouza 59 y.o. male I MRN: [id number]  Unit/Bed#: ICU 13 I Date of Admission: 3/13/2025   Date of Service: 3/14/2025 I Hospital Day: 1      Assessment & Plan  Acute cerebrovascular accident (CVA) due to stenosis of left carotid artery (HCC)  DX: Multifocal strokes primarily L ICA territory suggesting symptomatic L ICA stenosis  Imaging  CTA head 3/10: Worsening severe stenosis in the proximal left ICA, no significant intracranial stenosis or LVO  CT head 3/10: No LVO   MRI brain 3/10: Multiple small scattered acute infarcts in the left cerebral hemisphere  CTA H/N 3/13 scattered left hemispheric infarctions  MRI brain 3/13: Multiple small foci of acute/recent ischemia in the left MCA territory   VAS carotid: Right< 50% stenosis, left > 70 stenosis of ICA  Developed worsening neurological exam on 3/13 due to hypoperfusion and cerebral edema  Transferred to [facility] ICU for vasopressor support to target MAP > 100  S/p ASA/Plavix load, Heparin gtt ACS low initiated  Lipitor 40mg daily   Neuro checks- q4hrs  Neurology and vascular surgery following  Plan for left CEA on 3/17  Permissive hypertension > 100, use princess as needed  PT/OT/Speech eval   Primary hypertension  Holding amlodipine, carvedilol, hydrochlorothiazide, spironolactone for permissive hypertension  Hyperlipidemia  Continue Lipitor  Disposition: Stepdown Level 1    ICU Core Measures     A: Assess, Prevent, and Manage Pain Has pain been assessed? Yes  Need for changes to pain regimen? No   B: Both SAT/SAT  N/A   C: Choice of Sedation RASS Goal: 0 Alert and Calm  Need for changes to sedation or analgesia regimen? No   D: Delirium CAM-ICU: Negative   E: Early Mobility  Plan for early mobility? Yes   F: Family Engagement Plan for family engagement today? Yes         Prophylaxis:  VTE VTE covered by:  heparin (porcine), Intravenous, 13.1 Units/kg/hr at 03/13/25 2235  heparin (porcine), Intravenous  heparin (porcine),  Intravenous       Stress Ulcer  not ordered         24 Hour Events : Hospital day 5, transferred to [facility] ICU last night for permissive htn, automapped > 100, no requirement for vasopressors.  No acute events overnight  Subjective   Review of Systems: Review of Systems   Constitutional: Negative.    Respiratory: Negative.     Cardiovascular: Negative.    Gastrointestinal: Negative.    Musculoskeletal: Negative.    Neurological:  Positive for weakness and numbness.   Psychiatric/Behavioral: Negative.         Objective :                   Vitals I/O      Most Recent Min/Max in 24hrs   Temp 98.8 °F (37.1 °C) Temp  Min: 97.6 °F (36.4 °C)  Max: 98.8 °F (37.1 °C)   Pulse 56 Pulse  Min: 51  Max: 79   Resp 17 Resp  Min: 14  Max: 26   /86 BP  Min: 114/79  Max: 184/102   O2 Sat 95 % SpO2  Min: 93 %  Max: 97 %      Intake/Output Summary (Last 24 hours) at 3/14/2025 0503  Last data filed at 3/14/2025 0400  Gross per 24 hour   Intake 63.92 ml   Output 150 ml   Net -86.08 ml       Diet NPO    Invasive Monitoring           Physical Exam   Physical Exam  Eyes:      Pupils: Pupils are equal, round, and reactive to light.   Skin:     General: Skin is warm and dry.      Capillary Refill: Capillary refill takes less than 2 seconds.   HENT:      Head: Normocephalic and atraumatic.   Cardiovascular:      Rate and Rhythm: Normal rate and regular rhythm.      Pulses: Normal pulses.      Heart sounds: Normal heart sounds.   Musculoskeletal:      Right lower leg: No edema.      Left lower leg: No edema.   Abdominal: General: There is no distension.      Palpations: Abdomen is soft.      Tenderness: There is no abdominal tenderness.   Constitutional:       Appearance: He is well-developed and well-nourished.   Pulmonary:      Effort: Pulmonary effort is normal.      Breath sounds: Normal breath sounds.   Psychiatric:         Speech: Speech is expressive aphasia.   Neurological:      Mental Status: He is alert and oriented to person, place  and time.      Cranial Nerves: No facial asymmetry.      Sensory: Sensory deficit present.      Comments: Word finding difficulty and expressive aphasia and 4/5 RUE, 5/5 LUE, 4/5 RLE, 5/5 LLE, sensory deficit RUE          Diagnostic Studies        Lab Results: I have reviewed the following results:     Medications:  Scheduled PRN   [Held by provider] aspirin, 81 mg, Daily  atorvastatin, 40 mg, Daily With Dinner  chlorhexidine, 15 mL, Q12H TIEN  clopidogrel, 75 mg, Daily      acetaminophen, 650 mg, Q6H PRN  heparin (porcine), 2,000 Units, Q6H PRN  heparin (porcine), 4,000 Units, Q6H PRN  labetalol, 10 mg, Q6H PRN  ondansetron, 4 mg, Q6H PRN       Continuous    heparin (porcine), 3-20 Units/kg/hr (Order-Specific), Last Rate: 13.1 Units/kg/hr (03/13/25 2235)         Labs:   CBC    Recent Labs     03/12/25  0515 03/14/25  0453   WBC 7.94 8.56   HGB 13.5 12.4   HCT 39.9 36.7    263     BMP    Recent Labs     03/12/25  0515 03/13/25  0457   SODIUM 137 138   K 3.6 3.6    106   CO2 24 24   AGAP 8 8   BUN 26* 26*   CREATININE 1.41* 1.28   CALCIUM 9.1 8.8       Coags    Recent Labs     03/13/25  1209 03/13/25  1817 03/13/25  2256   INR 1.09  --  1.14   PTT 35* 47* 83*        Additional Electrolytes  Recent Labs     03/14/25  0453   CAIONIZED 1.13          Blood Gas    No recent results  No recent results LFTs  No recent results    Infectious  No recent results  Glucose  Recent Labs     03/12/25  0515 03/13/25  0457   GLUC 117 111

## 2025-03-14 NOTE — PLAN OF CARE
Problem: Prexisting or High Potential for Compromised Skin Integrity  Goal: Skin integrity is maintained or improved  Description: INTERVENTIONS:  - Identify patients at risk for skin breakdown  - Assess and monitor skin integrity  - Assess and monitor nutrition and hydration status  - Monitor labs   - Assess for incontinence   - Turn and reposition patient  - Assist with mobility/ambulation  - Relieve pressure over bony prominences  - Avoid friction and shearing  - Provide appropriate hygiene as needed including keeping skin clean and dry  - Evaluate need for skin moisturizer/barrier cream  - Collaborate with interdisciplinary team   - Patient/family teaching  - Consider wound care consult   Outcome: Progressing     Problem: PAIN - ADULT  Goal: Verbalizes/displays adequate comfort level or baseline comfort level  Description: Interventions:  - Encourage patient to monitor pain and request assistance  - Assess pain using appropriate pain scale  - Administer analgesics based on type and severity of pain and evaluate response  - Implement non-pharmacological measures as appropriate and evaluate response  - Consider cultural and social influences on pain and pain management  - Notify physician/advanced practitioner if interventions unsuccessful or patient reports new pain  Outcome: Progressing     Problem: INFECTION - ADULT  Goal: Absence or prevention of progression during hospitalization  Description: INTERVENTIONS:  - Assess and monitor for signs and symptoms of infection  - Monitor lab/diagnostic results  - Monitor all insertion sites, i.e. indwelling lines, tubes, and drains  - Monitor endotracheal if appropriate and nasal secretions for changes in amount and color  - Reva appropriate cooling/warming therapies per order  - Administer medications as ordered  - Instruct and encourage patient and family to use good hand hygiene technique  - Identify and instruct in appropriate isolation precautions for  identified infection/condition  Outcome: Progressing  Goal: Absence of fever/infection during neutropenic period  Description: INTERVENTIONS:  - Monitor WBC    Outcome: Progressing     Problem: SAFETY ADULT  Goal: Patient will remain free of falls  Description: INTERVENTIONS:  - Educate patient/family on patient safety including physical limitations  - Instruct patient to call for assistance with activity   - Consult OT/PT to assist with strengthening/mobility   - Keep Call bell within reach  - Keep bed low and locked with side rails adjusted as appropriate  - Keep care items and personal belongings within reach  - Initiate and maintain comfort rounds  - Make Fall Risk Sign visible to staff  - Offer Toileting every 2 Hours, in advance of need  - Initiate/Maintain bed alarm  - Obtain necessary fall risk management equipment:   - Apply yellow socks and bracelet for high fall risk patients  - Consider moving patient to room near nurses station  Outcome: Progressing  Goal: Maintain or return to baseline ADL function  Description: INTERVENTIONS:  -  Assess patient's ability to carry out ADLs; assess patient's baseline for ADL function and identify physical deficits which impact ability to perform ADLs (bathing, care of mouth/teeth, toileting, grooming, dressing, etc.)  - Assess/evaluate cause of self-care deficits   - Assess range of motion  - Assess patient's mobility; develop plan if impaired  - Assess patient's need for assistive devices and provide as appropriate  - Encourage maximum independence but intervene and supervise when necessary  - Involve family in performance of ADLs  - Assess for home care needs following discharge   - Consider OT consult to assist with ADL evaluation and planning for discharge  - Provide patient education as appropriate  Outcome: Progressing  Goal: Maintains/Returns to pre admission functional level  Description: INTERVENTIONS:  - Perform AM-PAC 6 Click Basic Mobility/ Daily Activity  assessment daily.  - Set and communicate daily mobility goal to care team and patient/family/caregiver.   - Collaborate with rehabilitation services on mobility goals if consulted  - Perform Range of Motion 3 times a day.  - Reposition patient every 2 hours.  - Dangle patient 3 times a day  - Stand patient 3 times a day  - Ambulate patient 3 times a day  - Out of bed to chair 3 times a day   - Out of bed for meals 3  Problem: DISCHARGE PLANNING  Goal: Discharge to home or other facility with appropriate resources  Description: INTERVENTIONS:  - Identify barriers to discharge w/patient and caregiver  - Arrange for needed discharge resources and transportation as appropriate  - Identify discharge learning needs (meds, wound care, etc.)  - Arrange for interpretive services to assist at discharge as needed  - Refer to Case Management Department for coordinating discharge planning if the patient needs post-hospital services based on physician/advanced practitioner order or complex needs related to functional status, cognitive ability, or social support system  Outcome: Progressing     Problem: Knowledge Deficit  Goal: Patient/family/caregiver demonstrates understanding of disease process, treatment plan, medications, and discharge instructions  Description: Complete learning assessment and assess knowledge base.  Interventions:  - Provide teaching at level of understanding  - Provide teaching via preferred learning methods  Outcome: Progressing     Problem: Nutrition/Hydration-ADULT  Goal: Nutrient/Hydration intake appropriate for improving, restoring or maintaining nutritional needs  Description: Monitor and assess patient's nutrition/hydration status for malnutrition. Collaborate with interdisciplinary team and initiate plan and interventions as ordered.  Monitor patient's weight and dietary intake as ordered or per policy. Utilize nutrition screening tool and intervene as necessary. Determine patient's food  preferences and provide high-protein, high-caloric foods as appropriate.     INTERVENTIONS:  - Monitor oral intake, urinary output, labs, and treatment plans  - Assess nutrition and hydration status and recommend course of action  - Evaluate amount of meals eaten  - Assist patient with eating if necessary   - Allow adequate time for meals  - Recommend/ encourage appropriate diets, oral nutritional supplements, and vitamin/mineral supplements  - Order, calculate, and assess calorie counts as needed  - Recommend, monitor, and adjust tube feedings and TPN/PPN based on assessed needs  - Assess need for intravenous fluids  - Provide specific nutrition/hydration education as appropriate  - Include patient/family/caregiver in decisions related to nutrition  Outcome: Progressing    times a day  - Out of bed for toileting  - Record patient progress and toleration of activity level   Outcome: Progressing

## 2025-03-14 NOTE — ASSESSMENT & PLAN NOTE
59-year-old male with hx of HTN, CAD, HLD, tobacco abuse, hx of fall '24 w/ rib fxs and R wrist fx, woke up with R side weakness and presented to [facility] by EMS. Patient transferred to [facility] overnight for tentative L carotid enterectomy on 3/17/25 with .    Results   Carotid DU:   R ICA <50% stenosis,   L ICA >70% stenosis w/ pilar 259/82 and ratio 3.2    3/10 MRI brain: multiple small acute CVAs in the L periventricular, subcotical, frontal and parietal/occipital lobes; small petechial hemorrhage    CTA neck: R ICA midl stenosis; L ICA w/ ~60-70% stenosis w/ soft plaque and increased irregularity compared to prior study in '24; there is a long area of irregularity, about 3.5cm in length    3/13 MRI brain: multiple small foci of ischemia in L MCA territory. CAMRYN to MCA watershed territory on the L as well as L caudate. Involvement of R parietal lobe. Consider central embolic source.   3/13 CTA head and neck: Scattered L hemispheric infarction. Unchanged L ICA high grade stenosis.     Labs 3/14/25  Cr 1.33/ GFR 57  Hg 12.4/ WBC 8.56    Plan  -Pt presented to [facility] as stroke alert with R sided weakness, confusion and altered speech found to have L ICA stenosis (70-99%) with soft plaque on CTA. Tentative plan for L CEA on 3/17 with . Pt transferred overnight to [facility] for procedure on Monday and ICU monitoring.   -ICU monitoring for vasopressor support with goals to keep MAP >100  -Neurology following, appreciate recs  -Cardiology following, no further workup recommended at this time.   -ASA/Plavix loaded, now on heparin gtt  -Lipitor 40mg daily  -Neuro checks  -Will D/W   -Will D/W ICU team

## 2025-03-14 NOTE — PHYSICAL THERAPY NOTE
PT EVALUATION    Pt. Name: Ez Dsouza  Pt. Age: 59 y.o.  MRN: [id number]  LENGTH OF STAY: 1      Admitting Diagnoses:   Acute cerebrovascular accident (CVA) due to stenosis of left carotid artery (HCC) [I63.232]    Past Medical History:   Diagnosis Date    Hypertension        Past Surgical History:   Procedure Laterality Date    KNEE SURGERY  2008    MOLE REMOVAL  2024    Back       Imaging Studies:  VAS intra-op ultrasound CEA    (Results Pending)         03/14/25 0903   PT Last Visit   PT Visit Date 03/14/25   Note Type   Note type Evaluation   Pain Assessment   Pain Score No Pain   Restrictions/Precautions   Weight Bearing Precautions Per Order No   Other Precautions Cognitive;Chair Alarm;Bed Alarm;Multiple lines;Telemetry;Fall Risk   Home Living   Type of Home House   Home Layout Two level;Stairs to enter with rails;Bed/bath upstairs;Other (Comment)  (2STE; 12 steps to 2nd floor bed/bath)   Bathroom Shower/Tub Tub/shower unit   Bathroom Toilet Standard   Bathroom Equipment Other (Comment)  (none)   Home Equipment Other (Comment)  (none)   Additional Comments pt poor historian; above information obtained from chart   Prior Function   Level of [place] Independent with functional mobility;Independent with ADLs;Independent with IADLS  (ambulates w/o AD)   Lives With Spouse   Receives Help From Family   Falls in the last 6 months 1 to 4  (1x)   Vocational Full time employment   Comments (+) ; above info obtained from chart & pt's wife   General   Additional Pertinent History Pt presented stroke alert at [facility] ED on 3/10 w/ R sided weakness and word finding difficultly and was found to have B/L carotid stenosis (L>R). MRI brain showed multipe small acute L CVAs. On 3/13 he had worsening neurologic exam w/ lower BP in the 110s suspected secondary to hypoperfusion and cerebral edema. Vascular surgery planning for L CEA on Monday 3/17. Pt transferred to [facility] ICU in preparation for vascular procedure on  3/17.   Family/Caregiver Present Yes  (wife)   Cognition   Overall Cognitive Status Impaired   Arousal/Participation Alert   Attention Attends with cues to redirect   Orientation Level Oriented to person;Disoriented to place;Disoriented to time;Disoriented to situation   Following Commands Follows one step commands with increased time or repetition   Comments pleasant & cooperative; (+) aphasia   Subjective   Subjective Pt agreeable to PT/OT evals.   RUE Assessment   RUE Assessment   (refer to OT)   LUE Assessment   LUE Assessment   (refer to OT)   RLE Assessment   RLE Assessment X  (3+/5 grossly except hip 3-/5)   LLE Assessment   LLE Assessment WFL  (4+/5 grossly)   Coordination   Movements are Fluid and Coordinated 0   Coordination and Movement Description ataxic; impaired motor planning; impaired proprioception   Bed Mobility   Supine to Sit 4  Minimal assistance   Additional items Assist x 1;HOB elevated;Increased time required;Verbal cues   Sit to Supine 4  Minimal assistance   Additional items Assist x 1;Increased time required;Verbal cues   Additional Comments cues for techniques & safety   Transfers   Sit to Stand 3  Moderate assistance   Additional items Assist x 1;Increased time required;Verbal cues   Stand to Sit 3  Moderate assistance   Additional items Assist x 1;Increased time required;Verbal cues   Additional Comments w/ RW; cues for techniques & safety; require assistance to position &  R hand on RW   Ambulation/Elevation   Gait pattern R Foot drag;R Hemiparesis;Poor UE support;Wide ANTONI;Decreased foot clearance;Decreased R stance;Shuffling;Ataxia;Step to;Excessively slow   Gait Assistance 3  Moderate assist   Additional items Assist x 2;Verbal cues;Tactile cues   Assistive Device Rolling walker   Distance 2 lateral steps to HOB   Ambulation/Elevation Additional Comments unsteady & ataxic but no gross LOB noted; pt able to initially take 2 lateral steps to HOB however pt unable to further motor  plan &/or coordinate to continue to ambulate further despite max cues   Balance   Static Sitting Fair +   Dynamic Sitting Fair   Static Standing Poor +  (w/ RW)   Dynamic Standing Poor  (w/ RW)   Ambulatory Poor -  (w/ RW)   Endurance Deficit   Endurance Deficit Yes   Endurance Deficit Description weakness; impaired balance & coordination   Activity Tolerance   Activity Tolerance Treatment limited secondary to medical complications (Comment)   Medical Staff Made Aware RODERICK Pisano   Nurse Made Aware yes   Assessment   Prognosis Good   Problem List Decreased strength;Decreased endurance;Impaired balance;Decreased mobility;Decreased cognition;Impaired judgement;Decreased safety awareness;Decreased coordination   Assessment Pt. 59 y.o.male admitted for Acute cerebrovascular accident (CVA) due to stenosis of left carotid artery (HCC). Planned L CEA scheduled on 3/17. Pt referred to PT for mobility assessment & D/C planning. Please see above for information re: home set-up & PLOF as well as objective findings during PT assessment. PTA, pt's wife reports that pt was completely I w/o AD at baseline. On eval, pt functioning below baseline hence will continue skilled PT to improve function & safety. Pt require minAx1 for bed mobility; modAx1 for transfers & modAx2 for amb trial w/ RW + cues for techniques & safety. Gait deviations as above but no gross LOB noted. Pt require assistance to position &  R hand on RW. Pt able to initially take 2 lateral steps to HOB however pt unable to further motor plan &/or coordinate to continue to ambulate further despite max cues. Pt (+) aphasia, ataxia w/ poor motor planning & impaired proprioception. The patient's AM-PAC Basic Mobility Inpatient Short Form Raw Score is 11. A Raw score of less than or equal to 16 suggests the patient may benefit from discharge to post-acute rehabilitation services. Please also refer to the recommendation of the Physical Therapist for safe discharge  "planning. From PT standpoint, will recommend Level I (maximun resource intensity) rehab services at D/C. No SOB & dizziness reported t/o session. Nsg staff most recent vital signs as follows: /86   Pulse 72   Temp 98.2 °F (36.8 °C)   Resp 17   Ht 6' 2\" (1.88 m)   Wt 92.7 kg (204 lb 5.9 oz)   SpO2 95%   BMI 26.24 kg/m² . At end of session, pt back in bed in stable condition, call bell & phone in reach, bed alarm activated, all lines intact. Fall precautions reinforced w/ good understanding. CM to follow. Nsg staff to continue to mobilized pt (OOB in chair for all meals) as tolerated to prevent further decline in function. Will also recommend [facility] for daily OOB in chair as appropriate. Nsg notified. Co-eval was necessary to complete this PT eval for the pt's best interest given pt's medical acuity & complexity.   Goals   Patient Goals none stated 2* to impaired cognition & speech   STG Expiration Date 03/28/25   Short Term Goal #1 Goals to be met in 14 days; pt will be able to: 1) inc strength & balance by 1/2 grade to improve overall functional mobility & dec fall risk; 2) inc bed mobility to modified I for pt to be able to get in/OOB safely w/ proper techniques 100% of the time, to dec caregiver burden & safely function at home; 3) inc transfers to S for pt to transition safely from one surface to another w/o % of the time, to dec caregiver burden & safely function at home; 4) inc amb w/ RW approx. >150' w/ minAx1 for pt to ambulate household distances w/o any % of the time, to dec caregiver burden & safely function at home; 5) negotiate stairs w/ minAx1 for inc safety during stair mgt inside/outside of home & dec caregiver burden; 6) pt/caregiver ed   PT Treatment Day 0   Plan   Treatment/Interventions Functional transfer training;LE strengthening/ROM;Elevations;Therapeutic exercise;Endurance training;Patient/family training;Bed mobility;Gait training;Spoke to nursing;OT   PT " Frequency 4-6x/wk   Discharge Recommendation   Rehab Resource Intensity Level, PT I (Maximum Resource Intensity)   Equipment Recommended Walker   Walker Package Recommended Wheeled walker   AM-PAC Basic Mobility Inpatient   Turning in Flat Bed Without Bedrails 3   Lying on Back to Sitting on Edge of Flat Bed Without Bedrails 3   Moving Bed to Chair 1   Standing Up From Chair Using Arms 2   Walk in Room 1   Climb 3-5 Stairs With Railing 1   Basic Mobility Inpatient Raw Score 11   Basic Mobility Standardized Score 30.25   [facility] Highest Level Of Mobility   [facility] Goal 4: Move to chair/commode   -[facility] Achieved 5: Stand (1 or more minutes)   End of Consult   Patient Position at End of Consult Supine;Bed/Chair alarm activated;All needs within reach   End of Consult Comments Pt in stable condition. All needs in reach. All lines intact. Bed alarm activated.   Hx/personal factors: co-morbidities, inaccessible home, mutliple lines, telemetry, use of AD, dec cognition, fall risk, and assist w/ ADL's  Examination: dec mobility, dec balance, dec endurance, dec amb, risk for falls, dec cognition  Clinical: unpredictable (ongoing medical status, abnormal lab values, and risk for falls)  Complexity: high    Adelfo Mane

## 2025-03-14 NOTE — ASSESSMENT & PLAN NOTE
DX: Multifocal strokes primarily L ICA territory suggesting symptomatic L ICA stenosis  Imaging  CTA head 3/10: Worsening severe stenosis in the proximal left ICA, no significant intracranial stenosis or LVO  CT head 3/10: No LVO   MRI brain 3/10: Multiple small scattered acute infarcts in the left cerebral hemisphere  CTA H/N 3/13 scattered left hemispheric infarctions  MRI brain 3/13: Multiple small foci of acute/recent ischemia in the left MCA territory   VAS carotid: Right< 50% stenosis, left > 70 stenosis of ICA  Developed worsening neurological exam on 3/13 due to hypoperfusion and cerebral edema  Transferred to [facility] ICU for vasopressor support to target MAP > 100  S/p ASA/Plavix load, Heparin gtt ACS low initiated  Lipitor 40mg daily   Neuro checks- q4hrs  Neurology and vascular surgery following  Plan for left CEA on 3/17  Permissive hypertension > 100, use princess as needed  PT/OT/Speech eval

## 2025-03-14 NOTE — QUICK NOTE
Duplicate order.  Please see prior consult note completed by Dr. Serenity Glass and attested by Dr. Burgos on 3/10/2025

## 2025-03-14 NOTE — OCCUPATIONAL THERAPY NOTE
Occupational Therapy Evaluation     Patient Name: Ez Dsouza  Today's Date: 3/14/2025  Problem List  Principal Problem:    Acute cerebrovascular accident (CVA) due to stenosis of left carotid artery (HCC)  Active Problems:    Primary hypertension    Hyperlipidemia    Past Medical History  Past Medical History:   Diagnosis Date    Hypertension      Past Surgical History  Past Surgical History:   Procedure Laterality Date    KNEE SURGERY  2008    MOLE REMOVAL  2024    Back         03/14/25 0843   OT Last Visit   OT Visit Date 03/14/25   Note Type   Note type Evaluation   Additional Comments greeted in supine and agreeable, wife at bedside.   Pain Assessment   Pain Assessment Tool 0-10   Pain Score No Pain   Restrictions/Precautions   Weight Bearing Precautions Per Order No   Other Precautions Cognitive;Chair Alarm;Bed Alarm;Multiple lines;Fall Risk;Telemetry  (word finding difficulty)   Home Living   Type of Home House   Home Layout Two level;Bed/bath upstairs;Stairs to enter with rails  (2 JAZMÍN)   Bathroom Shower/Tub Tub/shower unit   Bathroom Toilet Standard   Bathroom Equipment   (no DME)   Home Equipment   (no DME)   Prior Function   Level of [place] Independent with ADLs;Independent with functional mobility;Independent with IADLS   Lives With Spouse   Receives Help From Family   IADLs Independent with driving;Independent with meal prep;Independent with medication management   Falls in the last 6 months 1 to 4   Vocational Full time employment   Lifestyle   Service to Others    ADL   Where Assessed Edge of bed   Eating Assistance 4  Minimal Assistance   Grooming Assistance 4  Minimal Assistance   UB Bathing Assistance 4  Minimal Assistance   LB Bathing Assistance 3  Moderate Assistance   UB Dressing Assistance 4  Minimal Assistance   LB Dressing Assistance 2  Maximal Assistance   Toileting Assistance  3  Moderate Assistance   Bed Mobility   Supine to Sit 4  Minimal assistance   Additional items  Assist x 1;HOB elevated;Bedrails   Sit to Supine 4  Minimal assistance   Additional items Assist x 1;HOB elevated;Bedrails   Transfers   Sit to Stand 3  Moderate assistance   Additional items Assist x 1;Increased time required;Verbal cues   Stand to Sit 3  Moderate assistance   Additional items Assist x 1;Increased time required;Verbal cues   Additional Comments cues for safety and best tech. RW for support.   Functional Mobility   Functional Mobility 3  Moderate assistance   Additional Comments Ax2, RW, lateral side steps. difficultly with initiation and coordination to step with R foot.   Additional items Rolling walker   Balance   Static Sitting Fair +   Dynamic Sitting Fair -   Static Standing Poor +   Dynamic Standing Poor   Ambulatory Poor   Activity Tolerance   Activity Tolerance Patient tolerated treatment well;Other (Comment)  (decreased coordination)   Medical Staff Made Aware PT Adelfo   Nurse Made Aware RN   RUE Assessment   RUE Assessment X  (weakness noted. 3-/5 MMT. decreased coordination)   LUE Assessment   LUE Assessment WFL   Proprioception   Proprioception Partial deficits in the RUE;Partial deficits in the RLE   Vision - Complex Assessment   Additional Comments slight right neglect.   Cognition   Overall Cognitive Status Impaired   Arousal/Participation Alert;Responsive;Cooperative   Attention Attends with cues to redirect   Orientation Level Oriented to person;Disoriented to place;Disoriented to time;Oriented to situation  (stated sept 1999)   Memory Decreased short term memory;Decreased recall of recent events;Decreased recall of precautions   Following Commands Follows one step commands with increased time or repetition   Comments difficulty with word finding and task initiation.   Assessment   Limitation Decreased ADL status;Decreased UE ROM;Decreased UE strength;Decreased Safe judgement during ADL;Decreased cognition;Decreased endurance;Decreased sensation;Decreased self-care trans;Decreased  "high-level ADLs   Prognosis Good   Assessment Ez Dsouza is a 59 y.o. male seen for OT evaluation s/p admit to [facility] on 3/13/2025 w/ Acute cerebrovascular accident (CVA) due to stenosis of left carotid artery (HCC). MRI 3/13/25: \"Multiple small foci of acute/recent ischemia in the left MCA territory. These predominantly appear similar in the interim with slight worsening involvement of the CAMRYN to MCA watershed territory on the left as well as the left caudate head. Involvement of   the right parietal lobe also appears new/progressed in the interim.\"  Comorbidities affecting pt's functional performance at time of assessment include: HTN and smokes . Pt with active OT orders and activity orders for Up and OOB as tolerated. Personal factors affecting pt at time of IE include:JAZMÍN home environment, steps within home environment, difficulty performing ADLs, difficulty performing IADLs, difficulty performing transfers/mobility, fall risk , and functional decline . Prior to admission, pt lives with wife in a 2 level home with 2 JAZMÍN. I with ADLS, IADLs and mobility with no device. No DME. Drives. Works. Upon evaluation: Pt currently requires Jasmine for UB ADLs, maxA for LB ADLs, modA for toileting, Jasmine for bed mobility, Jasmine- modA for functional transfers, and modAx2 RW  mobility 2* the following deficits impacting occupational performance:weakness, decreased strength , decreased balance, decreased activity tolerance, and impaired GMC. Pt to benefit from continued skilled OT tx while in the hospital to address deficits as defined above and maximize level of functional independence w ADL's and functional mobility. Occupational Performance areas to address include: grooming, bathing/shower, toilet hygiene, dressing, functional mobility, community mobility, and clothing management. From OT standpoint, recommendation at time of d/c would be level 1 resources. OT to follow pt on caseload 3-5x/wk.   Goals   STG Time Frame 3-5 "   Short Term Goal #1 Pt will increase RUE strength by 1MM grade via AROM/AAROM/PROM exercises to increase independence in ADLs and transfers   Short Term Goal #2 Pt will complete bed mobility at a Mod I level w/ G balance/safety demonstrated to decrease caregiver assistance required   Short Term Goal  Pt will complete UB/ LB dressing/self care w/ mod I using adaptive device and DME as needed   LTG Time Frame 10-14   Long Term Goal #1 Pt will complete toileting w/ mod I w/ G hygiene/thoroughness using DME as needed   Long Term Goal #2 Pt will improve functional transfers to Mod I on/off all surfaces using DME as needed w/ G balance/safety   Long Term Goal Pt will improve functional mobility during ADL/IADL/leisure tasks to Mod I using DME as needed w/ G balance/safety   Plan   Treatment Interventions ADL retraining;Functional transfer training;Endurance training;Cognitive reorientation;Patient/family training;Equipment evaluation/education;Compensatory technique education;Energy conservation;Activityengagement;Fine motor coordination activities   Goal Expiration Date 03/21/25   OT Treatment Day 1   OT Frequency 3-5x/wk   Discharge Recommendation   Rehab Resource Intensity Level, OT I (Maximum Resource Intensity)  (would benefit from acute level rehab services)   AM-PAC Daily Activity Inpatient   Lower Body Dressing 2   Bathing 2   Toileting 2   Upper Body Dressing 3   Grooming 3   Eating 3   Daily Activity Raw Score 15   Daily Activity Standardized Score (Calc for Raw Score >=11) 34.69   AM-PAC Applied Cognition Inpatient   Following a Speech/Presentation 3   Understanding Ordinary Conversation 2   Taking Medications 1   Remembering Where Things Are Placed or Put Away 2   Remembering List of 4-5 Errands 1   Taking Care of Complicated Tasks 1   Applied Cognition Raw Score 10   Applied Cognition Standardized Score 24.98   Muriel Wharton, OT

## 2025-03-14 NOTE — DISCHARGE SUMMARY
Discharge Summary - Critical Care/ICU   Name: Ez Dsouza 59 y.o. male I MRN: [id number]  Unit/Bed#: ICU 16 I Date of Admission: 3/10/2025   Date of Service: 3/13/2025 I Hospital Day: 3    Admission Date: 3/10/2025 0720  Discharge Date: 25  Admitting Diagnosis: Primary hypertension [I10]  CVA (cerebral vascular accident) (HCC) [I63.9]  Stroke-like symptoms [R29.90]  Symptoms of cerebrovascular accident (CVA) [R09.89]  Acute cerebrovascular accident (CVA) due to stenosis of left carotid artery (HCC) [I63.232]    Discharge Diagnosis:   Acute cerebrovascular accident (CVA) due to stenosis of left carotid artery (HCC)  DX:  Multifocal strokes primarily in L ICA territory suggesting symptomatic L ICA (Carotid US significant for >70% stenosis of L ICA), but also in R hemisphere and possibly   ADMISSION: 60 yo M admitted to [facility] [id number] for stroke like sxs  DEFICITS  BASELINE: None  [id number]: 3 out of 5 RUE Weakness, encephalopathy (confusion vs expressive aphagia)  63HSJ66: Symptoms resolved   36FYI78: Worsening RUE weakness and expressive aphasia: Position dependent: Not a new stroke, recrudescence 2/2 hypoperfusion .   58NEB17: ICU 15:20: No expressive aphasia. Motor: RUE 3/5 RLE 4/5           Lab Results   Component Value Date     CHOLESTEROL 188 03/10/2025     TRIG 171 (H) 03/10/2025     HDL 29 (L) 03/10/2025     LDLCALC 125 (H) 03/10/2025            Lab Results   Component Value Date     HGBA1C 6.9 (H) 03/10/2025            Lab Results   Component Value Date     HMA7OYCJKCSS 0.543 03/10/2025         TELE: w/o Afib/flutter.     [id number]  CTA head and neck w wo contrast: No acute intracranial hemorrhage, edema, or mass effect. Scattered left hemispheric infarctions as above, seen better on recent MRI. 2.  No new large vessel occlusion in the head or neck. 3.  Unchanged high-grade stenosis proximal left ICA. Stable pedunculated plaque and/or thrombus in the proximal left ICA lumen which may be the source  of distal embolization.   [id number]  MRI brain wo contrast  Multiple small scattered acute infarcts in the left cerebral hemisphere. Several of the smaller infarcts are hyperintense on FLAIR. Tiny focus of left parietal occipital petechial hemorrhage. Tiny focus of mild DWI signal in the right centrum semiovale may also present recent infarct. No acute space-occupying hematoma. I personally discussed this study with Leo Burgos on 3/10/2025 8:28 AM.   CT stroke alert brain No acute large vessel distribution infarct, hemorrhage or mass effect New age-indeterminate lacunar infarcts in the left centrum semiovale. Findings were directly discussed with Leo Burgos at 7:49 a.m. Workstation performed: [id number]   CTA stroke alert (head/neck) Worsening severe stenosis in the proximal left internal carotid artery. No significant stenosis of the cervical right carotid or vertebral arteries No significant intracranial stenosis, large vessel occlusion or aneurysm. Findings were directly discussed with Leo Burgos at 7:49 a.m.   VAS Carotid   RIGHT:: <50% stenosis noted in the internal carotid artery.   LEFT: >70% stenosis noted in the internal carotid artery.  Echo complete w/ contrast if indicated  LV: EF 60% Diastolic func mildly abnormal Grade 1 relaxation  MV: trace MR  No change from [id number]        TNK: Not TNK/thrombectomy canadate.       PLAN:   NEURO ON BOARD  BP GOAL: Min , MAP>100  Levophed gtt   Trendelenburg, as pt becomes symptomatic when raised  Can slowly increase HOB angle so long as neuro exam stable.  Atorvastatin 40mg QHS  Glucose <180  Neuro checks- Every 1 hour x 4 hours, then every 2 hours x 4 hours, then every 4 hours x 72 hours     Stat CT Head for change in neuro status.  Monitor on telemetry  S/P ASA Clopidogrel load    HOLD: Amlodipine, carvedilol, hydrochlorothiazide, spironolactone in setting of permissive HTN.   Vascular on board:   Continue Clopidogrel and heprin gtt  Transfer  to [facility] for surgery [id number]     Pre-operative cardiovascular examination  Transfer to [facility] for vascular surgery     Primary hypertension  PLAN  Amlodipine, carvedilol, hydrochlorothiazide, spironolactone on hold for permissive HTN.     Hyperlipidemia  Atorvastatin 40mg PO daily     HPI: Ez Dsouza is a 59 y.o. who presented to [facility] ED [id number] d/t right sided weakness, numbness, dysarthria.  Stroke alert in ED. MRI showed mult small scattered infarct in left cerebral hemisphere w/ tiny focus of left parietal occipital petechial hemorrhages. Started on stroke pathway and admitted to [facility]. Patient was improving with resolution of symptoms by [id number] however on 56CHP59 patient developed worsening of right sided weakness, numbness and expressive aphagia.  Symptoms were position dependant suggesting they were more likley perfusion dependant defects of the previous stroke and not a new stroke.  Due to need to keep MAP >100 patient was transferred to ICU for pressor support. Scheduled for carotid intervention with vascular surgery on 3/17, requiring transfer to [facility].     Procedures Performed:   Orders Placed This Encounter   Procedures    ED ECG Documentation Only       Summary of Hospital Course: 59-year-old male presented to emergency department due to right-sided weakness and dysarthria. Stroke alert in ED. MRI confirmed multiple small scattered infarct in left cerebral hemisphere with tiny focus of left parietal occipital petechial hemorrhages. Left carotid stenosis more than 70% noted. Vascular surgery was consulted in ED. Pt admitted under stroke pathway. Received aspirin and Plavix load. On initial exam mild dysarthria. Mild right upper extremity weakness 4 out of 5. Pt also seen by neurology, recommended permissive HTN for 24 hours, ASA and Plavix daily, atorvastatin 40 mg QHS, telemetry. Cardio consulted, cleared patient for potential surgery. Vascular surgery recommended carotid intervention within 2  weeks of stroke, neuro cleared pt for CEA after hospital day 3.  Transferred to ICU afternoon of 3/13 for pressor support to maintain MAP>100. Requiring transfer to [facility] for vascular intervention, transferred night of 3/13.     Significant Findings, Care, Treatment and Services Provided:   MRI brain wo contrast  Result Date: 3/13/2025  Impression: Multiple small foci of acute/recent ischemia in the left MCA territory. These predominantly appear similar in the interim with slight worsening involvement of the CAMRYN to MCA watershed territory on the left as well as the left caudate head. Involvement of  the right parietal lobe also appears new/progressed in the interim. No large territory infarct or hemorrhage seen. Given the multiple vascular territories, consider a central embolic source.    CTA head and neck w wo contrast  Result Date: 3/13/2025  Impression: 1.  No acute intracranial hemorrhage, edema, or mass effect. Scattered left hemispheric infarctions as above, seen better on recent MRI. 2.  No new large vessel occlusion in the head or neck. 3.  Unchanged high-grade stenosis proximal left ICA. Stable pedunculated plaque and/or thrombus in the proximal left ICA lumen which may be the source of distal embolization.     MRI brain wo contrast  Result Date: 3/10/2025  Impression: Multiple small scattered acute infarcts in the left cerebral hemisphere. Several of the smaller infarcts are hyperintense on FLAIR. Tiny focus of left parietal occipital petechial hemorrhage. Tiny focus of mild DWI signal in the right centrum semiovale may also present recent infarct. No acute space-occupying hematoma.    CT chest abdomen pelvis w contrast  Result Date: 3/10/2025  Impression: 1.  No acute pathology within the chest, abdomen, or pelvis. 2.  Approximately 9 mm nonobstructing calculus within the left renal pelvis, previously within a left upper pole calyx.    CT stroke alert brain  Result Date: 3/10/2025  Impression: No acute  large vessel distribution infarct, hemorrhage or mass effect New age-indeterminate lacunar infarcts in the left centrum semiovale.    CTA stroke alert (head/neck)  Result Date: 3/10/2025  Impression: Worsening severe stenosis in the proximal left internal carotid artery. No significant stenosis of the cervical right carotid or vertebral arteries No significant intracranial stenosis, large vessel occlusion or aneurysm.     Complications: None    Condition at Discharge: fair       Discharge instructions/Information to patient and family:   See After Visit Summary (AVS) for information provided to patient and family.      Provisions for Follow-Up Care:  See after visit summary for information related to follow-up care and any pertinent home health orders.      PCP: Jakob Higgins MD    Disposition:  [facility]    Planned Readmission: Yes    Future Encounters        3/17/2025  8:15 AM (60 min) Awilda    VAS INTR-OP US CEA    AL Car NI ([facility])    AL VASCULAR 2     Linked requests:                      3/17/2025 11:35 AM Pend Preadm    AL OP Room, OR POOL     Case [id number] 3/17/2025 11:35 AM (245 min)  Awilda    ENDARTERECTOMY ARTERY CAROTID    Joss Calderón MD - Primary    AL Main OR                        Discharge Medications:  See after visit summary for reconciled discharge medications provided to patient and family.

## 2025-03-14 NOTE — CONSULTS
Consultation - General Cardiology  Ez Dsouza 59 y.o. male MRN: [id number]  Unit/Bed#: ICU 13 Encounter: [id number]        History of Present Illness   Physician Requesting Consult: Sunil Bauman MD  Reason for Consult / Principal Problem: CVA with elevated rope score    HPI: Ez Dsouza is a 59 y.o. year old male who presented to  [facility] with right-sided weakness starting at 6 AM on 3/10.  He has a past medical history significant for nonobstructive CAD, resistant HTN, HLD, tobacco use.  Imaging showed multiple small scattered acute infarcts in the left cerebral hemisphere as well as proximal left ICA severe stenosis.  Already takes aspirin and this was continued and was loaded with Plavix and started on a heparin drip.  Vascular surgery was consulted for severe carotid stenosis plan for left carotid endarterectomy 3/17/2025 and was transferred to [facility] for this.  He was transferred to the ICU at [facility] and has been under continued permissive hypertension with all antihypertensives held for goal SBP greater than 160.  Neurology requested CHANCE given elevated risk score and desired to rule out central embolic source.      Review of Systems  Review of system was conducted and was negative except for as stated in the HPI.      Historical Information   Past Medical History:   Diagnosis Date    Hypertension      Past Surgical History:   Procedure Laterality Date    KNEE SURGERY  2008    MOLE REMOVAL  2024    Back     Social History     Substance and Sexual Activity   Alcohol Use Yes    Alcohol/week: 2.0 standard drinks of alcohol    Types: 2 Standard drinks or equivalent per week    Comment: social     Social History     Substance and Sexual Activity   Drug Use Not Currently    Types: Marijuana     Social History     Tobacco Use   Smoking Status Every Day    Current packs/day: 0.50    Average packs/day: 0.5 packs/day for 15.0 years (7.5 ttl pk-yrs)    Types: Cigars, Cigarettes   Smokeless  "Tobacco Never     Family History: Family history non-contributory    Meds/Allergies   Hospital Medications:   Current Facility-Administered Medications:     acetaminophen (TYLENOL) tablet 650 mg, Q6H PRN    [Held by provider] aspirin (ECOTRIN LOW STRENGTH) EC tablet 81 mg, Daily    atorvastatin (LIPITOR) tablet 40 mg, Daily With Dinner    chlorhexidine (PERIDEX) 0.12 % oral rinse 15 mL, Q12H TIEN    [Held by provider] clopidogrel (PLAVIX) tablet 75 mg, Daily    heparin (porcine) 25,000 units in 0.45% NaCl 250 mL infusion (premix), Titrated, Last Rate: 15.1 Units/kg/hr (03/14/25 0728)    heparin (porcine) injection 2,000 Units, Q6H PRN    heparin (porcine) injection 4,000 Units, Q6H PRN    labetalol (NORMODYNE) injection 10 mg, Q6H PRN    ondansetron (ZOFRAN) injection 4 mg, Q6H PRN  Home Medications:   Medications Prior to Admission:     amLODIPine (NORVASC) 5 mg tablet    ASPIRIN 81 PO    atorvastatin (LIPITOR) 40 mg tablet    carvedilol (COREG) 25 mg tablet    hydroCHLOROthiazide 25 mg tablet    spironolactone (ALDACTONE) 25 mg tablet    No Known Allergies    Objective   Vitals: Blood pressure 141/86, pulse 72, temperature 98.2 °F (36.8 °C), resp. rate 17, height 6' 2\" (1.88 m), weight 92.7 kg (204 lb 5.9 oz), SpO2 95%.  Orthostatic Blood Pressures      Flowsheet Row Most Recent Value   Blood Pressure 141/86 filed at 03/14/2025 0930   Patient Position - Orthostatic VS Lying filed at 03/14/2025 0400              Invasive Devices       Peripheral Intravenous Line  Duration             Peripheral IV 03/13/25 Distal;Left;Upper;Ventral (anterior) Arm <1 day    Peripheral IV 03/13/25 Distal;Left;Ventral (anterior) Forearm <1 day    Peripheral IV 03/13/25 Left;Proximal;Upper;Ventral (anterior) Arm <1 day                    Physical Exam  GEN: Ez Dsouza appears well, alert and oriented x 3, pleasant and cooperative   HEENT:  Normocephalic, atraumatic, anicteric, moist mucous membranes  NECK: No JVD or carotid bruits "   HEART: regular rhythm, regular rate, normal S1 and S2, no murmurs, clicks, gallops or rubs   LUNGS: Clear to auscultation bilaterally; no wheezes, rales, or rhonchi; respiration nonlabored   ABDOMEN:  Normoactive bowel sounds, soft, no tenderness, no distention  EXTREMITIES: peripheral pulses palpable; no edema  NEURO: Dysarthria and word finding difficulties.  Right-sided weakness.  SKIN:  Dry, intact, warm to touch    Lab Results: I have personally reviewed pertinent lab results.    Results from last 7 days   Lab Units 03/10/25  1123 03/10/25  0915 03/10/25  0727   HS TNI 0HR ng/L  --   --  7   HS TNI 2HR ng/L  --  6  --    HS TNI 4HR ng/L 7  --   --          Results from last 7 days   Lab Units 03/14/25  0453 03/13/25  0457 03/12/25  0515   POTASSIUM mmol/L 3.8 3.6 3.6   CO2 mmol/L 25 24 24   CHLORIDE mmol/L 106 106 105   BUN mg/dL 22 26* 26*   CREATININE mg/dL 1.33* 1.28 1.41*     Results from last 7 days   Lab Units 03/14/25  0453 03/12/25  0515 03/11/25  0609   HEMOGLOBIN g/dL 12.4 13.5 13.3   HEMATOCRIT % 36.7 39.9 39.2   PLATELETS Thousands/uL 263 261 255     Results from last 7 days   Lab Units 03/10/25  1123 03/10/25  0727   TRIGLYCERIDES mg/dL 171*  --    HDL mg/dL 29*  --    LDL CALC mg/dL 125*  --    HEMOGLOBIN A1C %  --  6.9*         Imaging: Results Review Statement: No pertinent imaging studies reviewed.      Telemetry:   Normal sinus rhythm    EKG:   Date: 3/10/25  Interpretation: normal sinus rhythm      ECHO:  No results found for this or any previous visit.    Results for orders placed during the hospital encounter of 03/10/25    Echo complete w/ contrast if indicated    Interpretation Summary    Left Ventricle: Left ventricular cavity size is normal. Wall thickness is upper normal. The left ventricular ejection fraction is 60%. Systolic function is normal. Wall motion is normal. Diastolic function is mildly abnormal, consistent with grade I (abnormal) relaxation.    Mitral Valve: There is mild  annular calcification. There is trace regurgitation.    Prior TTE study available for comparison. Prior study date: 9/13/2024. No significant changes noted compared to the prior study.        Assessment & Plan     Multiple small left cerebral hemisphere and right parietal lobe infarcts  Presenting with right sided deficits and imaging revealed right and left sided infarcts on MRI. CTA with severe left ICA stenosis. Loaded with aspirin and plavix and maintained on these. Initiated on heparin for ICA stenosis. Has persistent dysarthria and left sided weakness. Worsened symptoms 3/13. Has been maintained on persistent HTN protocol. Transferred to [facility] for possible carotid endarterectomy 3/17.   Multiple vascular terrify infarct and ROPE score 4.  Etiology: high suspicion for carotid source however with multiple territories affected  cardiomegalic source should be ruled out  Plan:  - continue ASA, Plavix, and high intensity statin  - heparin gtt as per vascular surgery for left ICA stenosis  - neurology and vascular surgery following  - will order echo with bubble study for today, based on TTE results will consider future CHANCE, if CHANCE still requested by neurology, this would likely occur after surgery  - consider outpatient rhythm monitoring on discharge.    Severe Left ICA stenosis  See above    Resistant hypertension  Resistant HTN at home on multiple agents including carvedilol 25 twice daily, hydrochlorothiazide 25 daily, spironolactone 25 daily, amlodipine 5 mg daily.  -Held in setting of permissive hypertension    HLD  Atorvastatin 40 mg daily.    Nonobstructive CAD with elevated calcium score  Elevated calcium score 696.  No symptoms of ischemia.  - Aspirin and statin as above    Case discussed and reviewed with Dr. Aguila who agrees with my assessment and plan.    Thank you for involving us in the care of your patient.      Sean Du,   Cardiology Fellow    PGY-5    ==========================================================================================    Epic/ Allscripts/Care Everywhere records reviewed    ** Please Note: Fluency DirectDictation voice to text software may have been used in the creation of this document. **

## 2025-03-14 NOTE — PROGRESS NOTES
Progress Note - Vascular Surgery   Name: Ez Dsouza 59 y.o. male I MRN: [id number]  Unit/Bed#: ICU 13 I Date of Admission: 3/13/2025   Date of Service: 3/14/2025 I Hospital Day: 1    Assessment & Plan  Acute cerebrovascular accident (CVA) due to stenosis of left carotid artery (HCC)  59-year-old male with hx of HTN, CAD, HLD, tobacco abuse, hx of fall '24 w/ rib fxs and R wrist fx, woke up with R side weakness and presented to [facility] by EMS. Patient transferred to [facility] overnight for tentative L carotid enterectomy on 3/17/25 with .    Results   Carotid DU:   R ICA <50% stenosis,   L ICA >70% stenosis w/ pilar 259/82 and ratio 3.2    3/10 MRI brain: multiple small acute CVAs in the L periventricular, subcotical, frontal and parietal/occipital lobes; small petechial hemorrhage    CTA neck: R ICA midl stenosis; L ICA w/ ~60-70% stenosis w/ soft plaque and increased irregularity compared to prior study in '24; there is a long area of irregularity, about 3.5cm in length    3/13 MRI brain: multiple small foci of ischemia in L MCA territory. CAMRYN to MCA watershed territory on the L as well as L caudate. Involvement of R parietal lobe. Consider central embolic source.   3/13 CTA head and neck: Scattered L hemispheric infarction. Unchanged L ICA high grade stenosis.     Labs 3/14/25  Cr 1.33/ GFR 57  Hg 12.4/ WBC 8.56    Plan  -Pt presented to [facility] as stroke alert with R sided weakness, confusion and altered speech found to have L ICA stenosis (70-99%) with soft plaque on CTA. Tentative plan for L CEA on 3/17 with . Pt transferred overnight to [facility] for procedure on Monday and ICU monitoring.   -ICU monitoring for vasopressor support with goals to keep MAP >100  -Neurology following, appreciate recs  -Cardiology following, no further workup recommended at this time.   -ASA/Plavix loaded, now on heparin gtt  -Lipitor 40mg daily  -Neuro checks  -Will D/W    -Will D/W ICU team  Primary hypertension  -Permissive hypertension. Anti-hypertensive medications on hold.   -Managed by ICU team    24 Hour Events : Reports of worsening right sided weakness yesterday prompting repeat imaging.   Subjective : Pt is resting in bed with wife at the bedside.     Objective :  Temp:  [97.6 °F (36.4 °C)-98.8 °F (37.1 °C)] 98.2 °F (36.8 °C)  HR:  [51-79] 72  BP: (119-184)/() 141/86  Resp:  [15-26] 17  SpO2:  [93 %-97 %] 95 %  O2 Device: None (Room air)     I/O         03/12 0701  03/13 0700 03/13 0701  03/14 0700 03/14 0701  03/15 0700    P.O.   0    I.V. (mL/kg)  63.9 (0.7) 51.1 (0.6)    Total Intake(mL/kg)  63.9 (0.7) 51.1 (0.6)    Urine (mL/kg/hr)  150 175 (0.5)    Total Output  150 175    Net  -86.1 -123.9                   Physical Exam  Vitals reviewed.   Constitutional:       General: He is not in acute distress.     Appearance: Normal appearance. He is not ill-appearing.   HENT:      Head: Normocephalic and atraumatic.   Cardiovascular:      Rate and Rhythm: Normal rate and regular rhythm.      Pulses:           Radial pulses are 2+ on the right side and 2+ on the left side.        Dorsalis pedis pulses are 2+ on the right side and 2+ on the left side.        Posterior tibial pulses are 2+ on the right side and 2+ on the left side.      Heart sounds: No murmur heard.  Pulmonary:      Effort: Pulmonary effort is normal. No respiratory distress.      Breath sounds: Normal breath sounds.   Musculoskeletal:      Right lower leg: No edema.      Left lower leg: No edema.   Skin:     General: Skin is warm and dry.   Neurological:      Mental Status: He is alert.      Motor: Weakness (R arm and leg) present.      Coordination: Coordination abnormal.      Comments: Right arm and right leg weakness  Slight R facial droop  Pt is aphasic   Psychiatric:         Speech: He is noncommunicative. Speech is delayed.         Behavior: Behavior is slowed.         Cognition and Memory:  "Cognition is impaired.             Lab Results: I have reviewed the following results:  CBC with diff:   Lab Results   Component Value Date    WBC 8.56 03/14/2025    HGB 12.4 03/14/2025    HCT 36.7 03/14/2025    MCV 90 03/14/2025     03/14/2025    RBC 4.06 03/14/2025    MCH 30.5 03/14/2025    MCHC 33.8 03/14/2025    RDW 13.7 03/14/2025    MPV 10.1 03/14/2025    NRBC 0 03/14/2025   ,   BMP/CMP:  Lab Results   Component Value Date    SODIUM 138 03/14/2025    K 3.8 03/14/2025     03/14/2025    CO2 25 03/14/2025    CO2 29 06/22/2024    BUN 22 03/14/2025    CREATININE 1.33 (H) 03/14/2025    GLUCOSE 87 06/22/2024    CALCIUM 8.7 03/14/2025    AST 29 06/26/2024    ALT 29 06/26/2024    ALKPHOS 98 06/26/2024    EGFR 57 03/14/2025   ,   Lipid Panel: No results found for: \"CHOL\",   Coags:   Lab Results   Component Value Date    PTT 52 (H) 03/14/2025    INR 1.14 03/13/2025   ,   Blood Culture: No results found for: \"BLOODCX\",   Urinalysis:   Lab Results   Component Value Date    COLORU Light Yellow 03/10/2025    CLARITYU Clear 03/10/2025    SPECGRAV 1.043 (H) 03/10/2025    PHUR 5.5 03/10/2025    LEUKOCYTESUR Negative 03/10/2025    NITRITE Negative 03/10/2025    GLUCOSEU Negative 03/10/2025    KETONESU Negative 03/10/2025    BILIRUBINUR Negative 03/10/2025    BLOODU Moderate (A) 03/10/2025   ,   Urine Culture: No results found for: \"URINECX\",   Wound Culure: No results found for: \"WOUNDCULT\"    Imaging Results Review: I reviewed radiology reports from this admission including: CT head, MRI brain, and Ultrasound(s).  Other Study Results Review: Pathology reports reviewed.    VTE Prophylaxis: VTE covered by:  heparin (porcine), Intravenous, 15.1 Units/kg/hr at 03/14/25 0728  heparin (porcine), Intravenous, 2,000 Units at 03/14/25 0557  heparin (porcine), Intravenous     "

## 2025-03-14 NOTE — UTILIZATION REVIEW
NOTIFICATION OF ADMISSION DISCHARGE   This is a Notification of Discharge from [facility]. Please be advised that this patient has been discharge from our facility. Below you will find the admission and discharge date and time including the patient’s disposition.   UTILIZATION REVIEW CONTACT:  Audra Sepulveda  Utilization   Network Utilization Review Department  Phone: [phone] x carefully listen to the prompts. All voicemails are confidential.  Email: [email or web address]     ADMISSION INFORMATION  PRESENTATION DATE: 3/10/2025  7:20 AM  OBERVATION ADMISSION DATE: 03/10/2025 0856  INPATIENT ADMISSION DATE: 3/10/25  9:21 AM   DISCHARGE DATE: 3/13/2025  9:45 PM   DISPOSITION:[facility] Utilization Review Department  ATTENTION: Please call with any questions or concerns to [phone] and carefully listen to the prompts so that you are directed to the right person. All voicemails are confidential.   For Discharge needs, contact Care Management DC Support Team at [phone] opt. 2  Send all requests for admission clinical reviews, approved or denied determinations and any other requests to dedicated fax number below belonging to the campus where the patient is receiving treatment. List of dedicated fax numbers for the Facilities:  FACILITY NAME UR FAX NUMBER   ADMISSION DENIALS (Administrative/Medical Necessity) [phone]   DISCHARGE SUPPORT TEAM ([facility]) [phone]   PARENT CHILD HEALTH (Maternity/NICU/Pediatrics) [phone]   [facility] [phone]   [facility] [phone]   [facility] [phone]   [facility] [phone]   [facility] [phone]   [facility] [phone]   [facility] [phone]   [facility]  [place] [phone]   [facility] [phone]   [facility] [phone]   [facility] [phone]   [facility] [phone]

## 2025-03-14 NOTE — H&P
H&P - Critical Care/ICU   Name: Ez Dsouza 59 y.o. male I MRN: [id number]  Unit/Bed#: ICU 13 I Date of Admission: 3/13/2025   Date of Service: 3/13/2025 I Hospital Day: 0       Assessment & Plan  Acute cerebrovascular accident (CVA) due to stenosis of left carotid artery (HCC)  DX: Multifocal strokes primarily L ICA territory suggesting symptomatic L ICA stenosis  Imaging  CTA head 3/10: Worsening severe stenosis in the proximal left ICA, no significant intracranial stenosis or LVO  CT head 3/10: No LVO   MRI brain 3/10: Multiple small scattered acute infarcts in the left cerebral hemisphere  CTA H/N 3/13 scattered left hemispheric infarctions  MRI brain 3/13: Multiple small foci of acute/recent ischemia in the left MCA territory   VAS carotid: Right< 50% stenosis, left > 70 stenosis of ICA  Developed worsening neurological exam on 3/13 due to hypoperfusion and cerebral edema  Transferred to [facility] ICU for vasopressor support to target MAP > 100  S/p ASA/Plavix load, Heparin gtt ACS low initiated  Lipitor 40mg daily   Neuro checks- q4hrs  Neurology and vascular surgery following  Plan for left CEA on 3/17  Permissive hypertension > 100, use princess as needed  PT/OT/Speech eval   Primary hypertension  Holding , amlodipine, carvedilol, hydrochlorothiazide, spironolactone for permissive hypertension  Hyperlipidemia  Continue Lipitor  Disposition: Critical care    History of Present Illness   Ez Dsouza is a 58 y/o M hx HTN, CAD, HLD, tobacco abuse who presented stroke alert to [facility] ED on 3/10 w/ R sided weakness and word finding difficultly and was found to have b/l carotid stenosis (L>R). MRI brain showed multipe small acute L CVAs. On 3/13 he had worsening neurologic exam w/ lower BP in the 110s suspected secondary to hypoperfusion and cerebral edema. Started heparin gtt and ASA/Plavix load and he was transferred to critical care for pressors to maintain -160 for 24-48 hours. Vasopressors were never required,  he auto mapped for MAP > 100. Vascular surgery planning for L CEA on Monday 3/17. CHANCE ordered to rule out central embolic source of stroke. Transferred to [facility] ICU in preparation for procedure Monday and for MAP pushing with pressors if needed.     History obtained from chart review and the patient.  Review of Systems: Review of Systems   Respiratory: Negative.     Cardiovascular: Negative.    Gastrointestinal: Negative.    Neurological:  Positive for speech difficulty, weakness and numbness.       Historical Information   Past Medical History:  No date: Hypertension Past Surgical History:  2008: KNEE SURGERY  2024: MOLE REMOVAL      Comment:  Back   Current Outpatient Medications   Medication Instructions    amLODIPine (NORVASC) 5 mg, Daily    ASPIRIN 81 PO 81 mg, Daily    atorvastatin (LIPITOR) 40 mg, Oral, Daily    carvedilol (COREG) 25 mg, Oral, 2 times daily with meals    hydroCHLOROthiazide 25 mg, Oral, Daily    spironolactone (ALDACTONE) 25 mg, Oral, Daily    No Known Allergies   Social History     Tobacco Use    Smoking status: Every Day     Current packs/day: 0.50     Average packs/day: 0.5 packs/day for 15.0 years (7.5 ttl pk-yrs)     Types: Cigars, Cigarettes    Smokeless tobacco: Never   Vaping Use    Vaping status: Never Used   Substance Use Topics    Alcohol use: Yes     Alcohol/week: 2.0 standard drinks of alcohol     Types: 2 Standard drinks or equivalent per week     Comment: social    Drug use: Not Currently     Types: Marijuana    Family History   Problem Relation Age of Onset    Hypertension Mother     Hypertension Father     Heart disease Father           Objective :                   Vitals I/O      Most Recent Min/Max in 24hrs   Temp 98.8 °F (37.1 °C) Temp  Min: 97.6 °F (36.4 °C)  Max: 98.8 °F (37.1 °C)   Pulse   Pulse  Min: 51  Max: 79   Resp   Resp  Min: 14  Max: 26   BP   BP  Min: 114/79  Max: 184/102   O2 Sat   SpO2  Min: 94 %  Max: 97 %    No intake or output data in the 24 hours ending  03/13/25 2219    Diet NPO    Invasive Monitoring           Physical Exam   Physical Exam  Eyes:      Pupils: Pupils are equal, round, and reactive to light.   Skin:     General: Skin is warm and dry.      Capillary Refill: Capillary refill takes less than 2 seconds.   HENT:      Head: Normocephalic and atraumatic.      Mouth/Throat:      Comments: Tongue midline  Cardiovascular:      Rate and Rhythm: Normal rate and regular rhythm.      Pulses: Normal pulses.      Heart sounds: Normal heart sounds.   Musculoskeletal:      Right lower leg: No edema.      Left lower leg: No edema.   Abdominal:      Palpations: Abdomen is soft.      Tenderness: There is no abdominal tenderness.   Constitutional:       Appearance: He is well-developed and well-nourished.   Pulmonary:      Effort: Pulmonary effort is normal.      Breath sounds: Normal breath sounds.   Neurological:      Mental Status: He is alert. He is calm and disoriented to time.      Comments: Expressive aphasia, word finding difficulty and RLE 4/5, LLE 5/5, LUE 5/5, RUE 3/5, sensory deficit in RUE below elbow          Diagnostic Studies        Lab Results: I have reviewed the following results:     Medications:  Scheduled PRN   [START ON 3/14/2025] atorvastatin, 40 mg, Daily With Dinner  [START ON 3/14/2025] chlorhexidine, 15 mL, Q12H TIEN  [START ON 3/14/2025] clopidogrel, 75 mg, Daily      acetaminophen, 650 mg, Q6H PRN  heparin (porcine), 2,000 Units, Q6H PRN  heparin (porcine), 4,000 Units, Q6H PRN  labetalol, 10 mg, Q6H PRN  ondansetron, 4 mg, Q6H PRN       Continuous    heparin (porcine), 3-20 Units/kg/hr (Order-Specific)         Labs:   CBC    Recent Labs     03/12/25  0515   WBC 7.94   HGB 13.5   HCT 39.9        BMP    Recent Labs     03/12/25  0515 03/13/25  0457   SODIUM 137 138   K 3.6 3.6    106   CO2 24 24   AGAP 8 8   BUN 26* 26*   CREATININE 1.41* 1.28   CALCIUM 9.1 8.8       Coags    Recent Labs     03/13/25  1209 03/13/25  1817   INR  1.09  --    PTT 35* 47*        Additional Electrolytes  No recent results       Blood Gas    No recent results  No recent results LFTs  No recent results    Infectious  No recent results  Glucose  Recent Labs     03/12/25  0515 03/13/25  0457   GLUC 117 111

## 2025-03-14 NOTE — PLAN OF CARE
"  Problem: OCCUPATIONAL THERAPY ADULT  Goal: Performs self-care activities at highest level of function for planned discharge setting.  See evaluation for individualized goals.  Description: Treatment Interventions: ADL retraining, Functional transfer training, Endurance training, Cognitive reorientation, Patient/family training, Equipment evaluation/education, Compensatory technique education, Energy conservation, Activityengagement, Fine motor coordination activities          See flowsheet documentation for full assessment, interventions and recommendations.   3/14/2025 1052 by Muriel Wharton OT  Note: Limitation: Decreased ADL status, Decreased UE ROM, Decreased UE strength, Decreased Safe judgement during ADL, Decreased cognition, Decreased endurance, Decreased sensation, Decreased self-care trans, Decreased high-level ADLs  Prognosis: Good  Assessment: Ez Dsouza is a 59 y.o. male seen for OT evaluation s/p admit to [facility] on 3/13/2025 w/ Acute cerebrovascular accident (CVA) due to stenosis of left carotid artery (HCC). MRI 3/13/25: \"Multiple small foci of acute/recent ischemia in the left MCA territory. These predominantly appear similar in the interim with slight worsening involvement of the CAMRYN to MCA watershed territory on the left as well as the left caudate head. Involvement of   the right parietal lobe also appears new/progressed in the interim.\"  Comorbidities affecting pt's functional performance at time of assessment include: HTN and smokes . Pt with active OT orders and activity orders for Up and OOB as tolerated. Personal factors affecting pt at time of IE include:JAZMÍN home environment, steps within home environment, difficulty performing ADLs, difficulty performing IADLs, difficulty performing transfers/mobility, fall risk , and functional decline . Prior to admission, pt lives with wife in a 2 level home with 2 JAZMÍN. I with ADLS, IADLs and mobility with no device. No DME. Drives. Works. " "Upon evaluation: Pt currently requires Jasmine for UB ADLs, maxA for LB ADLs, modA for toileting, Jasmine for bed mobility, Jasmine- modA for functional transfers, and modAx2 RW  mobility 2* the following deficits impacting occupational performance:weakness, decreased strength , decreased balance, decreased activity tolerance, and impaired GMC. Pt to benefit from continued skilled OT tx while in the hospital to address deficits as defined above and maximize level of functional independence w ADL's and functional mobility. Occupational Performance areas to address include: grooming, bathing/shower, toilet hygiene, dressing, functional mobility, community mobility, and clothing management. From OT standpoint, recommendation at time of d/c would be level 1 resources. OT to follow pt on caseload 3-5x/wk.     Rehab Resource Intensity Level, OT: I (Maximum Resource Intensity) (would benefit from acute level rehab services)       3/14/2025 1052 by Muriel Wharton OT  Note: Limitation: Decreased ADL status, Decreased UE ROM, Decreased UE strength, Decreased Safe judgement during ADL, Decreased cognition, Decreased endurance, Decreased sensation, Decreased self-care trans, Decreased high-level ADLs  Prognosis: Good  Assessment: Ez Dsouza is a 59 y.o. male seen for OT evaluation s/p admit to [facility] on 3/13/2025 w/ Acute cerebrovascular accident (CVA) due to stenosis of left carotid artery (HCC). MRI 3/13/25: \"Multiple small foci of acute/recent ischemia in the left MCA territory. These predominantly appear similar in the interim with slight worsening involvement of the CAMRYN to MCA watershed territory on the left as well as the left caudate head. Involvement of   the right parietal lobe also appears new/progressed in the interim.\"  Comorbidities affecting pt's functional performance at time of assessment include: HTN and smokes . Pt with active OT orders and activity orders for Up and OOB as tolerated. Personal factors " affecting pt at time of IE include:JAZMÍN home environment, steps within home environment, difficulty performing ADLs, difficulty performing IADLs, difficulty performing transfers/mobility, fall risk , and functional decline . Prior to admission, pt lives with wife in a 2 level home with 2 JAZMÍN. I with ADLS, IADLs and mobility with no device. No DME. Drives. Works. Upon evaluation: Pt currently requires Jasmine for UB ADLs, maxA for LB ADLs, modA for toileting, Jasmine for bed mobility, Jasmine- modA for functional transfers, and modAx2 RW  mobility 2* the following deficits impacting occupational performance:weakness, decreased strength , decreased balance, decreased activity tolerance, and impaired GMC. Pt to benefit from continued skilled OT tx while in the hospital to address deficits as defined above and maximize level of functional independence w ADL's and functional mobility. Occupational Performance areas to address include: grooming, bathing/shower, toilet hygiene, dressing, functional mobility, community mobility, and clothing management. From OT standpoint, recommendation at time of d/c would be level 1 resources. OT to follow pt on caseload 3-5x/wk.     Rehab Resource Intensity Level, OT: I (Maximum Resource Intensity) (would benefit from acute level rehab services)

## 2025-03-14 NOTE — PROGRESS NOTES
Pastoral Care Progress Note    Pt was lying in bed, wife was nearby. Ch offered listening support. Both pt and family seem frustrated with communication at the hospital. Ch remains avail.           Chaplaincy Interventions Utilized:   Empowerment: Normalized experience of patient/family   Relationship Building: Listened empathically       03/14/25 1100   Clinical Encounter Type   Visited With Patient and family together   Routine Visit Introduction   Referral From Nurse

## 2025-03-14 NOTE — CONSULTS
Duplicate order. Please see prior consult note completed by Kristy Patel PA-C and attested by Dr. Rosa Ramirez on 3/10/2025

## 2025-03-14 NOTE — PLAN OF CARE
Problem: Prexisting or High Potential for Compromised Skin Integrity  Goal: Skin integrity is maintained or improved  Description: INTERVENTIONS:  - Identify patients at risk for skin breakdown  - Assess and monitor skin integrity  - Assess and monitor nutrition and hydration status  - Monitor labs   - Assess for incontinence   - Turn and reposition patient  - Assist with mobility/ambulation  - Relieve pressure over bony prominences  - Avoid friction and shearing  - Provide appropriate hygiene as needed including keeping skin clean and dry  - Evaluate need for skin moisturizer/barrier cream  - Collaborate with interdisciplinary team   - Patient/family teaching  - Consider wound care consult   Outcome: Progressing     Problem: PAIN - ADULT  Goal: Verbalizes/displays adequate comfort level or baseline comfort level  Description: Interventions:  - Encourage patient to monitor pain and request assistance  - Assess pain using appropriate pain scale  - Administer analgesics based on type and severity of pain and evaluate response  - Implement non-pharmacological measures as appropriate and evaluate response  - Consider cultural and social influences on pain and pain management  - Notify physician/advanced practitioner if interventions unsuccessful or patient reports new pain  Outcome: Progressing     Problem: INFECTION - ADULT  Goal: Absence or prevention of progression during hospitalization  Description: INTERVENTIONS:  - Assess and monitor for signs and symptoms of infection  - Monitor lab/diagnostic results  - Monitor all insertion sites, i.e. indwelling lines, tubes, and drains  - Monitor endotracheal if appropriate and nasal secretions for changes in amount and color  - [place] appropriate cooling/warming therapies per order  - Administer medications as ordered  - Instruct and encourage patient and family to use good hand hygiene technique  - Identify and instruct in appropriate isolation precautions for  identified infection/condition  Outcome: Progressing  Goal: Absence of fever/infection during neutropenic period  Description: INTERVENTIONS:  - Monitor WBC    Outcome: Progressing     Problem: SAFETY ADULT  Goal: Patient will remain free of falls  Description: INTERVENTIONS:  - Educate patient/family on patient safety including physical limitations  - Instruct patient to call for assistance with activity   - Consult OT/PT to assist with strengthening/mobility   - Keep Call bell within reach  - Keep bed low and locked with side rails adjusted as appropriate  - Keep care items and personal belongings within reach  - Initiate and maintain comfort rounds  - Make Fall Risk Sign visible to staff  - Offer Toileting every 2 Hours, in advance of need  - Initiate/Maintain bed alarm  - Obtain necessary fall risk management equipment:   - Apply yellow socks and bracelet for high fall risk patients  - Consider moving patient to room near nurses station  Outcome: Progressing  Goal: Maintain or return to baseline ADL function  Description: INTERVENTIONS:  -  Assess patient's ability to carry out ADLs; assess patient's baseline for ADL function and identify physical deficits which impact ability to perform ADLs (bathing, care of mouth/teeth, toileting, grooming, dressing, etc.)  - Assess/evaluate cause of self-care deficits   - Assess range of motion  - Assess patient's mobility; develop plan if impaired  - Assess patient's need for assistive devices and provide as appropriate  - Encourage maximum independence but intervene and supervise when necessary  - Involve family in performance of ADLs  - Assess for home care needs following discharge   - Consider OT consult to assist with ADL evaluation and planning for discharge  - Provide patient education as appropriate  Outcome: Progressing  Goal: Maintains/Returns to pre admission functional level  Description: INTERVENTIONS:  - Perform AM-PAC 6 Click Basic Mobility/ Daily Activity  assessment daily.  - Set and communicate daily mobility goal to care team and patient/family/caregiver.   - Collaborate with rehabilitation services on mobility goals if consulted  - Perform Range of Motion  times a day.  - Reposition patient every 2 hours.  - Dangle patient  times a day  - Stand patient  1 times a day  - Ambulate patient  times a day  - Out of bed to chair 1 times a day   -  - Out of bed for toileting  - Record patient progress and toleration of activity level   Outcome: Progressing     Problem: DISCHARGE PLANNING  Goal: Discharge to home or other facility with appropriate resources  Description: INTERVENTIONS:  - Identify barriers to discharge w/patient and caregiver  - Arrange for needed discharge resources and transportation as appropriate  - Identify discharge learning needs (meds, wound care, etc.)  - Arrange for interpretive services to assist at discharge as needed  - Refer to Case Management Department for coordinating discharge planning if the patient needs post-hospital services based on physician/advanced practitioner order or complex needs related to functional status, cognitive ability, or social support system  Outcome: Progressing     Problem: Knowledge Deficit  Goal: Patient/family/caregiver demonstrates understanding of disease process, treatment plan, medications, and discharge instructions  Description: Complete learning assessment and assess knowledge base.  Interventions:  - Provide teaching at level of understanding  - Provide teaching via preferred learning methods  Outcome: Progressing     Problem: Nutrition/Hydration-ADULT  Goal: Nutrient/Hydration intake appropriate for improving, restoring or maintaining nutritional needs  Description: Monitor and assess patient's nutrition/hydration status for malnutrition. Collaborate with interdisciplinary team and initiate plan and interventions as ordered.  Monitor patient's weight and dietary intake as ordered or per policy. Utilize  nutrition screening tool and intervene as necessary. Determine patient's food preferences and provide high-protein, high-caloric foods as appropriate.     INTERVENTIONS:  - Monitor oral intake, urinary output, labs, and treatment plans  - Assess nutrition and hydration status and recommend course of action  - Evaluate amount of meals eaten  - Assist patient with eating if necessary   - Allow adequate time for meals  - Recommend/ encourage appropriate diets, oral nutritional supplements, and vitamin/mineral supplements  - Order, calculate, and assess calorie counts as needed  - Recommend, monitor, and adjust tube feedings and TPN/PPN based on assessed needs  - Assess need for intravenous fluids  - Provide specific nutrition/hydration education as appropriate  - Include patient/family/caregiver in decisions related to nutrition  Outcome: Progressing

## 2025-03-14 NOTE — PLAN OF CARE
Problem: PHYSICAL THERAPY ADULT  Goal: Performs mobility at highest level of function for planned discharge setting.  See evaluation for individualized goals.  Description: Treatment/Interventions: Functional transfer training, LE strengthening/ROM, Elevations, Therapeutic exercise, Endurance training, Patient/family training, Bed mobility, Gait training, Spoke to nursing, OT  Equipment Recommended: Walker       See flowsheet documentation for full assessment, interventions and recommendations.  Note: Prognosis: Good  Problem List: Decreased strength, Decreased endurance, Impaired balance, Decreased mobility, Decreased cognition, Impaired judgement, Decreased safety awareness, Decreased coordination  Assessment: Pt. 59 y.o.male admitted for Acute cerebrovascular accident (CVA) due to stenosis of left carotid artery (HCC). Planned L CEA scheduled on 3/17. Pt referred to PT for mobility assessment & D/C planning. Please see above for information re: home set-up & PLOF as well as objective findings during PT assessment. PTA, pt's wife reports that pt was completely I w/o AD at baseline. On eval, pt functioning below baseline hence will continue skilled PT to improve function & safety. Pt require minAx1 for bed mobility; modAx1 for transfers & modAx2 for amb trial w/ RW + cues for techniques & safety. Gait deviations as above but no gross LOB noted. Pt require assistance to position &  R hand on RW. Pt able to initially take 2 lateral steps to HOB however pt unable to further motor plan &/or coordinate to continue to ambulate further despite max cues. Pt (+) aphasia, ataxia w/ poor motor planning & impaired proprioception. The patient's AM-PAC Basic Mobility Inpatient Short Form Raw Score is 11. A Raw score of less than or equal to 16 suggests the patient may benefit from discharge to post-acute rehabilitation services. Please also refer to the recommendation of the Physical Therapist for safe discharge planning.  "From PT standpoint, will recommend Level I (maximun resource intensity) rehab services at D/C. No SOB & dizziness reported t/o session. Nsg staff most recent vital signs as follows: /86   Pulse 72   Temp 98.2 °F (36.8 °C)   Resp 17   Ht 6' 2\" (1.88 m)   Wt 92.7 kg (204 lb 5.9 oz)   SpO2 95%   BMI 26.24 kg/m² . At end of session, pt back in bed in stable condition, call bell & phone in reach, bed alarm activated, all lines intact. Fall precautions reinforced w/ good understanding. CM to follow. Nsg staff to continue to mobilized pt (OOB in chair for all meals) as tolerated to prevent further decline in function. Will also recommend Restorative for daily OOB in chair as appropriate. Nsg notified. Co-eval was necessary to complete this PT eval for the pt's best interest given pt's medical acuity & complexity.        Rehab Resource Intensity Level, PT: I (Maximum Resource Intensity)    See flowsheet documentation for full assessment.        "

## 2025-03-14 NOTE — SPEECH THERAPY NOTE
Speech Language/Pathology  Speech/Language Pathology  Assessment    Patient Name: Ez Dsouza  Today's Date: 3/14/2025     Problem List  Principal Problem:    Acute cerebrovascular accident (CVA) due to stenosis of left carotid artery (HCC)  Active Problems:    Primary hypertension    Hyperlipidemia    Past Medical History  Past Medical History:   Diagnosis Date    Hypertension      Past Surgical History  Past Surgical History:   Procedure Laterality Date    KNEE SURGERY  2008    MOLE REMOVAL  2024    Back       Speech-Language Evaluation    Impression:  Decline since recent speech-language eval on 3/11 at [facility]. Pt now w/ both receptive and expressive aphasia as well as paraphasias and perseverations.   See below for findings. Mild R labial droop. Tolerated late lunch w/out issue.     Recommendation:  F/u after CEA.   Keep communication basic and highly contextual.   Offer choices.   Repeat.      Therapy Prognosis:pending following CEA  Frequency: f/u after sx and able and appropriate    Goals:  No goals at this time. Status will likely change until cea and after CEA. Currently able to make basic needs known.   Patient's goal:none stated    H&P/Admit info, pertinent provider notes:(PMH noted above) (transferred from [facility])  History of Present Illness  Ez Dsouza is a 60 y/o M hx HTN, CAD, HLD, tobacco abuse who presented stroke alert to [facility] ED on 3/10 w/ R sided weakness and word finding difficultly and was found to have b/l carotid stenosis (L>R). MRI brain showed multipe small acute L CVAs. On 3/13 he had worsening neurologic exam w/ lower BP in the 110s suspected secondary to hypoperfusion and cerebral edema. Started heparin gtt and ASA/Plavix load and he was transferred to critical care for pressors to maintain -160 for 24-48 hours. Vasopressors were never required, he auto mapped for MAP > 100. Vascular surgery planning for L CEA on Monday 3/17. CHANCE ordered to rule out central embolic source  of stroke. Transferred to [facility] ICU in preparation for procedure Monday and for MAP pushing with pressors if needed.     Special Studies:  MRI brain 3/13:  Multiple small foci of acute/recent ischemia in the left MCA territory. These predominantly appear similar in the interim with slight worsening involvement of the CAMRYN to MCA watershed territory on the left as well as the left caudate head. Involvement of   the right parietal lobe also appears new/progressed in the interim. No large territory infarct or hemorrhage seen. Given the multiple vascular territories, consider a central embolic source.    CEA scheduled for Monday    Did the pt report pain?no  If yes,was nursing notified/was it addressed?    Precautions:  fall    Social:  , wife martha at bedside    Orientation:  + name and     Auditory Comprehension:  R/L discrim:followed only initial direction. Then pointed to or moved incorrect body parts.   Body part ID: nose only. Then perseverated on nose. Nodded head to touch left shoulder later.   One step commands:able to stick out tongue (midline) and smile (mild right labial droop at rest and mildly reduced w/ smile)  Picture ID: 0 [id number] VF of 3. Chanve in a VF of 6 (pt turned the page for next task)  Letter ID: Pt went in a line from left to right. 1 correct by chance.     Reading Comprehension:  Single word comprehension/Match pic w/ a choice of 2 words: 70%    Oral Expression:  Auto Sequences:   Max perseveration on previous task. Max cues needed to redirect and start off.   FERNANDO:recited months. With max cues was able to state 7/7   MAYRA:cued to begin.  w/ cues   Counting to 21: 1-21 after cueing for numbers  Phrase Completion: 1/3       Picture Namin/10 (e.g. airplane for car)  Divergent Namin. Perseverated on months. I began naming some animals and specific places for animals (zoo, farm, pets) but he could not name any. Picture  Conversation: word finding  and paraphasias  Able to make basic  needs known? At this time yes      Motor Speech:  Dysarthria: absent   Apraxia: absent     Also noted:  Gaze preference to the left  Phonemic paraphasias  Semantic paraphasias  Perseveration    Results discussed with: wife, nurse

## 2025-03-15 ENCOUNTER — APPOINTMENT (INPATIENT)
Dept: RADIOLOGY | Facility: HOSPITAL | Age: 60
DRG: 037 | End: 2025-03-15
Payer: COMMERCIAL

## 2025-03-15 ENCOUNTER — APPOINTMENT (INPATIENT)
Dept: CT IMAGING | Facility: HOSPITAL | Age: 60
DRG: 037 | End: 2025-03-15
Payer: COMMERCIAL

## 2025-03-15 LAB
ANION GAP SERPL CALCULATED.3IONS-SCNC: 9 MMOL/L (ref 4–13)
APTT PPP: 188 SECONDS (ref 23–34)
APTT PPP: 32 SECONDS (ref 23–34)
APTT PPP: 86 SECONDS (ref 23–34)
BASOPHILS # BLD AUTO: 0.07 THOUSANDS/ÂΜL (ref 0–0.1)
BASOPHILS NFR BLD AUTO: 1 % (ref 0–1)
BUN SERPL-MCNC: 25 MG/DL (ref 5–25)
CA-I BLD-SCNC: 1.11 MMOL/L (ref 1.12–1.32)
CALCIUM SERPL-MCNC: 8.3 MG/DL (ref 8.4–10.2)
CHLORIDE SERPL-SCNC: 108 MMOL/L (ref 96–108)
CO2 SERPL-SCNC: 21 MMOL/L (ref 21–32)
CREAT SERPL-MCNC: 1.29 MG/DL (ref 0.6–1.3)
EOSINOPHIL # BLD AUTO: 0.17 THOUSAND/ÂΜL (ref 0–0.61)
EOSINOPHIL NFR BLD AUTO: 1 % (ref 0–6)
ERYTHROCYTE [DISTWIDTH] IN BLOOD BY AUTOMATED COUNT: 13.7 % (ref 11.6–15.1)
GFR SERPL CREATININE-BSD FRML MDRD: 59 ML/MIN/1.73SQ M
GLUCOSE SERPL-MCNC: 111 MG/DL (ref 65–140)
HCT VFR BLD AUTO: 36.3 % (ref 36.5–49.3)
HGB BLD-MCNC: 12.3 G/DL (ref 12–17)
IMM GRANULOCYTES # BLD AUTO: 0.03 THOUSAND/UL (ref 0–0.2)
IMM GRANULOCYTES NFR BLD AUTO: 0 % (ref 0–2)
LYMPHOCYTES # BLD AUTO: 2.24 THOUSANDS/ÂΜL (ref 0.6–4.47)
LYMPHOCYTES NFR BLD AUTO: 19 % (ref 14–44)
MAGNESIUM SERPL-MCNC: 1.9 MG/DL (ref 1.9–2.7)
MCH RBC QN AUTO: 30.5 PG (ref 26.8–34.3)
MCHC RBC AUTO-ENTMCNC: 33.9 G/DL (ref 31.4–37.4)
MCV RBC AUTO: 90 FL (ref 82–98)
MONOCYTES # BLD AUTO: 1.56 THOUSAND/ÂΜL (ref 0.17–1.22)
MONOCYTES NFR BLD AUTO: 13 % (ref 4–12)
NEUTROPHILS # BLD AUTO: 7.7 THOUSANDS/ÂΜL (ref 1.85–7.62)
NEUTS SEG NFR BLD AUTO: 66 % (ref 43–75)
NRBC BLD AUTO-RTO: 0 /100 WBCS
PHOSPHATE SERPL-MCNC: 3.1 MG/DL (ref 2.3–4.1)
PLATELET # BLD AUTO: 301 THOUSANDS/UL (ref 149–390)
PMV BLD AUTO: 10 FL (ref 8.9–12.7)
POTASSIUM SERPL-SCNC: 3.6 MMOL/L (ref 3.5–5.3)
RBC # BLD AUTO: 4.03 MILLION/UL (ref 3.88–5.62)
SODIUM SERPL-SCNC: 138 MMOL/L (ref 135–147)
WBC # BLD AUTO: 11.77 THOUSAND/UL (ref 4.31–10.16)

## 2025-03-15 PROCEDURE — 80048 BASIC METABOLIC PNL TOTAL CA: CPT | Performed by: NURSE PRACTITIONER

## 2025-03-15 PROCEDURE — 97116 GAIT TRAINING THERAPY: CPT

## 2025-03-15 PROCEDURE — 70450 CT HEAD/BRAIN W/O DYE: CPT

## 2025-03-15 PROCEDURE — 97530 THERAPEUTIC ACTIVITIES: CPT

## 2025-03-15 PROCEDURE — 99232 SBSQ HOSP IP/OBS MODERATE 35: CPT | Performed by: SURGERY

## 2025-03-15 PROCEDURE — NC001 PR NO CHARGE: Performed by: NURSE PRACTITIONER

## 2025-03-15 PROCEDURE — 84100 ASSAY OF PHOSPHORUS: CPT | Performed by: NURSE PRACTITIONER

## 2025-03-15 PROCEDURE — 83735 ASSAY OF MAGNESIUM: CPT | Performed by: NURSE PRACTITIONER

## 2025-03-15 PROCEDURE — 99232 SBSQ HOSP IP/OBS MODERATE 35: CPT | Performed by: INTERNAL MEDICINE

## 2025-03-15 PROCEDURE — 73521 X-RAY EXAM HIPS BI 2 VIEWS: CPT

## 2025-03-15 PROCEDURE — 85730 THROMBOPLASTIN TIME PARTIAL: CPT | Performed by: INTERNAL MEDICINE

## 2025-03-15 PROCEDURE — 82330 ASSAY OF CALCIUM: CPT | Performed by: NURSE PRACTITIONER

## 2025-03-15 PROCEDURE — 97110 THERAPEUTIC EXERCISES: CPT

## 2025-03-15 PROCEDURE — 85025 COMPLETE CBC W/AUTO DIFF WBC: CPT | Performed by: NURSE PRACTITIONER

## 2025-03-15 RX ORDER — SENNOSIDES 8.6 MG
1 TABLET ORAL
Status: DISCONTINUED | OUTPATIENT
Start: 2025-03-15 | End: 2025-03-21 | Stop reason: HOSPADM

## 2025-03-15 RX ORDER — POLYETHYLENE GLYCOL 3350 17 G/17G
17 POWDER, FOR SOLUTION ORAL DAILY
Status: DISCONTINUED | OUTPATIENT
Start: 2025-03-16 | End: 2025-03-21 | Stop reason: HOSPADM

## 2025-03-15 RX ADMIN — PHENYLEPHRINE HYDROCHLORIDE 150 MCG/MIN: 50 INJECTION INTRAVENOUS at 19:08

## 2025-03-15 RX ADMIN — CHLORHEXIDINE GLUCONATE 15 ML: 1.2 SOLUTION ORAL at 21:51

## 2025-03-15 RX ADMIN — CLOPIDOGREL BISULFATE 75 MG: 75 TABLET ORAL at 08:41

## 2025-03-15 RX ADMIN — HEPARIN SODIUM 4000 UNITS: 1000 INJECTION INTRAVENOUS; SUBCUTANEOUS at 06:03

## 2025-03-15 RX ADMIN — PHENYLEPHRINE HYDROCHLORIDE 170 MCG/MIN: 50 INJECTION INTRAVENOUS at 02:27

## 2025-03-15 RX ADMIN — CHLORHEXIDINE GLUCONATE 15 ML: 1.2 SOLUTION ORAL at 08:41

## 2025-03-15 RX ADMIN — PHENYLEPHRINE HYDROCHLORIDE 180 MCG/MIN: 50 INJECTION INTRAVENOUS at 07:35

## 2025-03-15 RX ADMIN — HEPARIN SODIUM 16.1 UNITS/KG/HR: 10000 INJECTION, SOLUTION INTRAVENOUS at 19:08

## 2025-03-15 RX ADMIN — HEPARIN SODIUM 15.1 UNITS/KG/HR: 10000 INJECTION, SOLUTION INTRAVENOUS at 00:13

## 2025-03-15 RX ADMIN — PHENYLEPHRINE HYDROCHLORIDE 130 MCG/MIN: 50 INJECTION INTRAVENOUS at 12:40

## 2025-03-15 RX ADMIN — ATORVASTATIN CALCIUM 40 MG: 40 TABLET, FILM COATED ORAL at 17:10

## 2025-03-15 RX ADMIN — SENNOSIDES 8.6 MG: 8.6 TABLET, FILM COATED ORAL at 21:51

## 2025-03-15 NOTE — PROGRESS NOTES
Progress Note - Vascular Surgery   Name: Ez Dsouza 59 y.o. male I MRN: [id number]  Unit/Bed#: ICU 13 I Date of Admission: 3/13/2025   Date of Service: 3/15/2025 I Hospital Day: 2    Assessment & Plan  Acute cerebrovascular accident (CVA) due to stenosis of left carotid artery (HCC)  59-year-old male with hx of HTN, CAD, HLD, tobacco abuse, hx of fall '24 w/ rib fxs and R wrist fx, woke up with R side weakness and presented to [facility] by EMS. Patient transferred to [facility] overnight for tentative L carotid enterectomy on 3/17/25 with .    Results   Carotid DU:   R ICA <50% stenosis,   L ICA >70% stenosis w/ pilar 259/82 and ratio 3.2    3/10 MRI brain: multiple small acute CVAs in the L periventricular, subcotical, frontal and parietal/occipital lobes; small petechial hemorrhage    CTA neck: R ICA midl stenosis; L ICA w/ ~60-70% stenosis w/ soft plaque and increased irregularity compared to prior study in '24; there is a long area of irregularity, about 3.5cm in length    3/13 MRI brain: multiple small foci of ischemia in L MCA territory. CAMRYN to MCA watershed territory on the L as well as L caudate. Involvement of R parietal lobe. Consider central embolic source.   3/13 CTA head and neck: Scattered L hemispheric infarction. Unchanged L ICA high grade stenosis.     Labs 3/14/25  Cr 1.33/ GFR 57  Hg 12.4/ WBC 8.56    Plan  -Pt presented to [facility] as stroke alert with R sided weakness, confusion and altered speech found to have L ICA stenosis (70-99%) with soft plaque on CTA. Tentative plan for L CEA on 3/17 with . Pt transferred to [facility] for procedure on Monday and ICU monitoring.   -No new neurological events   -ICU monitoring for vasopressor support with goals to keep MAP >100  -Neurology following, appreciate recs  -Cardiology following  -Continue Plavix and Heparin gtt  -Lipitor 40mg daily  -Neuro checks  -Will D/W Dr Sanchez     Primary hypertension  -Permissive  hypertension. Anti-hypertensive medications on hold.   -Managed by ICU team      Subjective :   Patient evaluated with wife at bedside.  No acute distress.  No neurological events overnight.  Continues right upper extremity weakness -improved since yesterday.  He is conversant, speech is clear, no distress.  Face is symmetrical, tongue is midline.    Objective :    Temp:  [98.4 °F (36.9 °C)-99.8 °F (37.7 °C)] 98.7 °F (37.1 °C)  HR:  [40-78] 66  BP: (109-198)/() 149/84  Resp:  [12-33] 16  SpO2:  [91 %-97 %] 97 %  O2 Device: None (Room air)     I/O         03/13 0701 03/14 0700 03/14 0701  03/15 0700 03/15 0701 03/16 0700    P.O.  600     I.V. (mL/kg) 63.9 (0.7) 991.3 (10.5)     Total Intake(mL/kg) 63.9 (0.7) 1591.3 (16.9)     Urine (mL/kg/hr) 150 975 (0.4)     Total Output 150 975     Net -86.1 +616.3            Unmeasured Urine Occurrence  1 x             Physical Exam  Vitals and nursing note reviewed. Exam conducted with a chaperone present.   Eyes:      Extraocular Movements: Extraocular movements intact.   Cardiovascular:      Rate and Rhythm: Normal rate.   Pulmonary:      Effort: Pulmonary effort is normal.   Neurological:      Mental Status: He is alert and oriented to person, place, and time.      Comments: Face symmetrical.  Tongue is midline.  Right upper extremity weakness in comparison to left 4/5 strength right upper and lower extremity    Psychiatric:         Mood and Affect: Mood normal.         Behavior: Behavior normal.           Lab Results: I have reviewed the following results:  CBC with diff:   Lab Results   Component Value Date    WBC 11.77 (H) 03/15/2025    HGB 12.3 03/15/2025    HCT 36.3 (L) 03/15/2025    MCV 90 03/15/2025     03/15/2025    RBC 4.03 03/15/2025    MCH 30.5 03/15/2025    MCHC 33.9 03/15/2025    RDW 13.7 03/15/2025    MPV 10.0 03/15/2025    NRBC 0 03/15/2025   ,   BMP/CMP:  Lab Results   Component Value Date    SODIUM 138 03/15/2025    K 3.6 03/15/2025      "03/15/2025    CO2 21 03/15/2025    CO2 29 06/22/2024    BUN 25 03/15/2025    CREATININE 1.29 03/15/2025    GLUCOSE 87 06/22/2024    CALCIUM 8.3 (L) 03/15/2025    AST 29 06/26/2024    ALT 29 06/26/2024    ALKPHOS 98 06/26/2024    EGFR 59 03/15/2025   ,   Lipid Panel: No results found for: \"CHOL\",   Coags:   Lab Results   Component Value Date    PTT 32 03/15/2025    INR 1.14 03/13/2025   ,   Blood Culture: No results found for: \"BLOODCX\",   Urinalysis:   Lab Results   Component Value Date    COLORU Light Yellow 03/10/2025    CLARITYU Clear 03/10/2025    SPECGRAV 1.043 (H) 03/10/2025    PHUR 5.5 03/10/2025    LEUKOCYTESUR Negative 03/10/2025    NITRITE Negative 03/10/2025    GLUCOSEU Negative 03/10/2025    KETONESU Negative 03/10/2025    BILIRUBINUR Negative 03/10/2025    BLOODU Moderate (A) 03/10/2025   ,   Urine Culture: No results found for: \"URINECX\",   Wound Culure: No results found for: \"WOUNDCULT\"      "

## 2025-03-15 NOTE — PHYSICAL THERAPY NOTE
PHYSICAL THERAPY NOTE          Patient Name: Ez Dsouza  Today's Date: 3/15/2025   03/15/25 4114   Note Type   Note Type Treatment   Pain Assessment   Pain Assessment Tool 0-10   Pain Score No Pain   Restrictions/Precautions   Other Precautions Chair Alarm;Bed Alarm;Multiple lines;Telemetry;Fall Risk;Cognitive   General   Chart Reviewed Yes   Cognition   Overall Cognitive Status Impaired   Arousal/Participation Alert;Responsive;Cooperative   Attention Attends with cues to redirect   Orientation Level Oriented to person;Oriented to place;Oriented to time  (oriented to month,)   Memory Decreased short term memory;Decreased recall of precautions;Decreased recall of recent events   Following Commands Follows one step commands with increased time or repetition   Comments expressive apasia, receptive aphasia   Subjective   Subjective pt offers no c/o pain.   Bed Mobility   Supine to Sit 4  Minimal assistance   Additional items Assist x 1;HOB elevated;Increased time required;Verbal cues;LE management;Impulsive   Additional Comments verbal cues for techniques and to pay attention to where R ue is, R ue lags behind pt,  pt  required verbal cues and assistance to bring R ue around to front of bed.   Transfers   Sit to Stand 3  Moderate assistance   Additional items Assist x 1;Increased time required;Verbal cues   Stand to Sit 4  Minimal assistance   Additional items Assist x 1;Increased time required;Verbal cues   Additional Comments with use of Rw.  Verbal cues and assistance to place R ue on to walker and to maintain R  on walker.   Ambulation/Elevation   Gait pattern Improper Weight shift;Poor UE support;Short stride;Excessively slow;Decreased toe off;Decreased heel strike;Step to;Antalgic;L Hemiparesis;Knees flexed   Gait Assistance 3  Moderate assist   Additional items Assist x 1;Verbal cues  (standby assist of another for safety and  line management.)   Assistive Device Rolling walker   Distance 6'x1   Ambulation/Elevation Additional Comments verbal ceus for L e sequencing and to tighten L knee during stance phase of gait to prevent buckling.   Balance   Static Sitting Fair +   Dynamic Sitting Fair  (w rw)   Static Standing Poor +   Dynamic Standing Poor +   Ambulatory Poor +  (w rw)   Endurance Deficit   Endurance Deficit Description weakness,   Activity Tolerance   Activity Tolerance Patient limited by fatigue;Treatment limited secondary to medical complications (Comment)   Exercises   Quad Sets Supine;10 reps;AROM;Bilateral   Heelslides Supine;10 reps;AROM;Bilateral   Hip Flexion Supine;10 reps;AROM;Bilateral  (slr)   Marching Standing;Bilateral;AROM  (marching in place x 5 reps with min assist x1 with support of Rw  single limb standing on R le  x 15 seconds with support of Rw and min assist x1.)   Equipment Use   Comments pt ed on call bell use and to NOT get up on own to wait for nursing staff, pt reported understanding and was able to demonstrate correct use of call bell system.  pt's PCA Amador and Nitza, informed of  NOT having chair alarm set- up and reported they would take care of it.   Assessment   Prognosis Good   Problem List Decreased endurance;Impaired balance;Decreased mobility;Decreased strength;Decreased coordination;Decreased cognition;Decreased safety awareness;Impaired judgement   Assessment Pt seen for PT treatment session this date with interventions consisting of bed mobility, transfer training, gait training, and HEP, and education provided as needed for safety and direction to improve functional mobility, safety awareness, and activity tolerance. Pt agreeable to PT treatment session upon arrival, pt found supine in bed . At end of session, pt left seated out of bed in chair with all needs in reach. In comparison to previous session, pt with improvement in activity tolerance, endurance, standing balance, ambulation  distances, ambulatory balance, and functional mobility. Pt is showing progress toward PT goals with improvements as noted.  Pt  overall is requiring less assistance for bed mobility,  transfers and ambulation and showing improved activity tolerance with progression of ambulation distances. Pt  demonstrates difficulty following commands at times  requires increased verbal, tactile and visual cues.  Pt  demonstrates continued weakness R hemibody with R ue weakness >  R le.  Decreased R  strength, pt  requires assistance to lift and place R ue onto walker and to maintain  on walker  with transfer and gait training. NOTed R ue neglect with supine to sit transfer,  pt 's R ue lagged behind and was caught behind pt's back, pt  not aware of this.  Pt  required assistance to physically bring pt's arm around to the front while pt seated at EOB.  Pt  demonstrate stable static sitting balance. Pt  performs transfers sit to stand with mod assist x1 verbal cues to lock R knee during stance phase,  pt able to perform one legged stance on R le with min assist x1 x 15 seconds.  Pt  progressed with ambulation distances to 6' with mod assist x1 with use of rw. Verbal cues for proper le sequencing, ue weightbearing, assist to steer and maneuver Rw, verbal cues for turning and backing up to chair and for stand to sit transfers.  Increased time,. Cues and assistance for all mobility due to impairments in balance, coordination, strength, cognition ( global aphasia), safety and gait deviations. Pt's pca's, Amador and Nitza informed  of pt NOT having chair alarm and pt;s current level of mobility.  PCA reported they would take care of getting pt a chair alarm.  Pt able to demonstrate correct use of call bell system.   Continue to recommend  level I maximal rehab resource intensity  at time of d/c in order to maximize pt's functional independence and safety w/ mobility.   Barriers to Discharge Comments Continue to recommend  level I  maximal rehab resource intensity  at time of d/c in order to maximize pt's functional independence and safety w/ mobility. Pt continues to be functioning below baseline level. PT will continue to see pt while here in order to address the deficits listed above and provide interventions consistent w/ POC in effort to achieve STGs.   Goals   Patient Goals I never really gave it a though.  pt  unable to state due to impaired cognition and speech.   STG Expiration Date 03/28/25   PT Treatment Day 1   Plan   Treatment/Interventions Functional transfer training;LE strengthening/ROM;Therapeutic exercise;Endurance training;Cognitive reorientation;Patient/family training;Equipment eval/education;Bed mobility;Gait training;Spoke to nursing  (kumar mark and hiren)   Progress Progressing toward goals   PT Frequency 4-6x/wk   Discharge Recommendation   Rehab Resource Intensity Level, PT I (Maximum Resource Intensity)   Additional Comments Pt continues to be functioning below baseline level. PT will continue to see pt while here in order to address the deficits listed above and provide interventions consistent w/ POC in effort to achieve STGs.    The patient's AM-PAC Basic Mobility Inpatient Short Form Raw Score is 11. A raw score less than 16 suggests the patient may benefit from discharge to post-acute rehabilitation services. Please also refer to the recommendation of the Physical Therapist for safe discharge planning.   AM-PAC Basic Mobility Inpatient   Turning in Flat Bed Without Bedrails 2   Lying on Back to Sitting on Edge of Flat Bed Without Bedrails 2   Moving Bed to Chair 2   Standing Up From Chair Using Arms 2   Walk in Room 2   Climb 3-5 Stairs With Railing 1   Basic Mobility Inpatient Raw Score 11   Basic Mobility Standardized Score 30.25   [facility] Highest Level Of Mobility   -[facility] Goal 4: Move to chair/commode   -[facility] Achieved 4: Move to chair/commode   Education   Education Provided Mobility training;Home  exercise program;Assistive device   Patient Reinforcement needed;Demonstrates verbal understanding   End of Consult   Patient Position at End of Consult Bedside chair;All needs within reach   End of Consult Comments pt  out of bed all needs in reach,  pt  able to demonstrate correct use of call bell system and Tv changer,  spoke to PCA staff to inform them pt did not have chair alarm set- up curently on chair PCa reported they would take care of it.   Yaima Yan, PTA

## 2025-03-15 NOTE — PROGRESS NOTES
Progress Note - Critical Care/ICU   Name: Ez Dsouza 59 y.o. male I MRN: [id number]  Unit/Bed#: ICU 13 I Date of Admission: 3/13/2025   Date of Service: 3/15/2025 I Hospital Day: 2      Assessment & Plan  Acute cerebrovascular accident (CVA) due to stenosis of left carotid artery (HCC)  DX: Multifocal strokes primarily L ICA territory suggesting symptomatic L ICA stenosis  Imaging  CTA head 3/10: Worsening severe stenosis in the proximal left ICA, no significant intracranial stenosis or LVO  CT head 3/10: No LVO   MRI brain 3/10: Multiple small scattered acute infarcts in the left cerebral hemisphere  CTA H/N 3/13 scattered left hemispheric infarctions  MRI brain 3/13: Multiple small foci of acute/recent ischemia in the left MCA territory   VAS carotid: Right< 50% stenosis, left > 70 stenosis of ICA  Developed worsening neurological exam on 3/13 due to hypoperfusion and cerebral edema  Transferred to [facility] ICU for vasopressor support to target MAP > 100  S/p ASA/Plavix load, Heparin gtt ACS low initiated  POD #1 s/p L CEA    Plan:  Continue statin and Plavix  Holding ASA, resume when okay with vascular surgery  Neuro checks- q4hrs  Neurology and vascular surgery following, appreciate recommendations  PT/OT/Speech eval   Continue heparin gtt  Continue neosynephrine for goal SBP > 160 mmHg in the setting of neuro deficits  Primary hypertension  PTA: amlodipine, carvedilol, hydrochlorothiazide, spironolactone  -- currently on hold  Was started on neosynephrine to maintain SBP > 160 mmHg given neruo deficits    Plan:  POD #1 s/p L CEA  Continue neosyneprine, titrate for SBP > 160 mmHg    Hyperlipidemia  Continue Lipitor.  Disposition: Stepdown Level 1    ICU Core Measures     A: Assess, Prevent, and Manage Pain Has pain been assessed? Yes  Need for changes to pain regimen? No   B: Both SAT/SAT  N/A   C: Choice of Sedation RASS Goal: N/A patient not on sedation  Need for changes to sedation or analgesia regimen? No   D:  Delirium CAM-ICU: Negative   E: Early Mobility  Plan for early mobility? Yes   F: Family Engagement Plan for family engagement today? Yes         Prophylaxis:  VTE VTE covered by:  heparin (porcine), Intravenous, 15.1 Units/kg/hr at 03/15/25 0013  heparin (porcine), Intravenous, 2,000 Units at 03/14/25 0557  heparin (porcine), Intravenous       Stress Ulcer  not ordered         24 Hour Events : Remains on Neosynephrine.  Given IVF bolus overnight as he was on max dose, not reaching goal SBP. Continues to have difficulty with open-ended questions, but is doing well with yes/no questions.       Subjective   Review of Systems: Review of Systems   Constitutional:  Negative for chills, fatigue and fever.   HENT:  Negative for ear pain and sore throat.    Eyes: Negative.    Respiratory:  Negative for cough, chest tightness, shortness of breath and wheezing.    Cardiovascular:  Negative for chest pain, palpitations and leg swelling.   Gastrointestinal:  Negative for abdominal distention, abdominal pain, diarrhea, nausea and vomiting.   Endocrine: Negative.    Genitourinary:  Negative for dysuria, frequency, hematuria and urgency.   Musculoskeletal:  Negative for arthralgias and myalgias.   Skin:  Negative for color change, pallor, rash and wound.   Allergic/Immunologic: Negative.    Neurological:  Positive for speech difficulty. Negative for seizures, syncope, facial asymmetry and weakness.   Hematological: Negative.    Psychiatric/Behavioral: Negative.     All other systems reviewed and are negative.      Objective :                   Vitals I/O      Most Recent Min/Max in 24hrs   Temp 98.8 °F (37.1 °C) Temp  Min: 98.2 °F (36.8 °C)  Max: 98.9 °F (37.2 °C)   Pulse (!) 50 Pulse  Min: 40  Max: 78   Resp 18 Resp  Min: 12  Max: 32   BP (!) 198/73 BP  Min: 109/68  Max: 198/73   O2 Sat 91 % SpO2  Min: 91 %  Max: 97 %      Intake/Output Summary (Last 24 hours) at 3/15/2025 0156  Last data filed at 3/15/2025 0000  Gross per 24  hour   Intake 1248.35 ml   Output 575 ml   Net 673.35 ml       Diet Cardiovascular; Cardiac; Cardiac    Invasive Monitoring           Physical Exam   Physical Exam  Vitals and nursing note reviewed.   Eyes:      General: Vision grossly intact. NeglectNo scleral icterus.        Right eye: No discharge.         Left eye: No discharge.      Extraocular Movements: Extraocular movements intact.      Conjunctiva/sclera: Conjunctivae normal.      Pupils: Pupils are equal, round, and reactive to light.   Skin:     General: Skin is warm, dry and not mottled extremities.      Capillary Refill: Capillary refill takes less than 2 seconds.      Coloration: Skin is not jaundiced or pale.      Findings: No lesion, rash or wound.   HENT:      Head: Normocephalic and atraumatic.      Right Ear: Hearing normal. No drainage.      Left Ear: Hearing normal. No drainage.      Nose: No nasal deformity, nasal tenderness, congestion, rhinorrhea, epistaxis or nasogastric tube.      Mouth/Throat:      Mouth: Mucous membranes are moist. No injury or angioedema.      Dentition: Normal dentition.      Pharynx: No oropharyngeal exudate.   Neck:      Vascular: No JVD.   no midline tenderness Cardiovascular:      Rate and Rhythm: Normal rate and regular rhythm.      Pulses: No decreased pulses.      Heart sounds: No murmur heard.     No friction rub. No gallop.   Musculoskeletal:         General: No swelling, tenderness, deformity or signs of injury.      Right lower leg: No edema.      Left lower leg: No edema.   Abdominal: General: Bowel sounds are normal. There is no distension.      Palpations: Abdomen is soft.      Tenderness: There is no abdominal tenderness.   Constitutional:       General: He is not in acute distress.     Appearance: He is well-developed and well-nourished. He is ill-appearing. He is not toxic-appearing.      Interventions: He is not sedated, not intubated and not restrained.  Pulmonary:      Effort: Pulmonary effort is  normal. No accessory muscle usage, respiratory distress or accessory muscle usage. He is not intubated.      Breath sounds: Normal breath sounds. No wheezing, rhonchi or rales.   Psychiatric:         Mood and Affect:  mood and affect abnormal.        Speech: Speech is not no receptive aphasia or no expressive aphasia.   Neurological:      General: No focal deficit present.      Mental Status: He is alert and oriented to person, place and time. He is CAM ICU negative.      GCS: GCS eye subscore is 4. GCS verbal subscore is 4. GCS motor subscore is 6.      Cranial Nerves: No dysarthria or facial asymmetry.      Sensory: Sensation is intact.      Motor: gross motor function is at baseline for patient. No pronator drift or motor deficit.      Comments: Does well with yes/no questions, but continues to have difficulty answering open-ended questions, I.e. How old are you? Where are you?  When he is supplied a question, such as 'Where are you?' Then given options with time to answer yes/no between options, he is able to select the appropriate answer.           Diagnostic Studies        Lab Results: I have reviewed the following results:     Medications:  Scheduled PRN   [Held by provider] aspirin, 81 mg, Daily  atorvastatin, 40 mg, Daily With Dinner  chlorhexidine, 15 mL, Q12H TIEN  clopidogrel, 75 mg, Daily      acetaminophen, 650 mg, Q6H PRN  heparin (porcine), 2,000 Units, Q6H PRN  heparin (porcine), 4,000 Units, Q6H PRN  labetalol, 10 mg, Q6H PRN  ondansetron, 4 mg, Q6H PRN       Continuous    heparin (porcine), 3-20 Units/kg/hr (Order-Specific), Last Rate: 15.1 Units/kg/hr (03/15/25 0013)  phenylephine,  mcg/min, Last Rate: 160 mcg/min (03/15/25 0148)         Labs:   CBC    Recent Labs     03/14/25 0453   WBC 8.56   HGB 12.4   HCT 36.7        BMP    Recent Labs     03/13/25 0457 03/14/25 0453   SODIUM 138 138   K 3.6 3.8    106   CO2 24 25   AGAP 8 7   BUN 26* 22   CREATININE 1.28 1.33*   CALCIUM  8.8 8.7       Coags    Recent Labs     03/13/25  1209 03/13/25  1817 03/13/25  2256 03/14/25  0532 03/14/25  1202 03/14/25  1808   INR 1.09  --  1.14  --   --   --    PTT 35*   < > 83*   < > 85* 76*    < > = values in this interval not displayed.        Additional Electrolytes  Recent Labs     03/14/25  0453   MG 1.9   PHOS 3.4   CAIONIZED 1.13          Blood Gas    No recent results  No recent results LFTs  No recent results    Infectious  No recent results  Glucose  Recent Labs     03/13/25  0457 03/14/25  0453   GLUC 111 115

## 2025-03-15 NOTE — ASSESSMENT & PLAN NOTE
DX: Multifocal strokes primarily L ICA territory suggesting symptomatic L ICA stenosis  Imaging  CTA head 3/10: Worsening severe stenosis in the proximal left ICA, no significant intracranial stenosis or LVO  CT head 3/10: No LVO   MRI brain 3/10: Multiple small scattered acute infarcts in the left cerebral hemisphere  CTA H/N 3/13 scattered left hemispheric infarctions  MRI brain 3/13: Multiple small foci of acute/recent ischemia in the left MCA territory   VAS carotid: Right< 50% stenosis, left > 70 stenosis of ICA  Developed worsening neurological exam on 3/13 due to hypoperfusion and cerebral edema  Transferred to [facility] ICU for vasopressor support to target MAP > 100  S/p ASA/Plavix load, Heparin gtt ACS low initiated  POD #1 s/p L CEA    Plan:  Continue statin and Plavix  Holding ASA, resume when okay with vascular surgery  Neuro checks- q4hrs  Neurology and vascular surgery following, appreciate recommendations  PT/OT/Speech eval   Continue heparin gtt  Continue neosynephrine for goal SBP > 160 mmHg in the setting of neuro deficits

## 2025-03-15 NOTE — ASSESSMENT & PLAN NOTE
PTA: amlodipine, carvedilol, hydrochlorothiazide, spironolactone  -- currently on hold  Was started on neosynephrine to maintain SBP > 160 mmHg given neruo deficits    Plan:  POD #1 s/p L CEA  Continue neosyneprine, titrate for SBP > 160 mmHg

## 2025-03-15 NOTE — PLAN OF CARE
Problem: PHYSICAL THERAPY ADULT  Goal: Performs mobility at highest level of function for planned discharge setting.  See evaluation for individualized goals.  Description: Treatment/Interventions: Functional transfer training, LE strengthening/ROM, Elevations, Therapeutic exercise, Endurance training, Patient/family training, Bed mobility, Gait training, Spoke to nursing, OT  Equipment Recommended: Walker       See flowsheet documentation for full assessment, interventions and recommendations.  Outcome: Progressing  Note: Prognosis: Good  Problem List: Decreased endurance, Impaired balance, Decreased mobility, Decreased strength, Decreased coordination, Decreased cognition, Decreased safety awareness, Impaired judgement  Assessment: Pt seen for PT treatment session this date with interventions consisting of bed mobility, transfer training, gait training, and HEP, and education provided as needed for safety and direction to improve functional mobility, safety awareness, and activity tolerance. Pt agreeable to PT treatment session upon arrival, pt found supine in bed . At end of session, pt left seated out of bed in chair with all needs in reach. In comparison to previous session, pt with improvement in activity tolerance, endurance, standing balance, ambulation distances, ambulatory balance, and functional mobility. Pt is showing progress toward PT goals with improvements as noted.  Pt  overall is requiring less assistance for bed mobility,  transfers and ambulation and showing improved activity tolerance with progression of ambulation distances. Pt  demonstrates difficulty following commands at times  requires increased verbal, tactile and visual cues.  Pt  demonstrates continued weakness R hemibody with R ue weakness >  R le.  Decreased R  strength, pt  requires assistance to lift and place R ue onto walker and to maintain  on walker  with transfer and gait training. NOTed R ue neglect with supine to sit  transfer,  pt 's R ue lagged behind and was caught behind pt's back, pt  not aware of this.  Pt  required assistance to physically bring pt's arm around to the front while pt seated at EOB.  Pt  demonstrate stable static sitting balance. Pt  performs transfers sit to stand with mod assist x1 verbal cues to lock R knee during stance phase,  pt able to perform one legged stance on R le with min assist x1 x 15 seconds.  Pt  progressed with ambulation distances to 6' with mod assist x1 with use of rw. Verbal cues for proper le sequencing, ue weightbearing, assist to steer and maneuver Rw, verbal cues for turning and backing up to chair and for stand to sit transfers.  Increased time,. Cues and assistance for all mobility due to impairments in balance, coordination, strength, cognition ( global aphasia), safety and gait deviations. Pt's pca's, [place] and [place] informed  of pt NOT having chair alarm and pt;s current level of mobility.  PCA reported they would take care of getting pt a chair alarm.  Pt able to demonstrate correct use of call bell system.   Continue to recommend  level I maximal rehab resource intensity  at time of d/c in order to maximize pt's functional independence and safety w/ mobility.     Barriers to Discharge Comments: Continue to recommend  level I maximal rehab resource intensity  at time of d/c in order to maximize pt's functional independence and safety w/ mobility. Pt continues to be functioning below baseline level. PT will continue to see pt while here in order to address the deficits listed above and provide interventions consistent w/ POC in effort to achieve STGs.  Rehab Resource Intensity Level, PT: I (Maximum Resource Intensity)    See flowsheet documentation for full assessment.

## 2025-03-15 NOTE — PLAN OF CARE
Problem: Prexisting or High Potential for Compromised Skin Integrity  Goal: Skin integrity is maintained or improved  Description: INTERVENTIONS:  - Identify patients at risk for skin breakdown  - Assess and monitor skin integrity  - Assess and monitor nutrition and hydration status  - Monitor labs   - Assess for incontinence   - Turn and reposition patient  - Assist with mobility/ambulation  - Relieve pressure over bony prominences  - Avoid friction and shearing  - Provide appropriate hygiene as needed including keeping skin clean and dry  - Evaluate need for skin moisturizer/barrier cream  - Collaborate with interdisciplinary team   - Patient/family teaching  - Consider wound care consult   3/14/2025 2227 by Walter Luo  Outcome: Progressing  3/14/2025 2227 by Walter Luo  Outcome: Progressing     Problem: PAIN - ADULT  Goal: Verbalizes/displays adequate comfort level or baseline comfort level  Description: Interventions:  - Encourage patient to monitor pain and request assistance  - Assess pain using appropriate pain scale  - Administer analgesics based on type and severity of pain and evaluate response  - Implement non-pharmacological measures as appropriate and evaluate response  - Consider cultural and social influences on pain and pain management  - Notify physician/advanced practitioner if interventions unsuccessful or patient reports new pain  3/14/2025 2227 by Walter Luo  Outcome: Progressing  3/14/2025 2227 by Walter Luo  Outcome: Progressing     Problem: INFECTION - ADULT  Goal: Absence or prevention of progression during hospitalization  Description: INTERVENTIONS:  - Assess and monitor for signs and symptoms of infection  - Monitor lab/diagnostic results  - Monitor all insertion sites, i.e. indwelling lines, tubes, and drains  - Monitor endotracheal if appropriate and nasal secretions for changes in amount and color  - [place] appropriate cooling/warming therapies per  order  - Administer medications as ordered  - Instruct and encourage patient and family to use good hand hygiene technique  - Identify and instruct in appropriate isolation precautions for identified infection/condition  3/14/2025 2227 by Walter Luo  Outcome: Progressing  3/14/2025 2227 by Walter Luo  Outcome: Progressing  Goal: Absence of fever/infection during neutropenic period  Description: INTERVENTIONS:  - Monitor WBC    3/14/2025 2227 by Walter Luo  Outcome: Progressing  3/14/2025 2227 by Walter Luo  Outcome: Progressing     Problem: SAFETY ADULT  Goal: Patient will remain free of falls  Description: INTERVENTIONS:  - Educate patient/family on patient safety including physical limitations  - Instruct patient to call for assistance with activity   - Consult OT/PT to assist with strengthening/mobility   - Keep Call bell within reach  - Keep bed low and locked with side rails adjusted as appropriate  - Keep care items and personal belongings within reach  - Initiate and maintain comfort rounds  - Make Fall Risk Sign visible to staff  - Offer Toileting every 2 Hours, in advance of need  - Initiate/Maintain bedalarm  - Obtain necessary fall risk management equipment:   - Apply yellow socks and bracelet for high fall risk patients  - Consider moving patient to room near nurses station  3/14/2025 2227 by Walter Luo  Outcome: Progressing  3/14/2025 2227 by Walter Luo  Outcome: Progressing  Goal: Maintain or return to baseline ADL function  Description: INTERVENTIONS:  -  Assess patient's ability to carry out ADLs; assess patient's baseline for ADL function and identify physical deficits which impact ability to perform ADLs (bathing, care of mouth/teeth, toileting, grooming, dressing, etc.)  - Assess/evaluate cause of self-care deficits   - Assess range of motion  - Assess patient's mobility; develop plan if impaired  - Assess patient's need for assistive devices and  provide as appropriate  - Encourage maximum independence but intervene and supervise when necessary  - Involve family in performance of ADLs  - Assess for home care needs following discharge   - Consider OT consult to assist with ADL evaluation and planning for discharge  - Provide patient education as appropriate  3/14/2025 2227 by Walter Luo  Outcome: Progressing  3/14/2025 2227 by Walter Luo  Outcome: Progressing  Goal: Maintains/Returns to pre admission functional level  Description: INTERVENTIONS:  - Perform AM-PAC 6 Click Basic Mobility/ Daily Activity assessment daily.  - Set and communicate daily mobility goal to care team and patient/family/caregiver.   - Collaborate with rehabilitation services on mobility goals if consulted  - Perform Range of Motion 3 times a day.  - Reposition patient every 2 hours.  - Dangle patient 3 times a day  - Stand patient 3 times a day  - Ambulate patient 3 times a day  - Out of bed to chair 3 times a day   - Out of bed for meals 3    Problem: Knowledge Deficit  Goal: Patient/family/caregiver demonstrates understanding of disease process, treatment plan, medications, and discharge instructions  Description: Complete learning assessment and assess knowledge base.  Interventions:  - Provide teaching at level of understanding  - Provide teaching via preferred learning methods  3/14/2025 2227 by Walter Luo  Outcome: Progressing  3/14/2025 2227 by Walter Luo  Outcome: Progressing     Outcome: Progressing  Goal: Maintain or return to baseline ADL function  Description: INTERVENTIONS:  -  Assess patient's ability to carry out ADLs; assess patient's baseline for ADL function and identify physical deficits which impact ability to perform ADLs (bathing, care of mouth/teeth, toileting, grooming, dressing, etc.)  - Assess/evaluate cause of self-care deficits   - Assess range of motion  - Assess patient's mobility; develop plan if impaired  - Assess patient's  need for assistive devices and provide as appropriate  - Encourage maximum independence but intervene and supervise when necessary  - Involve family in performance of ADLs  - Assess for home care needs following discharge   - Consider OT consult to assist with ADL evaluation and planning for discharge  - Provide patient education as appropriate  3/14/2025 2227 by Walter Luo  Outcome: Progressing  3/14/2025 2227 by Walter Luo  Outcome: Progressing    Problem: DISCHARGE PLANNING  Goal: Discharge to home or other facility with appropriate resources  Description: INTERVENTIONS:  - Identify barriers to discharge w/patient and caregiver  - Arrange for needed discharge resources and transportation as appropriate  - Identify discharge learning needs (meds, wound care, etc.)  - Arrange for interpretive services to assist at discharge as needed  - Refer to Case Management Department for coordinating discharge planning if the patient needs post-hospital services based on physician/advanced practitioner order or complex needs related to functional status, cognitive ability, or social support system  3/14/2025 2227 by Walter Luo  Outcome: Progressing  3/14/2025 2227 by Walter Luo  Outcome: Progressing

## 2025-03-15 NOTE — PROGRESS NOTES
Attending Attestation::{DISAPPEARING TIP I Billing Tip):[id number]}    I supervised the Advanced Practitioner/Resident team on 3/15/2025.? I performed, in its entirety, the assessment and plan of the visit.  I agree with the Advanced Practitioner's note with the following additions/exceptions:      Assessment:  59 y.o. male with a h/o ***     ***P/w ***    Principal Problem:    Acute cerebrovascular accident (CVA) due to stenosis of left carotid artery (HCC)  Active Problems:    Primary hypertension    Hyperlipidemia          Impression:  Acute L MCA ischemic stroke  L ICA stenosis-70 to 99% with soft plaque  Nonobstructive CAD  HTN  Dyslipidemia        Plan:  No overnight events, awake/conversant still aphasic receptive  Continue phenylephrine for goal SBP >160 48-hour from 3/13 supposed to be tonight  Then goal SBP >100  Plan for left CEA 3/17  Continue clopidogrel/heparin drip  Lipitor  Appreciate neurology/vascular surgery input    Neuro:  ***.   Cardiovascular: ***  Pulmonary:  ***  Gastrointestinal:  ***  Renal:  ***  ID:  ***  Heme:  ***  Endocrine:  ***.  Musculoskeletal:  ***.      Disposition:   {CC Dispo Plan:19421}    Devices:      Invasive Devices       Peripheral Intravenous Line  Duration             Peripheral IV 03/13/25 Distal;Left;Upper;Ventral (anterior) Arm 2 days    Peripheral IV 03/13/25 Distal;Left;Ventral (anterior) Forearm 2 days    Peripheral IV 03/13/25 Left;Proximal;Upper;Ventral (anterior) Arm 1 day                       Yes      No      Comments              Feeding                 Analgesia                 Sedation                 Thrombosis prevention           x     Head of bed up >30degrees                 Ulcer prophylaxis           x     Glucose control           x     Oral care                 Code Status    Level 1 - Full Code                                Family updated today              ***                    I have personally seen and examined the patient on 3/15/2025. I  discussed the patient with the Advanced Practitioner (MEGHANN or LORRIE) including, but not limited to, verifying findings; reviewing labs and x-rays/other imagings; discussing with consultants; developing the plan of care with the bedside nurse; and discussing treatment plan with patient or surrogate.  I have reviewed the note and assessment performed by the Advanced Practitioner (MEGHANN or LORRIE) , and agree with the documented findings and plan of care with the following additions/exceptions. Please see my following comments for details and adjustments. Critical care time, excluding procedures, teaching, family meetings, and excludes any prior time recorded by providers other than myself, (***) minutes (*** to ***).      Unique Saunders MD

## 2025-03-15 NOTE — PLAN OF CARE
Problem: Prexisting or High Potential for Compromised Skin Integrity  Goal: Skin integrity is maintained or improved  Description: INTERVENTIONS:  - Identify patients at risk for skin breakdown  - Assess and monitor skin integrity  - Assess and monitor nutrition and hydration status  - Monitor labs   - Assess for incontinence   - Turn and reposition patient  - Assist with mobility/ambulation  - Relieve pressure over bony prominences  - Avoid friction and shearing  - Provide appropriate hygiene as needed including keeping skin clean and dry  - Evaluate need for skin moisturizer/barrier cream  - Collaborate with interdisciplinary team   - Patient/family teaching  - Consider wound care consult   Outcome: Progressing     Problem: PAIN - ADULT  Goal: Verbalizes/displays adequate comfort level or baseline comfort level  Description: Interventions:  - Encourage patient to monitor pain and request assistance  - Assess pain using appropriate pain scale  - Administer analgesics based on type and severity of pain and evaluate response  - Implement non-pharmacological measures as appropriate and evaluate response  - Consider cultural and social influences on pain and pain management  - Notify physician/advanced practitioner if interventions unsuccessful or patient reports new pain  Outcome: Progressing     Problem: INFECTION - ADULT  Goal: Absence or prevention of progression during hospitalization  Description: INTERVENTIONS:  - Assess and monitor for signs and symptoms of infection  - Monitor lab/diagnostic results  - Monitor all insertion sites, i.e. indwelling lines, tubes, and drains  - Monitor endotracheal if appropriate and nasal secretions for changes in amount and color  - [place] appropriate cooling/warming therapies per order  - Administer medications as ordered  - Instruct and encourage patient and family to use good hand hygiene technique  - Identify and instruct in appropriate isolation precautions for  identified infection/condition  Outcome: Progressing  Goal: Absence of fever/infection during neutropenic period  Description: INTERVENTIONS:  - Monitor WBC    Outcome: Progressing     Problem: SAFETY ADULT  Goal: Patient will remain free of falls  Description: INTERVENTIONS:  - Educate patient/family on patient safety including physical limitations  - Instruct patient to call for assistance with activity   - Consult OT/PT to assist with strengthening/mobility   - Keep Call bell within reach  - Keep bed low and locked with side rails adjusted as appropriate  - Keep care items and personal belongings within reach  - Initiate and maintain comfort rounds  - Make Fall Risk Sign visible to staff  - Offer Toileting every 2 Hours, in advance of need  - Initiate/Maintain bed alarm  - Apply yellow socks and bracelet for high fall risk patients  - Consider moving patient to room near nurses station  Outcome: Progressing  Goal: Maintain or return to baseline ADL function  Description: INTERVENTIONS:  -  Assess patient's ability to carry out ADLs; assess patient's baseline for ADL function and identify physical deficits which impact ability to perform ADLs (bathing, care of mouth/teeth, toileting, grooming, dressing, etc.)  - Assess/evaluate cause of self-care deficits   - Assess range of motion  - Assess patient's mobility; develop plan if impaired  - Assess patient's need for assistive devices and provide as appropriate  - Encourage maximum independence but intervene and supervise when necessary  - Involve family in performance of ADLs  - Assess for home care needs following discharge   - Consider OT consult to assist with ADL evaluation and planning for discharge  - Provide patient education as appropriate  Outcome: Progressing  Goal: Maintains/Returns to pre admission functional level  Description: INTERVENTIONS:  - Perform AM-PAC 6 Click Basic Mobility/ Daily Activity assessment daily.  - Set and communicate daily mobility  goal to care team and patient/family/caregiver.   - Collaborate with rehabilitation services on mobility goals if consulted  - Perform Range of Motion 3 times a day.  - Reposition patient every 2 hours.  - Out of bed to chair 3 times a day   - Out of bed for meals 3  times a day  - Out of bed for toileting  - Record patient progress and toleration of activity level   Outcome: Progressing     Problem: DISCHARGE PLANNING  Goal: Discharge to home or other facility with appropriate resources  Description: INTERVENTIONS:  - Identify barriers to discharge w/patient and caregiver  - Arrange for needed discharge resources and transportation as appropriate  - Identify discharge learning needs (meds, wound care, etc.)  - Arrange for interpretive services to assist at discharge as needed  - Refer to Case Management Department for coordinating discharge planning if the patient needs post-hospital services based on physician/advanced practitioner order or complex needs related to functional status, cognitive ability, or social support system  Outcome: Progressing     Problem: Knowledge Deficit  Goal: Patient/family/caregiver demonstrates understanding of disease process, treatment plan, medications, and discharge instructions  Description: Complete learning assessment and assess knowledge base.  Interventions:  - Provide teaching at level of understanding  - Provide teaching via preferred learning methods  Outcome: Progressing     Problem: Nutrition/Hydration-ADULT  Goal: Nutrient/Hydration intake appropriate for improving, restoring or maintaining nutritional needs  Description: Monitor and assess patient's nutrition/hydration status for malnutrition. Collaborate with interdisciplinary team and initiate plan and interventions as ordered.  Monitor patient's weight and dietary intake as ordered or per policy. Utilize nutrition screening tool and intervene as necessary. Determine patient's food preferences and provide  high-protein, high-caloric foods as appropriate.     INTERVENTIONS:  - Monitor oral intake, urinary output, labs, and treatment plans  - Assess nutrition and hydration status and recommend course of action  - Evaluate amount of meals eaten  - Assist patient with eating if necessary   - Allow adequate time for meals  - Recommend/ encourage appropriate diets, oral nutritional supplements, and vitamin/mineral supplements  - Order, calculate, and assess calorie counts as needed  - Recommend, monitor, and adjust tube feedings and TPN/PPN based on assessed needs  - Assess need for intravenous fluids  - Provide specific nutrition/hydration education as appropriate  - Include patient/family/caregiver in decisions related to nutrition  Outcome: Progressing

## 2025-03-15 NOTE — ASSESSMENT & PLAN NOTE
59-year-old male with hx of HTN, CAD, HLD, tobacco abuse, hx of fall '24 w/ rib fxs and R wrist fx, woke up with R side weakness and presented to [facility] by EMS. Patient transferred to [facility] overnight for tentative L carotid enterectomy on 3/17/25 with .    Results   Carotid DU:   R ICA <50% stenosis,   L ICA >70% stenosis w/ pilar 259/82 and ratio 3.2    3/10 MRI brain: multiple small acute CVAs in the L periventricular, subcotical, frontal and parietal/occipital lobes; small petechial hemorrhage    CTA neck: R ICA midl stenosis; L ICA w/ ~60-70% stenosis w/ soft plaque and increased irregularity compared to prior study in '24; there is a long area of irregularity, about 3.5cm in length    3/13 MRI brain: multiple small foci of ischemia in L MCA territory. CAMRYN to MCA watershed territory on the L as well as L caudate. Involvement of R parietal lobe. Consider central embolic source.   3/13 CTA head and neck: Scattered L hemispheric infarction. Unchanged L ICA high grade stenosis.     Labs 3/14/25  Cr 1.33/ GFR 57  Hg 12.4/ WBC 8.56    Plan  -Pt presented to [facility] as stroke alert with R sided weakness, confusion and altered speech found to have L ICA stenosis (70-99%) with soft plaque on CTA. Tentative plan for L CEA on 3/17 with . Pt transferred to [facility] for procedure on Monday and ICU monitoring.   -No new neurological events   -ICU monitoring for vasopressor support with goals to keep MAP >100  -Neurology following, appreciate recs  -Cardiology following  -Continue Plavix and Heparin gtt  -Lipitor 40mg daily  -Neuro checks  -Will D/W Dr Sanchez

## 2025-03-15 NOTE — RAPID RESPONSE
Rapid Response Note  Ez Dsouza 59 y.o. male MRN: [id number]  Unit/Bed#: ICU 13 Encounter: [id number]    Rapid Response Notification(s):   Response called date/time:  3/15/2025 3:00 PM  Response team arrival date/time:  3/15/2025 3:00 PM  Response end date/time:  3/15/2025 3:23 PM  Level of care:  ICU  Rapid response location:  ICU  Primary reason for rapid response call:  Fall    Rapid Response Intervention(s):   Airway:  None  Breathing:  None  Circulation:  None  Fluids administered:  None  Medications administered:  None       Assessment:   Patient was left in chair by PT.  The patient is impulsive and got up on his own and fell to the ground striking his hip.  Patient was able to stand up with help and get back into bed.  He denies complaints of pain.  However, he does have receptive and expressive aphasia.    Plan:   There were no apparent bruises or fluctuant or edematous areas on the patient's hip.  No lumps or bumps on his head.  Send for CT of the head and x-ray of the hip in an effort for patient's safety.     Rapid Response Outcome:   Primary service notified of transfer: Yes    Code Status: Level 1 (Full Code)    Comments:  59-year-old CVA patient in the ICU suffered a fall.    Family notified: Primary team to notify Family Member       Background/Situation:   Ez Dsouza is a 59 y.o. male who was admitted to the intensive care unit after suffering a CVA and was initially admitted to [facility].  The patient was noted to have bilateral carotid artery stenosis and was transferred to [facility] for a vascular surgery consult and CEA of his carotid artery.    The patient demonstrates 4 out of 5 strength on the left and 5 out of 5 strength in the right.  He has demonstrated expressive and receptive aphasia and is easily frustrated by not being able to respond to more complicated questions he does best with yes and no questions.  However the patient was in chair in the room and decided  he was getting up to you either go to the bathroom or to get back to bed while he was connected to the continuous cardiopulmonary monitor and blood pressure cuff.  This likely caused him to lose balance and fall.    With minimal help we were able to stand him up and get him back to bed.  He will be sent downstairs to undergo CT of the head without contrast and x-ray of his bilateral hips.    Review of Systems   Constitutional: Negative.    HENT: Negative.     Eyes: Negative.    Respiratory: Negative.     Cardiovascular: Negative.    Gastrointestinal: Negative.    Endocrine: Negative.    Genitourinary: Negative.    Skin: Negative.    Allergic/Immunologic: Negative.    Neurological: Negative.    Hematological: Negative.        Objective:   Vitals:    03/15/25 1100 03/15/25 1135 03/15/25 1150 03/15/25 1250   BP:  (!) 198/89 (!) 175/116 126/88   BP Location:       Pulse:  (!) 42 60 56   Resp:  16 (!) 25 20   Temp: 98.3 °F (36.8 °C)      TempSrc: Oral      SpO2:  96% 96% 97%   Weight:       Height:         Physical Exam  Vitals reviewed.   Constitutional:       Appearance: Normal appearance.   HENT:      Head: Normocephalic and atraumatic.      Nose: Nose normal.      Mouth/Throat:      Mouth: Mucous membranes are moist.      Pharynx: Oropharynx is clear.   Eyes:      Extraocular Movements: Extraocular movements intact.      Pupils: Pupils are equal, round, and reactive to light.   Cardiovascular:      Rate and Rhythm: Normal rate and regular rhythm.      Heart sounds: Normal heart sounds.   Pulmonary:      Breath sounds: Normal breath sounds.   Abdominal:      General: Abdomen is flat. Bowel sounds are normal.      Palpations: Abdomen is soft.   Musculoskeletal:         General: Normal range of motion.      Cervical back: Normal range of motion and neck supple.   Skin:     General: Skin is warm and dry.      Capillary Refill: Capillary refill takes less than 2 seconds.   Neurological:      Mental Status: He is alert.       Comments: Receptive and expressive aphasia   Psychiatric:         Mood and Affect: Mood normal.         Behavior: Behavior normal.         Thought Content: Thought content normal.

## 2025-03-16 ENCOUNTER — APPOINTMENT (INPATIENT)
Dept: NON INVASIVE DIAGNOSTICS | Facility: HOSPITAL | Age: 60
DRG: 037 | End: 2025-03-16
Payer: COMMERCIAL

## 2025-03-16 PROBLEM — F17.200 SMOKING: Status: ACTIVE | Noted: 2025-03-16

## 2025-03-16 PROBLEM — IMO0001 SMOKING: Status: ACTIVE | Noted: 2025-03-16

## 2025-03-16 LAB
APTT PPP: 66 SECONDS (ref 23–34)
APTT PPP: 73 SECONDS (ref 23–34)
APTT PPP: 96 SECONDS (ref 23–34)
BSA FOR ECHO PROCEDURE: 2.16 M2

## 2025-03-16 PROCEDURE — 93308 TTE F-UP OR LMTD: CPT

## 2025-03-16 PROCEDURE — 85730 THROMBOPLASTIN TIME PARTIAL: CPT | Performed by: INTERNAL MEDICINE

## 2025-03-16 PROCEDURE — 99232 SBSQ HOSP IP/OBS MODERATE 35: CPT | Performed by: SURGERY

## 2025-03-16 PROCEDURE — 99232 SBSQ HOSP IP/OBS MODERATE 35: CPT | Performed by: INTERNAL MEDICINE

## 2025-03-16 PROCEDURE — 93308 TTE F-UP OR LMTD: CPT | Performed by: STUDENT IN AN ORGANIZED HEALTH CARE EDUCATION/TRAINING PROGRAM

## 2025-03-16 RX ADMIN — CHLORHEXIDINE GLUCONATE 15 ML: 1.2 SOLUTION ORAL at 08:57

## 2025-03-16 RX ADMIN — HEPARIN SODIUM 14.1 UNITS/KG/HR: 10000 INJECTION, SOLUTION INTRAVENOUS at 12:39

## 2025-03-16 RX ADMIN — ATORVASTATIN CALCIUM 40 MG: 40 TABLET, FILM COATED ORAL at 16:40

## 2025-03-16 RX ADMIN — SENNOSIDES 8.6 MG: 8.6 TABLET, FILM COATED ORAL at 22:33

## 2025-03-16 RX ADMIN — CHLORHEXIDINE GLUCONATE 15 ML: 1.2 SOLUTION ORAL at 22:33

## 2025-03-16 RX ADMIN — CLOPIDOGREL BISULFATE 75 MG: 75 TABLET ORAL at 08:57

## 2025-03-16 RX ADMIN — PHENYLEPHRINE HYDROCHLORIDE 100 MCG/MIN: 50 INJECTION INTRAVENOUS at 01:15

## 2025-03-16 RX ADMIN — PHENYLEPHRINE HYDROCHLORIDE 80 MCG/MIN: 50 INJECTION INTRAVENOUS at 12:53

## 2025-03-16 NOTE — ASSESSMENT & PLAN NOTE
PTA: amlodipine, carvedilol, hydrochlorothiazide, spironolactone  -- currently on hold  Was started on neosynephrine to maintain SBP > 160 mmHg given neruo deficits    Plan:  POD #2 s/p L CEA  Continue neosyneprine, titrate for MAP > 100 mmHg

## 2025-03-16 NOTE — ASSESSMENT & PLAN NOTE
DX: Multifocal strokes primarily L ICA territory suggesting symptomatic L ICA stenosis  Imaging  CTA head 3/10: Worsening severe stenosis in the proximal left ICA, no significant intracranial stenosis or LVO  CT head 3/10: No LVO   MRI brain 3/10: Multiple small scattered acute infarcts in the left cerebral hemisphere  CTA H/N 3/13 scattered left hemispheric infarctions  MRI brain 3/13: Multiple small foci of acute/recent ischemia in the left MCA territory   VAS carotid: Right< 50% stenosis, left > 70 stenosis of ICA  Developed worsening neurological exam on 3/13 due to hypoperfusion and cerebral edema  Transferred to [facility] ICU for vasopressor support to target MAP > 100  S/p ASA/Plavix load, Heparin gtt ACS low initiated  POD #2 s/p L CEA    Plan:  Continue statin and Plavix  Holding ASA, resume when okay with vascular surgery  Neuro checks- q4hrs  Neurology and vascular surgery following, appreciate recommendations  PT/OT/Speech eval   Continue heparin gtt  Continue neosynephrine for goal MAP > 100 mmHg in the setting of neuro deficits

## 2025-03-16 NOTE — ASSESSMENT & PLAN NOTE
59-year-old male with hx of HTN, CAD, HLD, tobacco abuse, hx of fall '24 w/ rib fxs and R wrist fx, woke up with R side weakness and presented to [facility] by EMS. Patient transferred to [facility] overnight for tentative L carotid enterectomy on 3/17/25 with .    Results   Carotid DU:   R ICA <50% stenosis,   L ICA >70% stenosis w/ pilar 259/82 and ratio 3.2    3/10 MRI brain: multiple small acute CVAs in the L periventricular, subcotical, frontal and parietal/occipital lobes; small petechial hemorrhage    CTA neck: R ICA midl stenosis; L ICA w/ ~60-70% stenosis w/ soft plaque and increased irregularity compared to prior study in '24; there is a long area of irregularity, about 3.5cm in length    3/13 MRI brain: multiple small foci of ischemia in L MCA territory. CAMRYN to MCA watershed territory on the L as well as L caudate. Involvement of R parietal lobe. Consider central embolic source.   3/13 CTA head and neck: Scattered L hemispheric infarction. Unchanged L ICA high grade stenosis.   3/15 CT Head - No intracranial hemorrhage or calvarial fracture. Left greater than right subacute to chronic watershed type infarctions are stable.     Labs 3/15/25  Cr 1.29/ GFR 59  WBC 11.77  Hg 12.3    Plan  -Pt presented to [facility] as stroke alert with R sided weakness, confusion and altered speech found to have L ICA stenosis (70-99%) with soft plaque on CTA. Tentative plan for L CEA on 3/17 with . NPO after MN  -Pt transferred to [facility] for procedure on Monday and ICU monitoring.   -No new neurological events   -ICU monitoring for vasopressor support with goals to keep MAP >100  -Neurology following, appreciate recs  -Cardiology following  -Continue Plavix and Heparin gtt  -Lipitor 40mg daily  -Frequent Neuro checks  -Will D/W Dr Sanchez

## 2025-03-16 NOTE — PROGRESS NOTES
Progress Note - Vascular Surgery   Name: Ez Dsouza 59 y.o. male I MRN: [id number]  Unit/Bed#: ICU 13 I Date of Admission: 3/13/2025   Date of Service: 3/16/2025 I Hospital Day: 3    Assessment & Plan  Acute cerebrovascular accident (CVA) due to stenosis of left carotid artery (HCC)  59-year-old male with hx of HTN, CAD, HLD, tobacco abuse, hx of fall '24 w/ rib fxs and R wrist fx, woke up with R side weakness and presented to [facility] by EMS. Patient transferred to [facility] overnight for tentative L carotid enterectomy on 3/17/25 with .    Results   Carotid DU:   R ICA <50% stenosis,   L ICA >70% stenosis w/ pilar 259/82 and ratio 3.2    3/10 MRI brain: multiple small acute CVAs in the L periventricular, subcotical, frontal and parietal/occipital lobes; small petechial hemorrhage    CTA neck: R ICA midl stenosis; L ICA w/ ~60-70% stenosis w/ soft plaque and increased irregularity compared to prior study in '24; there is a long area of irregularity, about 3.5cm in length    3/13 MRI brain: multiple small foci of ischemia in L MCA territory. CAMRYN to MCA watershed territory on the L as well as L caudate. Involvement of R parietal lobe. Consider central embolic source.   3/13 CTA head and neck: Scattered L hemispheric infarction. Unchanged L ICA high grade stenosis.   3/15 CT Head - No intracranial hemorrhage or calvarial fracture. Left greater than right subacute to chronic watershed type infarctions are stable.     Labs 3/15/25  Cr 1.29/ GFR 59  WBC 11.77  Hg 12.3    Plan  -Pt presented to [facility] as stroke alert with R sided weakness, confusion and altered speech found to have L ICA stenosis (70-99%) with soft plaque on CTA. Tentative plan for L CEA on 3/17 with . NPO after MN  -Pt transferred to [facility] for procedure on Monday and ICU monitoring.   -No new neurological events   -ICU monitoring for vasopressor support with goals to keep MAP >100  -Neurology following,  appreciate recs  -Cardiology following  -Continue Plavix and Heparin gtt  -Lipitor 40mg daily  -Frequent Neuro checks  -Will D/W Dr Sanchez     Primary hypertension  -Permissive hypertension. Anti-hypertensive medications on hold.   -Managed by ICU team  Fall  S/p fall yesterday + head strike  CT head head negative      Subjective :  Pt A+Ox3. OOB bed to chair.  Wife at bedside.  Patient stood up from chair yesterday and fell.    Denies any significant pain or discomfort.  No new focal neurological events consistent with TIA/CVA.  Patient answers easily yes and no to questioning.  Intermittently has some expressive aphasia with further conversation.  3-4 out of 5 strength of the right upper extremity.    Heparin drip/Fuad-Synephrine drip    NPO after MN for L CEA tomorrow    Objective :    Temp:  [98.5 °F (36.9 °C)-99 °F (37.2 °C)] 98.5 °F (36.9 °C)  HR:  [38-86] 70  BP: (126-207)/() 131/85  Resp:  [11-33] 21  SpO2:  [91 %-100 %] 94 %  O2 Device: None (Room air)     I/O         03/14 0701  03/15 0700 03/15 0701  03/16 0700 03/16 0701  03/17 0700    P.O. 600 360 480    I.V. (mL/kg) 991.3 (10.5) 1303.6 (14.5)     Total Intake(mL/kg) 1591.3 (16.9) 1663.6 (18.5) 480 (5.4)    Urine (mL/kg/hr) 975 (0.4) 1750 (0.8) 300 (0.6)    Total Output 975 1750 300    Net +616.3 -86.4 +180           Unmeasured Urine Occurrence 1 x                Lab Results: I have reviewed the following results:  CBC with diff:   Lab Results   Component Value Date    WBC 11.77 (H) 03/15/2025    HGB 12.3 03/15/2025    HCT 36.3 (L) 03/15/2025    MCV 90 03/15/2025     03/15/2025    RBC 4.03 03/15/2025    MCH 30.5 03/15/2025    MCHC 33.9 03/15/2025    RDW 13.7 03/15/2025    MPV 10.0 03/15/2025    NRBC 0 03/15/2025   ,   BMP/CMP:  Lab Results   Component Value Date    SODIUM 138 03/15/2025    K 3.6 03/15/2025     03/15/2025    CO2 21 03/15/2025    CO2 29 06/22/2024    BUN 25 03/15/2025    CREATININE 1.29 03/15/2025    GLUCOSE 87 06/22/2024  "   CALCIUM 8.3 (L) 03/15/2025    AST 29 06/26/2024    ALT 29 06/26/2024    ALKPHOS 98 06/26/2024    EGFR 59 03/15/2025   ,   Lipid Panel: No results found for: \"CHOL\",   Coags:   Lab Results   Component Value Date    PTT 73 (H) 03/16/2025    INR 1.14 03/13/2025   ,   Blood Culture: No results found for: \"BLOODCX\",   Urinalysis:   Lab Results   Component Value Date    COLORU Light Yellow 03/10/2025    CLARITYU Clear 03/10/2025    SPECGRAV 1.043 (H) 03/10/2025    PHUR 5.5 03/10/2025    LEUKOCYTESUR Negative 03/10/2025    NITRITE Negative 03/10/2025    GLUCOSEU Negative 03/10/2025    KETONESU Negative 03/10/2025    BILIRUBINUR Negative 03/10/2025    BLOODU Moderate (A) 03/10/2025   ,   Urine Culture: No results found for: \"URINECX\",   Wound Culure: No results found for: \"WOUNDCULT\"      "

## 2025-03-16 NOTE — PLAN OF CARE
Problem: Prexisting or High Potential for Compromised Skin Integrity  Goal: Skin integrity is maintained or improved  Description: INTERVENTIONS:  - Identify patients at risk for skin breakdown  - Assess and monitor skin integrity  - Assess and monitor nutrition and hydration status  - Monitor labs   - Assess for incontinence   - Turn and reposition patient  - Assist with mobility/ambulation  - Relieve pressure over bony prominences  - Avoid friction and shearing  - Provide appropriate hygiene as needed including keeping skin clean and dry  - Evaluate need for skin moisturizer/barrier cream  - Collaborate with interdisciplinary team   - Patient/family teaching  - Consider wound care consult   Outcome: Progressing     Problem: PAIN - ADULT  Goal: Verbalizes/displays adequate comfort level or baseline comfort level  Description: Interventions:  - Encourage patient to monitor pain and request assistance  - Assess pain using appropriate pain scale  - Administer analgesics based on type and severity of pain and evaluate response  - Implement non-pharmacological measures as appropriate and evaluate response  - Consider cultural and social influences on pain and pain management  - Notify physician/advanced practitioner if interventions unsuccessful or patient reports new pain  Outcome: Progressing     Problem: INFECTION - ADULT  Goal: Absence or prevention of progression during hospitalization  Description: INTERVENTIONS:  - Assess and monitor for signs and symptoms of infection  - Monitor lab/diagnostic results  - Monitor all insertion sites, i.e. indwelling lines, tubes, and drains  - Monitor endotracheal if appropriate and nasal secretions for changes in amount and color  - Spencer appropriate cooling/warming therapies per order  - Administer medications as ordered  - Instruct and encourage patient and family to use good hand hygiene technique  - Identify and instruct in appropriate isolation precautions for  identified infection/condition  Outcome: Progressing  Goal: Absence of fever/infection during neutropenic period  Description: INTERVENTIONS:  - Monitor WBC    Outcome: Progressing     Problem: SAFETY ADULT  Goal: Patient will remain free of falls  Description: INTERVENTIONS:  - Educate patient/family on patient safety including physical limitations  - Instruct patient to call for assistance with activity   - Consult OT/PT to assist with strengthening/mobility   - Keep Call bell within reach  - Keep bed low and locked with side rails adjusted as appropriate  - Keep care items and personal belongings within reach  - Initiate and maintain comfort rounds  - Make Fall Risk Sign visible to staff  - Offer Toileting every 2 Hours, in advance of need  - Initiate/Maintain bed alarm  - Obtain necessary fall risk management equipment:   - Apply yellow socks and bracelet for high fall risk patients  - Consider moving patient to room near nurses station  Outcome: Progressing  Goal: Maintain or return to baseline ADL function  Description: INTERVENTIONS:  -  Assess patient's ability to carry out ADLs; assess patient's baseline for ADL function and identify physical deficits which impact ability to perform ADLs (bathing, care of mouth/teeth, toileting, grooming, dressing, etc.)  - Assess/evaluate cause of self-care deficits   - Assess range of motion  - Assess patient's mobility; develop plan if impaired  - Assess patient's need for assistive devices and provide as appropriate  - Encourage maximum independence but intervene and supervise when necessary  - Involve family in performance of ADLs  - Assess for home care needs following discharge   - Consider OT consult to assist with ADL evaluation and planning for discharge  - Provide patient education as appropriate  Outcome: Progressing  Goal: Maintains/Returns to pre admission functional level  Description: INTERVENTIONS:  - Perform AM-PAC 6 Click Basic Mobility/ Daily Activity  assessment daily.  - Set and communicate daily mobility goal to care team and patient/family/caregiver.   - Collaborate with rehabilitation services on mobility goals if consulted  - Perform Range of Motion 3 times a day.  - Reposition patient every 2 hours.  - Dangle patient 3 times a day  - Stand patient 3 times a day  - Ambulate patient 3 times a day  - Out of bed to chair 3 times a day   - Out of bed for meals 3  Problem: DISCHARGE PLANNING  Goal: Discharge to home or other facility with appropriate resources  Description: INTERVENTIONS:  - Identify barriers to discharge w/patient and caregiver  - Arrange for needed discharge resources and transportation as appropriate  - Identify discharge learning needs (meds, wound care, etc.)  - Arrange for interpretive services to assist at discharge as needed  - Refer to Case Management Department for coordinating discharge planning if the patient needs post-hospital services based on physician/advanced practitioner order or complex needs related to functional status, cognitive ability, or social support system  Outcome: Progressing     Problem: Knowledge Deficit  Goal: Patient/family/caregiver demonstrates understanding of disease process, treatment plan, medications, and discharge instructions  Description: Complete learning assessment and assess knowledge base.  Interventions:  - Provide teaching at level of understanding  - Provide teaching via preferred learning methods  Outcome: Progressing     Problem: Nutrition/Hydration-ADULT  Goal: Nutrient/Hydration intake appropriate for improving, restoring or maintaining nutritional needs  Description: Monitor and assess patient's nutrition/hydration status for malnutrition. Collaborate with interdisciplinary team and initiate plan and interventions as ordered.  Monitor patient's weight and dietary intake as ordered or per policy. Utilize nutrition screening tool and intervene as necessary. Determine patient's food  preferences and provide high-protein, high-caloric foods as appropriate.     INTERVENTIONS:  - Monitor oral intake, urinary output, labs, and treatment plans  - Assess nutrition and hydration status and recommend course of action  - Evaluate amount of meals eaten  - Assist patient with eating if necessary   - Allow adequate time for meals  - Recommend/ encourage appropriate diets, oral nutritional supplements, and vitamin/mineral supplements  - Order, calculate, and assess calorie counts as needed  - Recommend, monitor, and adjust tube feedings and TPN/PPN based on assessed needs  - Assess need for intravenous fluids  - Provide specific nutrition/hydration education as appropriate  - Include patient/family/caregiver in decisions related to nutrition  Outcome: Progressing    times a day  - Out of bed for toileting  - Record patient progress and toleration of activity level   Outcome: Progressing

## 2025-03-16 NOTE — PLAN OF CARE
Problem: Prexisting or High Potential for Compromised Skin Integrity  Goal: Skin integrity is maintained or improved  Description: INTERVENTIONS:  - Identify patients at risk for skin breakdown  - Assess and monitor skin integrity  - Assess and monitor nutrition and hydration status  - Monitor labs   - Assess for incontinence   - Turn and reposition patient  - Assist with mobility/ambulation  - Relieve pressure over bony prominences  - Avoid friction and shearing  - Provide appropriate hygiene as needed including keeping skin clean and dry  - Evaluate need for skin moisturizer/barrier cream  - Collaborate with interdisciplinary team   - Patient/family teaching  - Consider wound care consult   Outcome: Progressing     Problem: PAIN - ADULT  Goal: Verbalizes/displays adequate comfort level or baseline comfort level  Description: Interventions:  - Encourage patient to monitor pain and request assistance  - Assess pain using appropriate pain scale  - Administer analgesics based on type and severity of pain and evaluate response  - Implement non-pharmacological measures as appropriate and evaluate response  - Consider cultural and social influences on pain and pain management  - Notify physician/advanced practitioner if interventions unsuccessful or patient reports new pain  Outcome: Progressing     Problem: INFECTION - ADULT  Goal: Absence or prevention of progression during hospitalization  Description: INTERVENTIONS:  - Assess and monitor for signs and symptoms of infection  - Monitor lab/diagnostic results  - Monitor all insertion sites, i.e. indwelling lines, tubes, and drains  - Monitor endotracheal if appropriate and nasal secretions for changes in amount and color  - [place] appropriate cooling/warming therapies per order  - Administer medications as ordered  - Instruct and encourage patient and family to use good hand hygiene technique  - Identify and instruct in appropriate isolation precautions for  identified infection/condition  Outcome: Progressing  Goal: Absence of fever/infection during neutropenic period  Description: INTERVENTIONS:  - Monitor WBC    Outcome: Progressing     Problem: SAFETY ADULT  Goal: Patient will remain free of falls  Description: INTERVENTIONS:  - Educate patient/family on patient safety including physical limitations  - Instruct patient to call for assistance with activity   - Consult OT/PT to assist with strengthening/mobility   - Keep Call bell within reach  - Keep bed low and locked with side rails adjusted as appropriate  - Keep care items and personal belongings within reach  - Initiate and maintain comfort rounds  - Make Fall Risk Sign visible to staff  - Offer Toileting every 2 Hours, in advance of need  - Initiate/Maintain bed alarm  - Apply yellow socks and bracelet for high fall risk patients  - Consider moving patient to room near nurses station  Outcome: Progressing  Goal: Maintain or return to baseline ADL function  Description: INTERVENTIONS:  -  Assess patient's ability to carry out ADLs; assess patient's baseline for ADL function and identify physical deficits which impact ability to perform ADLs (bathing, care of mouth/teeth, toileting, grooming, dressing, etc.)  - Assess/evaluate cause of self-care deficits   - Assess range of motion  - Assess patient's mobility; develop plan if impaired  - Assess patient's need for assistive devices and provide as appropriate  - Encourage maximum independence but intervene and supervise when necessary  - Involve family in performance of ADLs  - Assess for home care needs following discharge   - Consider OT consult to assist with ADL evaluation and planning for discharge  - Provide patient education as appropriate  Outcome: Progressing  Goal: Maintains/Returns to pre admission functional level  Description: INTERVENTIONS:  - Perform AM-PAC 6 Click Basic Mobility/ Daily Activity assessment daily.  - Set and communicate daily mobility  goal to care team and patient/family/caregiver.   - Collaborate with rehabilitation services on mobility goals if consulted  - Perform Range of Motion 3 times a day.  - Reposition patient every 2 hours.  - Dangle patient 3 times a day  - Stand patient 3 times a day  - Out of bed to chair 3 times a day   - Out of bed for meals 3 times a day  - Out of bed for toileting  - Record patient progress and toleration of activity level   Outcome: Progressing     Problem: DISCHARGE PLANNING  Goal: Discharge to home or other facility with appropriate resources  Description: INTERVENTIONS:  - Identify barriers to discharge w/patient and caregiver  - Arrange for needed discharge resources and transportation as appropriate  - Identify discharge learning needs (meds, wound care, etc.)  - Arrange for interpretive services to assist at discharge as needed  - Refer to Case Management Department for coordinating discharge planning if the patient needs post-hospital services based on physician/advanced practitioner order or complex needs related to functional status, cognitive ability, or social support system  Outcome: Progressing     Problem: Knowledge Deficit  Goal: Patient/family/caregiver demonstrates understanding of disease process, treatment plan, medications, and discharge instructions  Description: Complete learning assessment and assess knowledge base.  Interventions:  - Provide teaching at level of understanding  - Provide teaching via preferred learning methods  Outcome: Progressing     Problem: Nutrition/Hydration-ADULT  Goal: Nutrient/Hydration intake appropriate for improving, restoring or maintaining nutritional needs  Description: Monitor and assess patient's nutrition/hydration status for malnutrition. Collaborate with interdisciplinary team and initiate plan and interventions as ordered.  Monitor patient's weight and dietary intake as ordered or per policy. Utilize nutrition screening tool and intervene as necessary.  Determine patient's food preferences and provide high-protein, high-caloric foods as appropriate.     INTERVENTIONS:  - Monitor oral intake, urinary output, labs, and treatment plans  - Assess nutrition and hydration status and recommend course of action  - Evaluate amount of meals eaten  - Assist patient with eating if necessary   - Allow adequate time for meals  - Recommend/ encourage appropriate diets, oral nutritional supplements, and vitamin/mineral supplements  - Order, calculate, and assess calorie counts as needed  - Recommend, monitor, and adjust tube feedings and TPN/PPN based on assessed needs  - Assess need for intravenous fluids  - Provide specific nutrition/hydration education as appropriate  - Include patient/family/caregiver in decisions related to nutrition  Outcome: Progressing

## 2025-03-16 NOTE — ASSESSMENT & PLAN NOTE
CTH no acute intracranial process  Hip XR no fracture on my read. Awaiting final read.   Fall precautions  PT/OT

## 2025-03-16 NOTE — PROGRESS NOTES
Progress Note - Critical Care/ICU   Name: Ez Dsouza 59 y.o. male I MRN: [id number]  Unit/Bed#: ICU 13 I Date of Admission: 3/13/2025   Date of Service: 3/16/2025 I Hospital Day: 3      Assessment & Plan  Acute cerebrovascular accident (CVA) due to stenosis of left carotid artery (HCC)  DX: Multifocal strokes primarily L ICA territory suggesting symptomatic L ICA stenosis  Imaging  CTA head 3/10: Worsening severe stenosis in the proximal left ICA, no significant intracranial stenosis or LVO  CT head 3/10: No LVO   MRI brain 3/10: Multiple small scattered acute infarcts in the left cerebral hemisphere  CTA H/N 3/13 scattered left hemispheric infarctions  MRI brain 3/13: Multiple small foci of acute/recent ischemia in the left MCA territory   VAS carotid: Right< 50% stenosis, left > 70 stenosis of ICA  Developed worsening neurological exam on 3/13 due to hypoperfusion and cerebral edema  Transferred to [facility] ICU for vasopressor support to target MAP > 100  S/p ASA/Plavix load, Heparin gtt ACS low initiated  POD #2 s/p L CEA    Plan:  Continue statin and Plavix  Holding ASA, resume when okay with vascular surgery  Neuro checks- q4hrs  Neurology and vascular surgery following, appreciate recommendations  PT/OT/Speech eval   Continue heparin gtt  Continue neosynephrine for goal MAP > 100 mmHg in the setting of neuro deficits  Primary hypertension  PTA: amlodipine, carvedilol, hydrochlorothiazide, spironolactone  -- currently on hold  Was started on neosynephrine to maintain SBP > 160 mmHg given neruo deficits    Plan:  POD #2 s/p L CEA  Continue neosyneprine, titrate for MAP > 100 mmHg    Hyperlipidemia  Continue Lipitor.  Fall  CTH no acute intracranial process  Hip XR no fracture on my read. Awaiting final read.   Fall precautions  PT/OT   Disposition: Stepdown Level 1    ICU Core Measures     A: Assess, Prevent, and Manage Pain Has pain been assessed? Yes  Need for changes to pain regimen? No   B: Both SAT/SAT  N/A    C: Choice of Sedation RASS Goal: N/A patient not on sedation  Need for changes to sedation or analgesia regimen? No   D: Delirium CAM-ICU: Unable to perform secondary to Acute cognitive dysfunction   E: Early Mobility  Plan for early mobility? Yes   F: Family Engagement Plan for family engagement today? Yes         Prophylaxis:  VTE VTE covered by:  heparin (porcine), Intravenous, 14.1 Units/kg/hr at 03/16/25 0300  heparin (porcine), Intravenous, 2,000 Units at 03/14/25 0557  heparin (porcine), Intravenous, 4,000 Units at 03/15/25 0603       Stress Ulcer  not ordered         24 Hour Events : Fall yesterday.  No acute injury on imaging.  No acute events overnight.    Subjective   Review of Systems: Review of Systems not obtainable due to Altered mental status    Objective :                   Vitals I/O      Most Recent Min/Max in 24hrs   Temp 98.5 °F (36.9 °C) Temp  Min: 98.3 °F (36.8 °C)  Max: 99 °F (37.2 °C)   Pulse (!) 46 Pulse  Min: 38  Max: 86   Resp 16 Resp  Min: 11  Max: 33   /98 BP  Min: 126/88  Max: 207/95   O2 Sat 94 % SpO2  Min: 91 %  Max: 100 %      Intake/Output Summary (Last 24 hours) at 3/16/2025 0602  Last data filed at 3/16/2025 0222  Gross per 24 hour   Intake 1505.27 ml   Output 1550 ml   Net -44.73 ml       Diet Cardiovascular; Cardiac; Cardiac    Invasive Monitoring           Physical Exam   Physical Exam  Vitals and nursing note reviewed.   Eyes:      General: Vision grossly intact. No visual field deficit or scleral icterus.        Right eye: No discharge.         Left eye: No discharge.      Extraocular Movements: Extraocular movements intact.      Conjunctiva/sclera: Conjunctivae normal.      Pupils: Pupils are equal, round, and reactive to light.   Skin:     General: Skin is warm, dry and not mottled extremities.      Capillary Refill: Capillary refill takes less than 2 seconds.      Coloration: Skin is not jaundiced or pale.      Findings: No lesion or wound.   HENT:      Head:  Normocephalic and atraumatic.      Right Ear: Hearing normal. No drainage.      Left Ear: Hearing normal. No drainage.      Nose: No nasal deformity, nasal tenderness, congestion, rhinorrhea, epistaxis or nasogastric tube.      Mouth/Throat:      Mouth: Mucous membranes are moist. No injury or angioedema.      Dentition: Normal dentition.      Pharynx: No oropharyngeal exudate.   Neck:      Vascular: No JVD.   no midline tenderness Cardiovascular:      Rate and Rhythm: Normal rate and regular rhythm.      Pulses: No decreased pulses.      Heart sounds: No murmur heard.     No friction rub. No gallop.   Musculoskeletal:         General: No swelling, tenderness, deformity or signs of injury. Normal range of motion.      Right lower leg: No edema.      Left lower leg: No edema.   Abdominal: General: Bowel sounds are normal. There is no distension.      Palpations: Abdomen is soft.      Tenderness: There is no abdominal tenderness.   Constitutional:       General: He is not in acute distress.     Appearance: He is well-developed and well-nourished. He is ill-appearing. He is not toxic-appearing.      Interventions: He is not sedated, not intubated and not restrained.  Pulmonary:      Effort: Pulmonary effort is normal. No accessory muscle usage, respiratory distress or accessory muscle usage. He is not intubated.      Breath sounds: Normal breath sounds. No wheezing, rhonchi or rales.   Neurological:      Mental Status: He is alert.      GCS: GCS eye subscore is 4. GCS verbal subscore is 3. GCS motor subscore is 6.      Cranial Nerves: No cranial nerve deficit or facial asymmetry.      Comments: Responds yes/no.  Continues to have expressive/receptive aphasia.            Diagnostic Studies        Lab Results: I have reviewed the following results:     Medications:  Scheduled PRN   [Held by provider] aspirin, 81 mg, Daily  atorvastatin, 40 mg, Daily With Dinner  chlorhexidine, 15 mL, Q12H TIEN  clopidogrel, 75 mg,  Daily  polyethylene glycol, 17 g, Daily  senna, 1 tablet, HS      acetaminophen, 650 mg, Q6H PRN  heparin (porcine), 2,000 Units, Q6H PRN  heparin (porcine), 4,000 Units, Q6H PRN  labetalol, 10 mg, Q6H PRN  ondansetron, 4 mg, Q6H PRN       Continuous    heparin (porcine), 3-20 Units/kg/hr (Order-Specific), Last Rate: 14.1 Units/kg/hr (03/16/25 0300)  phenylephine,  mcg/min, Last Rate: 90 mcg/min (03/16/25 0515)         Labs:   CBC    Recent Labs     03/15/25  0438   WBC 11.77*   HGB 12.3   HCT 36.3*        BMP    Recent Labs     03/15/25  0438   SODIUM 138   K 3.6      CO2 21   AGAP 9   BUN 25   CREATININE 1.29   CALCIUM 8.3*       Coags    Recent Labs     03/15/25  2041 03/16/25  0217   PTT 86* 96*        Additional Electrolytes  Recent Labs     03/15/25  0438   MG 1.9   PHOS 3.1   CAIONIZED 1.11*          Blood Gas    No recent results  No recent results LFTs  No recent results    Infectious  No recent results  Glucose  Recent Labs     03/15/25  0438   GLUC 111

## 2025-03-16 NOTE — ANESTHESIA PREPROCEDURE EVALUATION
Procedure:  ENDARTERECTOMY ARTERY CAROTID (Left: Neck)    Relevant Problems   ANESTHESIA (within normal limits)      CARDIO  Left Ventricle Left ventricular cavity size is normal. Wall thickness is upper normal. The left ventricular ejection fraction is 60%. Systolic function is normal. Wall motion is normal. Diastolic function is mildly abnormal, consistent with grade I (abnormal) relaxation.  Right Ventricle Right ventricular cavity size is normal. Systolic function is normal. Wall thickness is normal.  Left Atrium The atrium is normal in size.  Right Atrium The atrium is normal in size.  Aortic Valve The aortic valve is trileaflet. The leaflets are mildly thickened. The leaflets are mildly calcified. The leaflets exhibit normal mobility. There is no evidence of regurgitation. The aortic valve has no significant stenosis.  Mitral Valve The leaflets are not thickened. The leaflets are not calcified. The leaflets exhibit normal mobility. There is mild annular calcification.  There is trace regurgitation. There is no evidence of stenosis.  Tricuspid Valve The tricuspid valve was not well visualized. There is no evidence of regurgitation. There is no evidence of stenosis.  Pulmonic Valve The pulmonic valve was not well visualized. There is no evidence of regurgitation. There is no evidence of stenosis.  Ascending Aorta The aortic root is normal in size. The ascending aorta is normal in size.       (+) Elevated coronary artery calcium score   (+) Hyperlipidemia   (+) Primary hypertension      GI/HEPATIC (within normal limits)      /RENAL   (+) MARIA TERESA (acute kidney injury) (HCC)      MUSCULOSKELETAL  Right arm and hand weakness s/p CVA      NEURO/PSYCH   (+) Acute cerebrovascular accident (CVA) due to stenosis of left carotid artery (HCC)      PULMONARY  Last cigarette 10 d ago    (+) Smoking        Physical Exam    Airway    Mallampati score: II         Dental   No notable dental hx     Cardiovascular  Cardiovascular  exam normal    Pulmonary  Pulmonary exam normal Breath sounds clear to auscultation    Other Findings  Right arm and hand weakness      Anesthesia Plan  ASA Score- 3     Anesthesia Type- general with ASA Monitors.         Additional Monitors: arterial line.    Airway Plan: ETT.           Plan Factors-    Chart reviewed.  Imaging results reviewed. Existing labs reviewed. Patient summary reviewed.    Patient is a current smoker.  Patient did not smoke on day of surgery.            Induction- intravenous.    Postoperative Plan-     Perioperative Resuscitation Plan - Level 1 - Full Code.       Informed Consent- Anesthetic plan and risks discussed with patient.        NPO Status:  No vitals data found for the desired time range.

## 2025-03-17 ENCOUNTER — ANESTHESIA (INPATIENT)
Dept: PERIOP | Facility: HOSPITAL | Age: 60
DRG: 037 | End: 2025-03-17
Payer: COMMERCIAL

## 2025-03-17 ENCOUNTER — APPOINTMENT (INPATIENT)
Dept: NON INVASIVE DIAGNOSTICS | Facility: HOSPITAL | Age: 60
DRG: 037 | End: 2025-03-17
Payer: COMMERCIAL

## 2025-03-17 LAB
ANION GAP SERPL CALCULATED.3IONS-SCNC: 7 MMOL/L (ref 4–13)
APTT PPP: 37 SECONDS (ref 23–34)
APTT PPP: 38 SECONDS (ref 23–34)
APTT PPP: 56 SECONDS (ref 23–34)
BASOPHILS # BLD AUTO: 0.04 THOUSANDS/ÂΜL (ref 0–0.1)
BASOPHILS NFR BLD AUTO: 1 % (ref 0–1)
BUN SERPL-MCNC: 18 MG/DL (ref 5–25)
CA-I BLD-SCNC: 1.12 MMOL/L (ref 1.12–1.32)
CALCIUM SERPL-MCNC: 8.4 MG/DL (ref 8.4–10.2)
CHLORIDE SERPL-SCNC: 107 MMOL/L (ref 96–108)
CO2 SERPL-SCNC: 23 MMOL/L (ref 21–32)
CREAT SERPL-MCNC: 1.23 MG/DL (ref 0.6–1.3)
EOSINOPHIL # BLD AUTO: 0.22 THOUSAND/ÂΜL (ref 0–0.61)
EOSINOPHIL NFR BLD AUTO: 3 % (ref 0–6)
ERYTHROCYTE [DISTWIDTH] IN BLOOD BY AUTOMATED COUNT: 13.6 % (ref 11.6–15.1)
GFR SERPL CREATININE-BSD FRML MDRD: 63 ML/MIN/1.73SQ M
GLUCOSE SERPL-MCNC: 109 MG/DL (ref 65–140)
HCT VFR BLD AUTO: 35.4 % (ref 36.5–49.3)
HGB BLD-MCNC: 12.1 G/DL (ref 12–17)
IMM GRANULOCYTES # BLD AUTO: 0.02 THOUSAND/UL (ref 0–0.2)
IMM GRANULOCYTES NFR BLD AUTO: 0 % (ref 0–2)
INR PPP: 1.14 (ref 0.85–1.19)
KCT BLD-ACNC: 142 SEC (ref 89–137)
KCT BLD-ACNC: 201 SEC (ref 89–137)
KCT BLD-ACNC: 207 SEC (ref 89–137)
KCT BLD-ACNC: 225 SEC (ref 89–137)
LYMPHOCYTES # BLD AUTO: 1.68 THOUSANDS/ÂΜL (ref 0.6–4.47)
LYMPHOCYTES NFR BLD AUTO: 19 % (ref 14–44)
MAGNESIUM SERPL-MCNC: 1.7 MG/DL (ref 1.9–2.7)
MCH RBC QN AUTO: 30.7 PG (ref 26.8–34.3)
MCHC RBC AUTO-ENTMCNC: 34.2 G/DL (ref 31.4–37.4)
MCV RBC AUTO: 90 FL (ref 82–98)
MONOCYTES # BLD AUTO: 0.99 THOUSAND/ÂΜL (ref 0.17–1.22)
MONOCYTES NFR BLD AUTO: 11 % (ref 4–12)
NEUTROPHILS # BLD AUTO: 5.77 THOUSANDS/ÂΜL (ref 1.85–7.62)
NEUTS SEG NFR BLD AUTO: 66 % (ref 43–75)
NRBC BLD AUTO-RTO: 0 /100 WBCS
PHOSPHATE SERPL-MCNC: 3.3 MG/DL (ref 2.3–4.1)
PLATELET # BLD AUTO: 237 THOUSANDS/UL (ref 149–390)
PLATELET # BLD AUTO: 262 THOUSANDS/UL (ref 149–390)
PMV BLD AUTO: 10.2 FL (ref 8.9–12.7)
PMV BLD AUTO: 10.2 FL (ref 8.9–12.7)
POTASSIUM SERPL-SCNC: 3.6 MMOL/L (ref 3.5–5.3)
PROTHROMBIN TIME: 14.8 SECONDS (ref 12.3–15)
RBC # BLD AUTO: 3.94 MILLION/UL (ref 3.88–5.62)
SODIUM SERPL-SCNC: 137 MMOL/L (ref 135–147)
SPECIMEN SOURCE: ABNORMAL
WBC # BLD AUTO: 8.72 THOUSAND/UL (ref 4.31–10.16)

## 2025-03-17 PROCEDURE — C1781 MESH (IMPLANTABLE): HCPCS | Performed by: STUDENT IN AN ORGANIZED HEALTH CARE EDUCATION/TRAINING PROGRAM

## 2025-03-17 PROCEDURE — 85610 PROTHROMBIN TIME: CPT

## 2025-03-17 PROCEDURE — 88304 TISSUE EXAM BY PATHOLOGIST: CPT | Performed by: PATHOLOGY

## 2025-03-17 PROCEDURE — 82330 ASSAY OF CALCIUM: CPT | Performed by: NURSE PRACTITIONER

## 2025-03-17 PROCEDURE — 85730 THROMBOPLASTIN TIME PARTIAL: CPT | Performed by: INTERNAL MEDICINE

## 2025-03-17 PROCEDURE — 03UJ0KZ SUPPLEMENT LEFT COMMON CAROTID ARTERY WITH NONAUTOLOGOUS TISSUE SUBSTITUTE, OPEN APPROACH: ICD-10-PCS | Performed by: STUDENT IN AN ORGANIZED HEALTH CARE EDUCATION/TRAINING PROGRAM

## 2025-03-17 PROCEDURE — 93882 EXTRACRANIAL UNI/LTD STUDY: CPT

## 2025-03-17 PROCEDURE — 80048 BASIC METABOLIC PNL TOTAL CA: CPT | Performed by: NURSE PRACTITIONER

## 2025-03-17 PROCEDURE — NC001 PR NO CHARGE: Performed by: STUDENT IN AN ORGANIZED HEALTH CARE EDUCATION/TRAINING PROGRAM

## 2025-03-17 PROCEDURE — 03CJ0ZZ EXTIRPATION OF MATTER FROM LEFT COMMON CAROTID ARTERY, OPEN APPROACH: ICD-10-PCS | Performed by: STUDENT IN AN ORGANIZED HEALTH CARE EDUCATION/TRAINING PROGRAM

## 2025-03-17 PROCEDURE — 83735 ASSAY OF MAGNESIUM: CPT | Performed by: NURSE PRACTITIONER

## 2025-03-17 PROCEDURE — 85347 COAGULATION TIME ACTIVATED: CPT

## 2025-03-17 PROCEDURE — 85025 COMPLETE CBC W/AUTO DIFF WBC: CPT | Performed by: NURSE PRACTITIONER

## 2025-03-17 PROCEDURE — 35301 RECHANNELING OF ARTERY: CPT | Performed by: STUDENT IN AN ORGANIZED HEALTH CARE EDUCATION/TRAINING PROGRAM

## 2025-03-17 PROCEDURE — 99232 SBSQ HOSP IP/OBS MODERATE 35: CPT | Performed by: INTERNAL MEDICINE

## 2025-03-17 PROCEDURE — 85049 AUTOMATED PLATELET COUNT: CPT | Performed by: STUDENT IN AN ORGANIZED HEALTH CARE EDUCATION/TRAINING PROGRAM

## 2025-03-17 PROCEDURE — 84100 ASSAY OF PHOSPHORUS: CPT | Performed by: NURSE PRACTITIONER

## 2025-03-17 PROCEDURE — 85730 THROMBOPLASTIN TIME PARTIAL: CPT

## 2025-03-17 PROCEDURE — 88311 DECALCIFY TISSUE: CPT | Performed by: PATHOLOGY

## 2025-03-17 DEVICE — XENOSURE BIOLOGIC PATCH, 0.8CM X 8CM, EIFU
Type: IMPLANTABLE DEVICE | Site: CAROTID | Status: FUNCTIONAL
Brand: XENOSURE BIOLOGIC PATCH

## 2025-03-17 RX ORDER — ACETAMINOPHEN 325 MG/1
975 TABLET ORAL EVERY 8 HOURS SCHEDULED
Status: DISCONTINUED | OUTPATIENT
Start: 2025-03-17 | End: 2025-03-19

## 2025-03-17 RX ORDER — HEPARIN SODIUM 5000 [USP'U]/ML
5000 INJECTION, SOLUTION INTRAVENOUS; SUBCUTANEOUS EVERY 8 HOURS SCHEDULED
Status: DISCONTINUED | OUTPATIENT
Start: 2025-03-17 | End: 2025-03-17

## 2025-03-17 RX ORDER — FENTANYL CITRATE 50 UG/ML
INJECTION, SOLUTION INTRAMUSCULAR; INTRAVENOUS AS NEEDED
Status: DISCONTINUED | OUTPATIENT
Start: 2025-03-17 | End: 2025-03-17

## 2025-03-17 RX ORDER — SODIUM CHLORIDE, SODIUM LACTATE, POTASSIUM CHLORIDE, CALCIUM CHLORIDE 600; 310; 30; 20 MG/100ML; MG/100ML; MG/100ML; MG/100ML
125 INJECTION, SOLUTION INTRAVENOUS CONTINUOUS
Status: DISCONTINUED | OUTPATIENT
Start: 2025-03-17 | End: 2025-03-17

## 2025-03-17 RX ORDER — FENTANYL CITRATE/PF 50 MCG/ML
25 SYRINGE (ML) INJECTION
Refills: 0 | Status: DISCONTINUED | OUTPATIENT
Start: 2025-03-17 | End: 2025-03-17 | Stop reason: HOSPADM

## 2025-03-17 RX ORDER — MIDAZOLAM HYDROCHLORIDE 2 MG/2ML
INJECTION, SOLUTION INTRAMUSCULAR; INTRAVENOUS AS NEEDED
Status: DISCONTINUED | OUTPATIENT
Start: 2025-03-17 | End: 2025-03-17

## 2025-03-17 RX ORDER — SODIUM CHLORIDE 9 MG/ML
INJECTION, SOLUTION INTRAVENOUS CONTINUOUS PRN
Status: DISCONTINUED | OUTPATIENT
Start: 2025-03-17 | End: 2025-03-17

## 2025-03-17 RX ORDER — HEPARIN SODIUM 1000 [USP'U]/ML
2000 INJECTION, SOLUTION INTRAVENOUS; SUBCUTANEOUS EVERY 6 HOURS PRN
Status: DISCONTINUED | OUTPATIENT
Start: 2025-03-17 | End: 2025-03-19

## 2025-03-17 RX ORDER — OXYCODONE HYDROCHLORIDE 10 MG/1
10 TABLET ORAL EVERY 4 HOURS PRN
Refills: 0 | Status: DISCONTINUED | OUTPATIENT
Start: 2025-03-17 | End: 2025-03-21 | Stop reason: HOSPADM

## 2025-03-17 RX ORDER — ONDANSETRON 2 MG/ML
4 INJECTION INTRAMUSCULAR; INTRAVENOUS ONCE AS NEEDED
Status: DISCONTINUED | OUTPATIENT
Start: 2025-03-17 | End: 2025-03-17 | Stop reason: SDUPTHER

## 2025-03-17 RX ORDER — CHLORHEXIDINE GLUCONATE ORAL RINSE 1.2 MG/ML
15 SOLUTION DENTAL ONCE
Status: DISCONTINUED | OUTPATIENT
Start: 2025-03-17 | End: 2025-03-17 | Stop reason: HOSPADM

## 2025-03-17 RX ORDER — PROTAMINE SULFATE 10 MG/ML
INJECTION, SOLUTION INTRAVENOUS AS NEEDED
Status: DISCONTINUED | OUTPATIENT
Start: 2025-03-17 | End: 2025-03-17

## 2025-03-17 RX ORDER — HEPARIN SODIUM 1000 [USP'U]/ML
INJECTION, SOLUTION INTRAVENOUS; SUBCUTANEOUS AS NEEDED
Status: DISCONTINUED | OUTPATIENT
Start: 2025-03-17 | End: 2025-03-17

## 2025-03-17 RX ORDER — FENTANYL CITRATE 50 UG/ML
50 INJECTION, SOLUTION INTRAMUSCULAR; INTRAVENOUS
Status: DISCONTINUED | OUTPATIENT
Start: 2025-03-17 | End: 2025-03-17 | Stop reason: SDUPTHER

## 2025-03-17 RX ORDER — MAGNESIUM HYDROXIDE 1200 MG/15ML
LIQUID ORAL AS NEEDED
Status: DISCONTINUED | OUTPATIENT
Start: 2025-03-17 | End: 2025-03-17 | Stop reason: HOSPADM

## 2025-03-17 RX ORDER — DEXAMETHASONE SODIUM PHOSPHATE 10 MG/ML
INJECTION, SOLUTION INTRAMUSCULAR; INTRAVENOUS AS NEEDED
Status: DISCONTINUED | OUTPATIENT
Start: 2025-03-17 | End: 2025-03-17

## 2025-03-17 RX ORDER — ASPIRIN 81 MG/1
81 TABLET ORAL DAILY
Status: DISCONTINUED | OUTPATIENT
Start: 2025-03-17 | End: 2025-03-17

## 2025-03-17 RX ORDER — HEPARIN SODIUM 10000 [USP'U]/100ML
3-20 INJECTION, SOLUTION INTRAVENOUS
Status: DISCONTINUED | OUTPATIENT
Start: 2025-03-17 | End: 2025-03-19

## 2025-03-17 RX ORDER — HEPARIN SODIUM 1000 [USP'U]/ML
4000 INJECTION, SOLUTION INTRAVENOUS; SUBCUTANEOUS EVERY 6 HOURS PRN
Status: DISCONTINUED | OUTPATIENT
Start: 2025-03-17 | End: 2025-03-19

## 2025-03-17 RX ORDER — LABETALOL HYDROCHLORIDE 5 MG/ML
5 INJECTION, SOLUTION INTRAVENOUS
Status: DISCONTINUED | OUTPATIENT
Start: 2025-03-17 | End: 2025-03-21 | Stop reason: HOSPADM

## 2025-03-17 RX ORDER — BUPIVACAINE HYDROCHLORIDE 5 MG/ML
INJECTION, SOLUTION EPIDURAL; INTRACAUDAL; PERINEURAL AS NEEDED
Status: DISCONTINUED | OUTPATIENT
Start: 2025-03-17 | End: 2025-03-17 | Stop reason: HOSPADM

## 2025-03-17 RX ORDER — PROPOFOL 10 MG/ML
INJECTION, EMULSION INTRAVENOUS AS NEEDED
Status: DISCONTINUED | OUTPATIENT
Start: 2025-03-17 | End: 2025-03-17

## 2025-03-17 RX ORDER — ONDANSETRON 2 MG/ML
INJECTION INTRAMUSCULAR; INTRAVENOUS AS NEEDED
Status: DISCONTINUED | OUTPATIENT
Start: 2025-03-17 | End: 2025-03-17

## 2025-03-17 RX ORDER — HYDRALAZINE HYDROCHLORIDE 20 MG/ML
15 INJECTION INTRAMUSCULAR; INTRAVENOUS
Status: DISCONTINUED | OUTPATIENT
Start: 2025-03-17 | End: 2025-03-19

## 2025-03-17 RX ORDER — EPHEDRINE SULFATE 50 MG/ML
INJECTION INTRAVENOUS AS NEEDED
Status: DISCONTINUED | OUTPATIENT
Start: 2025-03-17 | End: 2025-03-17

## 2025-03-17 RX ORDER — ONDANSETRON 2 MG/ML
4 INJECTION INTRAMUSCULAR; INTRAVENOUS EVERY 6 HOURS PRN
Status: DISCONTINUED | OUTPATIENT
Start: 2025-03-17 | End: 2025-03-17 | Stop reason: HOSPADM

## 2025-03-17 RX ORDER — LABETALOL HYDROCHLORIDE 5 MG/ML
INJECTION, SOLUTION INTRAVENOUS AS NEEDED
Status: DISCONTINUED | OUTPATIENT
Start: 2025-03-17 | End: 2025-03-17

## 2025-03-17 RX ORDER — OXYCODONE HYDROCHLORIDE 5 MG/1
5 TABLET ORAL EVERY 4 HOURS PRN
Refills: 0 | Status: DISCONTINUED | OUTPATIENT
Start: 2025-03-17 | End: 2025-03-21 | Stop reason: HOSPADM

## 2025-03-17 RX ORDER — ROCURONIUM BROMIDE 10 MG/ML
INJECTION, SOLUTION INTRAVENOUS AS NEEDED
Status: DISCONTINUED | OUTPATIENT
Start: 2025-03-17 | End: 2025-03-17

## 2025-03-17 RX ORDER — CHLORHEXIDINE GLUCONATE ORAL RINSE 1.2 MG/ML
15 SOLUTION DENTAL EVERY 12 HOURS SCHEDULED
Status: DISCONTINUED | OUTPATIENT
Start: 2025-03-17 | End: 2025-03-17 | Stop reason: HOSPADM

## 2025-03-17 RX ORDER — CEFAZOLIN SODIUM 2 G/50ML
2000 SOLUTION INTRAVENOUS ONCE
Status: COMPLETED | OUTPATIENT
Start: 2025-03-17 | End: 2025-03-17

## 2025-03-17 RX ORDER — LIDOCAINE HYDROCHLORIDE 20 MG/ML
INJECTION, SOLUTION EPIDURAL; INFILTRATION; INTRACAUDAL; PERINEURAL AS NEEDED
Status: DISCONTINUED | OUTPATIENT
Start: 2025-03-17 | End: 2025-03-17

## 2025-03-17 RX ADMIN — HEPARIN SODIUM 4000 UNITS: 1000 INJECTION INTRAVENOUS; SUBCUTANEOUS at 23:17

## 2025-03-17 RX ADMIN — ASPIRIN 81 MG: 81 TABLET, COATED ORAL at 15:39

## 2025-03-17 RX ADMIN — PHENYLEPHRINE HYDROCHLORIDE 200 MCG: 10 INJECTION INTRAVENOUS at 11:50

## 2025-03-17 RX ADMIN — ROCURONIUM 20 MG: 50 INJECTION, SOLUTION INTRAVENOUS at 12:52

## 2025-03-17 RX ADMIN — ROCURONIUM 10 MG: 50 INJECTION, SOLUTION INTRAVENOUS at 12:00

## 2025-03-17 RX ADMIN — MIDAZOLAM 1 MG: 1 INJECTION INTRAMUSCULAR; INTRAVENOUS at 11:20

## 2025-03-17 RX ADMIN — SENNOSIDES 8.6 MG: 8.6 TABLET, FILM COATED ORAL at 22:39

## 2025-03-17 RX ADMIN — NICARDIPINE HYDROCHLORIDE 200 MCG: 2.5 INJECTION INTRAVENOUS at 13:25

## 2025-03-17 RX ADMIN — NICARDIPINE HYDROCHLORIDE 100 MCG: 2.5 INJECTION INTRAVENOUS at 14:14

## 2025-03-17 RX ADMIN — PHENYLEPHRINE HYDROCHLORIDE 50 MCG: 10 INJECTION INTRAVENOUS at 13:52

## 2025-03-17 RX ADMIN — HEPARIN SODIUM 5000 UNITS: 1000 INJECTION, SOLUTION INTRAVENOUS; SUBCUTANEOUS at 13:08

## 2025-03-17 RX ADMIN — SODIUM CHLORIDE, SODIUM LACTATE, POTASSIUM CHLORIDE, AND CALCIUM CHLORIDE: .6; .31; .03; .02 INJECTION, SOLUTION INTRAVENOUS at 11:22

## 2025-03-17 RX ADMIN — PHENYLEPHRINE HYDROCHLORIDE 50 MCG: 10 INJECTION INTRAVENOUS at 13:41

## 2025-03-17 RX ADMIN — ROCURONIUM 10 MG: 50 INJECTION, SOLUTION INTRAVENOUS at 12:22

## 2025-03-17 RX ADMIN — PROPOFOL 200 MG: 10 INJECTION, EMULSION INTRAVENOUS at 11:28

## 2025-03-17 RX ADMIN — SODIUM CHLORIDE: 0.9 INJECTION, SOLUTION INTRAVENOUS at 11:33

## 2025-03-17 RX ADMIN — PHENYLEPHRINE HYDROCHLORIDE 100 MCG: 10 INJECTION INTRAVENOUS at 11:41

## 2025-03-17 RX ADMIN — NICARDIPINE HYDROCHLORIDE 100 MCG: 2.5 INJECTION INTRAVENOUS at 13:23

## 2025-03-17 RX ADMIN — LABETALOL HYDROCHLORIDE 2.5 MG: 5 INJECTION, SOLUTION INTRAVENOUS at 13:32

## 2025-03-17 RX ADMIN — HEPARIN SODIUM 2000 UNITS: 1000 INJECTION INTRAVENOUS; SUBCUTANEOUS at 06:18

## 2025-03-17 RX ADMIN — CHLORHEXIDINE GLUCONATE 15 ML: 1.2 SOLUTION ORAL at 08:12

## 2025-03-17 RX ADMIN — LIDOCAINE HYDROCHLORIDE 100 MG: 20 INJECTION, SOLUTION EPIDURAL; INFILTRATION; INTRACAUDAL at 11:28

## 2025-03-17 RX ADMIN — EPHEDRINE SULFATE 5 MG: 50 INJECTION INTRAVENOUS at 14:08

## 2025-03-17 RX ADMIN — PROTAMINE SULFATE 10 MG: 10 INJECTION, SOLUTION INTRAVENOUS at 13:48

## 2025-03-17 RX ADMIN — EPHEDRINE SULFATE 5 MG: 50 INJECTION INTRAVENOUS at 12:05

## 2025-03-17 RX ADMIN — HEPARIN SODIUM 11.8 UNITS/KG/HR: 10000 INJECTION, SOLUTION INTRAVENOUS at 16:28

## 2025-03-17 RX ADMIN — PHENYLEPHRINE HYDROCHLORIDE 60 MCG/MIN: 50 INJECTION INTRAVENOUS at 00:08

## 2025-03-17 RX ADMIN — ATORVASTATIN CALCIUM 40 MG: 40 TABLET, FILM COATED ORAL at 15:39

## 2025-03-17 RX ADMIN — NICARDIPINE HYDROCHLORIDE 200 MCG: 2.5 INJECTION INTRAVENOUS at 13:29

## 2025-03-17 RX ADMIN — CEFAZOLIN SODIUM 2000 MG: 2 SOLUTION INTRAVENOUS at 11:55

## 2025-03-17 RX ADMIN — PROPOFOL 20 MG: 10 INJECTION, EMULSION INTRAVENOUS at 13:59

## 2025-03-17 RX ADMIN — FENTANYL CITRATE 50 MCG: 50 INJECTION INTRAMUSCULAR; INTRAVENOUS at 11:28

## 2025-03-17 RX ADMIN — PROTAMINE SULFATE 50 MG: 10 INJECTION, SOLUTION INTRAVENOUS at 13:42

## 2025-03-17 RX ADMIN — ACETAMINOPHEN 975 MG: 325 TABLET, FILM COATED ORAL at 22:39

## 2025-03-17 RX ADMIN — FENTANYL CITRATE 50 MCG: 50 INJECTION INTRAMUSCULAR; INTRAVENOUS at 12:19

## 2025-03-17 RX ADMIN — NICARDIPINE HYDROCHLORIDE 100 MCG: 2.5 INJECTION INTRAVENOUS at 11:54

## 2025-03-17 RX ADMIN — NICARDIPINE HYDROCHLORIDE 100 MCG: 2.5 INJECTION INTRAVENOUS at 13:20

## 2025-03-17 RX ADMIN — LABETALOL HYDROCHLORIDE 2.5 MG: 5 INJECTION, SOLUTION INTRAVENOUS at 13:34

## 2025-03-17 RX ADMIN — ACETAMINOPHEN 975 MG: 325 TABLET, FILM COATED ORAL at 15:39

## 2025-03-17 RX ADMIN — PROPOFOL 30 MG: 10 INJECTION, EMULSION INTRAVENOUS at 13:52

## 2025-03-17 RX ADMIN — DEXAMETHASONE SODIUM PHOSPHATE 10 MG: 10 INJECTION INTRAMUSCULAR; INTRAVENOUS at 11:59

## 2025-03-17 RX ADMIN — HEPARIN SODIUM 16.1 UNITS/KG/HR: 10000 INJECTION, SOLUTION INTRAVENOUS at 08:23

## 2025-03-17 RX ADMIN — ROCURONIUM 50 MG: 50 INJECTION, SOLUTION INTRAVENOUS at 11:28

## 2025-03-17 RX ADMIN — CLOPIDOGREL BISULFATE 75 MG: 75 TABLET ORAL at 08:11

## 2025-03-17 RX ADMIN — EPHEDRINE SULFATE 5 MG: 50 INJECTION INTRAVENOUS at 14:03

## 2025-03-17 RX ADMIN — FENTANYL CITRATE 25 MCG: 50 INJECTION, SOLUTION INTRAMUSCULAR; INTRAVENOUS at 14:59

## 2025-03-17 RX ADMIN — NICARDIPINE HYDROCHLORIDE 100 MCG: 2.5 INJECTION INTRAVENOUS at 14:21

## 2025-03-17 RX ADMIN — ONDANSETRON 4 MG: 2 INJECTION INTRAMUSCULAR; INTRAVENOUS at 13:49

## 2025-03-17 RX ADMIN — HEPARIN SODIUM 9000 UNITS: 1000 INJECTION, SOLUTION INTRAVENOUS; SUBCUTANEOUS at 12:42

## 2025-03-17 RX ADMIN — SUGAMMADEX 200 MG: 100 INJECTION, SOLUTION INTRAVENOUS at 14:10

## 2025-03-17 RX ADMIN — EPHEDRINE SULFATE 5 MG: 50 INJECTION INTRAVENOUS at 13:55

## 2025-03-17 NOTE — ASSESSMENT & PLAN NOTE
CT Head:  no acute intracranial process  Hip XR:  no fracture     Plan:   Fall precautions  PT/OT

## 2025-03-17 NOTE — PLAN OF CARE
Problem: Prexisting or High Potential for Compromised Skin Integrity  Goal: Skin integrity is maintained or improved  Description: INTERVENTIONS:  - Identify patients at risk for skin breakdown  - Assess and monitor skin integrity  - Assess and monitor nutrition and hydration status  - Monitor labs   - Assess for incontinence   - Turn and reposition patient  - Assist with mobility/ambulation  - Relieve pressure over bony prominences  - Avoid friction and shearing  - Provide appropriate hygiene as needed including keeping skin clean and dry  - Evaluate need for skin moisturizer/barrier cream  - Collaborate with interdisciplinary team   - Patient/family teaching  - Consider wound care consult   Outcome: Progressing     Problem: PAIN - ADULT  Goal: Verbalizes/displays adequate comfort level or baseline comfort level  Description: Interventions:  - Encourage patient to monitor pain and request assistance  - Assess pain using appropriate pain scale  - Administer analgesics based on type and severity of pain and evaluate response  - Implement non-pharmacological measures as appropriate and evaluate response  - Consider cultural and social influences on pain and pain management  - Notify physician/advanced practitioner if interventions unsuccessful or patient reports new pain  Outcome: Progressing     Problem: INFECTION - ADULT  Goal: Absence or prevention of progression during hospitalization  Description: INTERVENTIONS:  - Assess and monitor for signs and symptoms of infection  - Monitor lab/diagnostic results  - Monitor all insertion sites, i.e. indwelling lines, tubes, and drains  - Monitor endotracheal if appropriate and nasal secretions for changes in amount and color  - [place] appropriate cooling/warming therapies per order  - Administer medications as ordered  - Instruct and encourage patient and family to use good hand hygiene technique  - Identify and instruct in appropriate isolation precautions for  identified infection/condition  Outcome: Progressing  Goal: Absence of fever/infection during neutropenic period  Description: INTERVENTIONS:  - Monitor WBC    Outcome: Progressing     Problem: SAFETY ADULT  Goal: Patient will remain free of falls  Description: INTERVENTIONS:  - Educate patient/family on patient safety including physical limitations  - Instruct patient to call for assistance with activity   - Consult OT/PT to assist with strengthening/mobility   - Keep Call bell within reach  - Keep bed low and locked with side rails adjusted as appropriate  - Keep care items and personal belongings within reach  - Initiate and maintain comfort rounds  - Make Fall Risk Sign visible to staff  - Offer Toileting every 2 Hours, in advance of need  - Initiate/Maintain bed alarm  - Obtain necessary fall risk management equipment  - Apply yellow socks and bracelet for high fall risk patients  - Consider moving patient to room near nurses station  Outcome: Progressing  Goal: Maintain or return to baseline ADL function  Description: INTERVENTIONS:  -  Assess patient's ability to carry out ADLs; assess patient's baseline for ADL function and identify physical deficits which impact ability to perform ADLs (bathing, care of mouth/teeth, toileting, grooming, dressing, etc.)  - Assess/evaluate cause of self-care deficits   - Assess range of motion  - Assess patient's mobility; develop plan if impaired  - Assess patient's need for assistive devices and provide as appropriate  - Encourage maximum independence but intervene and supervise when necessary  - Involve family in performance of ADLs  - Assess for home care needs following discharge   - Consider OT consult to assist with ADL evaluation and planning for discharge  - Provide patient education as appropriate  Outcome: Progressing  Goal: Maintains/Returns to pre admission functional level  Description: INTERVENTIONS:  - Perform AM-PAC 6 Click Basic Mobility/ Daily Activity  assessment daily.  - Set and communicate daily mobility goal to care team and patient/family/caregiver.   - Collaborate with rehabilitation services on mobility goals if consulted  - Perform Range of Motion 3 times a day.  - Reposition patient every 2 hours.  - Dangle patient 3 times a day  - Stand patient 3 times a day  - Ambulate patient 3 times a day  - Out of bed to chair 3 times a day   - Out of bed for meals 3 times a day  - Out of bed for toileting  - Record patient progress and toleration of activity level   Outcome: Progressing     Problem: DISCHARGE PLANNING  Goal: Discharge to home or other facility with appropriate resources  Description: INTERVENTIONS:  - Identify barriers to discharge w/patient and caregiver  - Arrange for needed discharge resources and transportation as appropriate  - Identify discharge learning needs (meds, wound care, etc.)  - Arrange for interpretive services to assist at discharge as needed  - Refer to Case Management Department for coordinating discharge planning if the patient needs post-hospital services based on physician/advanced practitioner order or complex needs related to functional status, cognitive ability, or social support system  Outcome: Progressing     Problem: Knowledge Deficit  Goal: Patient/family/caregiver demonstrates understanding of disease process, treatment plan, medications, and discharge instructions  Description: Complete learning assessment and assess knowledge base.  Interventions:  - Provide teaching at level of understanding  - Provide teaching via preferred learning methods  Outcome: Progressing     Problem: Nutrition/Hydration-ADULT  Goal: Nutrient/Hydration intake appropriate for improving, restoring or maintaining nutritional needs  Description: Monitor and assess patient's nutrition/hydration status for malnutrition. Collaborate with interdisciplinary team and initiate plan and interventions as ordered.  Monitor patient's weight and dietary  intake as ordered or per policy. Utilize nutrition screening tool and intervene as necessary. Determine patient's food preferences and provide high-protein, high-caloric foods as appropriate.     INTERVENTIONS:  - Monitor oral intake, urinary output, labs, and treatment plans  - Assess nutrition and hydration status and recommend course of action  - Evaluate amount of meals eaten  - Assist patient with eating if necessary   - Allow adequate time for meals  - Recommend/ encourage appropriate diets, oral nutritional supplements, and vitamin/mineral supplements  - Order, calculate, and assess calorie counts as needed  - Recommend, monitor, and adjust tube feedings and TPN/PPN based on assessed needs  - Assess need for intravenous fluids  - Provide specific nutrition/hydration education as appropriate  - Include patient/family/caregiver in decisions related to nutrition  Outcome: Progressing

## 2025-03-17 NOTE — H&P
The surgical history has been reviewed and remains accurate without interval change.  The patient was re-examined and patient's physiologic condition has not changed significantly in the last 30 days. The condition still exists that makes this procedure necessary. The treatment plan remains the same, without new options for care.  No new pharmacological allergies or types of therapy has been initiated that would change the plan or the appropriateness of the plan. The patient and/or family understand the potential benefits and risks.    Plan for left carotid endarterectomy today.     Creatinine   Date Value Ref Range Status   03/17/2025 1.23 0.60 - 1.30 mg/dL Final     Comment:     Standardized to IDMS reference method   03/15/2025 1.29 0.60 - 1.30 mg/dL Final     Comment:     Standardized to IDMS reference method   03/14/2025 1.33 (H) 0.60 - 1.30 mg/dL Final     Comment:     Standardized to IDMS reference method   03/13/2025 1.28 0.60 - 1.30 mg/dL Final     Comment:     Standardized to IDMS reference method   03/12/2025 1.41 (H) 0.60 - 1.30 mg/dL Final     Comment:     Standardized to IDMS reference method     WBC   Date Value Ref Range Status   03/17/2025 8.72 4.31 - 10.16 Thousand/uL Final   03/15/2025 11.77 (H) 4.31 - 10.16 Thousand/uL Final   03/14/2025 8.56 4.31 - 10.16 Thousand/uL Final   03/12/2025 7.94 4.31 - 10.16 Thousand/uL Final   03/11/2025 7.62 4.31 - 10.16 Thousand/uL Final     Hemoglobin   Date Value Ref Range Status   03/17/2025 12.1 12.0 - 17.0 g/dL Final   03/15/2025 12.3 12.0 - 17.0 g/dL Final   03/14/2025 12.4 12.0 - 17.0 g/dL Final   03/12/2025 13.5 12.0 - 17.0 g/dL Final   03/11/2025 13.3 12.0 - 17.0 g/dL Final

## 2025-03-17 NOTE — PROGRESS NOTES
Progress Note - Critical Care/ICU   Name: Ez Dsouza 59 y.o. male I MRN: [id number]  Unit/Bed#: ICU 13 I Date of Admission: 3/13/2025   Date of Service: 3/17/2025 I Hospital Day: 4       Assessment & Plan  Acute cerebrovascular accident (CVA) due to stenosis of left carotid artery (HCC)  DX: Multifocal strokes primarily L ICA territory suggesting symptomatic L ICA stenosis  Imaging  CTA head 3/10: Worsening severe stenosis in the proximal left ICA, no significant intracranial stenosis or LVO  CT head 3/10: No LVO   MRI brain 3/10: Multiple small scattered acute infarcts in the left cerebral hemisphere  CTA H/N 3/13 scattered left hemispheric infarctions  MRI brain 3/13: Multiple small foci of acute/recent ischemia in the left MCA territory   VAS carotid: Right< 50% stenosis, left > 70 stenosis of ICA  Developed worsening neurological exam on 3/13 due to hypoperfusion and cerebral edema  Transferred to RA ICU for vasopressor support to target MAP > 100  S/p ASA/Plavix load, Heparin gtt ACS low initiated    Plan:  Continue statin and Plavix 75mg   Continue heparin ACS low gtt   Holding ASA, resume when okay with vascular surgery  Neuro checks- q4hrs  Neurology and vascular surgery following, appreciate recommendations for anticoagulation post CEA   PT/OT/Speech eval   neosynephrine for goal MAP > 100 mmHg in the setting of neuro deficits  Primary hypertension  PTA: amlodipine, carvedilol, hydrochlorothiazide, spironolactone  -- currently on hold  Was started on neosynephrine to maintain SBP > 160 mmHg given neruo deficits    Plan:  Monitor     Hyperlipidemia  Lab Results   Component Value Date    LDLCALC 125 (H) 03/10/2025     Plan:   Continue Lipitor 40mg   Fall  CT Head:  no acute intracranial process  Hip XR:  no fracture     Plan:   Fall precautions  PT/OT   Smoking    Disposition: Stepdown Level 1    ICU Core Measures     A: Assess, Prevent, and Manage Pain Has pain been assessed? Yes  Need for changes to  pain regimen? No   B: Both SAT/SAT  N/A   C: Choice of Sedation RASS Goal: 0 Alert and Calm  Need for changes to sedation or analgesia regimen? No   D: Delirium CAM-ICU: Negative   E: Early Mobility  Plan for early mobility? Yes   F: Family Engagement Plan for family engagement today? Yes         Prophylaxis:  VTE VTE covered by:  heparin (porcine), Intravenous, 16.1 Units/kg/hr at 03/17/25 0823  heparin (porcine), Intravenous, 2,000 Units at 03/17/25 0618  heparin (porcine), Intravenous, 4,000 Units at 03/15/25 0603       Stress Ulcer  not ordered         24 Hour Events : No acute overnight events.   Subjective       Objective :                   Vitals I/O      Most Recent Min/Max in 24hrs   Temp 98.3 °F (36.8 °C) Temp  Min: 97.8 °F (36.6 °C)  Max: 99 °F (37.2 °C)   Pulse 76 Pulse  Min: 44  Max: 78   Resp 19 Resp  Min: 15  Max: 31   /88 BP  Min: 116/67  Max: 177/93   O2 Sat 94 % SpO2  Min: 92 %  Max: 96 %      Intake/Output Summary (Last 24 hours) at 3/17/2025 0905  Last data filed at 3/17/2025 0800  Gross per 24 hour   Intake 750.06 ml   Output 450 ml   Net 300.06 ml       Diet NPO    Invasive Monitoring           Physical Exam   Physical Exam  Eyes:      General: No scleral icterus.     Extraocular Movements: Extraocular movements intact.      Conjunctiva/sclera: Conjunctivae normal.      Pupils: Pupils are equal, round, and reactive to light.   Skin:     General: Skin is warm.      Capillary Refill: Capillary refill takes less than 2 seconds.      Findings: No rash.   HENT:      Head: Normocephalic and atraumatic.      Nose: No congestion.   Neck:      Vascular: No JVD.   no midline tenderness Cardiovascular:      Rate and Rhythm: Normal rate and regular rhythm.      Pulses: Normal pulses.      Heart sounds: Normal heart sounds.   Musculoskeletal:         General: No swelling.   Abdominal:      Palpations: Abdomen is soft.      Tenderness: There is no abdominal tenderness.   Constitutional:       General:  He is not in acute distress.  Pulmonary:      Effort: Pulmonary effort is normal. No respiratory distress.   Psychiatric:         Speech: Speech is expressive aphasia.   Neurological:      Mental Status: He is alert and oriented to person, place and time.      Cranial Nerves: Facial asymmetry (mild R labial droop) present.      Motor: Motor deficit (RUE 3/5).      Comments: Sleepy            Diagnostic Studies        Lab Results: I have reviewed the following results:     Medications:  Scheduled PRN   [Held by provider] aspirin, 81 mg, Daily  atorvastatin, 40 mg, Daily With Dinner  cefazolin, 2,000 mg, Once  chlorhexidine, 15 mL, Q12H TIEN  chlorhexidine, 15 mL, Once  chlorhexidine, 15 mL, Q12H TIEN  clopidogrel, 75 mg, Daily  polyethylene glycol, 17 g, Daily  senna, 1 tablet, HS      acetaminophen, 650 mg, Q6H PRN  heparin (porcine), 2,000 Units, Q6H PRN  heparin (porcine), 4,000 Units, Q6H PRN  labetalol, 10 mg, Q6H PRN  ondansetron, 4 mg, Q6H PRN       Continuous    heparin (porcine), 3-20 Units/kg/hr (Order-Specific), Last Rate: 16.1 Units/kg/hr (03/17/25 0823)  lactated ringers, 125 mL/hr  phenylephine,  mcg/min, Last Rate: Stopped (03/17/25 0600)         Labs:   CBC    Recent Labs     03/17/25  0505   WBC 8.72   HGB 12.1   HCT 35.4*        BMP    Recent Labs     03/17/25  0505   SODIUM 137   K 3.6      CO2 23   AGAP 7   BUN 18   CREATININE 1.23   CALCIUM 8.4       Coags    Recent Labs     03/16/25  1631 03/17/25  0505   PTT 66* 56*        Additional Electrolytes  Recent Labs     03/17/25  0505   MG 1.7*   PHOS 3.3   CAIONIZED 1.12          Blood Gas    No recent results  No recent results LFTs  No recent results    Infectious  No recent results  Glucose  Recent Labs     03/17/25  0505   GLUC 109

## 2025-03-17 NOTE — ANESTHESIA POSTPROCEDURE EVALUATION
Post-Op Assessment Note    CV Status:  Stable  Pain Score: 0    Pain management: adequate       Mental Status:  Alert and awake   Hydration Status:  Euvolemic   PONV Controlled:  Controlled   Airway Patency:  Patent     Post Op Vitals Reviewed: Yes    No anethesia notable event occurred.    Staff: CRNA           Last Filed PACU Vitals:  Vitals Value Taken Time   Temp 97.6 °F (36.4 °C) 03/17/25 1430   Pulse 78 03/17/25 1433   /78 03/17/25 1430   Resp 20    SpO2 96 % 03/17/25 1433   Vitals shown include unfiled device data.

## 2025-03-17 NOTE — OP NOTE
VASCULAR AND ENDOVASCULAR SURGERY OP NOTE     DATE:  03/17/25     ATTENDING SURGEON:  - Joss Calderón MD     ASSISTANT SURGEON(S):  - Surgeons and Role:     * Joss Calderón MD - Primary     * Johny Elise DO - Fellow     PREOPERATIVE DIAGNOSIS:    - Symptomatic left internal carotid artery stenosis.     POSTOPERATIVE DIAGNOSIS:   - SAME     PROCEDURE PERFORMED:   - Left carotid endarterectomy with bovine pericaridal patch angioplasty                             ANESTHESIA:  - General endotracheal anesthesia     BLOOD LOSS:  - 200 mL     SPECIMENS:  - Left carotid plaque      OPERATIVE FINDINGS:   - Critical stenosis of the left internal carotid artery. The patient was shunted during the case.     INDICATIONS FOR  PROCEDURE:  - Ez Dsouza is a 59 y.o. male with left internal carotid artery stenosis, which has been characterized as hemodynamically significant by duplex ultrasound and CTA. The lesion is symptomatic.       PROCEDURE IN DETAIL:  Informed consent was obtained from the patient prior to proceeding to the operating room.  The patient was then identified in the pre-anesthesia area, then taken to the operating room, placed in the supine position on the operating table, and induced under general endotracheal anesthesia. The patient was correctly positioned, padded at all pressure points. A radial arterial line was inserted. The patient was then positioned in a semi-upright position with a shoulder roll placed beneath the scapulae. A pre-operative timeout was performed. Preoperative antibiotics were administered at this time. The left anterior neck was then prepared and draped in a sterile fashion. An ultrasound was used to identify the carotid bifurcation. The sternal notch, ipsilateral earlobe, and angle of mandible were marked.     A #15 blade was then used to create an incision paralleling the anterior border of the sternocleidomastoid muscle. Dissection was carried through the subcutaneous  tissues including the platysma with electrocautery and self-retaining retractors were inserted. The internal jugular vein was identified within the carotid sheath and used as a landmark for exposure of the common carotid artery along its medial border, taking care to avoid traction or other injury to the vagus nerve. The facial vein was identified and sharply divided between ligatures of silk. We then exposed the common carotid artery at its bifurcation. Sharp dissection was then used to expose the anterior surface of the common, internal and external carotid arteries as well as the superior thyroid artery. We identified the vagus nerve and took care to avoid traction or injury to this structure. We then encircled the common carotid artery with an umbilical tape, which was attached to a Rumel tourniquet. The external carotid artery was encircled with a Silastic vessel loop. We identified a soft spot in the distal internal carotid artery which was free of plaque and seemed suitable for clamp application. The patient was systemically heparinized. The distal internal carotid was then encircled with Silastic vessel loop.     For the remainder of the procedure until the patch angioplasty was complete, we checked activated clotting times and and redosed heparin for any ACT less than 250.      The ICA, CCA, then ECA were controlled with vessel loops in that order. A #11-blade was then used to create an arteriotomy in the common carotid artery just proximal to the bifurcation. This was extended proximally and distally with Ambrosio scissors taking care to avoid the flow divider. The area of critical stenosis was visualized and the distal aspect of the arteriotomy was extended approximately 1 cm past the lesion.     An 8Fr argyle shunt was pre-flushed and clamped in the middle with a rubber Shod. The shunt was then inserted into the distal internal carotid artery after release of the distal clamp. Pulsatile back-bleeding from  the distal ICA was observed with temporary release of the clamp. The proximal end of the shunt was de-aired and then inserted into the common carotid artery following release of the clamp and the artery was then controlled around the shunt with a Rumel tourniquet. A continuous wave Doppler probe was then used to confirm flow in the shunt.     A Penfield elevator was then used to establish an endarterectomy plane between the plaque and the media of the common carotid artery. This plane was extended proximally to the proximal extent of our incision. Tenotomy scissors were used to transect the proximal end of the elevated plaque with a clean edge. The plaque was then manipulated and the endarterectomy was continued toward the external carotid artery. The plaque within the external carotid artery was grasped and an eversion endarterectomy was performed at this location. We then continued our endarterectomy to the level of the distal end point and completed the endarterectomy with distal traction on the plaque, tearing it with a straight edge at the junction with the relatively normal distal ICA. We then copiously irrigated the lumen of the carotid and removed all mobile pieces of residual plaque using forceps. Irrigation of the distal end point was performed to identify any non-adherent portions. None were identified and the distal endpoint appeared satisfactory. The patch was then brought on the operative field after being soaked in antibiotic saline, trimmed to the appropriate bevel and length, and sewn to the carotid arteriotomy to create a patch angioplasty using running 6-0 Prolene suture. Before the patch was completed, the carotid shunt was removed, the clamps were re-applied after back-bleeding the internal carotid artery, and the lumen was irrigated once again with heparinized saline. The patch was then de-aired by sequentially temporarily releasing control of the the external, common, and internal carotid  arteries just before the suture line was pulled tight and tied in place.  Areas of bleeding along the suture line were controlled with additional 6-0 Prolene sutures and surgicel.     A completion duplex ultrasound was then performed on the operating table by the Clinical Vascular Lab. This revealed patent internal, common, and external carotid arteries with no evidence of residual plaque or focal velocity elevation at the distal end point. This was interpreted as satisfactory. The patient was then given a dose of protamine sulfate for heparin reversal and the wound was irrigated and inspected for hemostasis. Once satisfactory hemostasis was achieved, we commenced wound closure. Vicryl suture was used to re-approximate the sternocleidomastoid muscle and close the platysma in a running fashion. The skin was closed with 4-0 Monocryl suture. Dermabond and an occlusive dressing were applied to the wound. The patient was then awakened and extubated. A grossly intact motor exam was confirmed prior to transport to the PACU in stable condition. There were no acute complications.       Vascular Quality Initiative - Carotid Endarterectomy     Urgency:  Elective    Anesthesia: General Type: Conventional    Side: left    Patch Type: Bovine Pericardial     If Prosthetic, Patch : NA    Shunt: Yes- Routine    Skin Prep: Chlorhexidine    Drain: no  Heparin: yes    Protamine: yes      Dextran: no     Re-explore artery after closure: no    Total Procedure time: 90min    Monitoring:   EEG: no   Stump Pressure: no   Other: no    Completion Study:   Doppler: no   Duplex: yes   Arteriogram: no    Concomitant Procedure:   Proximal Endovascular: no   Distal Endovascular: no   CABG: no   Other Arterial Op: no

## 2025-03-17 NOTE — CASE MANAGEMENT
Case Management Assessment & Discharge Planning Note    Patient name Ez Dsouza  Location OR POOL/OR POOL MRN [id number]  : 1965 Date 3/17/2025       Current Admission Date: 3/13/2025  Current Admission Diagnosis:Acute cerebrovascular accident (CVA) due to stenosis of left carotid artery (HCC)   Patient Active Problem List    Diagnosis Date Noted Date Diagnosed    Smoking 2025     Hyperlipidemia 03/10/2025     Acute cerebrovascular accident (CVA) due to stenosis of left carotid artery (HCC) 03/10/2025     Elevated coronary artery calcium score 03/10/2025     Pre-operative cardiovascular examination 03/10/2025     Closed fracture of right distal radius and ulna 2024     Closed fracture of distal ends of right radius and ulna, initial encounter 2024     Closed fracture of right wrist with routine healing, subsequent encounter 2024     Fall 2024     Acute pain due to trauma 2024     Closed fracture of multiple ribs of left side 2024     Closed fracture of right wrist 2024     MARIA TERESA (acute kidney injury) (HCC) 2024     Primary hypertension 2024     Scalp abrasion 2024     Elevated blood pressure reading 2021       LOS (days): 4  Geometric Mean LOS (GMLOS) (days):   Days to GMLOS:     OBJECTIVE:    Risk of Unplanned Readmission Score: 10.95       Current admission status: Inpatient     Preferred Pharmacy:   [facility]) [facility] [place] - [address]  [address] [place] [address]  Phone: [phone] Fax: [phone]    Primary Care Provider: Jakob Higgins MD    Primary Insurance: BLUE CROSS  Secondary Insurance: [employer]    ASSESSMENT:  Active Health Care Proxies       Nery Dsouza Health Care Representative - Spouse   Primary Phone: [phone] (Mobile)                 Advance Directives  Does patient have a Health Care POA?: No  Was patient offered paperwork?: No (off the unit)  Does  patient currently have a Health Care decision maker?: Yes, please see Health Care Proxy section  Does patient have Advance Directives?: No  Primary Contact: Nery Dsouza (spouse) [phone]       Readmission Root Cause  30 Day Readmission: No    Patient Information  Admitted from:: Facility ([facility])  Mental Status: Alert  Assessment information provided by:: Spouse  Primary Caregiver: Self  Support Systems: Spouse/significant other  County of Residence: [place]  What [facility] do you live in?: [place]  Living Arrangements: Lives w/ Spouse/significant other  Is patient a ?: No    Activities of Daily Living Prior to Admission  Functional Status: Independent  Completes ADLs independently?: Yes  Ambulates independently?: Yes  Does patient use assisted devices?: No  Does patient currently own DME?: No  Does patient have a history of Outpatient Therapy (PT/OT)?: No  Does the patient have a history of Short-Term Rehab?: No  Does patient have a history of HHC?: No  Does patient currently have HHC?: No       Patient Information Continued  Income Source: Employed  Does patient have prescription coverage?: Yes  Can the patient afford their medications and any related supplies (such as glucometers or test strips)?: N/A  Does patient receive dialysis treatments?: No  Does patient have a history of substance abuse?: No  Does patient have a history of Mental Health Diagnosis?: No       Means of Transportation  Means of Transport to Appts:: Drives Self    DISCHARGE DETAILS:      Contacts  Patient Contacts: Nery Dsouza (spouse) [phone]  Relationship to Patient:: Family  Phone Number: [phone]  Reason/Outcome: Emergency Contact, Discharge Planning       Treatment Team Recommendation: Short Term Rehab  Discharge Destination Plan:: Short Term Rehab  Transport at Discharge : Family         Additional Comments: Patient is off the unit for vascular surgery, spouse was in the room but left prior to care manager's  visit. CM department will follow patient for discharge planning.

## 2025-03-17 NOTE — QUICK NOTE
Vascular Surgery   Post-Op Check Progress Note   Ez Dsouza 59 y.o. male MRN: [id number]  Unit/Bed#: ICU 13 Encounter: [id number]    Assessment & Plan  Acute cerebrovascular accident (CVA) due to stenosis of left carotid artery (HCC)  59-year-old male with hx of HTN, CAD, HLD, tobacco abuse, hx of fall '24 w/ rib fxs and R wrist fx, woke up with R side weakness and presented to [facility] by EMS. Noted to have primarily L hemispheric strokes 2/2 symptomatic L ICA stenosis. Patient transferred to [facility] overnight for tentative L carotid enterectomy on 3/17/25 with .    Results   Carotid DU:   R ICA <50% stenosis,   L ICA >70% stenosis w/ pilar 259/82 and ratio 3.2    3/10 MRI brain: multiple small acute CVAs in the L periventricular, subcotical, frontal and parietal/occipital lobes; small petechial hemorrhage    CTA neck: R ICA midl stenosis; L ICA w/ ~60-70% stenosis w/ soft plaque and increased irregularity compared to prior study in '24; there is a long area of irregularity, about 3.5cm in length    3/13 MRI brain: multiple small foci of ischemia in L MCA territory. CAMRYN to MCA watershed territory on the L as well as L caudate. Involvement of R parietal lobe. Consider central embolic source.   3/13 CTA head and neck: Scattered L hemispheric infarction. Unchanged L ICA high grade stenosis.   3/15 CT Head - No intracranial hemorrhage or calvarial fracture. Left greater than right subacute to chronic watershed type infarctions are stable.     S/p L CEA 3/17    Plan  -Regular diet as tolerated  -No new neurological events   -ICU monitoring for close BP monitoring with goals to keep SBP >100 and <160  - continue A-line for close hemodynamic monitoring  -Neurology following, appreciate recs  -Cardiology following  -Continue Plavix and Heparin gtt  -Lipitor 40mg daily  -Frequent Neuro checks  - remainder of care per ICU    Subjective/Objective     Subjective:   Patient hemodynamicaly stable without  "requiring support. Patient appears comfortable, endorsing mild pain and numbness at incision site on L neck. Answers all questions appropriately. No changes in prior exam.    Objective:     Blood pressure 136/92, pulse 78, temperature 97.5 °F (36.4 °C), temperature source Oral, resp. rate 19, height 6' 2\" (1.88 m), weight 89.4 kg (197 lb), SpO2 92%.,Body mass index is 25.29 kg/m².      Intake/Output Summary (Last 24 hours) at 3/17/2025 1729  Last data filed at 3/17/2025 1600  Gross per 24 hour   Intake 1801.49 ml   Output 550 ml   Net 1251.49 ml       Invasive Devices       Peripheral Intravenous Line  Duration             Peripheral IV 03/13/25 Distal;Left;Upper;Ventral (anterior) Arm 4 days    Peripheral IV 03/17/25 Left;Ventral (anterior) Forearm <1 day              Arterial Line  Duration             Arterial Line 03/17/25 Right Radial <1 day                    Physical Exam:  General: NAD  HENT: NCAT MMM  Neck: supple, no JVD. L neck incision c/d/I. Mild ecchymosis underlying the incision and slight edema at the proximal region. Area remains soft and compressible.   CV: nl rate  Lungs: nl wob. No resp distress  ABD: Soft, nontender, nondistended  Extrem: Strength and sensation intact in all extremities L>R. Motor and sensory function in the face intact without deficit.   Neuro: AAOx3        Audnrea Lee MD  3/17/2025    "

## 2025-03-17 NOTE — ANESTHESIA POSTPROCEDURE EVALUATION
Post-Op Assessment Note    CV Status:  Stable  Pain Score: 1    Pain management: adequate       Mental Status:  Alert and awake   Hydration Status:  Euvolemic   PONV Controlled:  Controlled   Airway Patency:  Patent     Post Op Vitals Reviewed: Yes    No anethesia notable event occurred.    Staff: Anesthesiologist           Last Filed PACU Vitals:  Vitals Value Taken Time   Temp 97.6 °F (36.4 °C) 03/17/25 1430   Pulse 78 03/17/25 1440   /78 03/17/25 1430   Resp     SpO2 95 % 03/17/25 1440   Vitals shown include unfiled device data.

## 2025-03-17 NOTE — OCCUPATIONAL THERAPY NOTE
Occupational Therapy         Patient Name: Ez Dsouza  Today's Date: 3/17/2025       03/17/25 0824   OT Last Visit   OT Visit Date 03/17/25   Note Type   Note type Cancelled Session   Cancel Reasons Patient to operating room   Additional Comments WIll continue to follow and treat as appropriate.     Muriel Wharton, OT

## 2025-03-17 NOTE — ANESTHESIA PROCEDURE NOTES
Arterial Line Insertion    Performed by: Azul Saravia CRNA  Authorized by: Zeeshan Smith DO  Preparation: Patient was prepped and draped in the usual sterile fashion.  Indications: hemodynamic monitoring  Orientation:  Right  Location: radial artery  Procedure Details:      Needle gauge: 20  Placement technique:  Ultrasound guided  Ultrasound image availability:  Printed/placed in chart    Post-procedure:  Post-procedure: dressing applied  Waveform: good waveform  Post-procedure CNS: normal  Patient tolerance: Patient tolerated the procedure well with no immediate complications

## 2025-03-17 NOTE — ASSESSMENT & PLAN NOTE
59-year-old male with hx of HTN, CAD, HLD, tobacco abuse, hx of fall '24 w/ rib fxs and R wrist fx, woke up with R side weakness and presented to [facility] by EMS. Noted to have primarily L hemispheric strokes 2/2 symptomatic L ICA stenosis. Patient transferred to [facility] overnight for tentative L carotid enterectomy on 3/17/25 with .    Results   Carotid DU:   R ICA <50% stenosis,   L ICA >70% stenosis w/ pilar 259/82 and ratio 3.2    3/10 MRI brain: multiple small acute CVAs in the L periventricular, subcotical, frontal and parietal/occipital lobes; small petechial hemorrhage    CTA neck: R ICA midl stenosis; L ICA w/ ~60-70% stenosis w/ soft plaque and increased irregularity compared to prior study in '24; there is a long area of irregularity, about 3.5cm in length    3/13 MRI brain: multiple small foci of ischemia in L MCA territory. CAMRYN to MCA watershed territory on the L as well as L caudate. Involvement of R parietal lobe. Consider central embolic source.   3/13 CTA head and neck: Scattered L hemispheric infarction. Unchanged L ICA high grade stenosis.   3/15 CT Head - No intracranial hemorrhage or calvarial fracture. Left greater than right subacute to chronic watershed type infarctions are stable.     S/p L CEA 3/17    Plan  -Regular diet as tolerated  -No new neurological events   -ICU monitoring for close BP monitoring with goals to keep SBP >100 and <160  - continue A-line for close hemodynamic monitoring  -Neurology following, appreciate recs  -Cardiology following  -Continue Plavix and Heparin gtt  -Lipitor 40mg daily  -Frequent Neuro checks  - remainder of care per ICU

## 2025-03-17 NOTE — ASSESSMENT & PLAN NOTE
PTA: amlodipine, carvedilol, hydrochlorothiazide, spironolactone  -- currently on hold  Was started on neosynephrine to maintain SBP > 160 mmHg given neruo deficits    Plan:  Monitor

## 2025-03-17 NOTE — ASSESSMENT & PLAN NOTE
Lab Results   Component Value Date    LDLCALC 125 (H) 03/10/2025     Plan:   Continue Lipitor 40mg

## 2025-03-17 NOTE — PHYSICAL THERAPY NOTE
Physical Therapy Cancellation Note        PT session cancelled as pt. Off the floor for OR. Will continue to follow as able.

## 2025-03-18 ENCOUNTER — DOCUMENTATION (OUTPATIENT)
Dept: VASCULAR SURGERY | Facility: CLINIC | Age: 60
End: 2025-03-18

## 2025-03-18 LAB
ANION GAP SERPL CALCULATED.3IONS-SCNC: 8 MMOL/L (ref 4–13)
APTT PPP: 63 SECONDS (ref 23–34)
APTT PPP: 79 SECONDS (ref 23–34)
BUN SERPL-MCNC: 18 MG/DL (ref 5–25)
CALCIUM SERPL-MCNC: 8.7 MG/DL (ref 8.4–10.2)
CHLORIDE SERPL-SCNC: 106 MMOL/L (ref 96–108)
CO2 SERPL-SCNC: 22 MMOL/L (ref 21–32)
CREAT SERPL-MCNC: 1.27 MG/DL (ref 0.6–1.3)
ERYTHROCYTE [DISTWIDTH] IN BLOOD BY AUTOMATED COUNT: 13.8 % (ref 11.6–15.1)
GFR SERPL CREATININE-BSD FRML MDRD: 61 ML/MIN/1.73SQ M
GLUCOSE SERPL-MCNC: 106 MG/DL (ref 65–140)
HCT VFR BLD AUTO: 34.2 % (ref 36.5–49.3)
HGB BLD-MCNC: 11.7 G/DL (ref 12–17)
INR PPP: 1.16 (ref 0.85–1.19)
MAGNESIUM SERPL-MCNC: 1.8 MG/DL (ref 1.9–2.7)
MCH RBC QN AUTO: 30.8 PG (ref 26.8–34.3)
MCHC RBC AUTO-ENTMCNC: 34.2 G/DL (ref 31.4–37.4)
MCV RBC AUTO: 90 FL (ref 82–98)
PHOSPHATE SERPL-MCNC: 4.3 MG/DL (ref 2.3–4.1)
PLATELET # BLD AUTO: 265 THOUSANDS/UL (ref 149–390)
PMV BLD AUTO: 10.5 FL (ref 8.9–12.7)
POTASSIUM SERPL-SCNC: 3.7 MMOL/L (ref 3.5–5.3)
PROTHROMBIN TIME: 15 SECONDS (ref 12.3–15)
RBC # BLD AUTO: 3.8 MILLION/UL (ref 3.88–5.62)
SODIUM SERPL-SCNC: 136 MMOL/L (ref 135–147)
WBC # BLD AUTO: 13.38 THOUSAND/UL (ref 4.31–10.16)

## 2025-03-18 PROCEDURE — 99024 POSTOP FOLLOW-UP VISIT: CPT | Performed by: SURGERY

## 2025-03-18 PROCEDURE — 85610 PROTHROMBIN TIME: CPT | Performed by: STUDENT IN AN ORGANIZED HEALTH CARE EDUCATION/TRAINING PROGRAM

## 2025-03-18 PROCEDURE — 97530 THERAPEUTIC ACTIVITIES: CPT

## 2025-03-18 PROCEDURE — 99232 SBSQ HOSP IP/OBS MODERATE 35: CPT

## 2025-03-18 PROCEDURE — 84100 ASSAY OF PHOSPHORUS: CPT

## 2025-03-18 PROCEDURE — 85730 THROMBOPLASTIN TIME PARTIAL: CPT | Performed by: STUDENT IN AN ORGANIZED HEALTH CARE EDUCATION/TRAINING PROGRAM

## 2025-03-18 PROCEDURE — 80048 BASIC METABOLIC PNL TOTAL CA: CPT | Performed by: STUDENT IN AN ORGANIZED HEALTH CARE EDUCATION/TRAINING PROGRAM

## 2025-03-18 PROCEDURE — 85027 COMPLETE CBC AUTOMATED: CPT | Performed by: STUDENT IN AN ORGANIZED HEALTH CARE EDUCATION/TRAINING PROGRAM

## 2025-03-18 PROCEDURE — 99232 SBSQ HOSP IP/OBS MODERATE 35: CPT | Performed by: INTERNAL MEDICINE

## 2025-03-18 PROCEDURE — 97110 THERAPEUTIC EXERCISES: CPT

## 2025-03-18 PROCEDURE — 83735 ASSAY OF MAGNESIUM: CPT

## 2025-03-18 PROCEDURE — 97116 GAIT TRAINING THERAPY: CPT

## 2025-03-18 RX ADMIN — HEPARIN SODIUM 15.8 UNITS/KG/HR: 10000 INJECTION, SOLUTION INTRAVENOUS at 10:55

## 2025-03-18 RX ADMIN — OXYCODONE 5 MG: 5 TABLET ORAL at 02:16

## 2025-03-18 RX ADMIN — ACETAMINOPHEN 975 MG: 325 TABLET, FILM COATED ORAL at 13:19

## 2025-03-18 RX ADMIN — POLYETHYLENE GLYCOL 3350 17 G: 17 POWDER, FOR SOLUTION ORAL at 08:00

## 2025-03-18 RX ADMIN — CLOPIDOGREL BISULFATE 75 MG: 75 TABLET ORAL at 08:00

## 2025-03-18 RX ADMIN — ACETAMINOPHEN 975 MG: 325 TABLET, FILM COATED ORAL at 05:45

## 2025-03-18 RX ADMIN — ACETAMINOPHEN 975 MG: 325 TABLET, FILM COATED ORAL at 21:25

## 2025-03-18 RX ADMIN — SENNOSIDES 8.6 MG: 8.6 TABLET, FILM COATED ORAL at 21:25

## 2025-03-18 RX ADMIN — ATORVASTATIN CALCIUM 40 MG: 40 TABLET, FILM COATED ORAL at 16:32

## 2025-03-18 NOTE — PLAN OF CARE
Problem: PHYSICAL THERAPY ADULT  Goal: Performs mobility at highest level of function for planned discharge setting.  See evaluation for individualized goals.  Description: Treatment/Interventions: Functional transfer training, LE strengthening/ROM, Elevations, Therapeutic exercise, Endurance training, Patient/family training, Bed mobility, Gait training, Spoke to nursing, OT  Equipment Recommended: Walker       See flowsheet documentation for full assessment, interventions and recommendations.  Outcome: Progressing  Note: Prognosis: Good  Problem List: Decreased range of motion, Decreased strength, Decreased endurance, Impaired balance, Decreased mobility, Decreased coordination, Impaired judgement, Impaired tone, Decreased safety awareness  Assessment: Pt. given extensive education for safety and his impairments. Pt. given mild A for RUE Latisha in supine. Noted decreased strength and stability of RLE and decreased tone of RUE. Pt. needed A for maintaining RUE WBing on the RW during amb. Max cues for WBOS during ambulation and increased heel strike and foot clearance during ambulation. Pt. needed good amount of A (Mod A) on the R side and Min A on the L side for amb. HOB was flat for supine to sit transfer. Pt. remained on the chair with chair alarm engaged at the end of the session with all needs within reach and wife present in the room. Will continue to follow per PT POC. pt. requries Mod A for amb. and min A for all other mobility. Pt. is fucntioning below his baseline and will benefit from continued skilled PT to address the mobility and balance deficits.  Barriers to Discharge: None  Barriers to Discharge Comments: Continue to recommend  level I maximal rehab resource intensity  at time of d/c in order to maximize pt's functional independence and safety w/ mobility. Pt continues to be functioning below baseline level. PT will continue to see pt while here in order to address the deficits listed above and  provide interventions consistent w/ POC in effort to achieve STGs.  Rehab Resource Intensity Level, PT: I (Maximum Resource Intensity)    See flowsheet documentation for full assessment.

## 2025-03-18 NOTE — [facility] ADMISSION
[facility] community Liaison called  family- pt's spouse Nery, introduced self, explained role, reviewed [facility] program, services offered, acute rehab criteria, review of referral by [facility] Medical Director, insurance authorization process, the three [facility] locations and anticipated rehab length of stay.; all questions answered. family first choice is [facility]. family made aware [facility] reviewer will communicate with the  who will keep patient updated on referral status.

## 2025-03-18 NOTE — RESTORATIVE TECHNICIAN NOTE
Restorative Technician Note      Patient Name: Ez Dsouza     Restorative Tech Visit Date: 03/18/25  Note Type: Mobility  Patient Position Upon Consult: Supine  Activity Performed: Ambulated  Assistive Device: Roller walker  Patient Position at End of Consult: Bedside chair; All needs within reach; Bed/Chair alarm activated            ns

## 2025-03-18 NOTE — CASE MANAGEMENT
Case Management Discharge Planning Note    Patient name Ez Dsouza  Location ICU 13/ICU 13 MRN [id number]  : 1965 Date 3/18/2025       Current Admission Date: 3/13/2025  Current Admission Diagnosis:Acute cerebrovascular accident (CVA) due to stenosis of left carotid artery (HCC)   Patient Active Problem List    Diagnosis Date Noted Date Diagnosed    Smoking 2025     Hyperlipidemia 03/10/2025     Acute cerebrovascular accident (CVA) due to stenosis of left carotid artery (HCC) 03/10/2025     Elevated coronary artery calcium score 03/10/2025     Pre-operative cardiovascular examination 03/10/2025     Closed fracture of right distal radius and ulna 2024     Closed fracture of distal ends of right radius and ulna, initial encounter 2024     Closed fracture of right wrist with routine healing, subsequent encounter 2024     Fall 2024     Acute pain due to trauma 2024     Closed fracture of multiple ribs of left side 2024     Closed fracture of right wrist 2024     MARIA TERESA (acute kidney injury) (HCC) 2024     Primary hypertension 2024     Scalp abrasion 2024     Elevated blood pressure reading 2021       LOS (days): 5  Geometric Mean LOS (GMLOS) (days):   Days to GMLOS:     OBJECTIVE:  Risk of Unplanned Readmission Score: 11.98         Current admission status: Inpatient   Preferred Pharmacy:   [facility] [place] - [address]  [address] [place] [address]  Phone: [phone] Fax: [phone]    Primary Care Provider: Jakob Higgins MD    Primary Insurance: BLUE CROSS  Secondary Insurance: [employer]    DISCHARGE DETAILS:    Discharge planning discussed with:: patient and spouse  Freedom of Choice: Yes  Comments - Freedom of Choice: [facility] referral for Acute rehab supmitted in A[facility], patient provided with his choice list  CM contacted family/caregiver?: Yes  Were Treatment Team discharge  recommendations reviewed with patient/caregiver?: Yes  Did patient/caregiver verbalize understanding of patient care needs?: N/A- going to facility  Were patient/caregiver advised of the risks associated with not following Treatment Team discharge recommendations?: Yes    Contacts  Patient Contacts: Nery Dsouza (spouse) [phone]  Relationship to Patient:: Family  Contact Method: In Person  Phone Number: [phone]  Reason/Outcome: Emergency Contact, Discharge Planning    Requested Home Health Care         Is the patient interested in HHC at discharge?: No    DME Referral Provided  Referral made for DME?: No    Other Referral/Resources/Interventions Provided:  Interventions: Crisis Hotline  Referral Comments: [facility] referral placed for [facility], [facility] and [facility] - spouce and patient provided with choice list    Would you like to participate in our [facility] service program?  : No - Declined    Treatment Team Recommendation: Acute Rehab  Discharge Destination Plan:: Acute Rehab  Transport at Discharge : Jose leon        Additional Comments: CM discussed the PT/OT evaluation and recommendation for STR. Patient and spouse verbalized understanding and agreed to a blanket referral being placed in Aidin. CM provided the patient with his choice list and will follow up for facility choice and insurance auth.

## 2025-03-18 NOTE — PLAN OF CARE
Problem: Prexisting or High Potential for Compromised Skin Integrity  Goal: Skin integrity is maintained or improved  Description: INTERVENTIONS:  - Identify patients at risk for skin breakdown  - Assess and monitor skin integrity  - Assess and monitor nutrition and hydration status  - Monitor labs   - Assess for incontinence   - Turn and reposition patient  - Assist with mobility/ambulation  - Relieve pressure over bony prominences  - Avoid friction and shearing  - Provide appropriate hygiene as needed including keeping skin clean and dry  - Evaluate need for skin moisturizer/barrier cream  - Collaborate with interdisciplinary team   - Patient/family teaching  - Consider wound care consult   Outcome: Progressing     Problem: PAIN - ADULT  Goal: Verbalizes/displays adequate comfort level or baseline comfort level  Description: Interventions:  - Encourage patient to monitor pain and request assistance  - Assess pain using appropriate pain scale  - Administer analgesics based on type and severity of pain and evaluate response  - Implement non-pharmacological measures as appropriate and evaluate response  - Consider cultural and social influences on pain and pain management  - Notify physician/advanced practitioner if interventions unsuccessful or patient reports new pain  Outcome: Progressing     Problem: INFECTION - ADULT  Goal: Absence or prevention of progression during hospitalization  Description: INTERVENTIONS:  - Assess and monitor for signs and symptoms of infection  - Monitor lab/diagnostic results  - Monitor all insertion sites, i.e. indwelling lines, tubes, and drains  - Monitor endotracheal if appropriate and nasal secretions for changes in amount and color  - [place] appropriate cooling/warming therapies per order  - Administer medications as ordered  - Instruct and encourage patient and family to use good hand hygiene technique  - Identify and instruct in appropriate isolation precautions for  identified infection/condition  Outcome: Progressing  Goal: Absence of fever/infection during neutropenic period  Description: INTERVENTIONS:  - Monitor WBC    Outcome: Progressing     Problem: SAFETY ADULT  Goal: Patient will remain free of falls  Description: INTERVENTIONS:  - Educate patient/family on patient safety including physical limitations  - Instruct patient to call for assistance with activity   - Consult OT/PT to assist with strengthening/mobility   - Keep Call bell within reach  - Keep bed low and locked with side rails adjusted as appropriate  - Keep care items and personal belongings within reach  - Initiate and maintain comfort rounds  - Make Fall Risk Sign visible to staff  - Offer Toileting every 2 Hours, in advance of need  - Initiate/Maintain bed alarm  - Obtain necessary fall risk management equipment  - Apply yellow socks and bracelet for high fall risk patients  - Consider moving patient to room near nurses station  Outcome: Progressing  Goal: Maintain or return to baseline ADL function  Description: INTERVENTIONS:  -  Assess patient's ability to carry out ADLs; assess patient's baseline for ADL function and identify physical deficits which impact ability to perform ADLs (bathing, care of mouth/teeth, toileting, grooming, dressing, etc.)  - Assess/evaluate cause of self-care deficits   - Assess range of motion  - Assess patient's mobility; develop plan if impaired  - Assess patient's need for assistive devices and provide as appropriate  - Encourage maximum independence but intervene and supervise when necessary  - Involve family in performance of ADLs  - Assess for home care needs following discharge   - Consider OT consult to assist with ADL evaluation and planning for discharge  - Provide patient education as appropriate  Outcome: Progressing  Goal: Maintains/Returns to pre admission functional level  Description: INTERVENTIONS:  - Perform AM-PAC 6 Click Basic Mobility/ Daily Activity  assessment daily.  - Set and communicate daily mobility goal to care team and patient/family/caregiver.   - Collaborate with rehabilitation services on mobility goals if consulted  - Perform Range of Motion 3 times a day.  - Reposition patient every 2 hours.  - Dangle patient 3 times a day  - Stand patient 3 times a day  - Ambulate patient 3 times a day  - Out of bed to chair 3 times a day   - Out of bed for meals 3 times a day  - Out of bed for toileting  - Record patient progress and toleration of activity level   Outcome: Progressing     Problem: DISCHARGE PLANNING  Goal: Discharge to home or other facility with appropriate resources  Description: INTERVENTIONS:  - Identify barriers to discharge w/patient and caregiver  - Arrange for needed discharge resources and transportation as appropriate  - Identify discharge learning needs (meds, wound care, etc.)  - Arrange for interpretive services to assist at discharge as needed  - Refer to Case Management Department for coordinating discharge planning if the patient needs post-hospital services based on physician/advanced practitioner order or complex needs related to functional status, cognitive ability, or social support system  Outcome: Progressing     Problem: Knowledge Deficit  Goal: Patient/family/caregiver demonstrates understanding of disease process, treatment plan, medications, and discharge instructions  Description: Complete learning assessment and assess knowledge base.  Interventions:  - Provide teaching at level of understanding  - Provide teaching via preferred learning methods  Outcome: Progressing     Problem: Nutrition/Hydration-ADULT  Goal: Nutrient/Hydration intake appropriate for improving, restoring or maintaining nutritional needs  Description: Monitor and assess patient's nutrition/hydration status for malnutrition. Collaborate with interdisciplinary team and initiate plan and interventions as ordered.  Monitor patient's weight and dietary  intake as ordered or per policy. Utilize nutrition screening tool and intervene as necessary. Determine patient's food preferences and provide high-protein, high-caloric foods as appropriate.     INTERVENTIONS:  - Monitor oral intake, urinary output, labs, and treatment plans  - Assess nutrition and hydration status and recommend course of action  - Evaluate amount of meals eaten  - Assist patient with eating if necessary   - Allow adequate time for meals  - Recommend/ encourage appropriate diets, oral nutritional supplements, and vitamin/mineral supplements  - Order, calculate, and assess calorie counts as needed  - Recommend, monitor, and adjust tube feedings and TPN/PPN based on assessed needs  - Assess need for intravenous fluids  - Provide specific nutrition/hydration education as appropriate  - Include patient/family/caregiver in decisions related to nutrition  Outcome: Progressing

## 2025-03-18 NOTE — [facility] ADMISSION
Patients case reviewed, patient looks appropriate to come to [facility] pending medical stability, LOF at discharge, and insurance authorization approval.  [facility] admissions will continue to follow patient for progression of care and discharge readiness.

## 2025-03-18 NOTE — ASSESSMENT & PLAN NOTE
59-year-old male with hx of HTN, CAD, HLD, tobacco abuse, hx of fall '24 w/ rib fxs and R wrist fx, woke up with R side weakness and presented to [facility] by EMS. Noted to have primarily L hemispheric strokes 2/2 symptomatic L ICA stenosis. Patient transferred to [facility] overnight for tentative L carotid enterectomy on 3/17/25 with .    Results   Carotid DU:   R ICA <50% stenosis,   L ICA >70% stenosis w/ pilar 259/82 and ratio 3.2    3/10 MRI brain: multiple small acute CVAs in the L periventricular, subcotical, frontal and parietal/occipital lobes; small petechial hemorrhage    CTA neck: R ICA midl stenosis; L ICA w/ ~60-70% stenosis w/ soft plaque and increased irregularity compared to prior study in '24; there is a long area of irregularity, about 3.5cm in length    3/13 MRI brain: multiple small foci of ischemia in L MCA territory. CAMRYN to MCA watershed territory on the L as well as L caudate. Involvement of R parietal lobe. Consider central embolic source.   3/13 CTA head and neck: Scattered L hemispheric infarction. Unchanged L ICA high grade stenosis.   3/15 CT Head - No intracranial hemorrhage or calvarial fracture. Left greater than right subacute to chronic watershed type infarctions are stable.       Plan  -Pt is now S/p L CEA 3/17  -L neck incision site tenderness with mild swelling. Incision site is clean, dry and well approximated with glue in place. Pt noted to be touching the area frequently. Continue with ice  -Regular diet as tolerated  -No new neurological events   -ICU monitoring for close BP monitoring with goals to keep SBP >100 and <160  - continue A-line for close hemodynamic monitoring  -Neurology following, appreciate recs  -Cardiology following, planning CHANCE 3/20 or 3/21- D/W  pt clear from vascular standpoint to proceed.   -Continue Plavix  -Heparin as per cardiology  -Lipitor 40mg daily  -Frequent Neuro checks  -D/W   -Remainder of care per  ICU

## 2025-03-18 NOTE — PHYSICAL THERAPY NOTE
Physical Therapy Treatment Note     03/18/25 1247   PT Last Visit   PT Visit Date 03/18/25   Note Type   Note Type Treatment   Pain Assessment   Pain Assessment Tool 0-10   Pain Score No Pain   Restrictions/Precautions   Weight Bearing Precautions Per Order No   Other Precautions Fall Risk;Telemetry;Multiple lines;Chair Alarm;Bed Alarm;Cognitive;Impulsive  (R side hemiparesis)   General   Chart Reviewed Yes   Family/Caregiver Present Yes   Subjective   Subjective Pt. agreeable to PT. after encouragement. Pt. reported he fell after the therapy session last time.   Bed Mobility   Supine to Sit 5  Supervision   Additional items Assist x 1;Increased time required;Bedrails;Verbal cues   Transfers   Sit to Stand 4  Minimal assistance   Additional items Assist x 1;Increased time required;Verbal cues;Armrests   Stand to Sit 4  Minimal assistance   Additional items Assist x 1;Armrests;Increased time required;Verbal cues   Stand pivot 4  Minimal assistance   Additional items Assist x 1;Increased time required;Verbal cues   Ambulation/Elevation   Gait pattern Improper Weight shift;Poor UE support;Decreased R stance;Decreased foot clearance;Narrow ANTONI;Inconsistent nicholas;Foward flexed;Short stride;Excessively slow;Ataxia;Decreased heel strike;Decreased toe off;Step to   Gait Assistance 3  Moderate assist   Additional items Assist x 1;Assist x 2;Verbal cues;Other (Comment);Assist x 3  (3rd for lines)   Assistive Device Rolling walker   Distance 2ft, 50ft x 2   Balance   Static Sitting Fair +   Dynamic Sitting Fair   Static Standing Fair -   Dynamic Standing Fair -   Ambulatory Poor +   Endurance Deficit   Endurance Deficit Yes   Endurance Deficit Description r side weakness, fatigue   Activity Tolerance   Activity Tolerance Patient tolerated treatment well   Nurse Made Aware yes   Exercises   THR Supine;Sitting;Bilateral;AROM;20 reps;AAROM   Assessment   Prognosis Good   Problem List Decreased range of motion;Decreased  strength;Decreased endurance;Impaired balance;Decreased mobility;Decreased coordination;Impaired judgement;Impaired tone;Decreased safety awareness   Assessment Pt. given extensive education for safety and his impairments. Pt. given mild A for RUE Latisha in supine. Noted decreased strength and stability of RLE and decreased tone of RUE. Pt. needed A for maintaining RUE WBing on the RW during amb. Max cues for WBOS during ambulation and increased heel strike and foot clearance during ambulation. Pt. needed good amount of A (Mod A) on the R side and Min A on the L side for amb. HOB was flat for supine to sit transfer. Pt. remained on the chair with chair alarm engaged at the end of the session with all needs within reach and wife present in the room. Will continue to follow per PT POC. pt. requries Mod A for amb. and min A for all other mobility. Pt. is fucntioning below his baseline and will benefit from continued skilled PT to address the mobility and balance deficits.   Barriers to Discharge None   Goals   Patient Goals None reported   STG Expiration Date 03/28/25   PT Treatment Day 2   Plan   Treatment/Interventions Functional transfer training;LE strengthening/ROM;Therapeutic exercise;Gait training;Bed mobility;Equipment eval/education;Patient/family training;Cognitive reorientation;Spoke to nursing;Family   Progress Progressing toward goals   PT Frequency 4-6x/wk   Discharge Recommendation   Rehab Resource Intensity Level, PT I (Maximum Resource Intensity)   Equipment Recommended Walker   AM-PAC Basic Mobility Inpatient   Turning in Flat Bed Without Bedrails 3   Lying on Back to Sitting on Edge of Flat Bed Without Bedrails 3   Moving Bed to Chair 3   Standing Up From Chair Using Arms 3   Walk in Room 2   Climb 3-5 Stairs With Railing 2   Basic Mobility Inpatient Raw Score 16   Basic Mobility Standardized Score 38.32   [facility] Highest Level Of Mobility   -HL Goal 5: Stand one or more mins   -HLM Achieved  7: Walk 25 feet or more   End of Consult   Patient Position at End of Consult All needs within reach;Bed/Chair alarm activated;Bedside chair           Shayne Banuelos PTA    An AM-PAC basic mobility standardized score less than 42.9 suggest the patient may benefit from discharge to post-acute rehab services.

## 2025-03-18 NOTE — PROGRESS NOTES
Progress Note - Critical Care/ICU   Name: Ez Dsouza 59 y.o. male I MRN: [id number]  Unit/Bed#: ICU 13 I Date of Admission: 3/13/2025   Date of Service: 3/18/2025 I Hospital Day: 5       Assessment & Plan  Acute cerebrovascular accident (CVA) due to stenosis of left carotid artery (HCC)  DX: Multifocal strokes primarily L ICA territory suggesting symptomatic L ICA stenosis  Imaging  CTA head 3/10: Worsening severe stenosis in the proximal left ICA, no significant intracranial stenosis or LVO  CT head 3/10: No LVO   MRI brain 3/10: Multiple small scattered acute infarcts in the left cerebral hemisphere  CTA H/N 3/13 scattered left hemispheric infarctions  MRI brain 3/13: shows mild progression of his cerebral ischemia mostly on the left-hand side with some additional strokes on the right. Most concerning cause for stroke for him is the left internal carotid artery stenosis however a central embolic source should be further ruled out.  VAS carotid: Right< 50% stenosis, left > 70 stenosis of ICA  Developed worsening neurological exam on 3/13 due to hypoperfusion and cerebral edema  Transferred to [facility] ICU for vasopressor support to target MAP > 100  S/p ASA/Plavix load, Heparin gtt ACS low initiated    Plan:  Continue statin and Plavix 75mg   Continue heparin ACS low gtt   CHANCE order placed, will touch base with Cardio for scheduling   Holding ASA, neuro will decide AC post CHANCE   Hydralazine as needed, labetalol as needed, nicardipine as needed as antihypertensives blood pressure goal is 100-160  Phenylephrine as needed to increase blood pressure with a goal 100-160  Neuro checks- q4hrs  Neurology and vascular surgery following, appreciate recommendations   PT/OT/Speech eval     Primary hypertension  PTA: amlodipine, carvedilol, hydrochlorothiazide, spironolactone  -- currently on hold    Plan:  BP monitoring  , see AP above     Hyperlipidemia  Lab Results   Component Value Date    LDLCALC 125 (H) 03/10/2025     Plan:    Continue Lipitor 40mg   Fall  CT Head:  no acute intracranial process  Hip XR:  no fracture     Plan:   Fall precautions  PT/OT   Smoking    Disposition: Critical care    ICU Core Measures     A: Assess, Prevent, and Manage Pain Has pain been assessed? Yes  Need for changes to pain regimen? No   B: Both SAT/SAT  N/A   C: Choice of Sedation RASS Goal: 0 Alert and Calm  Not sedated    D: Delirium CAM-ICU: Negative   E: Early Mobility  Plan for early mobility? Yes   F: Family Engagement Plan for family engagement today? Yes       Review of Invasive Devices:            Prophylaxis:  VTE VTE covered by:  heparin (porcine), Intravenous, 15.8 Units/kg/hr at 03/17/25 2318  heparin (porcine), Intravenous  heparin (porcine), Intravenous, 4,000 Units at 03/17/25 2317       Stress Ulcer  not ordered         24 Hour Events : Moderate pain at surgical site, patient reported to be picking/scratching at site since post surgery. Received oxy 5mg overnight for pain. This morning moderate eryhema, TTP, and some firmness under the surgical site, vascular on call PA notified.       Subjective       Objective :                   Vitals I/O      Most Recent Min/Max in 24hrs   Temp 97.9 °F (36.6 °C) Temp  Min: 97.5 °F (36.4 °C)  Max: 98 °F (36.7 °C)   Pulse 66 Pulse  Min: 54  Max: 78   Resp 14 Resp  Min: 13  Max: 22   /99 BP  Min: 119/77  Max: 167/92   O2 Sat 96 % SpO2  Min: 92 %  Max: 97 %      Intake/Output Summary (Last 24 hours) at 3/18/2025 0818  Last data filed at 3/18/2025 0756  Gross per 24 hour   Intake 1300 ml   Output 1200 ml   Net 100 ml       Diet Regular; Regular House    Invasive Monitoring           Physical Exam   Physical Exam  Vitals and nursing note reviewed.   Eyes:      General: No scleral icterus.     Extraocular Movements: Extraocular movements intact.      Conjunctiva/sclera: Conjunctivae normal.      Pupils: Pupils are equal, round, and reactive to light.   Skin:     General: Skin is warm.      Capillary  Refill: Capillary refill takes less than 2 seconds.   HENT:      Head: Normocephalic.      Nose: No congestion.      Mouth/Throat:      Comments: Mild lower labial droop    Neck:      Vascular: No JVD.      Comments: L CEA incision demonstrating moderate erythema.   Some swelling, most soft, some firmness under incision site.   Moderately TTP.   Cardiovascular:      Rate and Rhythm: Normal rate and regular rhythm.      Pulses: Normal pulses.      Heart sounds: Normal heart sounds.   Abdominal:      Palpations: Abdomen is soft.      Tenderness: There is no abdominal tenderness.   Constitutional:       General: He is not in acute distress.     Appearance: He is well-developed and well-nourished. He is not ill-appearing.   Pulmonary:      Effort: Pulmonary effort is normal.      Breath sounds: Normal breath sounds.   Psychiatric:         Speech: Speech is expressive aphasia.   Neurological:      Mental Status: He is alert.      Comments: RUE 2/5 strength.   All other extremities 5/5.               Diagnostic Studies        Lab Results: I have reviewed the following results:     Medications:  Scheduled PRN   acetaminophen, 975 mg, Q8H TIEN  atorvastatin, 40 mg, Daily With Dinner  clopidogrel, 75 mg, Daily  polyethylene glycol, 17 g, Daily  senna, 1 tablet, HS      heparin (porcine), 2,000 Units, Q6H PRN  heparin (porcine), 4,000 Units, Q6H PRN  labetalol, 5 mg, Q15 Min PRN   And  hydrALAZINE, 15 mg, Q15 Min PRN   And  niCARdipine, 1-15 mg/hr, Continuous PRN  labetalol, 10 mg, Q6H PRN  ondansetron, 4 mg, Q6H PRN  oxyCODONE, 10 mg, Q4H PRN  oxyCODONE, 5 mg, Q4H PRN  sodium chloride, 500 mL, Once PRN   Followed by  phenylephine,  mcg/min, Continuous PRN       Continuous    heparin (porcine), 3-20 Units/kg/hr (Order-Specific), Last Rate: 15.8 Units/kg/hr (03/17/25 4857)  niCARdipine, 1-15 mg/hr  phenylephine,  mcg/min         Labs:   CBC    Recent Labs     03/17/25  0505 03/17/25  1543 03/18/25  0556   WBC 8.72   --  13.38*   HGB 12.1  --  11.7*   HCT 35.4*  --  34.2*    237 265     BMP    Recent Labs     03/17/25  0505 03/18/25  0556   SODIUM 137 136   K 3.6 3.7    106   CO2 23 22   AGAP 7 8   BUN 18 18   CREATININE 1.23 1.27   CALCIUM 8.4 8.7       Coags    Recent Labs     03/17/25  1626 03/17/25  2245 03/18/25  0556   INR 1.14  --  1.16   PTT 37* 38* 79*        Additional Electrolytes  Recent Labs     03/17/25  0505 03/18/25  0556   MG 1.7* 1.8*   PHOS 3.3 4.3*   CAIONIZED 1.12  --           Blood Gas    No recent results  No recent results LFTs  No recent results    Infectious  No recent results  Glucose  Recent Labs     03/17/25  0505 03/18/25  0556   GLUC 109 106

## 2025-03-18 NOTE — PROGRESS NOTES
"Progress Note - Cardiology   Ez Dsouza 59 y.o. male MRN: [id number]  Unit/Bed#: ICU 13 Encounter: [id number]      Assessment/Recommendations/Discussion:   Acute CVA due to carotid artery stenosis, left carotid artery  Hypertension  Hyperlipidemia  Fall  Smoking history    PLAN  Cardiology has been asked to perform CHANCE to evaluate for PFO given rope score 5    Limited echo was done on March 16 to evaluate for ASD/PFO, this was a bubble study echo which was negative for ASD/PFO at rest and with Valsalva    If CHANCE still needed for further evaluation, will plan for later this week-Thursday or Friday-as long as no contraindication from vascular and neurology standpoint regarding the patient undergoing anesthesia again and the possibility of dropping blood pressure with induction of anesthesia    Subjective:   HPI  Feels well today.  Denies any chest pain or palpitations      Review of Systems: As noted in HPI. Rest of ROS is negative.    Vitals:   /94   Pulse 72   Temp 97.9 °F (36.6 °C) (Oral)   Resp 22   Ht 6' 2\" (1.88 m)   Wt 89.4 kg (197 lb)   SpO2 95%   BMI 25.29 kg/m²   I/O         03/16 0701  03/17 0700 03/17 0701  03/18 0700 03/18 0701  03/19 0700    P.O. 480      I.V. (mL/kg) 288.2 (3.2) 1801.5 (20.2) 238.3 (2.7)    Total Intake(mL/kg) 768.2 (8.6) 1801.5 (20.2) 238.3 (2.7)    Urine (mL/kg/hr) 550 (0.3) 1100 (0.5) 300 (1.3)    Stool 0      Total Output 550 1100 300    Net +218.2 +701.5 -61.7           Unmeasured Stool Occurrence 1 x            Weight (last 2 days)       Date/Time Weight    03/16/25 0845 89.4 (197)    03/16/25 0600 89.8 (197.97)            Physical Exam   Constitutional: awake, alert and oriented, in no acute distress, no obvious deformities  Lungs: clear to auscultation bilaterally, no wheezes, no rales, no rhonchi, no accessory muscle use, breathing is nonlabored  Heart: regular rate and rhythm, S1, S2 normal, no murmur, no click, no rub and no gallop, no lower extremity " edema  Abdomen: soft, non-tender; bowel sounds normal; no masses, no organomegaly  Psychiatric: Patient is oriented to time, place, person, mood/affect is negative for depression, anxiety, agitation, appears to have appropriate insight  Skin: Incision left neck over carotid artery clean dry and intact with moderate erythema and some swelling  Neuro: 2/5 strength right upper extremity    TELEMETRY: Sinus rhythm  Lab Results:  Results from last 7 days   Lab Units 03/18/25  0556   WBC Thousand/uL 13.38*   HEMOGLOBIN g/dL 11.7*   HEMATOCRIT % 34.2*   PLATELETS Thousands/uL 265     Results from last 7 days   Lab Units 03/18/25  0556   POTASSIUM mmol/L 3.7   CHLORIDE mmol/L 106   CO2 mmol/L 22   BUN mg/dL 18   CREATININE mg/dL 1.27   CALCIUM mg/dL 8.7     Results from last 7 days   Lab Units 03/18/25  0556   POTASSIUM mmol/L 3.7   CHLORIDE mmol/L 106   CO2 mmol/L 22   BUN mg/dL 18   CREATININE mg/dL 1.27   CALCIUM mg/dL 8.7           Medications:    Current Facility-Administered Medications:     acetaminophen (TYLENOL) tablet 975 mg, 975 mg, Oral, Q8H TIEN, Johny Elise DO, 975 mg at 03/18/25 0545    atorvastatin (LIPITOR) tablet 40 mg, 40 mg, Oral, Daily With Dinner, Johny Elise DO, 40 mg at 03/17/25 1539    clopidogrel (PLAVIX) tablet 75 mg, 75 mg, Oral, Daily, Johny Elise DO, 75 mg at 03/18/25 0800    heparin (porcine) 25,000 units in 0.45% NaCl 250 mL infusion (premix), 3-20 Units/kg/hr (Order-Specific), Intravenous, Titrated, Ta Pierce MD, Last Rate: 13.4 mL/hr at 03/17/25 2318, 15.8 Units/kg/hr at 03/17/25 2318    heparin (porcine) injection 2,000 Units, 2,000 Units, Intravenous, Q6H PRN, Ta Pierce MD    heparin (porcine) injection 4,000 Units, 4,000 Units, Intravenous, Q6H PRN, Ta Pierce MD, 4,000 Units at 03/17/25 2317    labetalol (NORMODYNE) injection 5 mg, 5 mg, Intravenous, Q15 Min PRN **AND** hydrALAZINE (APRESOLINE) injection 15 mg, 15 mg, Intravenous, Q15 Min PRN **AND** niCARdipine  "(CARDENE) 25 mg (STANDARD CONCENTRATION) in sodium chloride 0.9% 250 mL, 1-15 mg/hr, Intravenous, Continuous PRN, Johny Elise DO    labetalol (NORMODYNE) injection 10 mg, 10 mg, Intravenous, Q6H PRN, Johny Elise DO    ondansetron (ZOFRAN) injection 4 mg, 4 mg, Intravenous, Q6H PRN, Johny Elise DO    oxyCODONE (ROXICODONE) immediate release tablet 10 mg, 10 mg, Oral, Q4H PRN, Johny Elise DO    oxyCODONE (ROXICODONE) IR tablet 5 mg, 5 mg, Oral, Q4H PRN, Johny Elise DO, 5 mg at 03/18/25 0216    sodium chloride 0.9 % bolus 500 mL, 500 mL, Intravenous, Once PRN **FOLLOWED BY** phenylephrine (GRACE-SYNEPHRINE) 50 mg (STANDARD CONCENTRATION) in sodium chloride 0.9% 250 mL,  mcg/min, Intravenous, Continuous PRN, Johny Elise DO    polyethylene glycol (MIRALAX) packet 17 g, 17 g, Oral, Daily, Johny Elise DO, 17 g at 03/18/25 0800    senna (SENOKOT) tablet 8.6 mg, 1 tablet, Oral, HS, Johny Elise DO, 8.6 mg at 03/17/25 2239    Portions of the record may have been created with voice recognition software. Occasional wrong word or \"sound a like\" substitutions may have occurred due to the inherent limitations of voice recognition software. Read the chart carefully and recognize, using context, where substitutions have occurred.    Ez Perkins DO, [facility], FASNC  3/18/2025 9:39 AM      "

## 2025-03-18 NOTE — ASSESSMENT & PLAN NOTE
DX: Multifocal strokes primarily L ICA territory suggesting symptomatic L ICA stenosis  Imaging  CTA head 3/10: Worsening severe stenosis in the proximal left ICA, no significant intracranial stenosis or LVO  CT head 3/10: No LVO   MRI brain 3/10: Multiple small scattered acute infarcts in the left cerebral hemisphere  CTA H/N 3/13 scattered left hemispheric infarctions  MRI brain 3/13: shows mild progression of his cerebral ischemia mostly on the left-hand side with some additional strokes on the right. Most concerning cause for stroke for him is the left internal carotid artery stenosis however a central embolic source should be further ruled out.  VAS carotid: Right< 50% stenosis, left > 70 stenosis of ICA  Developed worsening neurological exam on 3/13 due to hypoperfusion and cerebral edema  Transferred to [facility] ICU for vasopressor support to target MAP > 100  S/p ASA/Plavix load, Heparin gtt ACS low initiated    Plan:  Continue statin and Plavix 75mg   Continue heparin ACS low gtt   CHANCE order placed, will touch base with Cardio for scheduling   Holding ASA, neuro will decide AC post CHANCE   Hydralazine as needed, labetalol as needed, nicardipine as needed as antihypertensives blood pressure goal is 100-160  Phenylephrine as needed to increase blood pressure with a goal 100-160  Neuro checks- q4hrs  Neurology and vascular surgery following, appreciate recommendations   PT/OT/Speech eval

## 2025-03-18 NOTE — PROGRESS NOTES
Patient:    MRN:  [id number]    Ethan Request ID:  [id number]    Level of care reserved:    Partner Reserved:    Clinical needs requested:    Geography searched:  10 miles around [phone]    Start of Service:    Request sent:  11:15am EDT on 3/18/2025 by Aleja Adair    Partner reserved:  by Aleja Adair    Choice list shared:  3:35pm EDT on 3/18/2025 by Aleja Adair

## 2025-03-18 NOTE — PROGRESS NOTES
"Vascular Nurse Navigator Post Op Education    Met with patient and wife Nery at bedside to introduce myself as Vascular Nurse Navigator and explained my role.  Patient is appropriate and accepting to education. Patient was educated with Review of written materials provided, Teachback, Explanation, Demonstration, and Question & Answer on expectations of post op care and recovery on Left CEA. Patient is a smoker  (5 cigars per day x \"too many\" yrs), as such Smoking effects on the lungs, tobacco triggers, and Smoking cessation was reviewed. Education provided to patient and his wife Nery on infection prevention, activity limitations, when to call the office, importance of follow up, and incisional care.  Discharge instruction handout provided to patient to review.        Tobacco use is a significant patient-modifiable risk factor for this patient’s vascular disease with multiple vascular comorbidities, and a significant risk factor for failure of and complications from any endovascular or surgical interventions.    I explained to the patient the effects of smoking including peripheral artery disease, coronary artery disease, cerebrovascular disease as well as cancer and chronic obstructive pulmonary disease. I asked the patient to stop smoking immediately. It is never too late to quit, and many studies show significant health benefits as well as economical savings after smoking cessation. I offered to the patient nicotine replacement therapy as well as referral to the smoking cessation program and access to the quit line 9-000-BJNFATF or ambulatory referral to our network smoking cessation program.    Based on our conversation, this patient appears motivated to quit  And declined my offer of nicotine replacement or tobacco cessation medications    The patient set a quit date of 2 weeks ago .     I spent approximately 5 minutes on tobacco cessation counseling with this patient.    "

## 2025-03-18 NOTE — PROGRESS NOTES
Progress Note - Vascular Surgery   Name: Ez Dsouza 59 y.o. male I MRN: [id number]  Unit/Bed#: ICU 13 I Date of Admission: 3/13/2025   Date of Service: 3/18/2025 I Hospital Day: 5    Assessment & Plan  Acute cerebrovascular accident (CVA) due to stenosis of left carotid artery (HCC)  59-year-old male with hx of HTN, CAD, HLD, tobacco abuse, hx of fall '24 w/ rib fxs and R wrist fx, woke up with R side weakness and presented to [facility] by EMS. Noted to have primarily L hemispheric strokes 2/2 symptomatic L ICA stenosis. Patient transferred to [facility] overnight for tentative L carotid enterectomy on 3/17/25 with .    Results   Carotid DU:   R ICA <50% stenosis,   L ICA >70% stenosis w/ pilar 259/82 and ratio 3.2    3/10 MRI brain: multiple small acute CVAs in the L periventricular, subcotical, frontal and parietal/occipital lobes; small petechial hemorrhage    CTA neck: R ICA midl stenosis; L ICA w/ ~60-70% stenosis w/ soft plaque and increased irregularity compared to prior study in '24; there is a long area of irregularity, about 3.5cm in length    3/13 MRI brain: multiple small foci of ischemia in L MCA territory. CAMRYN to MCA watershed territory on the L as well as L caudate. Involvement of R parietal lobe. Consider central embolic source.   3/13 CTA head and neck: Scattered L hemispheric infarction. Unchanged L ICA high grade stenosis.   3/15 CT Head - No intracranial hemorrhage or calvarial fracture. Left greater than right subacute to chronic watershed type infarctions are stable.       Plan  -Pt is now S/p L CEA 3/17  -L neck incision site tenderness with mild swelling. Incision site is clean, dry and well approximated with glue in place. Pt noted to be touching the area frequently. Continue with ice  -Regular diet as tolerated  -No new neurological events   -ICU monitoring for close BP monitoring with goals to keep SBP >100 and <160  - continue A-line for close hemodynamic  monitoring  -Neurology following, appreciate recs  -Cardiology following, planning CHANCE 3/20 or 3/21- D/W  pt clear from vascular standpoint to proceed.   -Continue Plavix  -Heparin as per cardiology  -Lipitor 40mg daily  -Frequent Neuro checks  -D/W   -Remainder of care per ICU    24 Hour Events : No acute events overnight   Subjective : Pt seen and evaluated by vascular team. PT wife at the bedside, noting L neck incisional tenderness and mild swelling.     Objective :  Temp:  [97.5 °F (36.4 °C)-98 °F (36.7 °C)] 97.9 °F (36.6 °C)  HR:  [54-78] 72  BP: (119-167)/(74-99) 162/94  Resp:  [13-22] 22  SpO2:  [92 %-97 %] 95 %  O2 Device: None (Room air)  Nasal Cannula O2 Flow Rate (L/min):  [2 L/min-3 L/min] 2 L/min     I/O         03/16 0701  03/17 0700 03/17 0701  03/18 0700 03/18 0701  03/19 0700    P.O. 480      I.V. (mL/kg) 288.2 (3.2) 1801.5 (20.2) 238.3 (2.7)    Total Intake(mL/kg) 768.2 (8.6) 1801.5 (20.2) 238.3 (2.7)    Urine (mL/kg/hr) 550 (0.3) 1100 (0.5) 300 (1.1)    Stool 0      Total Output 550 1100 300    Net +218.2 +701.5 -61.7           Unmeasured Stool Occurrence 1 x              Physical Exam  Vitals and nursing note reviewed. Exam conducted with a chaperone present.   Constitutional:       General: He is not in acute distress.     Appearance: He is not ill-appearing.   HENT:      Head: Normocephalic and atraumatic.   Eyes:      Extraocular Movements: Extraocular movements intact.   Cardiovascular:      Rate and Rhythm: Normal rate.      Pulses: Normal pulses.      Heart sounds: Normal heart sounds. No murmur heard.  Pulmonary:      Effort: Pulmonary effort is normal.   Musculoskeletal:         General: Swelling and tenderness (L neck incision) present.   Skin:     General: Skin is warm and dry.      Comments: L neck incision site clean, dry and well approximated with surgical glue in place. Mild-moderate swelling. Tenderness with palpation.    Neurological:      Mental Status:  "He is alert and oriented to person, place, and time.      Sensory: No sensory deficit.      Motor: Weakness present.      Comments: Face symmetrical.  Tongue is midline.  Right upper extremity weakness in comparison to left 4/5 strength right upper and lower extremity    Psychiatric:         Mood and Affect: Mood normal.         Behavior: Behavior normal.           Lab Results: I have reviewed the following results:  CBC with diff:   Lab Results   Component Value Date    WBC 13.38 (H) 03/18/2025    HGB 11.7 (L) 03/18/2025    HCT 34.2 (L) 03/18/2025    MCV 90 03/18/2025     03/18/2025    RBC 3.80 (L) 03/18/2025    MCH 30.8 03/18/2025    MCHC 34.2 03/18/2025    RDW 13.8 03/18/2025    MPV 10.5 03/18/2025    NRBC 0 03/17/2025   ,   BMP/CMP:  Lab Results   Component Value Date    SODIUM 136 03/18/2025    K 3.7 03/18/2025     03/18/2025    CO2 22 03/18/2025    CO2 29 06/22/2024    BUN 18 03/18/2025    CREATININE 1.27 03/18/2025    GLUCOSE 87 06/22/2024    CALCIUM 8.7 03/18/2025    AST 29 06/26/2024    ALT 29 06/26/2024    ALKPHOS 98 06/26/2024    EGFR 61 03/18/2025   ,   Lipid Panel: No results found for: \"CHOL\",   Coags:   Lab Results   Component Value Date    PTT 79 (H) 03/18/2025    INR 1.16 03/18/2025   ,   Blood Culture: No results found for: \"BLOODCX\",   Urinalysis:   Lab Results   Component Value Date    COLORU Light Yellow 03/10/2025    CLARITYU Clear 03/10/2025    SPECGRAV 1.043 (H) 03/10/2025    PHUR 5.5 03/10/2025    LEUKOCYTESUR Negative 03/10/2025    NITRITE Negative 03/10/2025    GLUCOSEU Negative 03/10/2025    KETONESU Negative 03/10/2025    BILIRUBINUR Negative 03/10/2025    BLOODU Moderate (A) 03/10/2025   ,   Urine Culture: No results found for: \"URINECX\",   Wound Culure: No results found for: \"WOUNDCULT\"    Imaging Results Review: I reviewed radiology reports from this admission including: MRI and Ultrasound(s).      VTE Prophylaxis: VTE covered by:  heparin (porcine), Intravenous, 15.8 " Units/kg/hr at 03/17/25 2318  heparin (porcine), Intravenous  heparin (porcine), Intravenous, 4,000 Units at 03/17/25 1255

## 2025-03-18 NOTE — OCCUPATIONAL THERAPY NOTE
Occupational Therapy         Patient Name: Ez Dsouza  Today's Date: 3/18/2025       03/18/25 1430   OT Last Visit   OT Visit Date 03/18/25   Note Type   Note type Cancelled Session   Cancel Reasons Other   Additional Comments OT tx attempted. Pt politely declining as he had just returned to bed and worked w PT earlier. Pt agreeable to participation tomorrow. Will defer per pt request.     Jil Ryan

## 2025-03-18 NOTE — ASSESSMENT & PLAN NOTE
PTA: amlodipine, carvedilol, hydrochlorothiazide, spironolactone  -- currently on hold    Plan:  BP monitoring  , see AP above

## 2025-03-19 ENCOUNTER — ANESTHESIA EVENT (INPATIENT)
Dept: NON INVASIVE DIAGNOSTICS | Facility: HOSPITAL | Age: 60
DRG: 037 | End: 2025-03-19
Payer: COMMERCIAL

## 2025-03-19 PROBLEM — R22.1 LOCALIZED SWELLING, MASS OR LUMP OF NECK: Status: ACTIVE | Noted: 2025-03-19

## 2025-03-19 PROBLEM — Z98.890 S/P CAROTID ENDARTERECTOMY: Status: ACTIVE | Noted: 2025-03-19

## 2025-03-19 LAB — APTT PPP: 71 SECONDS (ref 23–34)

## 2025-03-19 PROCEDURE — 97535 SELF CARE MNGMENT TRAINING: CPT

## 2025-03-19 PROCEDURE — 97110 THERAPEUTIC EXERCISES: CPT

## 2025-03-19 PROCEDURE — 99233 SBSQ HOSP IP/OBS HIGH 50: CPT

## 2025-03-19 PROCEDURE — NC001 PR NO CHARGE: Performed by: STUDENT IN AN ORGANIZED HEALTH CARE EDUCATION/TRAINING PROGRAM

## 2025-03-19 PROCEDURE — 85730 THROMBOPLASTIN TIME PARTIAL: CPT

## 2025-03-19 PROCEDURE — 99232 SBSQ HOSP IP/OBS MODERATE 35: CPT

## 2025-03-19 PROCEDURE — 99024 POSTOP FOLLOW-UP VISIT: CPT | Performed by: NURSE PRACTITIONER

## 2025-03-19 PROCEDURE — 92507 TX SP LANG VOICE COMM INDIV: CPT

## 2025-03-19 PROCEDURE — 97530 THERAPEUTIC ACTIVITIES: CPT

## 2025-03-19 RX ORDER — ASPIRIN 81 MG/1
81 TABLET, CHEWABLE ORAL DAILY
Status: DISCONTINUED | OUTPATIENT
Start: 2025-03-19 | End: 2025-03-21 | Stop reason: HOSPADM

## 2025-03-19 RX ORDER — CARVEDILOL 25 MG/1
25 TABLET ORAL 2 TIMES DAILY WITH MEALS
Status: DISCONTINUED | OUTPATIENT
Start: 2025-03-19 | End: 2025-03-21 | Stop reason: HOSPADM

## 2025-03-19 RX ORDER — AMLODIPINE BESYLATE 5 MG/1
5 TABLET ORAL DAILY
Status: DISCONTINUED | OUTPATIENT
Start: 2025-03-19 | End: 2025-03-21 | Stop reason: HOSPADM

## 2025-03-19 RX ORDER — HYDROCHLOROTHIAZIDE 25 MG/1
25 TABLET ORAL DAILY
Status: DISCONTINUED | OUTPATIENT
Start: 2025-03-19 | End: 2025-03-21 | Stop reason: HOSPADM

## 2025-03-19 RX ORDER — MAGNESIUM SULFATE HEPTAHYDRATE 40 MG/ML
2 INJECTION, SOLUTION INTRAVENOUS ONCE
Status: COMPLETED | OUTPATIENT
Start: 2025-03-19 | End: 2025-03-19

## 2025-03-19 RX ORDER — AMLODIPINE BESYLATE 5 MG/1
5 TABLET ORAL DAILY
Status: DISCONTINUED | OUTPATIENT
Start: 2025-03-19 | End: 2025-03-19

## 2025-03-19 RX ORDER — ENOXAPARIN SODIUM 100 MG/ML
40 INJECTION SUBCUTANEOUS
Status: DISCONTINUED | OUTPATIENT
Start: 2025-03-19 | End: 2025-03-21 | Stop reason: HOSPADM

## 2025-03-19 RX ORDER — ACETAMINOPHEN 325 MG/1
650 TABLET ORAL EVERY 8 HOURS SCHEDULED
Status: DISCONTINUED | OUTPATIENT
Start: 2025-03-19 | End: 2025-03-19

## 2025-03-19 RX ORDER — ACETAMINOPHEN 325 MG/1
650 TABLET ORAL EVERY 8 HOURS PRN
Status: DISCONTINUED | OUTPATIENT
Start: 2025-03-19 | End: 2025-03-21 | Stop reason: HOSPADM

## 2025-03-19 RX ADMIN — HEPARIN SODIUM 15.8 UNITS/KG/HR: 10000 INJECTION, SOLUTION INTRAVENOUS at 05:39

## 2025-03-19 RX ADMIN — POLYETHYLENE GLYCOL 3350 17 G: 17 POWDER, FOR SOLUTION ORAL at 08:39

## 2025-03-19 RX ADMIN — ACETAMINOPHEN 650 MG: 325 TABLET, FILM COATED ORAL at 15:56

## 2025-03-19 RX ADMIN — SENNOSIDES 8.6 MG: 8.6 TABLET, FILM COATED ORAL at 21:26

## 2025-03-19 RX ADMIN — ASPIRIN 81 MG: 81 TABLET, CHEWABLE ORAL at 11:07

## 2025-03-19 RX ADMIN — AMLODIPINE BESYLATE 5 MG: 5 TABLET ORAL at 11:07

## 2025-03-19 RX ADMIN — HYDRALAZINE HYDROCHLORIDE 15 MG: 20 INJECTION, SOLUTION INTRAMUSCULAR; INTRAVENOUS at 08:58

## 2025-03-19 RX ADMIN — CLOPIDOGREL BISULFATE 75 MG: 75 TABLET ORAL at 08:39

## 2025-03-19 RX ADMIN — ENOXAPARIN SODIUM 40 MG: 40 INJECTION SUBCUTANEOUS at 11:07

## 2025-03-19 RX ADMIN — MAGNESIUM SULFATE HEPTAHYDRATE 2 G: 40 INJECTION, SOLUTION INTRAVENOUS at 08:56

## 2025-03-19 RX ADMIN — ATORVASTATIN CALCIUM 40 MG: 40 TABLET, FILM COATED ORAL at 16:37

## 2025-03-19 RX ADMIN — ACETAMINOPHEN 975 MG: 325 TABLET, FILM COATED ORAL at 05:39

## 2025-03-19 RX ADMIN — HYDROCHLOROTHIAZIDE 25 MG: 25 TABLET ORAL at 11:07

## 2025-03-19 RX ADMIN — CARVEDILOL 25 MG: 25 TABLET, FILM COATED ORAL at 16:37

## 2025-03-19 NOTE — SPEECH THERAPY NOTE
Speech Language/Pathology    Speech/Language Pathology Progress Note    Patient Name: Ez Dsouza  Today's Date: 3/19/2025     Problem List  Principal Problem:    Acute cerebrovascular accident (CVA) due to stenosis of left carotid artery (HCC)  Active Problems:    Fall    Primary hypertension    Hyperlipidemia    Smoking    Localized swelling, mass or lump of neck       Past Medical History  Past Medical History:   Diagnosis Date    Hypertension         Past Surgical History  Past Surgical History:   Procedure Laterality Date    KNEE SURGERY  2008    MOLE REMOVAL  2024    Back    TX TEAEC W/PATCH GRF CAROTID VERTB SUBCLAV NECK INC Left 3/17/2025    Procedure: ENDARTERECTOMY ARTERY CAROTID WITH BOVINE PATCH ANGIOPLASTY;  Surgeon: Joss Calderón MD;  Location: [facility];  Service: Vascular         Subjective:  Pt seen for speech/language tx. Pt sleeping but easily awakened. Wife at bedside.     Objective:  Reviewed chart. L CEA was completed on 3/17. Pt and wife report expressive/receptive language is much improved since the surgery, but pt still has some difficulty with word finding. During initial language assessment, pt presented with both expressive and receptive language difficulty. Pt and wife report no difficulty with swallowing (pt is on a regular diet/thin liquids).     Pt participated in the following tasks today:  Temporal qyoguxvluxw=873%, min cues  Personal hnjujxgonkk=658%  Receptive language:  -Point to named body parts=100%  -1 step stgrcqut=418%  -2 step commands=50%  -simple yes/no ynyqgdmws=330%  -complex yes/no cjulofzok=628%  Expressive language:  -Counting 1-59=732%  -Confrontational naming=80%  -Phrase fill ins=90%  -Name object described=50%, increased to 100% with mod cues  -Cookie theft picture=pt described pertinent features of the picture, some word finding difficulty observed. Speech was slow, with pt speaking in short phrases.     Pt expressed frustration during times of word  finding difficulty. Educated pt and spouse about aphasia, how therapy can help improve expressive/receptive language skills, need for time to improve, etc. Also reviewed strategy of circumlocution and encouraged pt to use during episodes of word finding difficulty.     Assessment:  Good participation in tx. Expressive and receptive language skills have improved since surgery. He is able to make wants and needs known, but still having some difficulty with word finding, producing conversational speech, and following more complex commands. Occasional phonemic and semantic paraphasias noted. ?mild dysarthria. Pt is aware of deficits and expresses some frustration. He will benefit from post acute speech tx.     Plan/Recommendations:  Continue with speech tx to address expressive/receptive language skills.  Post acute tx recommended.

## 2025-03-19 NOTE — CASE MANAGEMENT
Case Management Discharge Planning Note    Patient name Ez Dsouza  Location [facility] /E4 -* MRN [id number]  : 1965 Date 3/19/2025       Current Admission Date: 3/13/2025  Current Admission Diagnosis:Acute cerebrovascular accident (CVA) due to stenosis of left carotid artery (HCC)   Patient Active Problem List    Diagnosis Date Noted Date Diagnosed    Smoking 2025     Hyperlipidemia 03/10/2025     Acute cerebrovascular accident (CVA) due to stenosis of left carotid artery (HCC) 03/10/2025     Elevated coronary artery calcium score 03/10/2025     Pre-operative cardiovascular examination 03/10/2025     Closed fracture of right distal radius and ulna 2024     Closed fracture of distal ends of right radius and ulna, initial encounter 2024     Closed fracture of right wrist with routine healing, subsequent encounter 2024     Fall 2024     Acute pain due to trauma 2024     Closed fracture of multiple ribs of left side 2024     Closed fracture of right wrist 2024     MARIA TERESA (acute kidney injury) (HCC) 2024     Primary hypertension 2024     Scalp abrasion 2024     Elevated blood pressure reading 2021       LOS (days): 6  Geometric Mean LOS (GMLOS) (days):   Days to GMLOS:     OBJECTIVE:  Risk of Unplanned Readmission Score: 12.13         Current admission status: Inpatient   Preferred Pharmacy:   [facility] [place] - [address]  [address] [place] [address]  Phone: [phone] Fax: [phone]    Primary Care Provider: Jakob Higgins MD    Primary Insurance: BLUE CROSS  Secondary Insurance: [employer]    DISCHARGE DETAILS:    Discharge planning discussed with:: Patient and Spouse  Freedom of Choice: Yes  Comments - Freedom of Choice: [facility] reserved in [place]  CM contacted family/caregiver?: Yes  Were Treatment Team discharge recommendations reviewed with patient/caregiver?: Yes  Did  patient/caregiver verbalize understanding of patient care needs?: N/A- going to facility  Were patient/caregiver advised of the risks associated with not following Treatment Team discharge recommendations?: Yes    Contacts  Patient Contacts: Nery Dsouza (spouse) [phone]  Relationship to Patient:: Family  Contact Method: In Person    Requested Home Health Care         Is the patient interested in HHC at discharge?: No    DME Referral Provided  Referral made for DME?: No    Other Referral/Resources/Interventions Provided:  Interventions: Acute Rehab    Would you like to participate in our [facility] service program?  : No - Declined    Treatment Team Recommendation: Acute Rehab  Discharge Destination Plan:: Acute Rehab               Accepting Facility Name, City & State : [facility]        CM met with patient and wife at bedside to review choice in [facility], family would like [facility].  CM explained authorization process, patient willing to work with OT as a note is needed for auth.  Patient needs FMLA paperwork filled out for his job, CM made a copy and messaged SLIM.      CM reserved [facility] in [place].  CM awaiting auth information and updated OT note to submit.  CM will continue to follow.

## 2025-03-19 NOTE — ASSESSMENT & PLAN NOTE
59-year-old male with hx of HTN, CAD, HLD, tobacco abuse, hx of fall '24 w/ rib fxs and R wrist fx, woke up with R side weakness and presented to [facility] by EMS. Noted to have primarily L hemispheric strokes 2/2 symptomatic L ICA stenosis. Patient transferred to [facility] overnight for tentative L carotid enterectomy on 3/17/25 with .    Results   Carotid DU:   R ICA <50% stenosis,   L ICA >70% stenosis w/ pilar 259/82 and ratio 3.2    3/10 MRI brain: multiple small acute CVAs in the L periventricular, subcotical, frontal and parietal/occipital lobes; small petechial hemorrhage    CTA neck: R ICA midl stenosis; L ICA w/ ~60-70% stenosis w/ soft plaque and increased irregularity compared to prior study in '24; there is a long area of irregularity, about 3.5cm in length    3/13 MRI brain: multiple small foci of ischemia in L MCA territory. CAMRYN to MCA watershed territory on the L as well as L caudate. Involvement of R parietal lobe. Consider central embolic source.   3/13 CTA head and neck: Scattered L hemispheric infarction. Unchanged L ICA high grade stenosis.   3/15 CT Head - No intracranial hemorrhage or calvarial fracture. Left greater than right subacute to chronic watershed type infarctions are stable.       Plan  -Pt is now S/p L CEA 3/17, POD 2.   -L neck incision site tenderness with mild-moderate swelling. Incision site is clean, dry and well approximated with glue in place. Pt noted to be touching the area frequently.   -Regular diet as tolerated  -No new neurological events   -Neurology following, appreciate recs. Defer to neurology DAPT  -Cardiology following, planning CHANCE 3/20 or 3/21- D/W  pt clear from vascular standpoint to proceed.   -Continue ASA 81mg daily  -Heparin as per cardiology  -Lipitor 40mg daily  -Frequent Neuro checks  -D/W   -Remainder of care per SLIM

## 2025-03-19 NOTE — QUICK NOTE
This patient has not been personally evaluated by this provider. Recommendations   Were made by Dr. Gandara via chart review.     Short summary of patient's hospitalization: 60 yo M with HTN and tobacco use who presented to [facility]  on 3/10 for evaluation of L sided weakness. Pt was evaluated by Dr. Burgos and Dr. Serenity Glass at [facility] as a stroke alert. BP on arrival 180/115. LKW 10 PM 3/9. NIHSS 2 (month, moderate aphasia). CTH with age-indeterminate infarcts in the L centrum semiovale. CTA H/N wwo contrast without IR target but did show worsening severe stenosis of L ICA. A wake up MRI brain protocol was completed and showed multiple small acattered acute infarcts in the L cerebral hemisphere that were also present on flair, there was also a tiny focus of mild DWI signal in R central semiovale that may represent a recent infarct. Pt was not a TNK candidate. Pt was loaded with  mg x 1 and Plavix 300 mg x 1. The vascular team was consulted and initially agreed with DAPT with plan for revascularization of L carotid artery sometime after post stroke day 3. Pt was re-evaluated by Dr. Serenity Glass on 3/13/25 due to concern for worsening neurologic symptoms (10-30 minutes of worsening expressive aphasia on 3/12 and pt unable to lift RUE 3/13). A repeat CTA H/N was performed and was stable. Pt's symptoms suspected to be in part due to cerebral edema and brain compression secondary to acute ischemic strokes. However, there seemed to be a positional worsening of symptoms including increased weakness of the RUE and subsequent further worsening of increased aphasia (symptomatic L ICA). An MRI brain was completed and showed mild progression of cerebral ischemia mostly on the L side with some additional infarcts on the R. At that time, Dr. Bravo recommended pt to complete carotid intervention 3/17 at [facility] scheduled by vascular surgery  and CHANCE given rope score of 5. Vascular team recommeded starting heparin gtt prior to L  "CEA on 3/17. AP/AC recommendations at that time were deferred to vascular surgery team. Pt completed L CEA 3/17.     Workup  - CT c/a/p 3/10/25:  \"1.  No acute pathology within the chest, abdomen, or pelvis. 2.  Approximately 9 mm nonobstructing calculus within the left renal pelvis, previously within a left upper pole calyx. 3.  Chronic findings, detailed above.\"  - CTA H/N wwo contrast 3/10/25:   \"Worsening severe stenosis in the proximal left internal carotid artery. No significant stenosis of the cervical right carotid or vertebral arteries No significant intracranial stenosis, large vessel occlusion or aneurysm.\"  - CTH wo contrast 3/10/25:   \"No acute large vessel distribution infarct, hemorrhage or mass effect New age-indeterminate lacunar infarcts in the left centrum semiovale.\"  - MRI brain 3/10/25:  \"Multiple small scattered acute infarcts in the left cerebral hemisphere. Several of the smaller infarcts are hyperintense on FLAIR. Tiny focus of left parietal occipital petechial hemorrhage. Tiny focus of mild DWI signal in the right centrum semiovale may also present recent infarct. No acute space-occupying hematoma.\"  - Carotid doppler 3/10/25:  \"RIGHT:  There is <50% stenosis noted in the internal carotid artery. Plaque is heterogenous and irregular. Vertebral artery flow is antegrade. There is no significant subclavian artery disease.  LEFT:  There is >70% stenosis noted in the internal carotid artery. Plaque is heterogenous and irregular. Vertebral artery flow is antegrade. There is no significant subclavian artery disease.\"  - CTA H/N wwo contrast 3/13/25:  \"1.  No acute intracranial hemorrhage, edema, or mass effect. Scattered left hemispheric infarctions as above, seen better on recent MRI. 2.  No new large vessel occlusion in the head or neck. 3.  Unchanged high-grade stenosis proximal left ICA. Stable pedunculated plaque and/or thrombus in the proximal left ICA lumen which may be the source of " "distal embolization.\"  - MRI brain wo contrast 3/13/25:  \"Multiple small foci of acute/recent ischemia in the left MCA territory. These predominantly appear similar in the interim with slight worsening involvement of the CAMRYN to MCA watershed territory on the left as well as the left caudate head. Involvement of the right parietal lobe also appears new/progressed in the interim. No large territory infarct or hemorrhage seen. Given the multiple vascular territories, consider a central embolic source.\"    Recommendations:   - s/p L CEA 3/17  - Pt with bihemispheric embolic appearing strokes. Pt initially continued on heparin gtt+ Plavix following L CEA 3/17 due to no source of embolic appearing stroke being identified. However, TTE did not show PFO and there has been no Afib identified on telemery thus far. Additionally, CT C/A/P completed while at [facility] did not show obvious signs of malignancy. Therefore, recommend CHANCE (planned for 3/20). Okay with d/c heparin gtt at this time and transitioning to DAPT (ASA 81 mg daily and Plavix 75 mg daily  - If CHANCE unremarkable, recommend outpatient cardiac monitoring.        "

## 2025-03-19 NOTE — ANESTHESIA PREPROCEDURE EVALUATION
Procedure:  CHANCE    Relevant Problems   CARDIO   (+) Elevated coronary artery calcium score   (+) Hyperlipidemia   (+) Primary hypertension      /RENAL   (+) MARIA TERESA (acute kidney injury) (HCC)      NEURO/PSYCH   (+) Acute cerebrovascular accident (CVA) due to stenosis of left carotid artery (HCC)      PULMONARY   (+) Smoking      Surgery/Wound/Pain   (+) S/P carotid endarterectomy        Physical Exam    Airway    Mallampati score: II  TM Distance: >3 FB  Neck ROM: full     Dental   No notable dental hx     Cardiovascular  Rhythm: regular, Rate: normal, Cardiovascular exam normal    Pulmonary  Pulmonary exam normal Breath sounds clear to auscultation    Other Findings        Anesthesia Plan  ASA Score- 3     Anesthesia Type- IV sedation with anesthesia with ASA Monitors.         Additional Monitors:     Airway Plan:     Comment: Pt cleared by vascular Sx for CHANCE     ·  Left Ventricle: Left ventricular cavity size is normal. Wall thickness is upper normal. The left ventricular ejection fraction is 60%. Systolic function is normal. Wall motion is normal. Diastolic function is mildly abnormal, consistent with grade I (abnormal) relaxation.  ·  Mitral Valve: There is mild annular calcification. There is trace regurgitation.  ·  Prior TTE study available for comparison. Prior study date: 9/13/2024. No significant changes noted compared to the prior study.         .       Plan Factors-    Chart reviewed. EKG reviewed. Imaging results reviewed. Existing labs reviewed. Patient summary reviewed.                  Induction-     Postoperative Plan-     Perioperative Resuscitation Plan - Level 1 - Full Code.       Informed Consent- Anesthetic plan and risks discussed with patient.        NPO Status:  Vitals Value Taken Time   Date of last liquid 03/16/25 03/17/25 1033   Time of last liquid 0000 03/17/25 1033   Date of last solid 03/16/25 03/17/25 1033   Time of last solid 0000 03/17/25 1033

## 2025-03-19 NOTE — ASSESSMENT & PLAN NOTE
Patient is a 59-year-old male with past medical history significant but not limited to coronary artery disease, hypertension, hyperlipidemia, tobacco use disorder, history of fall in 24 with rib fractures and right wrist fracture, woke up with right-sided weakness and presented to [facility] via EMS on 3/10/2025 with complaints of right-sided weakness and dysarthria.  Patient was noted to have primary left hemispheric stroke secondary to symptomatic left ICA stenosis.  For carotid endarterectomy patient was transferred from [place] to [facility] on 3/13/2025 for the procedure scheduled on 3/17/2025 with Dr Calderón.  Patient remained in ICU till 3/18/2025 and then was downgraded to floors.  Patient's antihypertensive medications were slowly introduced back from 3/19/2025, heparin was discontinued and patient was initiated on DAPT aspirin along with clopidogrel, vascular neuro and cardiology participated in patient care patient to get CHANCE on 3/20/2025    -3/10/2025 MRI brain showed multiple small acute CVAs in the left periventricular subcortical frontal and parietal/occipital lobes with small petechial hemorrhage  -CTA neck showed right ICA mild stenosis but left ICA with 70% stenosis with soft plaque and increased irregularity compared to prior study in 2024.  -Upon arrival in the [facility] on 3/30/2025, neurology evaluated patient due to concerns of worsening neurological symptoms, patient was scheduled for endarterectomy for 3/17/2025.  -3/13/2025 MRI showed multiple small foci of ischemia in the left MCA territory.CAMRYN and MCA watershed territories on the left as well as left caudate.  -3/13/2025 CT head and neck showed scattered left hemispheric infarct unchanged left ICA high-grade stenosis  3/15/2025 CT head no intracranial hemorrhage or clavicular fracture.  -Patient underwent carotid endarterectomy on 3/17/125, vascular on board continue with Plavix along with  aspirin along with Lipitor and frequent neurochecks.  -Cardiology on board patient scheduled for CHANCE on 3/20/124-n.p.o. orders in.  -Blood pressure medications reinitiated- carvedilol/ hydrochlorothiazide/ amlodipine remaining to be initiated as per patient's tolerability.  -Neurology on board appreciate recs.  -Patient will require outpatient follow-up with cardiology neurology and vascular surgery upon discharge.  -PT OT on board-plans for short-term rehab on discharge

## 2025-03-19 NOTE — OCCUPATIONAL THERAPY NOTE
"  Occupational Therapy Progress Note     Patient Name: Ez Dsouza  Today's Date: 3/19/2025  Problem List  Principal Problem:    Acute cerebrovascular accident (CVA) due to stenosis of left carotid artery (HCC)  Active Problems:    Fall    Primary hypertension    Hyperlipidemia    Smoking    Localized swelling, mass or lump of neck            03/19/25 1417   Note Type   Note Type Treatment   Pain Assessment   Pain Assessment Tool 0-10   Pain Score No Pain   Restrictions/Precautions   Weight Bearing Precautions Per Order No   Other Precautions Chair Alarm;Bed Alarm;Cognitive;Fall Risk;Pain   ADL   Where Assessed Edge of bed   Eating Assistance 5  Supervision/Setup   Grooming Assistance 4  Minimal Assistance   UB Bathing Assistance 4  Minimal Assistance   LB Bathing Assistance 3  Moderate Assistance   UB Dressing Assistance 4  Minimal Assistance   LB Dressing Assistance 3  Moderate Assistance   Toileting Assistance  4  Minimal Assistance   Functional Standing Tolerance   Time 2-3mins   Activity toileting   Bed Mobility   Rolling R 5  Supervision   Additional items Increased time required;Verbal cues   Rolling L 5  Supervision   Additional items Increased time required;Verbal cues;LE management   Supine to Sit 5  Supervision   Additional items Assist x 1;Increased time required;Verbal cues;LE management   Transfers   Sit to Stand 4  Minimal assistance   Additional items Assist x 1;Increased time required;Verbal cues   Stand to Sit 4  Minimal assistance   Additional items Assist x 1;Increased time required;Verbal cues   Functional Mobility   Functional Mobility 3  Moderate assistance   Additional Comments x1   Additional items Rolling walker   Therapeutic Exercise - ROM   UE-ROM Yes   Neuromuscular Education   Comments Stereognosis--able to correctly ID 5/5 ADL items   Coordination   Gross Motor grasp/release 1set, 10reps   Subjective   Subjective \"I can sit in the chair.\"   Cognition   Overall Cognitive Status " Impaired   Arousal/Participation Alert   Attention Attends with cues to redirect   Orientation Level Oriented X4   Memory Decreased recall of precautions   Following Commands Follows one step commands without difficulty   Activity Tolerance   Activity Tolerance Patient limited by fatigue   Medical Staff Made Aware nsg, CM, P.T.   Assessment   Assessment Pt seen for 64min tx session with focus on functional balance, functional mobility, ADL status, transfer safety, R UE ROM, education, and cognition. Pt able to tolerate OOB mobility; sitting balance=f+/f, standing balance=f/f-. Pt required verbal cues/physical assistance to maintain transfer safety. Pt demonstrating need for assistance with his UE and LE ADLs. Reviewed with wife R UE AA/PROM exercises(i.e.shr flex/ext/abd/add, elbow/wrist/finger flex/ext); 1set, 10-15reps. Provide built-up foam for feeding utensils. R sided neglect noted. Able to demonstrate improving cognition(i.e.direction-following, orientation). Able to demonstrate good R hand sensation(i.e.stereognosis). Reviewed hand ball exercises(i.e.grasp/release, wrist flex/ext with continued , wrist/finger extension). Pt place in chair with alarm on/connect to call bell. Wife present during tx session. Pt continues to demonstrate appropriateness for inpt rehab to improve his overall level of independence. The patient's raw score on the AM-PAC Daily Activity Inpatient Short Form is 16. A raw score of less than 19 suggests the patient may benefit from discharge to post-acute rehabilitation services. Please refer to the recommendation of the Occupational Therapist for safe discharge planning.   Plan   Treatment Interventions ADL retraining;Functional transfer training;UE strengthening/ROM;Endurance training;Cognitive reorientation;Patient/family training;Equipment evaluation/education;Compensatory technique education;Continued evaluation   Goal Expiration Date 03/21/25   OT Treatment Day 2   Discharge  Recommendation   Rehab Resource Intensity Level, OT I (Maximum Resource Intensity)   AM-PAC Daily Activity Inpatient   Lower Body Dressing 2   Bathing 2   Toileting 3   Upper Body Dressing 3   Grooming 3   Eating 3   Daily Activity Raw Score 16   Daily Activity Standardized Score (Calc for Raw Score >=11) 35.96   AM-PAC Applied Cognition Inpatient   Following a Speech/Presentation 3   Understanding Ordinary Conversation 3   Taking Medications 2   Remembering Where Things Are Placed or Put Away 2   Remembering List of 4-5 Errands 2   Taking Care of Complicated Tasks 2   Applied Cognition Raw Score 14   Applied Cognition Standardized Score 32.02   Danial Ayala

## 2025-03-19 NOTE — PROGRESS NOTES
Progress Note - Vascular Surgery   Name: Ez Dsouza 59 y.o. male I MRN: [id number]  Unit/Bed#: E4 -01 I Date of Admission: 3/13/2025   Date of Service: 3/19/2025 I Hospital Day: 6    Assessment & Plan  Acute cerebrovascular accident (CVA) due to stenosis of left carotid artery (HCC)  59-year-old male with hx of HTN, CAD, HLD, tobacco abuse, hx of fall '24 w/ rib fxs and R wrist fx, woke up with R side weakness and presented to [facility] by EMS. Noted to have primarily L hemispheric strokes 2/2 symptomatic L ICA stenosis. Patient transferred to [facility] overnight for tentative L carotid enterectomy on 3/17/25 with .    Results   Carotid DU:   R ICA <50% stenosis,   L ICA >70% stenosis w/ pilar 259/82 and ratio 3.2    3/10 MRI brain: multiple small acute CVAs in the L periventricular, subcotical, frontal and parietal/occipital lobes; small petechial hemorrhage    CTA neck: R ICA midl stenosis; L ICA w/ ~60-70% stenosis w/ soft plaque and increased irregularity compared to prior study in '24; there is a long area of irregularity, about 3.5cm in length    3/13 MRI brain: multiple small foci of ischemia in L MCA territory. CAMRYN to MCA watershed territory on the L as well as L caudate. Involvement of R parietal lobe. Consider central embolic source.   3/13 CTA head and neck: Scattered L hemispheric infarction. Unchanged L ICA high grade stenosis.   3/15 CT Head - No intracranial hemorrhage or calvarial fracture. Left greater than right subacute to chronic watershed type infarctions are stable.       Plan  -Pt is now S/p L CEA 3/17, POD 2.   -L neck incision site tenderness with mild-moderate swelling. Incision site is clean, dry and well approximated with glue in place. Pt noted to be touching the area frequently.   -Regular diet as tolerated  -No new neurological events   -Neurology following, appreciate recs. Defer to neurology DAPT  -Cardiology following, planning CHANCE 3/20 or 3/21- D/W   pt clear from vascular standpoint to proceed.   -Continue ASA 81mg daily  -Heparin as per cardiology  -Lipitor 40mg daily  -Frequent Neuro checks  -D/W   -Remainder of care per SLIM    24 Hour Events : no acute events overnight.   Subjective : Pt sitting in bed, wife at the bedside. Pt reports mild pain/ swelling at L neck incision. Inquiring about showering today. Provided with post-op incisional care instructions.     Objective :  Temp:  [97.4 °F (36.3 °C)-98.7 °F (37.1 °C)] 97.6 °F (36.4 °C)  HR:  [68-96] 79  BP: (145-175)/() 169/99  Resp:  [13-22] 18  SpO2:  [94 %-99 %] 96 %  O2 Device: None (Room air)     I/O         03/17 0701  03/18 0700 03/18 0701  03/19 0700 03/19 0701  03/20 0700    P.O.  30     I.V. (mL/kg) 1801.5 (20.2) 345.5 (3.8)     Total Intake(mL/kg) 1801.5 (20.2) 375.5 (4.2)     Urine (mL/kg/hr) 1100 (0.5) 2350 (1.1)     Stool       Total Output 1100 2350     Net +701.5 -1974.5                    Physical Exam  Vitals and nursing note reviewed. Exam conducted with a chaperone present.   Constitutional:       General: He is not in acute distress.     Appearance: He is not ill-appearing.   HENT:      Head: Normocephalic and atraumatic.   Eyes:      Extraocular Movements: Extraocular movements intact.   Cardiovascular:      Rate and Rhythm: Normal rate.      Pulses: Normal pulses.      Heart sounds: Normal heart sounds. No murmur heard.  Pulmonary:      Effort: Pulmonary effort is normal.   Musculoskeletal:         General: Swelling and tenderness (L neck incision) present.   Skin:     General: Skin is warm and dry.      Comments: L neck incision site clean, dry and well approximated with surgical glue in place. Mild-moderate swelling. Tenderness with palpation.    Neurological:      Mental Status: He is alert and oriented to person, place, and time.      Sensory: No sensory deficit.      Motor: Weakness present.      Comments: Face symmetrical.  Tongue is midline.  Right  "upper extremity weakness in comparison to left 4/5 strength right upper and lower extremity    Psychiatric:         Mood and Affect: Mood normal.         Behavior: Behavior normal.             Lab Results: I have reviewed the following results:  CBC with diff:   Lab Results   Component Value Date    WBC 13.38 (H) 03/18/2025    HGB 11.7 (L) 03/18/2025    HCT 34.2 (L) 03/18/2025    MCV 90 03/18/2025     03/18/2025    RBC 3.80 (L) 03/18/2025    MCH 30.8 03/18/2025    MCHC 34.2 03/18/2025    RDW 13.8 03/18/2025    MPV 10.5 03/18/2025    NRBC 0 03/17/2025   ,   BMP/CMP:  Lab Results   Component Value Date    SODIUM 136 03/18/2025    K 3.7 03/18/2025     03/18/2025    CO2 22 03/18/2025    CO2 29 06/22/2024    BUN 18 03/18/2025    CREATININE 1.27 03/18/2025    GLUCOSE 87 06/22/2024    CALCIUM 8.7 03/18/2025    AST 29 06/26/2024    ALT 29 06/26/2024    ALKPHOS 98 06/26/2024    EGFR 61 03/18/2025   ,   Lipid Panel: No results found for: \"CHOL\",   Coags:   Lab Results   Component Value Date    PTT 71 (H) 03/19/2025    INR 1.16 03/18/2025   ,   Blood Culture: No results found for: \"BLOODCX\",   Urinalysis:   Lab Results   Component Value Date    COLORU Light Yellow 03/10/2025    CLARITYU Clear 03/10/2025    SPECGRAV 1.043 (H) 03/10/2025    PHUR 5.5 03/10/2025    LEUKOCYTESUR Negative 03/10/2025    NITRITE Negative 03/10/2025    GLUCOSEU Negative 03/10/2025    KETONESU Negative 03/10/2025    BILIRUBINUR Negative 03/10/2025    BLOODU Moderate (A) 03/10/2025   ,   Urine Culture: No results found for: \"URINECX\",   Wound Culure: No results found for: \"WOUNDCULT\"    Imaging Results Review: I reviewed radiology reports from this admission including: Ultrasound(s).      VTE Prophylaxis: VTE covered by:  enoxaparin, Subcutaneous    and Sequential compression device (Venodyne)   "

## 2025-03-19 NOTE — PLAN OF CARE
Problem: Prexisting or High Potential for Compromised Skin Integrity  Goal: Skin integrity is maintained or improved  Description: INTERVENTIONS:  - Identify patients at risk for skin breakdown  - Assess and monitor skin integrity  - Assess and monitor nutrition and hydration status  - Monitor labs   - Assess for incontinence   - Turn and reposition patient  - Assist with mobility/ambulation  - Relieve pressure over bony prominences  - Avoid friction and shearing  - Provide appropriate hygiene as needed including keeping skin clean and dry  - Evaluate need for skin moisturizer/barrier cream  - Collaborate with interdisciplinary team   - Patient/family teaching  - Consider wound care consult   Outcome: Progressing     Problem: PAIN - ADULT  Goal: Verbalizes/displays adequate comfort level or baseline comfort level  Description: Interventions:  - Encourage patient to monitor pain and request assistance  - Assess pain using appropriate pain scale  - Administer analgesics based on type and severity of pain and evaluate response  - Implement non-pharmacological measures as appropriate and evaluate response  - Consider cultural and social influences on pain and pain management  - Notify physician/advanced practitioner if interventions unsuccessful or patient reports new pain  Outcome: Progressing     Problem: INFECTION - ADULT  Goal: Absence or prevention of progression during hospitalization  Description: INTERVENTIONS:  - Assess and monitor for signs and symptoms of infection  - Monitor lab/diagnostic results  - Monitor all insertion sites, i.e. indwelling lines, tubes, and drains  - Monitor endotracheal if appropriate and nasal secretions for changes in amount and color  - Gore appropriate cooling/warming therapies per order  - Administer medications as ordered  - Instruct and encourage patient and family to use good hand hygiene technique  - Identify and instruct in appropriate isolation precautions for  identified infection/condition  Outcome: Progressing  Goal: Absence of fever/infection during neutropenic period  Description: INTERVENTIONS:  - Monitor WBC    Outcome: Progressing     Problem: SAFETY ADULT  Goal: Patient will remain free of falls  Description: INTERVENTIONS:  - Educate patient/family on patient safety including physical limitations  - Instruct patient to call for assistance with activity   - Consult OT/PT to assist with strengthening/mobility   - Keep Call bell within reach  - Keep bed low and locked with side rails adjusted as appropriate  - Keep care items and personal belongings within reach  - Initiate and maintain comfort rounds  - Make Fall Risk Sign visible to staff  - Offer Toileting every 2 Hours, in advance of need  - Initiate/Maintain bed alarm  - Obtain necessary fall risk management equipment:   - Apply yellow socks and bracelet for high fall risk patients  - Consider moving patient to room near nurses station  Outcome: Progressing  Goal: Maintain or return to baseline ADL function  Description: INTERVENTIONS:  -  Assess patient's ability to carry out ADLs; assess patient's baseline for ADL function and identify physical deficits which impact ability to perform ADLs (bathing, care of mouth/teeth, toileting, grooming, dressing, etc.)  - Assess/evaluate cause of self-care deficits   - Assess range of motion  - Assess patient's mobility; develop plan if impaired  - Assess patient's need for assistive devices and provide as appropriate  - Encourage maximum independence but intervene and supervise when necessary  - Involve family in performance of ADLs  - Assess for home care needs following discharge   - Consider OT consult to assist with ADL evaluation and planning for discharge  - Provide patient education as appropriate  Outcome: Progressing  Goal: Maintains/Returns to pre admission functional level  Description: INTERVENTIONS:  - Perform AM-PAC 6 Click Basic Mobility/ Daily Activity  assessment daily.  - Set and communicate daily mobility goal to care team and patient/family/caregiver.   - Collaborate with rehabilitation services on mobility goals if consulted  - Perform Range of Motion 3 times a day.  - Reposition patient every 2 hours.  - Dangle patient 3 times a day  - Stand patient 3 times a day  - Ambulate patient 3 times a day  - Out of bed to chair 3 times a day   - Out of bed for meals 3 times a day  - Out of bed for toileting  - Record patient progress and toleration of activity level   Outcome: Progressing     Problem: DISCHARGE PLANNING  Goal: Discharge to home or other facility with appropriate resources  Description: INTERVENTIONS:  - Identify barriers to discharge w/patient and caregiver  - Arrange for needed discharge resources and transportation as appropriate  - Identify discharge learning needs (meds, wound care, etc.)  - Arrange for interpretive services to assist at discharge as needed  - Refer to Case Management Department for coordinating discharge planning if the patient needs post-hospital services based on physician/advanced practitioner order or complex needs related to functional status, cognitive ability, or social support system  Outcome: Progressing     Problem: Knowledge Deficit  Goal: Patient/family/caregiver demonstrates understanding of disease process, treatment plan, medications, and discharge instructions  Description: Complete learning assessment and assess knowledge base.  Interventions:  - Provide teaching at level of understanding  - Provide teaching via preferred learning methods  Outcome: Progressing     Problem: Nutrition/Hydration-ADULT  Goal: Nutrient/Hydration intake appropriate for improving, restoring or maintaining nutritional needs  Description: Monitor and assess patient's nutrition/hydration status for malnutrition. Collaborate with interdisciplinary team and initiate plan and interventions as ordered.  Monitor patient's weight and dietary  intake as ordered or per policy. Utilize nutrition screening tool and intervene as necessary. Determine patient's food preferences and provide high-protein, high-caloric foods as appropriate.     INTERVENTIONS:  - Monitor oral intake, urinary output, labs, and treatment plans  - Assess nutrition and hydration status and recommend course of action  - Evaluate amount of meals eaten  - Assist patient with eating if necessary   - Allow adequate time for meals  - Recommend/ encourage appropriate diets, oral nutritional supplements, and vitamin/mineral supplements  - Order, calculate, and assess calorie counts as needed  - Recommend, monitor, and adjust tube feedings and TPN/PPN based on assessed needs  - Assess need for intravenous fluids  - Provide specific nutrition/hydration education as appropriate  - Include patient/family/caregiver in decisions related to nutrition  Outcome: Progressing

## 2025-03-19 NOTE — PROGRESS NOTES
"Progress Note - Cardiology   Ez Dsouza 59 y.o. male MRN: [id number]  Unit/Bed#: E4 -01 Encounter: [id number]      Assessment/Recommendations/Discussion:   Acute CVA due to carotid artery stenosis, left carotid artery  Hypertension  Hyperlipidemia  Fall  Smoking history    PLAN  Blood pressure elevated today  Would restart hypertensive medication-carvedilol-hydrochlorothiazide-amlodipine  Continue with therapy aspirin Plavix  Okay to DC heparin drip  Keep n.p.o. after midnight plan for CHANCE tomorrow for evaluation of intracardiac source of embolism    Subjective:   HPI  Doing well, no chest pain or shortness of breath      Review of Systems: As noted in HPI. Rest of ROS is negative.    Vitals:   /99   Pulse 79   Temp 97.6 °F (36.4 °C) (Temporal)   Resp 18   Ht 6' 2\" (1.88 m)   Wt 90.4 kg (199 lb 4.7 oz)   SpO2 96%   BMI 25.59 kg/m²   I/O         03/17 0701  03/18 0700 03/18 0701  03/19 0700 03/19 0701  03/20 0700    P.O.  30     I.V. (mL/kg) 1801.5 (20.2) 345.5 (3.8)     Total Intake(mL/kg) 1801.5 (20.2) 375.5 (4.2)     Urine (mL/kg/hr) 1100 (0.5) 2350 (1.1)     Stool       Total Output 1100 2350     Net +701.5 -1974.5                  Weight (last 2 days)       Date/Time Weight    03/19/25 0600 90.4 (199.3)            Physical Exam   Constitutional: awake, alert and oriented, in no acute distress, no obvious deformities  Head: Normocephalic, without obvious abnormality, atraumatic  Eyes: conjunctivae clear and moist. Sclera anicteric. No xanthelasmas. Pupils equal bilaterally. Extraocular motions are full.  Ear nose mouth and throat: ears are symmetrical bilaterally, hearing appears to be equal bilaterally, no nasal discharge or epistaxis, oropharynx is clear with moist mucous membranes  Neck: Trachea is midline, neck is supple, no thyromegaly or significant lymphadenopathy, there is full range of motion.  Lungs: clear to auscultation bilaterally, no wheezes, no rales, no rhonchi, no accessory " muscle use, breathing is nonlabored  Heart: regular rate and rhythm, S1, S2 normal, no murmur, no click, no rub and no gallop, no lower extremity edema  Abdomen: soft, non-tender; bowel sounds normal; no masses, no organomegaly  Psychiatric: Patient is oriented to time, place, person, mood/affect is negative for depression, anxiety, agitation, appears to have appropriate insight  Skin: Skin is warm, dry, intact. No obvious rashes or lesions on exposed extremities. Nail beds are pink with no cyanosis or clubbing.  Neuro: 2 out of 5 strength right upper extremity    TELEMETRY:   Lab Results:  Results from last 7 days   Lab Units 03/18/25  0556   WBC Thousand/uL 13.38*   HEMOGLOBIN g/dL 11.7*   HEMATOCRIT % 34.2*   PLATELETS Thousands/uL 265     Results from last 7 days   Lab Units 03/18/25  0556   POTASSIUM mmol/L 3.7   CHLORIDE mmol/L 106   CO2 mmol/L 22   BUN mg/dL 18   CREATININE mg/dL 1.27   CALCIUM mg/dL 8.7     Results from last 7 days   Lab Units 03/18/25  0556   POTASSIUM mmol/L 3.7   CHLORIDE mmol/L 106   CO2 mmol/L 22   BUN mg/dL 18   CREATININE mg/dL 1.27   CALCIUM mg/dL 8.7           Medications:    Current Facility-Administered Medications:     acetaminophen (TYLENOL) tablet 975 mg, 975 mg, Oral, Q8H [facility], MEGHANN Nicole, 975 mg at 03/19/25 0539    amLODIPine (NORVASC) tablet 5 mg, 5 mg, Oral, Daily, Mariela Garcia MD    aspirin chewable tablet 81 mg, 81 mg, Oral, Daily, Mariela Garcia MD    atorvastatin (LIPITOR) tablet 40 mg, 40 mg, Oral, Daily With Dinner, MEGHANN Nicole, 40 mg at 03/18/25 1632    carvedilol (COREG) tablet 25 mg, 25 mg, Oral, BID With Meals, Mariela Garcia MD    clopidogrel (PLAVIX) tablet 75 mg, 75 mg, Oral, Daily, MEGHANN Nicole, 75 mg at 03/19/25 0839    enoxaparin (LOVENOX) subcutaneous injection 40 mg, 40 mg, Subcutaneous, Q24H [facility], Mariela Garcia MD    hydroCHLOROthiazide tablet 25 mg, 25 mg, Oral, Daily, Mariela Garcia MD    labetalol (NORMODYNE) injection  "5 mg, 5 mg, Intravenous, Q15 Min PRN **AND** [DISCONTINUED] hydrALAZINE (APRESOLINE) injection 15 mg, 15 mg, Intravenous, Q15 Min PRN, 15 mg at 25 0858 **AND** [DISCONTINUED] niCARdipine (CARDENE) 25 mg (STANDARD CONCENTRATION) in sodium chloride 0.9% 250 mL, 1-15 mg/hr, Intravenous, Continuous PRN, Yesica Martonik, CRNP    magnesium sulfate 2 g/50 mL IVPB (premix) 2 g, 2 g, Intravenous, Once, Mariela Garcia MD, Last Rate: 25 mL/hr at 25 0856, 2 g at 25 0856    ondansetron (ZOFRAN) injection 4 mg, 4 mg, Intravenous, Q6H PRN, Yesica Martonik, CRNP    oxyCODONE (ROXICODONE) immediate release tablet 10 mg, 10 mg, Oral, Q4H PRN, Yesica Martonik, CRNP    oxyCODONE (ROXICODONE) IR tablet 5 mg, 5 mg, Oral, Q4H PRN, Yesica Martonik, CRNP, 5 mg at 25 0216    [] sodium chloride 0.9 % bolus 500 mL, 500 mL, Intravenous, Once PRN **FOLLOWED BY** phenylephrine (GRACE-SYNEPHRINE) 50 mg (STANDARD CONCENTRATION) in sodium chloride 0.9% 250 mL,  mcg/min, Intravenous, Continuous PRN, Yesica Martonik, CRNP    polyethylene glycol (MIRALAX) packet 17 g, 17 g, Oral, Daily, Yesica Martonik, CRNP, 17 g at 25 0839    senna (SENOKOT) tablet 8.6 mg, 1 tablet, Oral, HS, Yesica Martonik, CRNP, 8.6 mg at 25 2125    Portions of the record may have been created with voice recognition software. Occasional wrong word or \"sound a like\" substitutions may have occurred due to the inherent limitations of voice recognition software. Read the chart carefully and recognize, using context, where substitutions have occurred.    Ez Perkins DO, [facility], FASNC  3/19/2025 10:21 AM      "

## 2025-03-19 NOTE — PROGRESS NOTES
Patient:    MRN:  [id number]    Ethan Request ID:  [id number]    Level of care reserved:  Inpatient Rehab Facility    Partner Reserved:  [place] [id number] [phone]    Clinical needs requested:    Geography searched:  10 miles around [id number]    Start of Service:    Request sent:  11:15am EDT on 3/18/2025 by Aleja Adair    Partner reserved:  11:53am EDT on 3/19/2025 by Yoselyn Jessica    Choice list shared:  3:35pm EDT on 3/18/2025 by Aleja Adair

## 2025-03-19 NOTE — ASSESSMENT & PLAN NOTE
Continue prior to admission atorvastatin   PT recommending C. Referrals to in network/in area [place] [id number] agencies. Await responses.

## 2025-03-19 NOTE — ASSESSMENT & PLAN NOTE
Patient status post left CEA on 3/17/2025    -Swelling appreciated along with some pain  -Tylenol ordered along with cold compression as per vascular team  -Vascular surgery on board

## 2025-03-19 NOTE — ASSESSMENT & PLAN NOTE
-Patient started on some of the medications consisting of carvedilol along with hydrochlorothiazide and amlodipine.   - Spironolactone currently on hold we will reinitiate as per telemetry of the patient

## 2025-03-19 NOTE — PROGRESS NOTES
Progress Note - Hospitalist   Name: Ez Dsouza 59 y.o. male I MRN: [id number]  Unit/Bed#: E4 -01 I Date of Admission: 3/13/2025   Date of Service: 3/19/2025 I Hospital Day: 6    Assessment & Plan  Acute cerebrovascular accident (CVA) due to stenosis of left carotid artery (HCC)  Patient is a 59-year-old male with past medical history significant but not limited to coronary artery disease, hypertension, hyperlipidemia, tobacco use disorder, history of fall in 23 with rib fractures and right wrist fracture, woke up with right-sided weakness and presented to [facility] via EMS on 3/10/2025 with complaints of right-sided weakness and dysarthria.  Patient was noted to have primary left hemispheric stroke secondary to symptomatic left ICA stenosis.  For carotid endarterectomy patient was transferred from [place] to [facility] on 3/13/2025 for the procedure scheduled on 3/17/2025 with Dr Calderón.  Patient remained in ICU till 3/18/2025 and then was downgraded to floors.  Patient's antihypertensive medications were slowly introduced back from 3/19/2025, heparin was discontinued and patient was initiated on DAPT aspirin along with clopidogrel, vascular neuro and cardiology participated in patient care patient to get CHANCE on 3/20/2025    -3/10/2025 MRI brain showed multiple small acute CVAs in the left periventricular subcortical frontal and parietal/occipital lobes with small petechial hemorrhage  -CTA neck showed right ICA mild stenosis but left ICA with 70% stenosis with soft plaque and increased irregularity compared to prior study in 2024.  -Upon arrival in the [facility] on 3/30/2025, neurology evaluated patient due to concerns of worsening neurological symptoms, patient was scheduled for endarterectomy for 3/17/2025.  -3/13/2025 MRI showed multiple small foci of ischemia in the left MCA territory.CAMRYN and MCA watershed territories on the left as well as left  caudate.  -3/13/2025 CT head and neck showed scattered left hemispheric infarct unchanged left ICA high-grade stenosis  3/15/2025 CT head no intracranial hemorrhage or clavicular fracture.  -Patient underwent carotid endarterectomy on 3/17/125, vascular on board continue with Plavix along with aspirin along with Lipitor and frequent neurochecks.  -Cardiology on board patient scheduled for CHANCE on 3/20/124-n.p.o. orders in.  -Blood pressure medications reinitiated- carvedilol/ hydrochlorothiazide/ amlodipine remaining to be initiated as per patient's tolerability.  -Neurology on board appreciate recs.  -Patient will require outpatient follow-up with cardiology neurology and vascular surgery upon discharge.  -PT OT on board-plans for short-term rehab on discharge    Fall  Fall precaution in place  Primary hypertension  -Patient started on some of the medications consisting of carvedilol along with hydrochlorothiazide and amlodipine.   - Spironolactone currently on hold we will reinitiate as per telemetry of the patient  Hyperlipidemia  Continue prior to admission atorvastatin  Smoking  Cessation counseling given  Localized swelling, mass or lump of neck  Patient status post left CEA on 3/17/2025    -Swelling appreciated along with some pain  -Tylenol ordered along with cold compression as per vascular team  -Vascular surgery on board    VTE Pharmacologic Prophylaxis:    Enoxaparin    Mobility:   Basic Mobility Inpatient Raw Score: 16  JH-HLM Goal: 5: Stand one or more mins  JH-HLM Achieved: 3: Sit at edge of bed      Education and Discussions with Family / Patient: Updated  (wife) at bedside.    Current Length of Stay: 6 day(s)  Current Patient Status: Inpatient     Discharge Plan: Anticipate discharge in 48-72 hrs to rehab facility.    Code Status: Level 1 - Full Code    Subjective   And examined at bedside.  No acute concerns.    Objective :  Temp:  [97.4 °F (36.3 °C)-98.7 °F (37.1 °C)] 97.6 °F (36.4  °C)  HR:  [68-96] 79  BP: (150-175)/() 169/99  Resp:  [13-22] 18  SpO2:  [94 %-98 %] 96 %  O2 Device: None (Room air)    Body mass index is 25.59 kg/m².     Input and Output Summary (last 24 hours):     Intake/Output Summary (Last 24 hours) at 3/19/2025 1258  Last data filed at 3/19/2025 0857  Gross per 24 hour   Intake 317.2 ml   Output 1650 ml   Net -1332.8 ml       Physical Exam  Vitals and nursing note reviewed.   Constitutional:       General: He is not in acute distress.     Appearance: He is well-developed.   HENT:      Head: Normocephalic and atraumatic.   Eyes:      Conjunctiva/sclera: Conjunctivae normal.   Neck:      Comments: Swelling appreciated on the left side of the neck site of insertion for cea  Cardiovascular:      Rate and Rhythm: Normal rate and regular rhythm.      Heart sounds: No murmur heard.  Pulmonary:      Effort: Pulmonary effort is normal. No respiratory distress.      Breath sounds: Normal breath sounds.   Abdominal:      Palpations: Abdomen is soft.      Tenderness: There is no abdominal tenderness.   Musculoskeletal:         General: No swelling.      Cervical back: Neck supple.   Skin:     General: Skin is warm and dry.      Capillary Refill: Capillary refill takes less than 2 seconds.   Neurological:      Mental Status: He is alert and oriented to person, place, and time.      Comments: Right upper extremity weakness 4/5 as compared to left   Psychiatric:         Mood and Affect: Mood normal.           Lines/Drains:              Lab Results: I have reviewed the following results:   Results from last 7 days   Lab Units 03/18/25  0556 03/17/25  1543 03/17/25  0505   WBC Thousand/uL 13.38*  --  8.72   HEMOGLOBIN g/dL 11.7*  --  12.1   HEMATOCRIT % 34.2*  --  35.4*   PLATELETS Thousands/uL 265   < > 262   SEGS PCT %  --   --  66   LYMPHO PCT %  --   --  19   MONO PCT %  --   --  11   EOS PCT %  --   --  3    < > = values in this interval not displayed.     Results from last 7  days   Lab Units 03/18/25  0556   SODIUM mmol/L 136   POTASSIUM mmol/L 3.7   CHLORIDE mmol/L 106   CO2 mmol/L 22   BUN mg/dL 18   CREATININE mg/dL 1.27   ANION GAP mmol/L 8   CALCIUM mg/dL 8.7   GLUCOSE RANDOM mg/dL 106     Results from last 7 days   Lab Units 03/18/25  0556   INR  1.16                   Recent Cultures (last 7 days):         XR hips bilateral 2 vw w pelvis if performed  Result Date: 3/16/2025  Impression: No acute osseous abnormality. Computerized Assisted Algorithm (CAA) may have been used to analyze all applicable images. Workstation performed: [id number]     CT head wo contrast  Result Date: 3/15/2025  Impression: 1.  No intracranial hemorrhage or calvarial fracture. 2.  Left greater than right subacute to chronic watershed type infarctions are stable. Workstation performed: [id number]     MRI brain wo contrast  Result Date: 3/13/2025  Impression: Multiple small foci of acute/recent ischemia in the left MCA territory. These predominantly appear similar in the interim with slight worsening involvement of the CAMRYN to MCA watershed territory on the left as well as the left caudate head. Involvement of  the right parietal lobe also appears new/progressed in the interim. No large territory infarct or hemorrhage seen. Given the multiple vascular territories, consider a central embolic source. The study was marked in EPIC for immediate notification. Workstation performed: [id number]     CTA head and neck w wo contrast  Result Date: 3/13/2025  Impression: 1.  No acute intracranial hemorrhage, edema, or mass effect. Scattered left hemispheric infarctions as above, seen better on recent MRI. 2.  No new large vessel occlusion in the head or neck. 3.  Unchanged high-grade stenosis proximal left ICA. Stable pedunculated plaque and/or thrombus in the proximal left ICA lumen which may be the source of distal embolization. Workstation performed: [id number]       Last 24 Hours Medication List:     Current  Facility-Administered Medications:     acetaminophen (TYLENOL) tablet 975 mg, Q8H TIEN    amLODIPine (NORVASC) tablet 5 mg, Daily    aspirin chewable tablet 81 mg, Daily    atorvastatin (LIPITOR) tablet 40 mg, Daily With Dinner    carvedilol (COREG) tablet 25 mg, BID With Meals    clopidogrel (PLAVIX) tablet 75 mg, Daily    enoxaparin (LOVENOX) subcutaneous injection 40 mg, Q24H TIEN    hydroCHLOROthiazide tablet 25 mg, Daily    labetalol (NORMODYNE) injection 5 mg, Q15 Min PRN **AND** [DISCONTINUED] hydrALAZINE (APRESOLINE) injection 15 mg, Q15 Min PRN **AND** [DISCONTINUED] niCARdipine (CARDENE) 25 mg (STANDARD CONCENTRATION) in sodium chloride 0.9% 250 mL, Continuous PRN    ondansetron (ZOFRAN) injection 4 mg, Q6H PRN    oxyCODONE (ROXICODONE) immediate release tablet 10 mg, Q4H PRN    oxyCODONE (ROXICODONE) IR tablet 5 mg, Q4H PRN    [] sodium chloride 0.9 % bolus 500 mL, Once PRN **FOLLOWED BY** phenylephrine (GRACE-SYNEPHRINE) 50 mg (STANDARD CONCENTRATION) in sodium chloride 0.9% 250 mL, Continuous PRN    polyethylene glycol (MIRALAX) packet 17 g, Daily    senna (SENOKOT) tablet 8.6 mg, HS    Administrative Statements   Today, Patient Was Seen By: Mariela Garcia MD  I have spent a total time of >35 minutes in caring for this patient on the day of the visit/encounter including Diagnostic results, Risks and benefits of tx options, Instructions for management, Importance of tx compliance, Risk factor reductions, Counseling / Coordination of care, Documenting in the medical record, Reviewing/placing orders in the medical record (including tests, medications, and/or procedures), and Obtaining or reviewing history  .'    **Please Note: This note may have been constructed using a voice recognition system.**

## 2025-03-19 NOTE — PHYSICAL THERAPY NOTE
"                                     Physical Therapy Cancellation Note              Attempted to see pt for PT treatment session, however pt declining PT at this time stating, \" No. I am good where I am at.\".  PT, OT and pt's wife encouraged pt  to participate in therapy session however Pt  continued to decline stating, \" No I'm good, we're good.\"  Pt educated on benefits of mobility, pt  was asked what he would like to work on in therapy however pt did not give an answer.  Pt  repeatedly stated, \" No I'm good.\"  Will continue to follow at a later time as per PT POC.      Yaima Yan, PTA                                                                             "

## 2025-03-19 NOTE — PLAN OF CARE
Problem: OCCUPATIONAL THERAPY ADULT  Goal: Performs self-care activities at highest level of function for planned discharge setting.  See evaluation for individualized goals.  Description: Treatment Interventions: ADL retraining, Functional transfer training, Endurance training, Cognitive reorientation, Patient/family training, Equipment evaluation/education, Compensatory technique education, Energy conservation, Activityengagement, Fine motor coordination activities          See flowsheet documentation for full assessment, interventions and recommendations.   Note: Limitation: Decreased ADL status, Decreased UE ROM, Decreased UE strength, Decreased Safe judgement during ADL, Decreased cognition, Decreased endurance, Decreased sensation, Decreased self-care trans, Decreased high-level ADLs  Prognosis: Good  Assessment: Pt seen for 64min tx session with focus on functional balance, functional mobility, ADL status, transfer safety, R UE ROM, education, and cognition. Pt able to tolerate OOB mobility; sitting balance=f+/f, standing balance=f/f-. Pt required verbal cues/physical assistance to maintain transfer safety. Pt demonstrating need for assistance with his UE and LE ADLs. Reviewed with wife L UE AA/PROM exercises(i.e.shr flex/ext/abd/add, elbow/wrist/finger flex/ext); 1set, 10-15reps. Provide built-up foam for feeding utensils. R sided neglect noted. Able to demonstrate improving cognition(i.e.direction-following, orientation). Able to demonstrate good R hand sensation(i.e.stereognosis). Reviewed hand ball exercises(i.e.grasp/release, wrist flex/ext with continued , wrist/finger extension). Pt place in chair with alarm on/connect to call bell. Wife present during tx session. Pt continues to demonstrate appropriateness for inpt rehab to improve his overall level of independence.     Rehab Resource Intensity Level, OT: I (Maximum Resource Intensity) (would benefit from acute level rehab services)

## 2025-03-20 ENCOUNTER — APPOINTMENT (INPATIENT)
Dept: NON INVASIVE DIAGNOSTICS | Facility: HOSPITAL | Age: 60
DRG: 037 | End: 2025-03-20
Payer: COMMERCIAL

## 2025-03-20 LAB
ANION GAP SERPL CALCULATED.3IONS-SCNC: 8 MMOL/L (ref 4–13)
APTT PPP: 44 SECONDS (ref 23–34)
BUN SERPL-MCNC: 19 MG/DL (ref 5–25)
CALCIUM SERPL-MCNC: 9.2 MG/DL (ref 8.4–10.2)
CHLORIDE SERPL-SCNC: 103 MMOL/L (ref 96–108)
CO2 SERPL-SCNC: 25 MMOL/L (ref 21–32)
CREAT SERPL-MCNC: 1.25 MG/DL (ref 0.6–1.3)
ERYTHROCYTE [DISTWIDTH] IN BLOOD BY AUTOMATED COUNT: 14.1 % (ref 11.6–15.1)
GFR SERPL CREATININE-BSD FRML MDRD: 62 ML/MIN/1.73SQ M
GLUCOSE SERPL-MCNC: 120 MG/DL (ref 65–140)
GLUCOSE SERPL-MCNC: 131 MG/DL (ref 65–140)
GLUCOSE SERPL-MCNC: 98 MG/DL (ref 65–140)
HCT VFR BLD AUTO: 36.6 % (ref 36.5–49.3)
HGB BLD-MCNC: 12.5 G/DL (ref 12–17)
MCH RBC QN AUTO: 30.8 PG (ref 26.8–34.3)
MCHC RBC AUTO-ENTMCNC: 34.2 G/DL (ref 31.4–37.4)
MCV RBC AUTO: 90 FL (ref 82–98)
PLATELET # BLD AUTO: 303 THOUSANDS/UL (ref 149–390)
PMV BLD AUTO: 10.3 FL (ref 8.9–12.7)
POTASSIUM SERPL-SCNC: 3.7 MMOL/L (ref 3.5–5.3)
RBC # BLD AUTO: 4.06 MILLION/UL (ref 3.88–5.62)
SL CV LV EF: 55
SODIUM SERPL-SCNC: 136 MMOL/L (ref 135–147)
WBC # BLD AUTO: 8.39 THOUSAND/UL (ref 4.31–10.16)

## 2025-03-20 PROCEDURE — 93312 ECHO TRANSESOPHAGEAL: CPT

## 2025-03-20 PROCEDURE — 97530 THERAPEUTIC ACTIVITIES: CPT

## 2025-03-20 PROCEDURE — 97116 GAIT TRAINING THERAPY: CPT

## 2025-03-20 PROCEDURE — B245ZZ4 ULTRASONOGRAPHY OF LEFT HEART, TRANSESOPHAGEAL: ICD-10-PCS

## 2025-03-20 PROCEDURE — 99232 SBSQ HOSP IP/OBS MODERATE 35: CPT

## 2025-03-20 PROCEDURE — 80048 BASIC METABOLIC PNL TOTAL CA: CPT

## 2025-03-20 PROCEDURE — 85027 COMPLETE CBC AUTOMATED: CPT

## 2025-03-20 PROCEDURE — 93325 DOPPLER ECHO COLOR FLOW MAPG: CPT

## 2025-03-20 PROCEDURE — 93320 DOPPLER ECHO COMPLETE: CPT

## 2025-03-20 PROCEDURE — 85730 THROMBOPLASTIN TIME PARTIAL: CPT

## 2025-03-20 PROCEDURE — 97110 THERAPEUTIC EXERCISES: CPT

## 2025-03-20 PROCEDURE — 82948 REAGENT STRIP/BLOOD GLUCOSE: CPT

## 2025-03-20 RX ORDER — PROPOFOL 10 MG/ML
INJECTION, EMULSION INTRAVENOUS AS NEEDED
Status: DISCONTINUED | OUTPATIENT
Start: 2025-03-20 | End: 2025-03-20

## 2025-03-20 RX ORDER — SPIRONOLACTONE 25 MG/1
25 TABLET ORAL DAILY
Status: DISCONTINUED | OUTPATIENT
Start: 2025-03-20 | End: 2025-03-21 | Stop reason: HOSPADM

## 2025-03-20 RX ORDER — PROPOFOL 10 MG/ML
INJECTION, EMULSION INTRAVENOUS CONTINUOUS PRN
Status: DISCONTINUED | OUTPATIENT
Start: 2025-03-20 | End: 2025-03-20

## 2025-03-20 RX ORDER — LIDOCAINE HYDROCHLORIDE 20 MG/ML
INJECTION, SOLUTION EPIDURAL; INFILTRATION; INTRACAUDAL; PERINEURAL AS NEEDED
Status: DISCONTINUED | OUTPATIENT
Start: 2025-03-20 | End: 2025-03-20

## 2025-03-20 RX ORDER — SODIUM CHLORIDE, SODIUM LACTATE, POTASSIUM CHLORIDE, CALCIUM CHLORIDE 600; 310; 30; 20 MG/100ML; MG/100ML; MG/100ML; MG/100ML
INJECTION, SOLUTION INTRAVENOUS CONTINUOUS PRN
Status: DISCONTINUED | OUTPATIENT
Start: 2025-03-20 | End: 2025-03-20

## 2025-03-20 RX ADMIN — PROPOFOL 150 MG: 10 INJECTION, EMULSION INTRAVENOUS at 14:01

## 2025-03-20 RX ADMIN — CARVEDILOL 25 MG: 25 TABLET, FILM COATED ORAL at 15:12

## 2025-03-20 RX ADMIN — ASPIRIN 81 MG: 81 TABLET, CHEWABLE ORAL at 15:12

## 2025-03-20 RX ADMIN — TOPICAL ANESTHETIC 1 SPRAY: 200 SPRAY DENTAL; PERIODONTAL at 13:58

## 2025-03-20 RX ADMIN — LIDOCAINE HYDROCHLORIDE 100 MG: 20 INJECTION, SOLUTION EPIDURAL; INFILTRATION; INTRACAUDAL at 14:01

## 2025-03-20 RX ADMIN — ATORVASTATIN CALCIUM 40 MG: 40 TABLET, FILM COATED ORAL at 16:48

## 2025-03-20 RX ADMIN — HYDROCHLOROTHIAZIDE 25 MG: 25 TABLET ORAL at 15:13

## 2025-03-20 RX ADMIN — CLOPIDOGREL BISULFATE 75 MG: 75 TABLET ORAL at 15:12

## 2025-03-20 RX ADMIN — AMLODIPINE BESYLATE 5 MG: 5 TABLET ORAL at 15:13

## 2025-03-20 RX ADMIN — ENOXAPARIN SODIUM 40 MG: 40 INJECTION SUBCUTANEOUS at 15:14

## 2025-03-20 RX ADMIN — SENNOSIDES 8.6 MG: 8.6 TABLET, FILM COATED ORAL at 21:00

## 2025-03-20 RX ADMIN — SODIUM CHLORIDE, SODIUM LACTATE, POTASSIUM CHLORIDE, AND CALCIUM CHLORIDE: .6; .31; .03; .02 INJECTION, SOLUTION INTRAVENOUS at 13:54

## 2025-03-20 RX ADMIN — PROPOFOL 120 MCG/KG/MIN: 10 INJECTION, EMULSION INTRAVENOUS at 14:02

## 2025-03-20 NOTE — DISCHARGE INSTR - OTHER ORDERS
Wash L neck incision daily with soap and water directly over incision site.   Call the office with any post-op incision concerns.

## 2025-03-20 NOTE — DISCHARGE SUPPORT
Case Management Assessment & Discharge Planning Note    Patient name Ez Dsouza  Location [facility] /E4 -* MRN [id number]  : 1965 Date 3/20/2025       Current Admission Date: 3/13/2025  Current Admission Diagnosis:Acute cerebrovascular accident (CVA) due to stenosis of left carotid artery (HCC)   Patient Active Problem List    Diagnosis Date Noted Date Diagnosed    Localized swelling, mass or lump of neck 2025     S/P carotid endarterectomy 2025     Smoking 2025     Hyperlipidemia 03/10/2025     Acute cerebrovascular accident (CVA) due to stenosis of left carotid artery (HCC) 03/10/2025     Elevated coronary artery calcium score 03/10/2025     Pre-operative cardiovascular examination 03/10/2025     Closed fracture of right distal radius and ulna 2024     Closed fracture of distal ends of right radius and ulna, initial encounter 2024     Closed fracture of right wrist with routine healing, subsequent encounter 2024     Fall 2024     Acute pain due to trauma 2024     Closed fracture of multiple ribs of left side 2024     Closed fracture of right wrist 2024     MARIA TERESA (acute kidney injury) (HCC) 2024     Primary hypertension 2024     Scalp abrasion 2024     Elevated blood pressure reading 2021       LOS (days): 7  Geometric Mean LOS (GMLOS) (days):   Days to GMLOS:   Facility Authorization Initiated  [facility] received request for auth from : Yoselyn Jessica  Authorization Request Submitted for: Acute Rehab  Requested Start of Care Date: 25  Facility Name: [facility]  Facility NPI: [id number]  Facility MD: Armando Prescott  Facility MD NPI: [id number]  Via: [facility] [facility]  Phone # Called: [phone]  Clinicals submitted via: Portal Attachment  Pending reference #: [id number]  Other Notes: Per provider services, auth request needs to be submitted via [employer] and cannot be started over through the  phone line (P#: [phone]).   notified: Yoselyn Jessica     Updates to authorization status will be noted in chart.    Please reach out to CM for updates on any clinical information.

## 2025-03-20 NOTE — RESTORATIVE TECHNICIAN NOTE
Restorative Technician Note      Patient Name: Ez Dsouza     Restorative Tech Visit Date: 03/20/25  Note Type: Mobility  Patient Position Upon Consult: Supine  Activity Performed: Ambulated  Assistive Device: Roller walker  Patient Position at End of Consult: Bedside chair; All needs within reach; Bed/Chair alarm activated            ns

## 2025-03-20 NOTE — PLAN OF CARE
Problem: Prexisting or High Potential for Compromised Skin Integrity  Goal: Skin integrity is maintained or improved  Description: INTERVENTIONS:  - Identify patients at risk for skin breakdown  - Assess and monitor skin integrity  - Assess and monitor nutrition and hydration status  - Monitor labs   - Assess for incontinence   - Turn and reposition patient  - Assist with mobility/ambulation  - Relieve pressure over bony prominences  - Avoid friction and shearing  - Provide appropriate hygiene as needed including keeping skin clean and dry  - Evaluate need for skin moisturizer/barrier cream  - Collaborate with interdisciplinary team   - Patient/family teaching  - Consider wound care consult   Outcome: Progressing     Problem: PAIN - ADULT  Goal: Verbalizes/displays adequate comfort level or baseline comfort level  Description: Interventions:  - Encourage patient to monitor pain and request assistance  - Assess pain using appropriate pain scale  - Administer analgesics based on type and severity of pain and evaluate response  - Implement non-pharmacological measures as appropriate and evaluate response  - Consider cultural and social influences on pain and pain management  - Notify physician/advanced practitioner if interventions unsuccessful or patient reports new pain  Outcome: Progressing     Problem: INFECTION - ADULT  Goal: Absence or prevention of progression during hospitalization  Description: INTERVENTIONS:  - Assess and monitor for signs and symptoms of infection  - Monitor lab/diagnostic results  - Monitor all insertion sites, i.e. indwelling lines, tubes, and drains  - Monitor endotracheal if appropriate and nasal secretions for changes in amount and color  - Greene appropriate cooling/warming therapies per order  - Administer medications as ordered  - Instruct and encourage patient and family to use good hand hygiene technique  - Identify and instruct in appropriate isolation precautions for  identified infection/condition  Outcome: Progressing  Goal: Absence of fever/infection during neutropenic period  Description: INTERVENTIONS:  - Monitor WBC    Outcome: Progressing     Problem: SAFETY ADULT  Goal: Patient will remain free of falls  Description: INTERVENTIONS:  - Educate patient/family on patient safety including physical limitations  - Instruct patient to call for assistance with activity   - Consult OT/PT to assist with strengthening/mobility   - Keep Call bell within reach  - Keep bed low and locked with side rails adjusted as appropriate  - Keep care items and personal belongings within reach  - Initiate and maintain comfort rounds  - Make Fall Risk Sign visible to staff  - Offer Toileting every 2 Hours, in advance of need  - Initiate/Maintain bed/chair alarm  - Obtain necessary fall risk management equipment:   - Apply yellow socks and bracelet for high fall risk patients  - Consider moving patient to room near nurses station  Outcome: Progressing  Goal: Maintain or return to baseline ADL function  Description: INTERVENTIONS:  -  Assess patient's ability to carry out ADLs; assess patient's baseline for ADL function and identify physical deficits which impact ability to perform ADLs (bathing, care of mouth/teeth, toileting, grooming, dressing, etc.)  - Assess/evaluate cause of self-care deficits   - Assess range of motion  - Assess patient's mobility; develop plan if impaired  - Assess patient's need for assistive devices and provide as appropriate  - Encourage maximum independence but intervene and supervise when necessary  - Involve family in performance of ADLs  - Assess for home care needs following discharge   - Consider OT consult to assist with ADL evaluation and planning for discharge  - Provide patient education as appropriate  Outcome: Progressing  Goal: Maintains/Returns to pre admission functional level  Description: INTERVENTIONS:  - Perform AM-PAC 6 Click Basic Mobility/ Daily  Activity assessment daily.  - Set and communicate daily mobility goal to care team and patient/family/caregiver.   - Collaborate with rehabilitation services on mobility goals if consulted  - Perform Range of Motion 4 times a day.  - Reposition patient every 2 hours.  - Dangle patient 3 times a day  - Stand patient 3 times a day  - Ambulate patient 3 times a day  - Out of bed to chair 3 times a day   - Out of bed for meals 3 times a day  - Out of bed for toileting  - Record patient progress and toleration of activity level   Outcome: Progressing

## 2025-03-20 NOTE — PROGRESS NOTES
"Progress Note - Cardiology   Ez Dsouza 59 y.o. male MRN: [id number]  Unit/Bed#: E4 -01 Encounter: [id number]      Assessment/Recommendations/Discussion:   Acute CVA due to carotid artery stenosis, left carotid artery  Hypertension  Hyperlipidemia  Fall  Smoking history    PLAN  Was up walking the hallway today with physical therapy, seems to be doing well  He is n.p.o., planning for transesophageal echo this afternoon for evaluation of intracardiac source of embolism  Continue with dual antiplatelet therapy aspirin Plavix  Blood pressure has improved with restarting his carvedilol, hydrochlorothiazide, amlodipine, uptitrate as needed  Further recommendations pending results of CHANCE today    Subjective:   HPI  Doing well, up ambulating with physical therapy this morning      Review of Systems: As noted in HPI. Rest of ROS is negative.    Vitals:   /98 (BP Location: Right arm)   Pulse 82   Temp 98 °F (36.7 °C) (Temporal)   Resp 18   Ht 6' 2\" (1.88 m)   Wt 90.1 kg (198 lb 10.2 oz)   SpO2 92%   BMI 25.50 kg/m²   I/O         03/18 0701  03/19 0700 03/19 0701 03/20 0700 03/20 0701  03/21 0700    P.O. 30 360     I.V. (mL/kg) 345.5 (3.8)      Total Intake(mL/kg) 375.5 (4.2) 360 (4)     Urine (mL/kg/hr) 2350 (1.1) 500 (0.2)     Total Output 2350 500     Net -1974.5 -140                  Weight (last 2 days)       Date/Time Weight    03/20/25 0600 90.1 (198.63)    03/19/25 0600 90.4 (199.3)            Physical Exam   Constitutional: awake, alert and oriented, in no acute distress, no obvious deformities  Head: Normocephalic, without obvious abnormality, atraumatic  Eyes: conjunctivae clear and moist. Sclera anicteric. No xanthelasmas. Pupils equal bilaterally. Extraocular motions are full.  Ear nose mouth and throat: ears are symmetrical bilaterally, hearing appears to be equal bilaterally, no nasal discharge or epistaxis, oropharynx is clear with moist mucous membranes  Neck: Trachea is midline, neck is " supple, no thyromegaly or significant lymphadenopathy, there is full range of motion.  Lungs: clear to auscultation bilaterally, no wheezes, no rales, no rhonchi, no accessory muscle use, breathing is nonlabored  Heart: regular rate and rhythm, S1, S2 normal, no murmur, no click, no rub and no gallop, no lower extremity edema  Abdomen: soft, non-tender; bowel sounds normal; no masses, no organomegaly  Psychiatric: Patient is oriented to time, place, person, mood/affect is negative for depression, anxiety, agitation, appears to have appropriate insight  Skin: Skin is warm, dry, intact. No obvious rashes or lesions on exposed extremities. Nail beds are pink with no cyanosis or clubbing.  Neuro still with right-sided weakness 2 out of 5 right upper extremity but able to ambulate with help of rolling walker    TELEMETRY:   Lab Results:  Results from last 7 days   Lab Units 03/20/25  0504   WBC Thousand/uL 8.39   HEMOGLOBIN g/dL 12.5   HEMATOCRIT % 36.6   PLATELETS Thousands/uL 303     Results from last 7 days   Lab Units 03/20/25  0504   POTASSIUM mmol/L 3.7   CHLORIDE mmol/L 103   CO2 mmol/L 25   BUN mg/dL 19   CREATININE mg/dL 1.25   CALCIUM mg/dL 9.2     Results from last 7 days   Lab Units 03/20/25  0504   POTASSIUM mmol/L 3.7   CHLORIDE mmol/L 103   CO2 mmol/L 25   BUN mg/dL 19   CREATININE mg/dL 1.25   CALCIUM mg/dL 9.2           Medications:    Current Facility-Administered Medications:     acetaminophen (TYLENOL) tablet 650 mg, 650 mg, Oral, Q8H PRN, Mariela Garcia MD, 650 mg at 03/19/25 1556    amLODIPine (NORVASC) tablet 5 mg, 5 mg, Oral, Daily, Mariela Garcia MD, 5 mg at 03/19/25 1107    aspirin chewable tablet 81 mg, 81 mg, Oral, Daily, Mariela Garcia MD, 81 mg at 03/19/25 1107    atorvastatin (LIPITOR) tablet 40 mg, 40 mg, Oral, Daily With Dinner, MEGHANN Nicole, 40 mg at 03/19/25 1637    carvedilol (COREG) tablet 25 mg, 25 mg, Oral, BID With Meals, Mariela Garcia MD, 25 mg at 03/19/25 7733     "clopidogrel (PLAVIX) tablet 75 mg, 75 mg, Oral, Daily, MEGHANN Nicole, 75 mg at 25 0839    enoxaparin (LOVENOX) subcutaneous injection 40 mg, 40 mg, Subcutaneous, Q24H TIEN, Mariela Garcia MD, 40 mg at 25 1107    hydroCHLOROthiazide tablet 25 mg, 25 mg, Oral, Daily, Mariela Garcia MD, 25 mg at 25 1107    labetalol (NORMODYNE) injection 5 mg, 5 mg, Intravenous, Q15 Min PRN **AND** [DISCONTINUED] hydrALAZINE (APRESOLINE) injection 15 mg, 15 mg, Intravenous, Q15 Min PRN, 15 mg at 25 0858 **AND** [DISCONTINUED] niCARdipine (CARDENE) 25 mg (STANDARD CONCENTRATION) in sodium chloride 0.9% 250 mL, 1-15 mg/hr, Intravenous, Continuous PRN, MEGHANN Nicole    ondansetron (ZOFRAN) injection 4 mg, 4 mg, Intravenous, Q6H PRN, MEGHANN Nicole    oxyCODONE (ROXICODONE) immediate release tablet 10 mg, 10 mg, Oral, Q4H PRN, MEGHNAN Nicole    oxyCODONE (ROXICODONE) IR tablet 5 mg, 5 mg, Oral, Q4H PRN, MEGHANN Nicole, 5 mg at 25 0216    [] sodium chloride 0.9 % bolus 500 mL, 500 mL, Intravenous, Once PRN **FOLLOWED BY** phenylephrine (GRACE-SYNEPHRINE) 50 mg (STANDARD CONCENTRATION) in sodium chloride 0.9% 250 mL,  mcg/min, Intravenous, Continuous PRN, MEGHANN Nicole    polyethylene glycol (MIRALAX) packet 17 g, 17 g, Oral, Daily, MEGHANN Nicole, 17 g at 25 0839    senna (SENOKOT) tablet 8.6 mg, 1 tablet, Oral, HS, MEGHANN Nicole, 8.6 mg at 256    [Held by provider] spironolactone (ALDACTONE) tablet 25 mg, 25 mg, Oral, Daily, Mariela Garcia MD    Portions of the record may have been created with voice recognition software. Occasional wrong word or \"sound a like\" substitutions may have occurred due to the inherent limitations of voice recognition software. Read the chart carefully and recognize, using context, where substitutions have occurred.    Ez Perkins DO, [facility], [facility]  3/20/2025 10:36 AM      "

## 2025-03-20 NOTE — PROGRESS NOTES
Progress Note - Hospitalist   Name: Ez Dsouza 59 y.o. male I MRN: [id number]  Unit/Bed#: E4 -01 I Date of Admission: 3/13/2025   Date of Service: 3/20/2025 I Hospital Day: 7    Assessment & Plan  Acute cerebrovascular accident (CVA) due to stenosis of left carotid artery (HCC)  Patient is a 59-year-old male with past medical history significant but not limited to coronary artery disease, hypertension, hyperlipidemia, tobacco use disorder, history of fall in 24 with rib fractures and right wrist fracture, woke up with right-sided weakness and presented to [facility] via EMS on 3/10/2025 with complaints of right-sided weakness and dysarthria.  Patient was noted to have primary left hemispheric stroke secondary to symptomatic left ICA stenosis.  For carotid endarterectomy patient was transferred from [facility] to [facility] on 3/13/2025 for the procedure scheduled on 3/17/2025 with Dr Calderón.  Patient remained in ICU till 3/18/2025 and then was downgraded to floors.  Patient's antihypertensive medications were slowly introduced back from 3/19/2025, heparin was discontinued and patient was initiated on DAPT aspirin along with clopidogrel, vascular neuro and cardiology participated in patient care patient to get CHANCE on 3/20/2025    -3/10/2025 MRI brain showed multiple small acute CVAs in the left periventricular subcortical frontal and parietal/occipital lobes with small petechial hemorrhage  -CTA neck showed right ICA mild stenosis but left ICA with 70% stenosis with soft plaque and increased irregularity compared to prior study in 2024.  -Upon arrival in the [facility] on 3/30/2025, neurology evaluated patient due to concerns of worsening neurological symptoms, patient was scheduled for endarterectomy for 3/17/2025.  -3/13/2025 MRI showed multiple small foci of ischemia in the left MCA territory.CAMRYN and MCA watershed territories on the left as well as left  caudate.  -3/13/2025 CT head and neck showed scattered left hemispheric infarct unchanged left ICA high-grade stenosis  3/15/2025 CT head no intracranial hemorrhage or clavicular fracture.  -Patient underwent carotid endarterectomy on 3/17/125, vascular on board continue with Plavix along with aspirin along with Lipitor and frequent neurochecks.  -Cardiology on board patient scheduled for CHANCE on 3/20/124-n.p.o. orders in.  -Blood pressure medications reinitiated- carvedilol/ hydrochlorothiazide/ amlodipine remaining to be initiated as per patient's tolerability.  -Neurology on board appreciate recs.  -Patient will require outpatient follow-up with cardiology neurology and vascular surgery upon discharge.  -PT OT on board-plans for short-term rehab on discharge    Fall  Fall precaution in place  Primary hypertension  -Patient started on some of the medications consisting of carvedilol along with hydrochlorothiazide and amlodipine.   - Spironolactone currently on hold we will reinitiate as per telemetry of the patient  Hyperlipidemia  Continue prior to admission atorvastatin  Smoking  Cessation counseling given  Localized swelling, mass or lump of neck  Patient status post left CEA on 3/17/2025    -Swelling appreciated along with some pain  -Tylenol ordered along with cold compression as per vascular team  -Vascular surgery on board  S/P carotid endarterectomy  See above under CVA    VTE Pharmacologic Prophylaxis:    Enoxaparin    Mobility:   Basic Mobility Inpatient Raw Score: 18  JH-HLM Goal: 6: Walk 10 steps or more  JH-HLM Achieved: 5: Stand (1 or more minutes)    Education and Discussions with Family / Patient: Updated  (wife) at bedside.    Current Length of Stay: 7 day(s)  Current Patient Status: Inpatient     Discharge Plan: Anticipate discharge in 24-48 hrs to rehab facility.    Code Status: Level 1 - Full Code    Subjective   Patient was seen examined bedside no acute concerns as for  today.    Objective :  Temp:  [97.8 °F (36.6 °C)-98.1 °F (36.7 °C)] 98 °F (36.7 °C)  HR:  [] 82  BP: (131-154)/(70-98) 154/98  Resp:  [18] 18  SpO2:  [92 %-96 %] 92 %  O2 Device: None (Room air)    Body mass index is 25.5 kg/m².     Input and Output Summary (last 24 hours):     Intake/Output Summary (Last 24 hours) at 3/20/2025 1045  Last data filed at 3/20/2025 0509  Gross per 24 hour   Intake 180 ml   Output 500 ml   Net -320 ml       Physical Exam  Vitals and nursing note reviewed.   Constitutional:       General: He is not in acute distress.     Appearance: He is well-developed.   HENT:      Head: Normocephalic and atraumatic.   Eyes:      Conjunctiva/sclera: Conjunctivae normal.   Neck:      Comments: Swelling on the left side of the neck better than before.  Cardiovascular:      Rate and Rhythm: Normal rate and regular rhythm.      Heart sounds: No murmur heard.  Pulmonary:      Effort: Pulmonary effort is normal. No respiratory distress.      Breath sounds: Normal breath sounds.   Abdominal:      Palpations: Abdomen is soft.      Tenderness: There is no abdominal tenderness.   Musculoskeletal:         General: No swelling.      Cervical back: Neck supple.   Skin:     General: Skin is warm and dry.      Capillary Refill: Capillary refill takes less than 2 seconds.   Neurological:      Mental Status: He is alert and oriented to person, place, and time.   Psychiatric:         Mood and Affect: Mood normal.           Lines/Drains:              Lab Results: I have reviewed the following results:   Results from last 7 days   Lab Units 03/20/25  0504 03/17/25  1543 03/17/25  0505   WBC Thousand/uL 8.39   < > 8.72   HEMOGLOBIN g/dL 12.5   < > 12.1   HEMATOCRIT % 36.6   < > 35.4*   PLATELETS Thousands/uL 303   < > 262   SEGS PCT %  --   --  66   LYMPHO PCT %  --   --  19   MONO PCT %  --   --  11   EOS PCT %  --   --  3    < > = values in this interval not displayed.     Results from last 7 days   Lab Units  03/20/25  0504   SODIUM mmol/L 136   POTASSIUM mmol/L 3.7   CHLORIDE mmol/L 103   CO2 mmol/L 25   BUN mg/dL 19   CREATININE mg/dL 1.25   ANION GAP mmol/L 8   CALCIUM mg/dL 9.2   GLUCOSE RANDOM mg/dL 98     Results from last 7 days   Lab Units 03/18/25  0556   INR  1.16                   Recent Cultures (last 7 days):         XR hips bilateral 2 vw w pelvis if performed  Result Date: 3/16/2025  Impression: No acute osseous abnormality. Computerized Assisted Algorithm (CAA) may have been used to analyze all applicable images. Workstation performed: [id number]     CT head wo contrast  Result Date: 3/15/2025  Impression: 1.  No intracranial hemorrhage or calvarial fracture. 2.  Left greater than right subacute to chronic watershed type infarctions are stable. Workstation performed: [id number]     MRI brain wo contrast  Result Date: 3/13/2025  Impression: Multiple small foci of acute/recent ischemia in the left MCA territory. These predominantly appear similar in the interim with slight worsening involvement of the CAMRYN to MCA watershed territory on the left as well as the left caudate head. Involvement of  the right parietal lobe also appears new/progressed in the interim. No large territory infarct or hemorrhage seen. Given the multiple vascular territories, consider a central embolic source. The study was marked in EPIC for immediate notification. Workstation performed: [id number]     CTA head and neck w wo contrast  Result Date: 3/13/2025  Impression: 1.  No acute intracranial hemorrhage, edema, or mass effect. Scattered left hemispheric infarctions as above, seen better on recent MRI. 2.  No new large vessel occlusion in the head or neck. 3.  Unchanged high-grade stenosis proximal left ICA. Stable pedunculated plaque and/or thrombus in the proximal left ICA lumen which may be the source of distal embolization. Workstation performed: [id number]       Last 24 Hours Medication List:     Current Facility-Administered  Medications:     acetaminophen (TYLENOL) tablet 650 mg, Q8H PRN    amLODIPine (NORVASC) tablet 5 mg, Daily    aspirin chewable tablet 81 mg, Daily    atorvastatin (LIPITOR) tablet 40 mg, Daily With Dinner    carvedilol (COREG) tablet 25 mg, BID With Meals    clopidogrel (PLAVIX) tablet 75 mg, Daily    enoxaparin (LOVENOX) subcutaneous injection 40 mg, Q24H TIEN    hydroCHLOROthiazide tablet 25 mg, Daily    labetalol (NORMODYNE) injection 5 mg, Q15 Min PRN **AND** [DISCONTINUED] hydrALAZINE (APRESOLINE) injection 15 mg, Q15 Min PRN **AND** [DISCONTINUED] niCARdipine (CARDENE) 25 mg (STANDARD CONCENTRATION) in sodium chloride 0.9% 250 mL, Continuous PRN    ondansetron (ZOFRAN) injection 4 mg, Q6H PRN    oxyCODONE (ROXICODONE) immediate release tablet 10 mg, Q4H PRN    oxyCODONE (ROXICODONE) IR tablet 5 mg, Q4H PRN    [] sodium chloride 0.9 % bolus 500 mL, Once PRN **FOLLOWED BY** phenylephrine (GRACE-SYNEPHRINE) 50 mg (STANDARD CONCENTRATION) in sodium chloride 0.9% 250 mL, Continuous PRN    polyethylene glycol (MIRALAX) packet 17 g, Daily    senna (SENOKOT) tablet 8.6 mg, HS    [Held by provider] spironolactone (ALDACTONE) tablet 25 mg, Daily    Administrative Statements   Today, Patient Was Seen By: Mariela Garcia MD  I have spent a total time of >35 minutes in caring for this patient on the day of the visit/encounter including Diagnostic results, Risks and benefits of tx options, Patient and family education, Importance of tx compliance, Risk factor reductions, Counseling / Coordination of care, Documenting in the medical record, Reviewing/placing orders in the medical record (including tests, medications, and/or procedures), and Obtaining or reviewing history  .    **Please Note: This note may have been constructed using a voice recognition system.**

## 2025-03-20 NOTE — PHYSICAL THERAPY NOTE
Physical Therapy Treatment Note     03/20/25 0949   PT Last Visit   PT Visit Date 03/20/25   Note Type   Note Type Treatment   Pain Assessment   Pain Assessment Tool 0-10   Pain Score No Pain   Restrictions/Precautions   Weight Bearing Precautions Per Order No   Other Precautions Cognitive;Chair Alarm;Bed Alarm;Fall Risk;Limb alert  (R hemiparesis)   General   Chart Reviewed Yes   Family/Caregiver Present Yes   Subjective   Subjective Pt. agreeable to PT   Bed Mobility   Supine to Sit 5  Supervision   Additional items Assist x 1;Increased time required;Verbal cues  (HOB flat and no BRs used)   Transfers   Sit to Stand   (CGA)   Additional items Assist x 1;Increased time required;Verbal cues   Stand to Sit   (CGA)   Additional items Impulsive;Verbal cues   Stand pivot 4  Minimal assistance   Additional items Assist x 1;Increased time required;Verbal cues   Ambulation/Elevation   Gait pattern Improper Weight shift;R Foot drag;R Hemiparesis;Poor UE support;Excessively slow;Decreased toe off;Decreased heel strike;Decreased foot clearance   Gait Assistance   (Min - Mod A on the R)   Additional items Assist x 1;Tactile cues;Verbal cues   Assistive Device Rolling walker   Distance 80ft, 70ft, 50ft   Balance   Static Sitting Fair +   Dynamic Sitting Fair   Static Standing Fair -   Dynamic Standing Fair -   Ambulatory Poor +   Endurance Deficit   Endurance Deficit Yes   Endurance Deficit Description R side weakness, fatigue   Activity Tolerance   Activity Tolerance Patient tolerated treatment well   Nurse Made Aware Yes   Exercises   UE Exercise Sitting;Supine;10 reps;20 reps;AAROM   THR Supine;Bilateral;AROM;20 reps   Balance training  Repeated STS transfers 10x   Assessment   Prognosis Good   Problem List Decreased strength;Decreased range of motion;Decreased endurance;Decreased mobility;Impaired balance;Decreased coordination;Impaired judgement   Assessment Pt. progressing well with overall mobility. pt. continues with  RUE and RLE weakness and neglect. Max Cues to engage and increase awareness of RUE and RLE during mobility and at rest. performed supine to sit transfer with S with HOB and no BRs used. Noted increased effort to perform the transfer. Pt. continues to be impulsive at time. Extensive education provided for safety and mobility deficits. Pt. occ. voiced his frustration with his situation. A provided to have RUE on the RW during ambulation and to avoid it slipping off the RW. Seated rest between amb. trials due to fatigue and increased gait deviations as pt. fatigued. Noted decreased foot clearance, heel strike of RLE and R foot drag. Pt. was unable to correct it consistently even with max cues. Performed supine and seated AAROM of UEs. No knee buckling noted during gt. however LE instability noted. Pt. seated on chair with alarm engaged and all needs within reach. Will continue to follow epr PT POC. Chair follow given for amb. Pt. requries continued skilled PT to address mobility, balance, strength and ROM deficits.   Barriers to Discharge None   Goals   Patient Goals I want to get ou of here   STG Expiration Date 03/28/25   PT Treatment Day 3   Plan   Treatment/Interventions Functional transfer training;LE strengthening/ROM;Therapeutic exercise;Spoke to nursing;Gait training;Bed mobility;Equipment eval/education;Patient/family training;Cognitive reorientation;Family   Progress Progressing toward goals   PT Frequency 4-6x/wk   Discharge Recommendation   Rehab Resource Intensity Level, PT I (Maximum Resource Intensity)   Equipment Recommended Walker   AM-PAC Basic Mobility Inpatient   Turning in Flat Bed Without Bedrails 4   Lying on Back to Sitting on Edge of Flat Bed Without Bedrails 4   Moving Bed to Chair 3   Standing Up From Chair Using Arms 3   Walk in Room 3   Climb 3-5 Stairs With Railing 3   Basic Mobility Inpatient Raw Score 20   Basic Mobility Standardized Score 43.99   [facility] Highest Level Of Mobility    JH-HLM Goal 6: Walk 10 steps or more   JH-HLM Achieved 7: Walk 25 feet or more           Shayne Banuelos PTA    An AM-PAC basic mobility standardized score less than 42.9 suggest the patient may benefit from discharge to post-acute rehab services.

## 2025-03-20 NOTE — DISCHARGE INSTR - AVS FIRST PAGE
DISCHARGE INSTRUCTIONS  CAROTID ENDARTERECTOMY    ACTIVITY:  Limit your physical activity to walking for the first week and then increase your activity as tolerated.  Walking up steps and normal activities may be resumed as you feel ready.  Avoid strenuous activity such as vigorous exercise.  Avoid heavy lifting (do not lift more than 15 pounds) for the first four weeks after surgery.  You should not drive a car for at least one week following discharge from the hospital and you are off all narcotic pain medication.  You may ride in a car.  If you have had a stroke or mini-stroke, please consult with your neurologist prior to driving.    DO NOT START OR RESUME ANY PREVIOUSLY PLANNED THERAPY (PHYSICAL, CARDIAC, REHAB, ETC…) UNTIL YOU DISCUSS WITH YOUR SURGEON AND THEY FEEL IT IS SAFE TO ENGAGE IN THERAPY.    DIET:  Resume your normal diet.  Good nutrition is important for healing of your incision.  You can expect to have a sore throat and trouble swallowing after surgery which should improve quickly.  If you feel like you are choking, please call your doctor.    RECURRENT SYMPTOMS: If you develop any new numbness, weakness, vision changes, drooping of the face, or difficulty with speech after discharge, CALL 911 or go to the nearest Emergency department immediately.    INCISION SITE:  You may have surgical glue at your incision site.  There are stitches present under the skin which will absorb on their own.  The glue is used to cover the incision, assist in closure, and prevent contamination. This adhesive will darken and peel away on its own within one to two weeks. Do not pick at it.  If you have a bandage, please remove this on the second day after surgery.    You should shower daily.  Wash incision daily with soap and water, but do not rub or scrub the incision; rinse thoroughly and pat dry.  Do not bathe in a tub or swim for the first 4 weeks following surgery or if you have any open wounds.  It is normal to  have some bruising, swelling or discoloration around the incision.  IF increasing redness, pain, bleeding or a bulge develops, call our office immediately.    If you notice any active bleeding at the site, apply pressure to the site and call our office ([phone]) or 911.    FOLLOW UP STUDIES: Doppler ultrasound studies are very important to your post-operative care. Your surgeon will arrange them at your first postoperative visit. The first study will be 3 months after surgery.    FOLLOW UP APPOINTMENTS:  Making and keeping follow up appointments and ultrasound tests are important to your recovery.  If you have difficulty making it to or keeping your follow up appointments, call the office.    If you have increased pain, fever >101.5, increased drainage, redness or a bad smell at your surgery site, new coldness/numbness of your arm or leg, please call us immediately and GO directly to the ER.    PLEASE CALL THE OFFICE IF YOU HAVE ANY QUESTIONS  [phone]  [phone] [address], [address], [place] [address]  [address] [place] [address]  [address] [place] [address]  [address] [facility], [place] [address]  [address] [facility], [address], [place] [address]  [address] [facility], [address], [place] [address]  [address] [facility], [facility] [address], [address] Floor, [place] [address]  [address] [facility], 1st Floor, [address], [place] [address]  [address] [place] [address]  [address] [facility] [address], [place] [address]               Follow-ups:    -Follow-up with your PCP within 1 week of discharge  -Follow-up with cardiology  -Follow-up with neurology  -Follow-up with vascular surgery    Medication changes:    -Start taking Plavix 75 mg daily as prescribed along with Tylenol/MiraLAX as needed for constipation

## 2025-03-20 NOTE — ANESTHESIA POSTPROCEDURE EVALUATION
Post-Op Assessment Note    CV Status:  Stable  Pain Score: 0    Pain management: adequate       Mental Status:  Alert and awake   Hydration Status:  Stable and euvolemic   PONV Controlled:  None   Airway Patency:  Patent and adequate     Post Op Vitals Reviewed: Yes    No anethesia notable event occurred.    Staff: CRNA           Last Filed PACU Vitals:  Vitals Value Taken Time   Temp 97.7 °F (36.5 °C) 03/20/25 1431   Pulse 82 03/20/25 1435   /82 03/20/25 1432   Resp 16 03/20/25 1431   SpO2 96 % 03/20/25 1435   Vitals shown include unfiled device data.    Modified Shraddha:     Vitals Value Taken Time   Activity 2 03/20/25 1431   Respiration 2 03/20/25 1431   Circulation 2 03/20/25 1431   Consciousness 2 03/20/25 1431   Oxygen Saturation 2 03/20/25 1431     Modified Shraddha Score: 10

## 2025-03-20 NOTE — PLAN OF CARE
Problem: PHYSICAL THERAPY ADULT  Goal: Performs mobility at highest level of function for planned discharge setting.  See evaluation for individualized goals.  Description: Treatment/Interventions: Functional transfer training, LE strengthening/ROM, Elevations, Therapeutic exercise, Endurance training, Patient/family training, Bed mobility, Gait training, Spoke to nursing, OT  Equipment Recommended: Walker       See flowsheet documentation for full assessment, interventions and recommendations.  Outcome: Progressing  Note: Prognosis: Good  Problem List: Decreased strength, Decreased range of motion, Decreased endurance, Decreased mobility, Impaired balance, Decreased coordination, Impaired judgement  Assessment: Pt. progressing well with overall mobility. pt. continues with RUE and RLE weakness and neglect. Max Cues to engage and increase awareness of RUE and RLE during mobility and at rest. performed supine to sit transfer with S with HOB and no BRs used. Noted increased effort to perform the transfer. Pt. continues to be impulsive at time. Extensive education provided for safety and mobility deficits. Pt. occ. voiced his frustration with his situation. A provided to have RUE on the RW during ambulation and to avoid it slipping off the RW. Seated rest between amb. trials due to fatigue and increased gait deviations as pt. fatigued. Noted decreased foot clearance, heel strike of RLE and R foot drag. Pt. was unable to correct it consistently even with max cues. Performed supine and seated AAROM of UEs. No knee buckling noted during gt. however LE instability noted. Pt. seated on chair with alarm engaged and all needs within reach. Will continue to follow epr PT POC. Chair follow given for amb. Pt. requries continued skilled PT to address mobility, balance, strength and ROM deficits.  Barriers to Discharge: None  Barriers to Discharge Comments: Continue to recommend  level I maximal rehab resource intensity  at time  of d/c in order to maximize pt's functional independence and safety w/ mobility. Pt continues to be functioning below baseline level. PT will continue to see pt while here in order to address the deficits listed above and provide interventions consistent w/ POC in effort to achieve STGs.  Rehab Resource Intensity Level, PT: I (Maximum Resource Intensity)    See flowsheet documentation for full assessment.

## 2025-03-20 NOTE — UTILIZATION REVIEW
Continued Stay Review    Date: 3/20                          Current Patient Class: Inpatient    Level of Care:   3/13 - 3/15   Level 1 stepdown  3/15 - 3/18   Critical   3/18 - current    MS       HPI:59 y.o. male initially transferred to [facility] on  3/13   d/t  New/progressive R parietal lobe stroke  w/ L sided weakness. CTH with age-indeterminate infarcts in the L centrum semiovale. CTA H/N wwo contrast without IR target but did show worsening severe stenosis of L ICA.  MRI brain showed multiple small acattered acute infarcts in the L cerebral hemisphere that were also present on flair, there was also a tiny focus of mild DWI signal in R central semiovale that may represent a recent infarct. Pt was not a TNK candidate. Pt was loaded with  mg x 1 and Plavix 300 mg x 1. The vascular team was consulted and initially agreed with DAPT with plan for revascularization of L carotid artery sometime after post stroke day 3.    MRI brain showed mild progression of cerebral ischemia mostly on the L side with some additional infarcts on the R.       3/15   TRANSFER to [facility]   following  fall when standing up from chair and fell with head strike. Follow-up CTH was negative. TTE bubble study obtained to evaluate for central embolic source was negative. Stable neuro exam - mild expressive aphasia, primarily RUE weakness.   Required vasopressor support   Cont plavix/ heparin gtt.   3/17  completed L CEA  3/18  Continue IV heparin;  Transfer to [facility] floor.  3/19  alert/oriented PPT.   RUE weakness  4/5  compared to left  (stable)  ongoing therapies     3/20  n.p.o., planning for transesophageal echo this afternoon for evaluation of intracardiac source of embolism.  Blood pressure has improved with restarting his carvedilol, hydrochlorothiazide, amlodipine, uptitrate as needed.      Medications:   Scheduled Medications:  amLODIPine, 5 mg, Oral, Daily  aspirin, 81 mg, Oral, Daily  atorvastatin, 40 mg, Oral,  Daily With Dinner  carvedilol, 25 mg, Oral, BID With Meals  clopidogrel, 75 mg, Oral, Daily  enoxaparin, 40 mg, Subcutaneous, Q24H TIEN  hydroCHLOROthiazide, 25 mg, Oral, Daily  polyethylene glycol, 17 g, Oral, Daily  senna, 1 tablet, Oral, HS  [Held by provider] spironolactone, 25 mg, Oral, Daily      Continuous IV Infusions:  phenylephine,  mcg/min, Intravenous, Continuous PRN      PRN Meds:  acetaminophen, 650 mg, Oral, Q8H PRN  labetalol, 5 mg, Intravenous, Q15 Min PRN  ondansetron, 4 mg, Intravenous, Q6H PRN  oxyCODONE, 10 mg, Oral, Q4H PRN  oxyCODONE, 5 mg, Oral, Q4H PRN  phenylephine,  mcg/min, Intravenous, Continuous PRN      Discharge Plan: tbd    Vital Signs (last 3 days)       Date/Time Temp Pulse Resp BP MAP (mmHg) SpO2 O2 Device Patient Position - Orthostatic VS [place] Coma Scale Score Pain    03/20/25 0716 98 °F (36.7 °C) 82 18 154/98 108 92 % None (Room air) Lying -- --    03/20/25 0130 -- -- -- -- -- -- -- -- 15 --    03/19/25 2320 98.1 °F (36.7 °C) 82 18 147/70 102 96 % None (Room air) Lying -- --    03/19/25 2129 -- -- -- -- -- -- -- -- 15 No Pain    03/19/25 1603 -- -- -- -- -- -- -- -- 15 --    03/19/25 1556 -- -- -- -- -- -- -- -- -- 3    03/19/25 1517 97.8 °F (36.6 °C) 106 18 131/72 93 95 % None (Room air) Sitting -- --    03/19/25 1417 -- -- -- -- -- -- -- -- -- No Pain    03/19/25 1300 -- -- -- -- -- -- -- -- 15 No Pain    03/19/25 0857 -- -- -- 169/99 -- -- -- -- -- --    03/19/25 0723 97.6 °F (36.4 °C) 79 18 175/84 120 96 % None (Room air) Lying -- --    03/19/25 0539 -- -- -- -- -- -- -- -- -- 5    03/18/25 2334 98.1 °F (36.7 °C) 82 18 152/75 106 94 % None (Room air) Lying -- --    03/18/25 2125 -- -- -- -- -- -- -- -- -- No Pain    03/18/25 2045 -- -- -- -- -- -- -- -- 15 No Pain    03/18/25 1920 98.7 °F (37.1 °C) 73 18 161/92 120 96 % None (Room air) Lying -- --    03/18/25 1800 -- -- -- -- -- -- -- -- 15 No Pain    03/18/25 1755 97.4 °F (36.3 °C) 77 20 156/82 112 98 % --  Lying -- --          Weight (last 2 days)       Date/Time Weight    03/20/25 0600 90.1 (198.63)    03/19/25 0600 90.4 (199.3)            Pertinent Labs/Diagnostic Results:   Radiology:  VAS intra-op ultrasound CEA   Final Interpretation by Joss Calderón MD (03/19 1303)      XR hips bilateral 2 vw w pelvis if performed   Final Interpretation by Damien Christie MD (03/16 0856)      No acute osseous abnormality.         Computerized Assisted Algorithm (CAA) may have been used to analyze all applicable images.            Workstation performed: [id number]         CT head wo contrast   Final Interpretation by Blake Nguyen MD (03/15 1553)      1.  No intracranial hemorrhage or calvarial fracture.   2.  Left greater than right subacute to chronic watershed type infarctions are stable.                  Workstation performed: [id number]               Results from last 7 days   Lab Units 03/20/25  0504 03/18/25  0556 03/17/25  1543 03/17/25  0505 03/15/25  0438 03/14/25  0453   WBC Thousand/uL 8.39 13.38*  --  8.72 11.77* 8.56   HEMOGLOBIN g/dL 12.5 11.7*  --  12.1 12.3 12.4   HEMATOCRIT % 36.6 34.2*  --  35.4* 36.3* 36.7   PLATELETS Thousands/uL 303 265 237 262 301 263   TOTAL NEUT ABS Thousands/µL  --   --   --  5.77 7.70* 5.63         Results from last 7 days   Lab Units 03/20/25  0504 03/18/25  0556 03/17/25  0505 03/15/25  0438 03/14/25  0453   SODIUM mmol/L 136 136 137 138 138   POTASSIUM mmol/L 3.7 3.7 3.6 3.6 3.8   CHLORIDE mmol/L 103 106 107 108 106   CO2 mmol/L 25 22 23 21 25   ANION GAP mmol/L 8 8 7 9 7   BUN mg/dL 19 18 18 25 22   CREATININE mg/dL 1.25 1.27 1.23 1.29 1.33*   EGFR ml/min/1.73sq m 62 61 63 59 57   CALCIUM mg/dL 9.2 8.7 8.4 8.3* 8.7   CALCIUM, IONIZED mmol/L  --   --  1.12 1.11* 1.13   MAGNESIUM mg/dL  --  1.8* 1.7* 1.9 1.9   PHOSPHORUS mg/dL  --  4.3* 3.3 3.1 3.4             Results from last 7 days   Lab Units 03/20/25  0504 03/18/25  0556 03/17/25  0505 03/15/25  0438  03/14/25  0453   GLUCOSE RANDOM mg/dL 98 106 109 111 115       Results from last 7 days   Lab Units 03/20/25  0504 03/19/25  0452 03/18/25  1325 03/18/25  0556 03/17/25  2245 03/17/25  1626 03/14/25  0532 03/13/25  2256   PROTIME seconds  --   --   --  15.0  --  14.8  --  14.8   INR   --   --   --  1.16  --  1.14  --  1.14   PTT seconds 44* 71* 63* 79*   < > 37*   < > 83*    < > = values in this interval not displayed.           Network Utilization Review Department  ATTENTION: Please call with any questions or concerns to [phone] and carefully listen to the prompts so that you are directed to the right person. All voicemails are confidential.   For Discharge needs, contact Care Management DC Support Team at [phone] opt. 2  Send all requests for admission clinical reviews, approved or denied determinations and any other requests to dedicated fax number below belonging to the campus where the patient is receiving treatment. List of dedicated fax numbers for the Facilities:  FACILITY NAME UR FAX NUMBER   ADMISSION DENIALS (Administrative/Medical Necessity) [phone]   DISCHARGE SUPPORT TEAM (NETWORK) [phone]   PARENT CHILD HEALTH (Maternity/NICU/Pediatrics) [phone]   [facility] [phone]   [facility] [phone]   [facility] [phone]   [facility] [phone]   [facility] [phone]   [facility] [phone]   [facility] [phone]   [facility] [phone]   [facility] [phone]   [facility] [phone]   [facility] [phone]   [facility] [phone]

## 2025-03-20 NOTE — OCCUPATIONAL THERAPY NOTE
Occupational Therapy Cancel Note:    Patient Name: Ez Dsouza  Today's Date: 3/20/2025    Chart reviewed. Pt off floor at cardiac cath lab for CHANCE. Will cx and complete OT session at later time as schedule permits.    Meka Nails, OTR/L

## 2025-03-20 NOTE — ASSESSMENT & PLAN NOTE
Patient is a 59-year-old male with past medical history significant but not limited to coronary artery disease, hypertension, hyperlipidemia, tobacco use disorder, history of fall in 23 with rib fractures and right wrist fracture, woke up with right-sided weakness and presented to [facility] via EMS on 3/10/2025 with complaints of right-sided weakness and dysarthria.  Patient was noted to have primary left hemispheric stroke secondary to symptomatic left ICA stenosis.  For carotid endarterectomy patient was transferred from [place] to [facility] on 3/13/2025 for the procedure scheduled on 3/17/2025 with Dr Calderón.  Patient remained in ICU till 3/18/2025 and then was downgraded to floors.  Patient's antihypertensive medications were slowly introduced back from 3/19/2025, heparin was discontinued and patient was initiated on DAPT aspirin along with clopidogrel, vascular neuro and cardiology participated in patient care patient to get CHANCE on 3/20/2025    -3/10/2025 MRI brain showed multiple small acute CVAs in the left periventricular subcortical frontal and parietal/occipital lobes with small petechial hemorrhage  -CTA neck showed right ICA mild stenosis but left ICA with 70% stenosis with soft plaque and increased irregularity compared to prior study in 2024.  -Upon arrival in the [facility] on 3/30/2025, neurology evaluated patient due to concerns of worsening neurological symptoms, patient was scheduled for endarterectomy for 3/17/2025.  -3/13/2025 MRI showed multiple small foci of ischemia in the left MCA territory.CAMRYN and MCA watershed territories on the left as well as left caudate.  -3/13/2025 CT head and neck showed scattered left hemispheric infarct unchanged left ICA high-grade stenosis  3/15/2025 CT head no intracranial hemorrhage or clavicular fracture.  -Patient underwent carotid endarterectomy on 3/17/125, vascular on board continue with Plavix along with  aspirin along with Lipitor and frequent neurochecks.  -Cardiology on board patient scheduled for CHANCE on 3/20/124-n.p.o. orders in.  -Blood pressure medications reinitiated- carvedilol/ hydrochlorothiazide/ amlodipine remaining to be initiated as per patient's tolerability.  -Neurology on board appreciate recs.  -Patient will require outpatient follow-up with cardiology neurology and vascular surgery upon discharge.  -PT OT on board-plans for short-term rehab on discharge

## 2025-03-20 NOTE — PLAN OF CARE
Problem: Prexisting or High Potential for Compromised Skin Integrity  Goal: Skin integrity is maintained or improved  Description: INTERVENTIONS:  - Identify patients at risk for skin breakdown  - Assess and monitor skin integrity  - Assess and monitor nutrition and hydration status  - Monitor labs   - Assess for incontinence   - Turn and reposition patient  - Assist with mobility/ambulation  - Relieve pressure over bony prominences  - Avoid friction and shearing  - Provide appropriate hygiene as needed including keeping skin clean and dry  - Evaluate need for skin moisturizer/barrier cream  - Collaborate with interdisciplinary team   - Patient/family teaching  - Consider wound care consult   Outcome: Progressing     Problem: PAIN - ADULT  Goal: Verbalizes/displays adequate comfort level or baseline comfort level  Description: Interventions:  - Encourage patient to monitor pain and request assistance  - Assess pain using appropriate pain scale  - Administer analgesics based on type and severity of pain and evaluate response  - Implement non-pharmacological measures as appropriate and evaluate response  - Consider cultural and social influences on pain and pain management  - Notify physician/advanced practitioner if interventions unsuccessful or patient reports new pain  Outcome: Progressing     Problem: INFECTION - ADULT  Goal: Absence or prevention of progression during hospitalization  Description: INTERVENTIONS:  - Assess and monitor for signs and symptoms of infection  - Monitor lab/diagnostic results  - Monitor all insertion sites, i.e. indwelling lines, tubes, and drains  - Monitor endotracheal if appropriate and nasal secretions for changes in amount and color  - [place] appropriate cooling/warming therapies per order  - Administer medications as ordered  - Instruct and encourage patient and family to use good hand hygiene technique  - Identify and instruct in appropriate isolation precautions for  identified infection/condition  Outcome: Progressing  Goal: Absence of fever/infection during neutropenic period  Description: INTERVENTIONS:  - Monitor WBC    Outcome: Progressing     Problem: SAFETY ADULT  Goal: Patient will remain free of falls  Description: INTERVENTIONS:  - Educate patient/family on patient safety including physical limitations  - Instruct patient to call for assistance with activity   - Consult OT/PT to assist with strengthening/mobility   - Keep Call bell within reach  - Keep bed low and locked with side rails adjusted as appropriate  - Keep care items and personal belongings within reach  - Initiate and maintain comfort rounds  - Make Fall Risk Sign visible to staff  - Offer Toileting every  Hours, in advance of need  - Initiate/Maintain alarm  - Obtain necessary fall risk management equipment:   - Apply yellow socks and bracelet for high fall risk patients  - Consider moving patient to room near nurses station  Outcome: Progressing  Goal: Maintain or return to baseline ADL function  Description: INTERVENTIONS:  -  Assess patient's ability to carry out ADLs; assess patient's baseline for ADL function and identify physical deficits which impact ability to perform ADLs (bathing, care of mouth/teeth, toileting, grooming, dressing, etc.)  - Assess/evaluate cause of self-care deficits   - Assess range of motion  - Assess patient's mobility; develop plan if impaired  - Assess patient's need for assistive devices and provide as appropriate  - Encourage maximum independence but intervene and supervise when necessary  - Involve family in performance of ADLs  - Assess for home care needs following discharge   - Consider OT consult to assist with ADL evaluation and planning for discharge  - Provide patient education as appropriate  Outcome: Progressing  Goal: Maintains/Returns to pre admission functional level  Description: INTERVENTIONS:  - Perform AM-PAC 6 Click Basic Mobility/ Daily Activity  assessment daily.  - Set and communicate daily mobility goal to care team and patient/family/caregiver.   - Collaborate with rehabilitation services on mobility goals if consulted  - Perform Range of Motion  times a day.  - Reposition patient every  hours.  - Dangle patient  times a day  - Stand patient  times a day  - Ambulate patient  times a day  - Out of bed to chair  times a day   - Out of bed for meals  times a day  - Out of bed for toileting  - Record patient progress and toleration of activity level   Outcome: Progressing     Problem: DISCHARGE PLANNING  Goal: Discharge to home or other facility with appropriate resources  Description: INTERVENTIONS:  - Identify barriers to discharge w/patient and caregiver  - Arrange for needed discharge resources and transportation as appropriate  - Identify discharge learning needs (meds, wound care, etc.)  - Arrange for interpretive services to assist at discharge as needed  - Refer to Case Management Department for coordinating discharge planning if the patient needs post-hospital services based on physician/advanced practitioner order or complex needs related to functional status, cognitive ability, or social support system  Outcome: Progressing     Problem: Knowledge Deficit  Goal: Patient/family/caregiver demonstrates understanding of disease process, treatment plan, medications, and discharge instructions  Description: Complete learning assessment and assess knowledge base.  Interventions:  - Provide teaching at level of understanding  - Provide teaching via preferred learning methods  Outcome: Progressing     Problem: Nutrition/Hydration-ADULT  Goal: Nutrient/Hydration intake appropriate for improving, restoring or maintaining nutritional needs  Description: Monitor and assess patient's nutrition/hydration status for malnutrition. Collaborate with interdisciplinary team and initiate plan and interventions as ordered.  Monitor patient's weight and dietary intake as  ordered or per policy. Utilize nutrition screening tool and intervene as necessary. Determine patient's food preferences and provide high-protein, high-caloric foods as appropriate.     INTERVENTIONS:  - Monitor oral intake, urinary output, labs, and treatment plans  - Assess nutrition and hydration status and recommend course of action  - Evaluate amount of meals eaten  - Assist patient with eating if necessary   - Allow adequate time for meals  - Recommend/ encourage appropriate diets, oral nutritional supplements, and vitamin/mineral supplements  - Order, calculate, and assess calorie counts as needed  - Recommend, monitor, and adjust tube feedings and TPN/PPN based on assessed needs  - Assess need for intravenous fluids  - Provide specific nutrition/hydration education as appropriate  - Include patient/family/caregiver in decisions related to nutrition  Outcome: Progressing

## 2025-03-21 VITALS
BODY MASS INDEX: 25.24 KG/M2 | HEIGHT: 74 IN | SYSTOLIC BLOOD PRESSURE: 118 MMHG | WEIGHT: 196.65 LBS | DIASTOLIC BLOOD PRESSURE: 83 MMHG | HEART RATE: 84 BPM | TEMPERATURE: 97.9 F | RESPIRATION RATE: 20 BRPM | OXYGEN SATURATION: 94 %

## 2025-03-21 DIAGNOSIS — I63.232 ACUTE CEREBROVASCULAR ACCIDENT (CVA) DUE TO STENOSIS OF LEFT CAROTID ARTERY (HCC): Primary | ICD-10-CM

## 2025-03-21 LAB
ANION GAP SERPL CALCULATED.3IONS-SCNC: 8 MMOL/L (ref 4–13)
BUN SERPL-MCNC: 26 MG/DL (ref 5–25)
CALCIUM SERPL-MCNC: 9.4 MG/DL (ref 8.4–10.2)
CHLORIDE SERPL-SCNC: 101 MMOL/L (ref 96–108)
CO2 SERPL-SCNC: 26 MMOL/L (ref 21–32)
CREAT SERPL-MCNC: 1.27 MG/DL (ref 0.6–1.3)
ERYTHROCYTE [DISTWIDTH] IN BLOOD BY AUTOMATED COUNT: 14.2 % (ref 11.6–15.1)
GFR SERPL CREATININE-BSD FRML MDRD: 61 ML/MIN/1.73SQ M
GLUCOSE SERPL-MCNC: 108 MG/DL (ref 65–140)
GLUCOSE SERPL-MCNC: 120 MG/DL (ref 65–140)
GLUCOSE SERPL-MCNC: 128 MG/DL (ref 65–140)
GLUCOSE SERPL-MCNC: 148 MG/DL (ref 65–140)
HCT VFR BLD AUTO: 36 % (ref 36.5–49.3)
HGB BLD-MCNC: 12.2 G/DL (ref 12–17)
MAGNESIUM SERPL-MCNC: 1.9 MG/DL (ref 1.9–2.7)
MCH RBC QN AUTO: 30.3 PG (ref 26.8–34.3)
MCHC RBC AUTO-ENTMCNC: 33.9 G/DL (ref 31.4–37.4)
MCV RBC AUTO: 89 FL (ref 82–98)
PLATELET # BLD AUTO: 288 THOUSANDS/UL (ref 149–390)
PMV BLD AUTO: 10.1 FL (ref 8.9–12.7)
POTASSIUM SERPL-SCNC: 4.1 MMOL/L (ref 3.5–5.3)
RBC # BLD AUTO: 4.03 MILLION/UL (ref 3.88–5.62)
SODIUM SERPL-SCNC: 135 MMOL/L (ref 135–147)
WBC # BLD AUTO: 9.11 THOUSAND/UL (ref 4.31–10.16)

## 2025-03-21 PROCEDURE — 99239 HOSP IP/OBS DSCHRG MGMT >30: CPT

## 2025-03-21 PROCEDURE — 88311 DECALCIFY TISSUE: CPT | Performed by: PATHOLOGY

## 2025-03-21 PROCEDURE — 99024 POSTOP FOLLOW-UP VISIT: CPT | Performed by: SURGERY

## 2025-03-21 PROCEDURE — 85027 COMPLETE CBC AUTOMATED: CPT

## 2025-03-21 PROCEDURE — 86147 CARDIOLIPIN ANTIBODY EA IG: CPT | Performed by: PSYCHIATRY & NEUROLOGY

## 2025-03-21 PROCEDURE — 81241 F5 GENE: CPT | Performed by: PSYCHIATRY & NEUROLOGY

## 2025-03-21 PROCEDURE — 85306 CLOT INHIBIT PROT S FREE: CPT | Performed by: PSYCHIATRY & NEUROLOGY

## 2025-03-21 PROCEDURE — 85598 HEXAGNAL PHOSPH PLTLT NEUTRL: CPT | Performed by: PSYCHIATRY & NEUROLOGY

## 2025-03-21 PROCEDURE — 85303 CLOT INHIBIT PROT C ACTIVITY: CPT | Performed by: PSYCHIATRY & NEUROLOGY

## 2025-03-21 PROCEDURE — 85732 THROMBOPLASTIN TIME PARTIAL: CPT | Performed by: PSYCHIATRY & NEUROLOGY

## 2025-03-21 PROCEDURE — 83735 ASSAY OF MAGNESIUM: CPT

## 2025-03-21 PROCEDURE — 80048 BASIC METABOLIC PNL TOTAL CA: CPT

## 2025-03-21 PROCEDURE — 86146 BETA-2 GLYCOPROTEIN ANTIBODY: CPT | Performed by: PSYCHIATRY & NEUROLOGY

## 2025-03-21 PROCEDURE — 85613 RUSSELL VIPER VENOM DILUTED: CPT | Performed by: PSYCHIATRY & NEUROLOGY

## 2025-03-21 PROCEDURE — 88304 TISSUE EXAM BY PATHOLOGIST: CPT | Performed by: PATHOLOGY

## 2025-03-21 PROCEDURE — 99232 SBSQ HOSP IP/OBS MODERATE 35: CPT

## 2025-03-21 PROCEDURE — 82948 REAGENT STRIP/BLOOD GLUCOSE: CPT

## 2025-03-21 RX ORDER — ACETAMINOPHEN 325 MG/1
650 TABLET ORAL EVERY 8 HOURS PRN
Start: 2025-03-21

## 2025-03-21 RX ORDER — SENNOSIDES 8.6 MG
8.6 TABLET ORAL
Status: ON HOLD
Start: 2025-03-21 | End: 2025-03-28

## 2025-03-21 RX ORDER — CLOPIDOGREL BISULFATE 75 MG/1
75 TABLET ORAL DAILY
Start: 2025-03-22

## 2025-03-21 RX ORDER — POLYETHYLENE GLYCOL 3350 17 G/17G
17 POWDER, FOR SOLUTION ORAL DAILY PRN
Status: ON HOLD
Start: 2025-03-21 | End: 2025-03-28

## 2025-03-21 RX ADMIN — ENOXAPARIN SODIUM 40 MG: 40 INJECTION SUBCUTANEOUS at 08:34

## 2025-03-21 RX ADMIN — CARVEDILOL 25 MG: 25 TABLET, FILM COATED ORAL at 08:39

## 2025-03-21 RX ADMIN — SPIRONOLACTONE 25 MG: 25 TABLET ORAL at 10:10

## 2025-03-21 RX ADMIN — ASPIRIN 81 MG: 81 TABLET, CHEWABLE ORAL at 08:34

## 2025-03-21 RX ADMIN — AMLODIPINE BESYLATE 5 MG: 5 TABLET ORAL at 08:34

## 2025-03-21 RX ADMIN — CLOPIDOGREL BISULFATE 75 MG: 75 TABLET ORAL at 08:34

## 2025-03-21 RX ADMIN — POLYETHYLENE GLYCOL 3350 17 G: 17 POWDER, FOR SOLUTION ORAL at 08:39

## 2025-03-21 RX ADMIN — HYDROCHLOROTHIAZIDE 25 MG: 25 TABLET ORAL at 08:34

## 2025-03-21 NOTE — PLAN OF CARE
Problem: Prexisting or High Potential for Compromised Skin Integrity  Goal: Skin integrity is maintained or improved  Description: INTERVENTIONS:  - Identify patients at risk for skin breakdown  - Assess and monitor skin integrity  - Assess and monitor nutrition and hydration status  - Monitor labs   - Assess for incontinence   - Turn and reposition patient  - Assist with mobility/ambulation  - Relieve pressure over bony prominences  - Avoid friction and shearing  - Provide appropriate hygiene as needed including keeping skin clean and dry  - Evaluate need for skin moisturizer/barrier cream  - Collaborate with interdisciplinary team   - Patient/family teaching  - Consider wound care consult   Outcome: Progressing     Problem: PAIN - ADULT  Goal: Verbalizes/displays adequate comfort level or baseline comfort level  Description: Interventions:  - Encourage patient to monitor pain and request assistance  - Assess pain using appropriate pain scale  - Administer analgesics based on type and severity of pain and evaluate response  - Implement non-pharmacological measures as appropriate and evaluate response  - Consider cultural and social influences on pain and pain management  - Notify physician/advanced practitioner if interventions unsuccessful or patient reports new pain  Outcome: Progressing     Problem: INFECTION - ADULT  Goal: Absence or prevention of progression during hospitalization  Description: INTERVENTIONS:  - Assess and monitor for signs and symptoms of infection  - Monitor lab/diagnostic results  - Monitor all insertion sites, i.e. indwelling lines, tubes, and drains  - Monitor endotracheal if appropriate and nasal secretions for changes in amount and color  - [place] appropriate cooling/warming therapies per order  - Administer medications as ordered  - Instruct and encourage patient and family to use good hand hygiene technique  - Identify and instruct in appropriate isolation precautions for  identified infection/condition  Outcome: Progressing  Goal: Absence of fever/infection during neutropenic period  Description: INTERVENTIONS:  - Monitor WBC    Outcome: Progressing     Problem: SAFETY ADULT  Goal: Patient will remain free of falls  Description: INTERVENTIONS:  - Educate patient/family on patient safety including physical limitations  - Instruct patient to call for assistance with activity   - Consult OT/PT to assist with strengthening/mobility   - Keep Call bell within reach  - Keep bed low and locked with side rails adjusted as appropriate  - Keep care items and personal belongings within reach  - Initiate and maintain comfort rounds  - Make Fall Risk Sign visible to staff  - Offer Toileting every 2 Hours, in advance of need  - Initiate/Maintain bed alarm  - Obtain necessary fall risk management equipment:  socks, yellow bracelet, call bell  - Apply yellow socks and bracelet for high fall risk patients  - Consider moving patient to room near nurses station  Outcome: Progressing  Goal: Maintain or return to baseline ADL function  Description: INTERVENTIONS:  -  Assess patient's ability to carry out ADLs; assess patient's baseline for ADL function and identify physical deficits which impact ability to perform ADLs (bathing, care of mouth/teeth, toileting, grooming, dressing, etc.)  - Assess/evaluate cause of self-care deficits   - Assess range of motion  - Assess patient's mobility; develop plan if impaired  - Assess patient's need for assistive devices and provide as appropriate  - Encourage maximum independence but intervene and supervise when necessary  - Involve family in performance of ADLs  - Assess for home care needs following discharge   - Consider OT consult to assist with ADL evaluation and planning for discharge  - Provide patient education as appropriate  Outcome: Progressing  Goal: Maintains/Returns to pre admission functional level  Description: INTERVENTIONS:  - Perform AM-PAC 6  Click Basic Mobility/ Daily Activity assessment daily.  - Set and communicate daily mobility goal to care team and patient/family/caregiver.   - Collaborate with rehabilitation services on mobility goals if consulted  - Perform Range of Motion 3 times a day.  - Reposition patient every 2 hours.  - Dangle patient 3 times a day  - Stand patient 3 times a day  - Ambulate patient 3 times a day  - Out of bed to chair 3 times a day   - Out of bed for meals 3 times a day  - Out of bed for toileting  - Record patient progress and toleration of activity level   Outcome: Progressing     Problem: DISCHARGE PLANNING  Goal: Discharge to home or other facility with appropriate resources  Description: INTERVENTIONS:  - Identify barriers to discharge w/patient and caregiver  - Arrange for needed discharge resources and transportation as appropriate  - Identify discharge learning needs (meds, wound care, etc.)  - Arrange for interpretive services to assist at discharge as needed  - Refer to Case Management Department for coordinating discharge planning if the patient needs post-hospital services based on physician/advanced practitioner order or complex needs related to functional status, cognitive ability, or social support system  Outcome: Progressing     Problem: Knowledge Deficit  Goal: Patient/family/caregiver demonstrates understanding of disease process, treatment plan, medications, and discharge instructions  Description: Complete learning assessment and assess knowledge base.  Interventions:  - Provide teaching at level of understanding  - Provide teaching via preferred learning methods  Outcome: Progressing     Problem: Nutrition/Hydration-ADULT  Goal: Nutrient/Hydration intake appropriate for improving, restoring or maintaining nutritional needs  Description: Monitor and assess patient's nutrition/hydration status for malnutrition. Collaborate with interdisciplinary team and initiate plan and interventions as ordered.   Monitor patient's weight and dietary intake as ordered or per policy. Utilize nutrition screening tool and intervene as necessary. Determine patient's food preferences and provide high-protein, high-caloric foods as appropriate.     INTERVENTIONS:  - Monitor oral intake, urinary output, labs, and treatment plans  - Assess nutrition and hydration status and recommend course of action  - Evaluate amount of meals eaten  - Assist patient with eating if necessary   - Allow adequate time for meals  - Recommend/ encourage appropriate diets, oral nutritional supplements, and vitamin/mineral supplements  - Order, calculate, and assess calorie counts as needed  - Recommend, monitor, and adjust tube feedings and TPN/PPN based on assessed needs  - Assess need for intravenous fluids  - Provide specific nutrition/hydration education as appropriate  - Include patient/family/caregiver in decisions related to nutrition  Outcome: Progressing

## 2025-03-21 NOTE — RESTORATIVE TECHNICIAN NOTE
Restorative Technician Note      Patient Name: Ez Dsouza     Restorative Tech Visit Date: 03/21/25  Note Type: Mobility  Patient Position Upon Consult: Supine  Mobility / Activity Provided: Assisted Restorative with chair follow  Activity Performed: Ambulated  Assistive Device: Roller walker  Patient Position at End of Consult: All needs within reach; Supine

## 2025-03-21 NOTE — CASE MANAGEMENT
Per Availity, IRF auth still pended at this time. Called insurance to check status of submitted auth (P#: [phone]). Unable to reach an insurance rep after 3 attempts. Request faxed to insurance requesting [employer] be contacted regarding auth status.     Pended Auth#: [id number]   CM notified: Yoselyn Jessica

## 2025-03-21 NOTE — PROGRESS NOTES
Progress Note - Vascular Surgery   Name: Ez Dsouza 59 y.o. male I MRN: [id number]  Unit/Bed#: E4 -01 I Date of Admission: 3/13/2025   Date of Service: 3/21/2025 I Hospital Day: 8    Assessment & Plan  Acute cerebrovascular accident (CVA) due to stenosis of left carotid artery (HCC)  59-year-old male with hx of HTN, CAD, HLD, tobacco abuse, hx of fall '24 w/ rib fxs and R wrist fx, woke up with R side weakness and presented to [facility] by EMS. Noted to have primarily L hemispheric strokes 2/2 symptomatic L ICA stenosis. Patient transferred to [facility] overnight for tentative L carotid enterectomy on 3/17/25 with .    Plan  -S/p left carotid CEA, doing well post-operatively   - Residual RUE weakness from index event improving per patient   -L neck incision site with mild swelling improving without evidence of infection   -MRI with bilateral lesions including multiple small foci of ischemia in L MCA territory, CAMRYN to MCA watershed territory on the L as well as L caudate, R parietal lobe however CHANCE without mural thrombus although with small PFO noted    - AC discontinued by primary team   -Recommend DAPT with aspirin/plavix from vascular perspective in setting of symptomatic carotid lesion   -Continue statin therapy   -Neurology input appreciated   -Stable for discharge from vascular standpoint   -Remainder of care per SLIM  Localized swelling, mass or lump of neck    S/P carotid endarterectomy      24 Hour Events : no acute events overnight.   Subjective : Pt sitting in bed, wife at the bedside. Residual RUE weakness, no HA, vision changes, paresthesias.      Objective :  Temp:  [97.5 °F (36.4 °C)-98.9 °F (37.2 °C)] 98 °F (36.7 °C)  HR:  [69-81] 69  BP: (102-144)/(69-90) 131/70  Resp:  [18] 18  SpO2:  [95 %-98 %] 95 %  O2 Device: None (Room air)     I/O         03/17 0701  03/18 0700 03/18 0701  03/19 0700 03/19 0701 03/20 0700    P.O.  30     I.V. (mL/kg) 1801.5 (20.2) 345.5 (3.8)      Total Intake(mL/kg) 1801.5 (20.2) 375.5 (4.2)     Urine (mL/kg/hr) 1100 (0.5) 2350 (1.1)     Stool       Total Output 1100 2350     Net +701.5 -1974.5                    Physical Exam  Vitals and nursing note reviewed. Exam conducted with a chaperone present.   Constitutional:       General: He is not in acute distress.     Appearance: He is not ill-appearing.   HENT:      Head: Normocephalic and atraumatic.   Eyes:      Extraocular Movements: Extraocular movements intact.   Cardiovascular:      Rate and Rhythm: Normal rate.      Pulses: Normal pulses.      Heart sounds: Normal heart sounds. No murmur heard.  Pulmonary:      Effort: Pulmonary effort is normal.   Musculoskeletal:         General: Tenderness: L neck incision.      Comments: RUE 2/5 motor function   Skin:     General: Skin is warm and dry.      Comments: L neck incision site clean, dry and well approximated with surgical glue in place. Mild-moderate swelling. Tenderness with palpation.    Neurological:      Mental Status: He is alert and oriented to person, place, and time.      Sensory: No sensory deficit.      Comments: CN grossly intact    Psychiatric:         Mood and Affect: Mood normal.         Behavior: Behavior normal.             Lab Results: I have reviewed the following results:  CBC with diff:   Lab Results   Component Value Date    WBC 9.11 03/21/2025    HGB 12.2 03/21/2025    HCT 36.0 (L) 03/21/2025    MCV 89 03/21/2025     03/21/2025    RBC 4.03 03/21/2025    MCH 30.3 03/21/2025    MCHC 33.9 03/21/2025    RDW 14.2 03/21/2025    MPV 10.1 03/21/2025    NRBC 0 03/17/2025   ,   BMP/CMP:  Lab Results   Component Value Date    SODIUM 135 03/21/2025    K 4.1 03/21/2025     03/21/2025    CO2 26 03/21/2025    CO2 29 06/22/2024    BUN 26 (H) 03/21/2025    CREATININE 1.27 03/21/2025    GLUCOSE 87 06/22/2024    CALCIUM 9.4 03/21/2025    AST 29 06/26/2024    ALT 29 06/26/2024    ALKPHOS 98 06/26/2024    EGFR 61 03/21/2025   ,   Lipid  "Panel: No results found for: \"CHOL\",   Coags:   Lab Results   Component Value Date    PTT 44 (H) 03/20/2025    INR 1.16 03/18/2025   ,   Blood Culture: No results found for: \"BLOODCX\",   Urinalysis:   Lab Results   Component Value Date    COLORU Light Yellow 03/10/2025    CLARITYU Clear 03/10/2025    SPECGRAV 1.043 (H) 03/10/2025    PHUR 5.5 03/10/2025    LEUKOCYTESUR Negative 03/10/2025    NITRITE Negative 03/10/2025    GLUCOSEU Negative 03/10/2025    KETONESU Negative 03/10/2025    BILIRUBINUR Negative 03/10/2025    BLOODU Moderate (A) 03/10/2025   ,   Urine Culture: No results found for: \"URINECX\",   Wound Culure: No results found for: \"WOUNDCULT\"    Imaging Results Review: I reviewed radiology reports from this admission including: Ultrasound(s).      VTE Prophylaxis: VTE covered by:  enoxaparin, Subcutaneous, 40 mg at 03/21/25 0834     and Sequential compression device (Venodyne)   "

## 2025-03-21 NOTE — PROGRESS NOTES
RN notified that patient was put in chair by PT.  While walking past pt room, pt found sitting on floor.  Unwitnessed fall.  Physician notified, no injuries noted, pt denies pain.  Pt taken for CT of head and xray of hips.

## 2025-03-21 NOTE — RESTORATIVE TECHNICIAN NOTE
Restorative Technician Note      Patient Name: Ez Dsouza     Restorative Tech Visit Date: 03/21/25  Note Type: Mobility  Patient Position Upon Consult: Supine  Activity Performed: Ambulated  Assistive Device: Roller walker  Patient Position at End of Consult: All needs within reach; Supine; Bed/Chair alarm activated            ns

## 2025-03-21 NOTE — DISCHARGE SUMMARY
Discharge Summary - Hospitalist   Name: Ez Dsouza 59 y.o. male I MRN: [id number]  Unit/Bed#: E4 -01 I Date of Admission: 3/13/2025   Date of Service: 3/21/2025 I Hospital Day: 8     Assessment & Plan  Acute cerebrovascular accident (CVA) due to stenosis of left carotid artery (HCC)  Patient is a 59-year-old male with past medical history significant but not limited to coronary artery disease, hypertension, hyperlipidemia, tobacco use disorder, history of fall in 24 with rib fractures and right wrist fracture, woke up with right-sided weakness and presented to [facility] via EMS on 3/10/2025 with complaints of right-sided weakness and dysarthria.  Patient was noted to have primary left hemispheric stroke secondary to symptomatic left ICA stenosis.  For carotid endarterectomy patient was transferred from [place] to [facility] on 3/13/2025 for the procedure scheduled on 3/17/2025 with Dr Calderón.  Patient remained in ICU till 3/18/2025 and then was downgraded to floors.  Patient's antihypertensive medications were slowly introduced back from 3/19/2025, heparin was discontinued and patient was initiated on DAPT aspirin along with clopidogrel, vascular neuro and cardiology participated in patient care patient to get CHANCE on 3/20/2025    -3/10/2025 MRI brain showed multiple small acute CVAs in the left periventricular subcortical frontal and parietal/occipital lobes with small petechial hemorrhage  -CTA neck showed right ICA mild stenosis but left ICA with 70% stenosis with soft plaque and increased irregularity compared to prior study in 2024.  -Upon arrival in the [facility] on 3/30/2025, neurology evaluated patient due to concerns of worsening neurological symptoms, patient was scheduled for endarterectomy for 3/17/2025.  -3/13/2025 MRI showed multiple small foci of ischemia in the left MCA territory.CAMRYN and MCA watershed territories on the left as well as left  caudate.  -3/13/2025 CT head and neck showed scattered left hemispheric infarct unchanged left ICA high-grade stenosis  3/15/2025 CT head no intracranial hemorrhage or clavicular fracture.  -Patient underwent carotid endarterectomy on 3/17/125, vascular on board continue with Plavix along with aspirin along with Lipitor and frequent neurochecks.  -Cardiology on board patient scheduled for CHANCE on 3/20/124-tunneled patent foramen ovale confirmed at rest. There was a minimal amount of bubbles that crossed.  Would be requiring outpatient Zio patch arranged by cardiology upon discharge  -Blood pressure medications reinitiated  -Neurology on board appreciate recs.  -Patient will require outpatient follow-up with cardiology neurology and vascular surgery upon discharge.  -PT OT on board-plans for short-term rehab on discharge    Fall  Fall precaution in place  Primary hypertension  -Patient started on some of the medications consisting of carvedilol along with hydrochlorothiazide and amlodipine.   - Spironolactone currently on hold we will reinitiate as per telemetry of the patient  Hyperlipidemia  Continue prior to admission atorvastatin  Smoking  Cessation counseling given  Localized swelling, mass or lump of neck  Patient status post left CEA on 3/17/2025    -Swelling appreciated along with some pain  -Tylenol ordered along with cold compression as per vascular team  -Vascular surgery on board  S/P carotid endarterectomy  See above under CVA     Medical Problems       Resolved Problems  Date Reviewed: 3/16/2025   None       Discharging Physician / Practitioner: Mariela Garcia MD  PCP: Jakob Higgins MD  Admission Date:   Admission Orders (From admission, onward)       Ordered        03/13/25 2216  Inpatient Admission  Once                          Discharge Date: 03/21/25    Consultations During Hospital Stay:  Cardiology  Vascular surgery  Neurology  Critical care  Physical therapy and compression therapy      Procedures  Performed:   Carotid endarterectomy on the left side  CHANCE    Significant Findings / Test Results:   XR hips bilateral 2 vw w pelvis if performed  Result Date: 3/16/2025  Impression: No acute osseous abnormality. Computerized Assisted Algorithm (CAA) may have been used to analyze all applicable images. Workstation performed: [id number]     CT head wo contrast  Result Date: 3/15/2025  Impression: 1.  No intracranial hemorrhage or calvarial fracture. 2.  Left greater than right subacute to chronic watershed type infarctions are stable. Workstation performed: [id number]     MRI brain wo contrast  Result Date: 3/13/2025  Impression: Multiple small foci of acute/recent ischemia in the left MCA territory. These predominantly appear similar in the interim with slight worsening involvement of the CAMRYN to MCA watershed territory on the left as well as the left caudate head. Involvement of  the right parietal lobe also appears new/progressed in the interim. No large territory infarct or hemorrhage seen. Given the multiple vascular territories, consider a central embolic source. The study was marked in EPIC for immediate notification. Workstation performed: [id number]     CTA head and neck w wo contrast  Result Date: 3/13/2025  Impression: 1.  No acute intracranial hemorrhage, edema, or mass effect. Scattered left hemispheric infarctions as above, seen better on recent MRI. 2.  No new large vessel occlusion in the head or neck. 3.  Unchanged high-grade stenosis proximal left ICA. Stable pedunculated plaque and/or thrombus in the proximal left ICA lumen which may be the source of distal embolization. Workstation performed: [id number]     Lab Results   Component Value Date    WBC 9.11 03/21/2025    HGB 12.2 03/21/2025    HCT 36.0 (L) 03/21/2025    MCV 89 03/21/2025     03/21/2025     Lab Results   Component Value Date    SODIUM 135 03/21/2025    K 4.1 03/21/2025     03/21/2025    CO2 26 03/21/2025    BUN 26 (H)  03/21/2025    CREATININE 1.27 03/21/2025    GLUC 120 03/21/2025    CALCIUM 9.4 03/21/2025     Lab Results   Component Value Date    WBC 9.11 03/21/2025    HGB 12.2 03/21/2025    HCT 36.0 (L) 03/21/2025    MCV 89 03/21/2025     03/21/2025     Lab Results   Component Value Date    HGBA1C 6.9 (H) 03/10/2025     Lab Results   Component Value Date    CHOLESTEROL 188 03/10/2025    CHOLESTEROL 236 (H) 06/26/2024     Lab Results   Component Value Date    HDL 29 (L) 03/10/2025    HDL 39 (L) 06/26/2024     Lab Results   Component Value Date    TRIG 171 (H) 03/10/2025    TRIG 132 06/26/2024     Lab Results   Component Value Date    NONHDLC 197 06/26/2024     Lab Results   Component Value Date    WSL6FGLYLEFN 0.543 03/10/2025     Lab Results   Component Value Date    ALT 29 06/26/2024    AST 29 06/26/2024    ALKPHOS 98 06/26/2024     CHANCE    Left Ventricle: Left ventricular cavity size is normal. Wall thickness   is normal. The left ventricular ejection fraction is 55% by visual   estimation. Systolic function is normal. Wall motion is normal.    Atrial Septum: There is a very small tunneled patent foramen ovale   confirmed at rest.  There was a minimal amount of bubbles that crossed.    Left Atrial Appendage: Left atrial appendage is normal in size. There   is normal function. There is no thrombus.         Incidental Findings:   N/A    Test Results Pending at Discharge (will require follow up):   N/A     Outpatient Tests Requested:  N/A    Complications: N/A    Reason for Admission: Dizzy and weakness on the right side resulting in a fall    Hospital Course:   Ez Dsouza is a 59 y.o. male patient who originally presented to the hospital on 3/13/2025 due to weakness on the right side of the body resulting in fall.    Rest see above under acute CVA         Condition at Discharge: good    Discharge Day Visit / Exam:   Subjective: Patient seen examined at bedside.  Stated he feels better.  No acute distress  Vitals: Blood  "Pressure: 131/70 (03/21/25 0834)  Pulse: 69 (03/21/25 0724)  Temperature: 98 °F (36.7 °C) (03/21/25 0724)  Temp Source: Temporal (03/21/25 0724)  Respirations: 18 (03/21/25 0724)  Height: 6' 2\" (188 cm) (03/20/25 1408)  Weight - Scale: 89.2 kg (196 lb 10.4 oz) (03/21/25 0530)  SpO2: 95 % (03/21/25 0724)  Physical Exam  Vitals and nursing note reviewed.   Constitutional:       General: He is not in acute distress.     Appearance: He is well-developed.   HENT:      Head: Normocephalic and atraumatic.   Eyes:      Conjunctiva/sclera: Conjunctivae normal.   Neck:      Comments: Swelling on the left side of the neck much improved as compared to prior examinations.  Cardiovascular:      Rate and Rhythm: Normal rate and regular rhythm.      Heart sounds: No murmur heard.  Pulmonary:      Effort: Pulmonary effort is normal. No respiratory distress.      Breath sounds: Normal breath sounds.   Abdominal:      Palpations: Abdomen is soft.      Tenderness: There is no abdominal tenderness.   Musculoskeletal:         General: No swelling.      Cervical back: Neck supple.   Skin:     General: Skin is warm and dry.      Capillary Refill: Capillary refill takes less than 2 seconds.   Neurological:      Mental Status: He is alert.      Comments: Right upper extremity weakness 4/5 as compared to left    Psychiatric:         Mood and Affect: Mood normal.          Discussion with Family: Updated  (wife) at bedside.    Discharge instructions/Information to patient and family:   See after visit summary for information provided to patient and family.      Provisions for Follow-Up Care:  See after visit summary for information related to follow-up care and any pertinent home health orders.      Mobility at time of Discharge:   Basic Mobility Inpatient Raw Score: 20  JH-HLM Goal: 6: Walk 10 steps or more  JH-HLM Achieved: 6: Walk 10 steps or more       Disposition:   Other: Acute rehab    Planned Readmission: N/A    Discharge " Medications:  See after visit summary for reconciled discharge medications provided to patient and/or family.      Administrative Statements   Discharge Statement:  I have spent a total time of >40   minutes in caring for this patient on the day of the visit/encounter. >30 minutes of time was spent on: Patient and family education, Importance of tx compliance, Risk factor reductions, Impressions, Counseling / Coordination of care, Documenting in the medical record, Reviewing / ordering tests, medicine, procedures  , and Communicating with other healthcare professionals .    **Please Note: This note may have been constructed using a voice recognition system**

## 2025-03-21 NOTE — PROGRESS NOTES
"Progress Note - Cardiology   Ez Dsouza 59 y.o. male MRN: [id number]  Unit/Bed#: E4 -01 Encounter: [id number]      Assessment/Recommendations/Discussion:   Acute CVA due to carotid artery stenosis, left carotid artery  Hypertension  Hyperlipidemia  Fall  Smoking history    Results from last 7 days   Lab Units 03/20/25  1427   SL CV LV EF  55     PLAN  Status post CHANCE yesterday with very small tunneled PFO  Spoke to neurology, plan for outpatient Zio patch for further workup  I placed orders for 2 weeks Zio patch.  Will need follow-up with cardiology in the office thereafter.  If negative may need to consider loop recorder placement.  Continue with diligent with therapy aspirin Plavix  Continue with carvedilol hydrochlorothiazide amlodipine.  BP significantly improved    Subjective:   HPI  Doing well, no dysphagia, no complications related to CHANCE      Review of Systems: As noted in HPI. Rest of ROS is negative.    Vitals:   /70   Pulse 69   Temp 98 °F (36.7 °C) (Temporal)   Resp 18   Ht 6' 2\" (1.88 m)   Wt 89.2 kg (196 lb 10.4 oz)   SpO2 95%   BMI 25.25 kg/m²   I/O         03/19 0701  03/20 0700 03/20 0701  03/21 0700 03/21 0701  03/22 0700    P.O. 360  360    I.V. (mL/kg)  300 (3.4)     Total Intake(mL/kg) 360 (4) 300 (3.4) 360 (4)    Urine (mL/kg/hr) 500 (0.2) 500 (0.2) 200 (0.3)    Total Output 500 500 200    Net -140 -200 +160                 Weight (last 2 days)       Date/Time Weight    03/21/25 0530 89.2 (196.65)    03/20/25 1408 89.8 (198)    03/20/25 0600 90.1 (198.63)    03/19/25 0600 90.4 (199.3)            Physical Exam   Constitutional: awake, alert and oriented, in no acute distress, no obvious deformities  Head: Normocephalic, without obvious abnormality, atraumatic  Eyes: conjunctivae clear and moist. Sclera anicteric. No xanthelasmas. Pupils equal bilaterally. Extraocular motions are full.  Ear nose mouth and throat: ears are symmetrical bilaterally, hearing appears to be equal " bilaterally, no nasal discharge or epistaxis, oropharynx is clear with moist mucous membranes  Neck: Postsurgical changes noted left neck/carotid region  Lungs: clear to auscultation bilaterally, no wheezes, no rales, no rhonchi, no accessory muscle use, breathing is nonlabored  Heart: regular rate and rhythm, S1, S2 normal, no murmur, no click, no rub and no gallop, no lower extremity edema  Abdomen: soft, non-tender; bowel sounds normal; no masses, no organomegaly  Psychiatric: Patient is oriented to time, place, person, mood/affect is negative for depression, anxiety, agitation, appears to have appropriate insight  Skin: Skin is warm, dry, intact. No obvious rashes or lesions on exposed extremities. Nail beds are pink with no cyanosis or clubbing.    TELEMETRY:   Lab Results:  Results from last 7 days   Lab Units 03/21/25  0542   WBC Thousand/uL 9.11   HEMOGLOBIN g/dL 12.2   HEMATOCRIT % 36.0*   PLATELETS Thousands/uL 288     Results from last 7 days   Lab Units 03/21/25  0542   POTASSIUM mmol/L 4.1   CHLORIDE mmol/L 101   CO2 mmol/L 26   BUN mg/dL 26*   CREATININE mg/dL 1.27   CALCIUM mg/dL 9.4     Results from last 7 days   Lab Units 03/21/25  0542   POTASSIUM mmol/L 4.1   CHLORIDE mmol/L 101   CO2 mmol/L 26   BUN mg/dL 26*   CREATININE mg/dL 1.27   CALCIUM mg/dL 9.4           Medications:    Current Facility-Administered Medications:     acetaminophen (TYLENOL) tablet 650 mg, 650 mg, Oral, Q8H PRN, Mariela Garcia MD, 650 mg at 03/19/25 1556    amLODIPine (NORVASC) tablet 5 mg, 5 mg, Oral, Daily, Mariela Garcia MD, 5 mg at 03/21/25 0834    aspirin chewable tablet 81 mg, 81 mg, Oral, Daily, Mariela Garcia MD, 81 mg at 03/21/25 0834    atorvastatin (LIPITOR) tablet 40 mg, 40 mg, Oral, Daily With Dinner, MEGHANN Nicole, 40 mg at 03/20/25 1648    carvedilol (COREG) tablet 25 mg, 25 mg, Oral, BID With Meals, Mariela Garcia MD, 25 mg at 03/21/25 0839    clopidogrel (PLAVIX) tablet 75 mg, 75 mg, Oral, Daily,  "MEGHANN Nicole, 75 mg at 25 0834    enoxaparin (LOVENOX) subcutaneous injection 40 mg, 40 mg, Subcutaneous, Q24H TIEN, Mariela Garcia MD, 40 mg at 25 0834    hydroCHLOROthiazide tablet 25 mg, 25 mg, Oral, Daily, Mariela Garcia MD, 25 mg at 25 0834    labetalol (NORMODYNE) injection 5 mg, 5 mg, Intravenous, Q15 Min PRN **AND** [DISCONTINUED] hydrALAZINE (APRESOLINE) injection 15 mg, 15 mg, Intravenous, Q15 Min PRN, 15 mg at 25 0858 **AND** [DISCONTINUED] niCARdipine (CARDENE) 25 mg (STANDARD CONCENTRATION) in sodium chloride 0.9% 250 mL, 1-15 mg/hr, Intravenous, Continuous PRN, MEGHANN Nicole    ondansetron (ZOFRAN) injection 4 mg, 4 mg, Intravenous, Q6H PRN, MEGHANN Nicole    oxyCODONE (ROXICODONE) immediate release tablet 10 mg, 10 mg, Oral, Q4H PRN, MEGHANN Nicole    oxyCODONE (ROXICODONE) IR tablet 5 mg, 5 mg, Oral, Q4H PRN, MEGHANN Nicole, 5 mg at 25 0216    [] sodium chloride 0.9 % bolus 500 mL, 500 mL, Intravenous, Once PRN **FOLLOWED BY** phenylephrine (GRACE-SYNEPHRINE) 50 mg (STANDARD CONCENTRATION) in sodium chloride 0.9% 250 mL,  mcg/min, Intravenous, Continuous PRN, MEGHANN Nicole    polyethylene glycol (MIRALAX) packet 17 g, 17 g, Oral, Daily, MEGHANN Nicole, 17 g at 25 0839    senna (SENOKOT) tablet 8.6 mg, 1 tablet, Oral, HS, MEGHANN Nicole, 8.6 mg at 25 2100    spironolactone (ALDACTONE) tablet 25 mg, 25 mg, Oral, Daily, Mariela Garcia MD, 25 mg at 25 1010    Portions of the record may have been created with voice recognition software. Occasional wrong word or \"sound a like\" substitutions may have occurred due to the inherent limitations of voice recognition software. Read the chart carefully and recognize, using context, where substitutions have occurred.    Ez Perkins DO, [facility], FASNC  3/21/2025 1:53 PM      "

## 2025-03-21 NOTE — PLAN OF CARE
Problem: Prexisting or High Potential for Compromised Skin Integrity  Goal: Skin integrity is maintained or improved  Description: INTERVENTIONS:  - Identify patients at risk for skin breakdown  - Assess and monitor skin integrity  - Assess and monitor nutrition and hydration status  - Monitor labs   - Assess for incontinence   - Turn and reposition patient  - Assist with mobility/ambulation  - Relieve pressure over bony prominences  - Avoid friction and shearing  - Provide appropriate hygiene as needed including keeping skin clean and dry  - Evaluate need for skin moisturizer/barrier cream  - Collaborate with interdisciplinary team   - Patient/family teaching  - Consider wound care consult   Outcome: Progressing     Problem: PAIN - ADULT  Goal: Verbalizes/displays adequate comfort level or baseline comfort level  Description: Interventions:  - Encourage patient to monitor pain and request assistance  - Assess pain using appropriate pain scale  - Administer analgesics based on type and severity of pain and evaluate response  - Implement non-pharmacological measures as appropriate and evaluate response  - Consider cultural and social influences on pain and pain management  - Notify physician/advanced practitioner if interventions unsuccessful or patient reports new pain  Outcome: Progressing     Problem: INFECTION - ADULT  Goal: Absence or prevention of progression during hospitalization  Description: INTERVENTIONS:  - Assess and monitor for signs and symptoms of infection  - Monitor lab/diagnostic results  - Monitor all insertion sites, i.e. indwelling lines, tubes, and drains  - Monitor endotracheal if appropriate and nasal secretions for changes in amount and color  - Andreas appropriate cooling/warming therapies per order  - Administer medications as ordered  - Instruct and encourage patient and family to use good hand hygiene technique  - Identify and instruct in appropriate isolation precautions for  identified infection/condition  Outcome: Progressing  Goal: Absence of fever/infection during neutropenic period  Description: INTERVENTIONS:  - Monitor WBC    Outcome: Progressing     Problem: SAFETY ADULT  Goal: Patient will remain free of falls  Description: INTERVENTIONS:  - Educate patient/family on patient safety including physical limitations  - Instruct patient to call for assistance with activity   - Consult OT/PT to assist with strengthening/mobility   - Keep Call bell within reach  - Keep bed low and locked with side rails adjusted as appropriate  - Keep care items and personal belongings within reach  - Initiate and maintain comfort rounds  - Make Fall Risk Sign visible to staff  - Offer Toileting every 2 Hours, in advance of need  - Initiate/Maintain bed alarm  - Obtain necessary fall risk management equipment: bed alarm, call bell,  socks  - Apply yellow socks and bracelet for high fall risk patients  - Consider moving patient to room near nurses station  Outcome: Progressing  Goal: Maintain or return to baseline ADL function  Description: INTERVENTIONS:  -  Assess patient's ability to carry out ADLs; assess patient's baseline for ADL function and identify physical deficits which impact ability to perform ADLs (bathing, care of mouth/teeth, toileting, grooming, dressing, etc.)  - Assess/evaluate cause of self-care deficits   - Assess range of motion  - Assess patient's mobility; develop plan if impaired  - Assess patient's need for assistive devices and provide as appropriate  - Encourage maximum independence but intervene and supervise when necessary  - Involve family in performance of ADLs  - Assess for home care needs following discharge   - Consider OT consult to assist with ADL evaluation and planning for discharge  - Provide patient education as appropriate  Outcome: Progressing  Goal: Maintains/Returns to pre admission functional level  Description: INTERVENTIONS:  - Perform AM-PAC 6 Click  Basic Mobility/ Daily Activity assessment daily.  - Set and communicate daily mobility goal to care team and patient/family/caregiver.   - Collaborate with rehabilitation services on mobility goals if consulted  - Perform Range of Motion 3 times a day.  - Reposition patient every 2 hours.  - Dangle patient 3 times a day  - Stand patient 3 times a day  - Ambulate patient 3 times a day  - Out of bed to chair 3 times a day   - Out of bed for meals 3 times a day  - Out of bed for toileting  - Record patient progress and toleration of activity level   Outcome: Progressing     Problem: DISCHARGE PLANNING  Goal: Discharge to home or other facility with appropriate resources  Description: INTERVENTIONS:  - Identify barriers to discharge w/patient and caregiver  - Arrange for needed discharge resources and transportation as appropriate  - Identify discharge learning needs (meds, wound care, etc.)  - Arrange for interpretive services to assist at discharge as needed  - Refer to Case Management Department for coordinating discharge planning if the patient needs post-hospital services based on physician/advanced practitioner order or complex needs related to functional status, cognitive ability, or social support system  Outcome: Progressing     Problem: Knowledge Deficit  Goal: Patient/family/caregiver demonstrates understanding of disease process, treatment plan, medications, and discharge instructions  Description: Complete learning assessment and assess knowledge base.  Interventions:  - Provide teaching at level of understanding  - Provide teaching via preferred learning methods  Outcome: Progressing     Problem: Nutrition/Hydration-ADULT  Goal: Nutrient/Hydration intake appropriate for improving, restoring or maintaining nutritional needs  Description: Monitor and assess patient's nutrition/hydration status for malnutrition. Collaborate with interdisciplinary team and initiate plan and interventions as ordered.  Monitor  patient's weight and dietary intake as ordered or per policy. Utilize nutrition screening tool and intervene as necessary. Determine patient's food preferences and provide high-protein, high-caloric foods as appropriate.     INTERVENTIONS:  - Monitor oral intake, urinary output, labs, and treatment plans  - Assess nutrition and hydration status and recommend course of action  - Evaluate amount of meals eaten  - Assist patient with eating if necessary   - Allow adequate time for meals  - Recommend/ encourage appropriate diets, oral nutritional supplements, and vitamin/mineral supplements  - Order, calculate, and assess calorie counts as needed  - Recommend, monitor, and adjust tube feedings and TPN/PPN based on assessed needs  - Assess need for intravenous fluids  - Provide specific nutrition/hydration education as appropriate  - Include patient/family/caregiver in decisions related to nutrition  Outcome: Progressing

## 2025-03-21 NOTE — CASE MANAGEMENT
Case Management Discharge Planning Note    Patient name Ez Dsouza  Location [facility] 4 /E4 -* MRN [id number]  : 1965 Date 3/21/2025       Current Admission Date: 3/13/2025  Current Admission Diagnosis:Acute cerebrovascular accident (CVA) due to stenosis of left carotid artery (HCC)   Patient Active Problem List    Diagnosis Date Noted Date Diagnosed    Localized swelling, mass or lump of neck 2025     S/P carotid endarterectomy 2025     Smoking 2025     Hyperlipidemia 03/10/2025     Acute cerebrovascular accident (CVA) due to stenosis of left carotid artery (HCC) 03/10/2025     Elevated coronary artery calcium score 03/10/2025     Pre-operative cardiovascular examination 03/10/2025     Closed fracture of right distal radius and ulna 2024     Closed fracture of distal ends of right radius and ulna, initial encounter 2024     Closed fracture of right wrist with routine healing, subsequent encounter 2024     Fall 2024     Acute pain due to trauma 2024     Closed fracture of multiple ribs of left side 2024     Closed fracture of right wrist 2024     MARIA TERESA (acute kidney injury) (HCC) 2024     Primary hypertension 2024     Scalp abrasion 2024     Elevated blood pressure reading 2021       LOS (days): 8  Geometric Mean LOS (GMLOS) (days): 4.9  Days to GMLOS:-2.8     OBJECTIVE:  Risk of Unplanned Readmission Score: 11.62         Current admission status: Inpatient   Preferred Pharmacy:   [facility]  [place] - [address]  [address]  [place] [address]  Phone: [phone] Fax: [phone]    Primary Care Provider: Jakob Higgins MD    Primary Insurance: BLUE CROSS  Secondary Insurance: [employer]    DISCHARGE DETAILS:    Discharge planning discussed with:: Patient and Spouse  Freedom of Choice: Yes  Comments - Freedom of Choice: [facility]  CM contacted family/caregiver?:  Yes  Were Treatment Team discharge recommendations reviewed with patient/caregiver?: Yes  Did patient/caregiver verbalize understanding of patient care needs?: N/A- going to facility  Were patient/caregiver advised of the risks associated with not following Treatment Team discharge recommendations?: Yes    Contacts  Patient Contacts: Nery Dsouza (spouse) [phone]  Relationship to Patient:: Family  Contact Method: In Person  Phone Number: [phone]  Reason/Outcome: Emergency Contact, Discharge Planning    Requested Home Health Care         Is the patient interested in HHC at discharge?: No    DME Referral Provided  Referral made for DME?: No    Other Referral/Resources/Interventions Provided:  Interventions: Acute Rehab    Would you like to participate in our [facility] service program?  : No - Declined    Treatment Team Recommendation: Acute Rehab  Discharge Destination Plan:: Acute Rehab  Transport at Discharge : Stretcher van     Number/Name of Dispatcher: [employer]  Transported by (Company and Unit #): [employer]  ETA of Transport (Date): 03/21/25  ETA of Transport (Time): 1600                            Accepting Facility Name, City & State : [facility]  Receiving Facility/Agency Phone Number: [phone]          ROBE completed [place] paperwork for patient, CM provided to patient's wife.     Auth received for [facility], they can accept today.    CM requested BLS transport via [employer].  [employer] claimed the ride for 3.21.25 at 4:00pm. CM notified SLIM, RN, facility, and patient's spouse.

## 2025-03-21 NOTE — QUICK NOTE
Patient seen at bedside by myself and Dr. Gandara-      Short summary of patient's hospitalization: 60 yo M with HTN and tobacco use who presented to [facility]  on 3/10 for evaluation of L sided weakness. Pt was evaluated by Dr. Burgos and Dr. Serenity Glass at [facility] as a stroke alert. BP on arrival 180/115. LKW 10 PM 3/9. NIHSS 2 (month, moderate aphasia). CTH with age-indeterminate infarcts in the L centrum semiovale. CTA H/N wwo contrast without IR target but did show worsening severe stenosis of L ICA. A wake up MRI brain protocol was completed and showed multiple small acattered acute infarcts in the L cerebral hemisphere that were also present on flair, there was also a tiny focus of mild DWI signal in R central semiovale that may represent a recent infarct. Pt was not a TNK candidate. Pt was loaded with  mg x 1 and Plavix 300 mg x 1. The vascular team was consulted and initially agreed with DAPT with plan for revascularization of L carotid artery sometime after post stroke day 3. Pt was re-evaluated by Dr. Serenity Glass on 3/13/25 due to concern for worsening neurologic symptoms (10-30 minutes of worsening expressive aphasia on 3/12 and pt unable to lift RUE 3/13). A repeat CTA H/N was performed and was stable. Pt's symptoms suspected to be in part due to cerebral edema and brain compression secondary to acute ischemic strokes. However, there seemed to be a positional worsening of symptoms including increased weakness of the RUE and subsequent further worsening of increased aphasia (symptomatic L ICA). An MRI brain was completed and showed mild progression of cerebral ischemia mostly on the L side with some additional infarcts on the R. At that time, Dr. Bravo recommended pt to complete carotid intervention 3/17 at [facility] scheduled by vascular surgery  and CHANCE given rope score of 5. Vascular team recommeded starting heparin gtt prior to L CEA on 3/17. AP/AC recommendations at that time were deferred to vascular  "surgery team. Pt completed L CEA 3/17.      Workup  - CT c/a/p 3/10/25:  \"1.  No acute pathology within the chest, abdomen, or pelvis. 2.  Approximately 9 mm nonobstructing calculus within the left renal pelvis, previously within a left upper pole calyx. 3.  Chronic findings, detailed above.\"  - CTA H/N wwo contrast 3/10/25:   \"Worsening severe stenosis in the proximal left internal carotid artery. No significant stenosis of the cervical right carotid or vertebral arteries No significant intracranial stenosis, large vessel occlusion or aneurysm.\"  - CTH wo contrast 3/10/25:   \"No acute large vessel distribution infarct, hemorrhage or mass effect New age-indeterminate lacunar infarcts in the left centrum semiovale.\"  - MRI brain 3/10/25:  \"Multiple small scattered acute infarcts in the left cerebral hemisphere. Several of the smaller infarcts are hyperintense on FLAIR. Tiny focus of left parietal occipital petechial hemorrhage. Tiny focus of mild DWI signal in the right centrum semiovale may also present recent infarct. No acute space-occupying hematoma.\"  - Carotid doppler 3/10/25:  \"RIGHT:  There is <50% stenosis noted in the internal carotid artery. Plaque is heterogenous and irregular. Vertebral artery flow is antegrade. There is no significant subclavian artery disease.  LEFT:  There is >70% stenosis noted in the internal carotid artery. Plaque is heterogenous and irregular. Vertebral artery flow is antegrade. There is no significant subclavian artery disease.\"  - CTA H/N wwo contrast 3/13/25:  \"1.  No acute intracranial hemorrhage, edema, or mass effect. Scattered left hemispheric infarctions as above, seen better on recent MRI. 2.  No new large vessel occlusion in the head or neck. 3.  Unchanged high-grade stenosis proximal left ICA. Stable pedunculated plaque and/or thrombus in the proximal left ICA lumen which may be the source of distal embolization.\"  - MRI brain wo contrast 3/13/25:  \"Multiple small foci " "of acute/recent ischemia in the left MCA territory. These predominantly appear similar in the interim with slight worsening involvement of the CAMRYN to MCA watershed territory on the left as well as the left caudate head. Involvement of the right parietal lobe also appears new/progressed in the interim. No large territory infarct or hemorrhage seen. Given the multiple vascular territories, consider a central embolic source.\"   - CHANCE: EF 55%, normal sized atrium  bilaterally. There is a very small tunneled patent foramen ovale confirmed at rest. There was a minimal amount of bubbles that crossed. No thrombus identified    Patient and significant other seen at bedside, updated patient with CHANCE results.  Case discussed between attending neurologist, myself, patient/spouse and cardiology -patient made aware we will continue DAPT and he will need outpatient cardiac monitoring.  All questions/concerns answered.    Recommendations:   - s/p L CEA 3/17  - Patient with bihemispheric embolic appearing strokes. He was initially continued on heparin gtt+ Plavix following L CEA 3/17 due to no source of embolic appearing stroke being identified. However, TTE did not show PFO and there has been no Afib identified on telemery thus far. Additionally, CT C/A/P completed while at [facility] did not show obvious signs of malignancy.   - Heparin gtt discontinued and he was transitioned to DAPT (ASA 81 mg daily and Plavix 75 mg daily).    - Will defer length of DAPT to vascular surgery  - P2Y12 140  - No further inpatient neurological recommendations  - Thrombosis panel ordered  - Recommend outpatient cardiac monitoring    Ez Dsouza will need follow-up in in 6 weeks with neurovascular team for Other in 60 minute appointment. They will not require outpatient neurological testing.   Message previously sent to scheduling team     "

## 2025-03-21 NOTE — ASSESSMENT & PLAN NOTE
Patient is a 59-year-old male with past medical history significant but not limited to coronary artery disease, hypertension, hyperlipidemia, tobacco use disorder, history of fall in 24 with rib fractures and right wrist fracture, woke up with right-sided weakness and presented to [facility] via EMS on 3/10/2025 with complaints of right-sided weakness and dysarthria.  Patient was noted to have primary left hemispheric stroke secondary to symptomatic left ICA stenosis.  For carotid endarterectomy patient was transferred from [place] to [facility] on 3/13/2025 for the procedure scheduled on 3/17/2025 with Dr Calderón.  Patient remained in ICU till 3/18/2025 and then was downgraded to floors.  Patient's antihypertensive medications were slowly introduced back from 3/19/2025, heparin was discontinued and patient was initiated on DAPT aspirin along with clopidogrel, vascular neuro and cardiology participated in patient care patient to get CHANCE on 3/20/2025    -3/10/2025 MRI brain showed multiple small acute CVAs in the left periventricular subcortical frontal and parietal/occipital lobes with small petechial hemorrhage  -CTA neck showed right ICA mild stenosis but left ICA with 70% stenosis with soft plaque and increased irregularity compared to prior study in 2024.  -Upon arrival in the [facility] on 3/30/2025, neurology evaluated patient due to concerns of worsening neurological symptoms, patient was scheduled for endarterectomy for 3/17/2025.  -3/13/2025 MRI showed multiple small foci of ischemia in the left MCA territory.CAMRYN and MCA watershed territories on the left as well as left caudate.  -3/13/2025 CT head and neck showed scattered left hemispheric infarct unchanged left ICA high-grade stenosis  3/15/2025 CT head no intracranial hemorrhage or clavicular fracture.  -Patient underwent carotid endarterectomy on 3/17/125, vascular on board continue with Plavix along with  aspirin along with Lipitor and frequent neurochecks.  -Cardiology on board patient scheduled for CHANCE on 3/20/124-tunneled patent foramen ovale confirmed at rest. There was a minimal amount of bubbles that crossed.  Would be requiring outpatient Zio patch arranged by cardiology upon discharge  -Blood pressure medications reinitiated  -Neurology on board appreciate recs.  -Patient will require outpatient follow-up with cardiology neurology and vascular surgery upon discharge.  -PT OT on board-plans for short-term rehab on discharge

## 2025-03-21 NOTE — ASSESSMENT & PLAN NOTE
59-year-old male with hx of HTN, CAD, HLD, tobacco abuse, hx of fall '24 w/ rib fxs and R wrist fx, woke up with R side weakness and presented to [facility] by EMS. Noted to have primarily L hemispheric strokes 2/2 symptomatic L ICA stenosis. Patient transferred to [facility] overnight for tentative L carotid enterectomy on 3/17/25 with .    Plan  -S/p left carotid CEA, doing well post-operatively   - Residual RUE weakness from index event improving per patient   -L neck incision site with mild swelling improving without evidence of infection   -MRI with bilateral lesions including multiple small foci of ischemia in L MCA territory, CAMRYN to MCA watershed territory on the L as well as L caudate, R parietal lobe however CHANCE without mural thrombus although with small PFO noted    - AC discontinued by primary team   -Recommend DAPT with aspirin/plavix from vascular perspective in setting of symptomatic carotid lesion   -Continue statin therapy   -Neurology input appreciated   -Stable for discharge from vascular standpoint   -Remainder of care per SLIM

## 2025-03-21 NOTE — DISCHARGE SUPPORT
Case Management Assessment & Discharge Planning Note    Patient name Ez Dsouza  Location [facility] /E4 -* MRN [id number]  : 1965 Date 3/21/2025       Current Admission Date: 3/13/2025  Current Admission Diagnosis:Acute cerebrovascular accident (CVA) due to stenosis of left carotid artery (HCC)   Patient Active Problem List    Diagnosis Date Noted Date Diagnosed    Localized swelling, mass or lump of neck 2025     S/P carotid endarterectomy 2025     Smoking 2025     Hyperlipidemia 03/10/2025     Acute cerebrovascular accident (CVA) due to stenosis of left carotid artery (HCC) 03/10/2025     Elevated coronary artery calcium score 03/10/2025     Pre-operative cardiovascular examination 03/10/2025     Closed fracture of right distal radius and ulna 2024     Closed fracture of distal ends of right radius and ulna, initial encounter 2024     Closed fracture of right wrist with routine healing, subsequent encounter 2024     Fall 2024     Acute pain due to trauma 2024     Closed fracture of multiple ribs of left side 2024     Closed fracture of right wrist 2024     MARIA TERESA (acute kidney injury) (HCC) 2024     Primary hypertension 2024     Scalp abrasion 2024     Elevated blood pressure reading 2021       LOS (days): 8  Geometric Mean LOS (GMLOS) (days): 4.9  Days to GMLOS:-2.7   Facility Authorization Approved  [facility] received approved auth for: Acute Rehab  Insurance: Rola  Determination made after peer to peer was completed?: Not applicable  Authorization Obtained Via: Phone  Name/Phone # of Rep who called in determination (if applicable): Yris (P#: [phone])  Facility Name: [facility]  Approved Authorization Number: [id number]  Start of Care Date: 25  Next Review Date: 25  Continued Stay Care Coordinator Name: Yris  Continued Stay Care Coordinator Phone #: [phone]  Submit Next Review to:  F#: [phone]   notified: Yoselyn Jessica     Updates to authorization status will be noted in chart.    Please reach out to CM for updates on any clinical information.

## 2025-03-21 NOTE — ANESTHESIA POSTPROCEDURE EVALUATION
MEDICINE HISTORY AND PHYSICAL    Patient ID:  Gagandeep Olivo  [id number]  75 year old  1947       Chief Complaint: chest fluttering/pressure    History of Present Illness:   Patient is a 75 year old  male, history of CAD/recent non-STEMI/multivessel CAD scheduled for CABG (08/05/2023), paroxysmal AFib not on anticoagulation due to rectal bleeding, second-degree AV block s/p PPM, ischemic cardiomyopathy/chronic systolic CHF, hypertension  who presents to the hospital with chest fluttering/pressure.    Earlier tonight, patient was upset as he seemed was not working, blood pressure was elevated and developed chest fluttering and mild pressure in the lower substernal region rating around 1/10 in intensity, BP was significantly elevated, so came to emergency room.  Per patient, he was very upset and has had panic attacks in the past.  Denies any shortness a breath, dizziness, excessive sweating, nausea, vomiting, fever, cough, loss of consciousness, abdominal pain, hematemesis, melena, hematochezia, focal neurological deficits.  Compliant with medications.  No active smoking excessive alcohol or illicits.    Recently hospitalized last week for non-STEMI underwent coronary angiogram which showed severe multivessel disease and CABG was planned however patient opted to go home and come back for CABG which is scheduled tomorrow on 08/05/2023.    In the emergency room, BP elevated up to 170s otherwise remained stable.  Received aspirin and sublingual nitroglycerin by EMS, currently pain-free.  EKG negative for acute ischemia.  Troponin negative.  Lab stable.         Past Medical/ Surgical History: reviewed    Special screening for malignant neoplasm of pr* 08/10/2007      Comment: results: 1.2    Diverticulosis                                                CONTUSIONS CHEST                                2011            Comment: left lower anterior rib pain    Impotence of organic origin                                    Post-Op Assessment Note    CV Status:  Stable    Pain management: adequate       Mental Status:  Alert and awake   Hydration Status:  Euvolemic   PONV Controlled:  Controlled   Airway Patency:  Patent     Post Op Vitals Reviewed: Yes    No anethesia notable event occurred.    Staff: Anesthesiologist           Last Filed PACU Vitals:  Vitals Value Taken Time   Temp 97.7 °F (36.5 °C) 03/20/25 1501   Pulse 85 03/20/25 1501   /90 03/20/25 1501   Resp 18 03/20/25 1501   SpO2 95 % 03/20/25 1501       Modified Shraddha:     Vitals Value Taken Time   Activity 2 03/20/25 1501   Respiration 2 03/20/25 1501   Circulation 2 03/20/25 1501   Consciousness 2 03/20/25 1501   Oxygen Saturation 2 03/20/25 1501     Modified Shraddha Score: 10             SPASTIC COLON                                                   Comment: per patient, this was diagnosed as a young                adult without having had a colonoscopy    h/o rectal seepage                              05/09/2008    PAIN KNEE JOINT                                                 Comment: bilateral, started about age 34 from jogging    History of colon polyps                         2008            Comment: adenoma    Syncope and collapse                                          Rectal bleeding                                                 Comment: x 1. Day after starting Eliquis    Pacemaker                                       12/2015         Comment: Streamline    Gastroesophageal reflux disease                               Hemorrhoids                                                   Enlarged prostate                                             Arthritis                                                     Cataracts, bilateral                                          Colon polyp                                     04/12/2017      Comment: Tubular Adenoma, recall in 5 years    Hearing deficit, bilateral                                      Comment: wears bilateral hearing aids    Presence of dental prosthetic device                            Comment: \"permanent lower dental implants\"    Loss of consciousness (CMD)                     03/29/2014    Dizzy                                           08/12/2017    Chest pain                                      07/27/2023    HTN (hypertension)                              02/16/2015    Coronary artery disease                         07/31/2023    Abnormal ECG                                    02/16/2015    Left bundle branch block                        04/14/2014    Bradycardia                                     10/26/2015    Paroxysmal atrial fibrillation (CMD)            07/31/2023    Sleep apnea                                      10/07/2015      Comment: no cpap    Rectal bleed                                    10/29/2013    Baker's cyst of knee, right                     12/30/2016    REMOVAL OF TONSILS,<13 Y/O                                    COLONOSCOPY DIAGNOSTIC                          06/09/2008      Comment: results: tubular adenoma, hemorrhoids,                diverticulosis    REPAIR OF NASAL SEPTUM                                          Comment: Septoplasty    SINUS SURGERY PROC UNLISTED                                     Comment: sinuplasty    TONSILLECTOMY AND ADENOIDECTOMY                               SERVICE TO GASTROENTEROLOGY                                   HB TILT TABLE TESTING (CATH LAB)                              PACEMAKER IMPLANT                               12/15/15      EYE SURGERY                                                     Comment: Eye lids    COLONOSCOPY W/ POLYPECTOMY                      04/12/2017      Comment: dr gann    Allergies: reviewed  Allergies as of 08/04/2023   • (No Known Allergies)       Home Medications: reviewed  (Not in a hospital admission)      Family History: reviewed   Family History   Problem Relation Age of Onset   • Cancer Mother         lung   • Heart Father         heart disease   • Heart disease Father    • Patient is unaware of any medical problems Sister    • Patient is unaware of any medical problems Brother        Social History: reviewed  Social History     Socioeconomic History   • Marital status: /Civil Union     Spouse name: Not on file   • Number of children: 4   • Years of education: Not on file   • Highest education level: Not on file   Occupational History   • Occupation:      Comment: retired 2008   • Occupation: [employer]    Tobacco Use   • Smoking status: Never   • Smokeless tobacco: Never   Vaping Use   • Vaping Use: never used   Substance and Sexual Activity   • Alcohol use: Not Currently     Alcohol/week: 7.0 standard  drinks of alcohol     Types: 7 Glasses of wine per week     Comment: 1 glass of wine/day   • Drug use: No   • Sexual activity: Not on file   Other Topics Concern   • Not on file   Social History Narrative   • Not on file     Social Determinants of Health     Financial Resource Strain: Low Risk  (7/27/2023)    Financial Resource Strain    • Social Determinants: Financial Resource Strain: None   Food Insecurity: No Food Insecurity (7/27/2023)    Food Insecurity    • Social Determinants: Food Insecurity: Never   Transportation Needs: No Transportation Needs (7/27/2023)    PRAPARE - Transportation    • Lack of Transportation (Medical): No    • Lack of Transportation (Non-Medical): No   Physical Activity: Not on file   Stress: Not on file   Social Connections: Socially Integrated (7/27/2023)    Social Connections    • Social Determinants: Social Connections: 5 or more times a week   Intimate Partner Violence: Not At Risk (8/2/2023)    Intimate Partner Violence    • Social Determinants: Intimate Partner Violence Past Fear: No    • Social Determinants: Intimate Partner Violence Current Fear: No         Review of Systems:  Total 10 point systems has been reviewed, and are negative other than what mentioned in HPI and above.      Physical Exam:     Vital Signs:    Visit Vitals  BP (!) 170/78   Pulse (!) 57   Resp 12   SpO2 96%    No intake/output data recorded.      GENERAL: Alert, awake and oriented to place, person and time. Not in acute distress  HEAD: Appears to be atraumatic and normocephalic   EYES: No conjunctival pallor. No scleral icterus. Pupils   equal, round and reactive to light.  Extraocular movements appear to be intact  THROAT: Oropharyngeal exam reveals moist oral mucosa. No oral lesion or lip lesion.  NECK: There is no JVD. No Lymphadenopathy in anterior cervical, posterior cervical, supraclavicular and submandibular region. Trachea midline. No thyromegaly.  SKIN: Warm and moist. No rash.  LUNGS: Reveals  good effort and lungs are clear to auscultation   bilaterally. There are no wheezes or rhonchi  HEART: S1 normal, S2 normal. Regular rate and rhythm. No murmur  ABDOMEN:  Normoactive bowel sounds. Soft, non tender and nondistended. There is no rebound tenderness.  There is no hepatosplenomegaly   EXTREMITIES: No clubbing, cyanosis or edema  NEUROLOGIC: Cranial nerves II through XII appear grossly intact. Sensation is intact. No focal neurological deficits.  PSYCH:  Mood and affect are appropriate.      Labs: reviewed  Lab Results   Component Value Date    SODIUM 140 08/04/2023    POTASSIUM 3.7 08/04/2023    CHLORIDE 110 08/04/2023    CO2 25 08/04/2023    GLUCOSE 110 (H) 08/04/2023    BUN 24 (H) 08/04/2023    CREATININE 1.20 (H) 08/04/2023    ALBUMIN 3.8 08/04/2023    BILIRUBIN 0.8 08/04/2023    AST 26 08/04/2023    INR 1.0 07/29/2023     Lab Results   Component Value Date    PTT 73 (H) 07/29/2023    WBC 7.5 08/04/2023    HGB 12.6 (L) 08/04/2023    HCT 37.1 (L) 08/04/2023     08/04/2023    BNP 25 03/29/2014    TSH 3.019 07/29/2023       Diagnostics:  EKG reviewed personally showed paced rhythm without acute MI  XR CHEST PA OR AP 1 VIEW   Final Result   IMPRESSION:       No acute cardiopulmonary findings.      I, Attending Radiologist Nathanael Hsu MD, have reviewed the images and report and concur with these findings interpreted by Resident Radiologist, Martir Peters DO.      Electronically Signed by: Nathanael Hsu MD    Signed on: 8/4/2023 5:40 AM    Workstation ID: [id number]          ASSESSMENT AND PLAN:    1. Chest pressure, known CAD/multivessel CAD scheduled for CABG tomorrow on 08/05/2023::  Current episode is likely induced by stress/suspected rapid AFib.  - currently pain free.  Troponin and EKG negative.  Received aspirin and nitroglycerin by EMS.  - recent coronary angiogram on 07/20/2023 showed multivessel three-vessel disease  - resume aspirin, Toprol-XL, Lipitor and Imdur  - no AC for now,  trend troponin if elevated may consider starting heparin drip.  - cardiology and CV surgery consult    2. Paroxysmal AFib, second-degree AV block/ppm:::  Currently paced rhythm.  Continue aspirin and Toprol-XL.  Not on anticoagulation due to rectal bleeding in the past.    3. Hypertension::  Resume Toprol-XL and Imdur.  IV hydralazine as needed    4. Ischemic cardiomyopathy/chronic systolic CHF: :  Recent echo with EF of 49%.  No exacerbation and asymptomatic.  Not on any diuretics at home.  Continue Toprol-XL, anticipate improvement in EF after CABG    DVT Prophylaxis: SCDs.  Lovenox    Code status: Prior full resuscitation    GOC:  Patient is decisional.  Full code.  Wife at bedside, plan of care discussed.    Is the patient expected to require at least a two midnight stay in the hospital? Yes -  I certify that I expect inpatient services for greater than two midnights are medically necessary for this patient.  Please see H&P and MD Progress Notes for additional information about the patient's course of treatment.        Natalia Vazquez MD  [facility] Hospitalist        8/4/2023 6:00 AM

## 2025-03-23 LAB
APTT HEX PL PPP: 10 SEC (ref 0–11)
APTT-LA IMM 4:1 NP PPP: 50.3 SEC (ref 0–40.5)
DRVVT IMM 1:2 NP PPP: 45.6 SEC (ref 0–40.4)
DRVVT SCREEN TO CONFIRM RATIO: 1.2 RATIO (ref 0.8–1.2)
LA PPP-IMP: ABNORMAL
SCREEN APTT: 53.6 SEC (ref 0–43.5)
SCREEN DRVVT: 52.4 SEC (ref 0–47)

## 2025-03-24 ENCOUNTER — APPOINTMENT (EMERGENCY)
Dept: RADIOLOGY | Facility: HOSPITAL | Age: 60
DRG: 908 | End: 2025-03-24
Payer: COMMERCIAL

## 2025-03-24 ENCOUNTER — ANESTHESIA EVENT (INPATIENT)
Dept: PERIOP | Facility: HOSPITAL | Age: 60
DRG: 908 | End: 2025-03-24
Payer: COMMERCIAL

## 2025-03-24 ENCOUNTER — ANESTHESIA (INPATIENT)
Dept: PERIOP | Facility: HOSPITAL | Age: 60
DRG: 908 | End: 2025-03-24
Payer: COMMERCIAL

## 2025-03-24 ENCOUNTER — TELEPHONE (OUTPATIENT)
Dept: CARDIOLOGY CLINIC | Facility: CLINIC | Age: 60
End: 2025-03-24

## 2025-03-24 ENCOUNTER — HOSPITAL ENCOUNTER (INPATIENT)
Facility: HOSPITAL | Age: 60
LOS: 4 days | DRG: 908 | End: 2025-03-28
Attending: EMERGENCY MEDICINE | Admitting: SURGERY
Payer: COMMERCIAL

## 2025-03-24 DIAGNOSIS — M54.2 NECK PAIN: ICD-10-CM

## 2025-03-24 DIAGNOSIS — F17.200 SMOKING: ICD-10-CM

## 2025-03-24 DIAGNOSIS — N17.9 AKI (ACUTE KIDNEY INJURY) (HCC): ICD-10-CM

## 2025-03-24 DIAGNOSIS — K59.00 CONSTIPATION: ICD-10-CM

## 2025-03-24 DIAGNOSIS — Z98.890 S/P CAROTID ENDARTERECTOMY: Primary | ICD-10-CM

## 2025-03-24 DIAGNOSIS — T14.8XXA HEMATOMA: ICD-10-CM

## 2025-03-24 DIAGNOSIS — T81.9XXA POST-OPERATIVE COMPLICATION: ICD-10-CM

## 2025-03-24 DIAGNOSIS — IMO0001 SMOKING: ICD-10-CM

## 2025-03-24 DIAGNOSIS — R22.1 LOCALIZED SWELLING, MASS OR LUMP OF NECK: ICD-10-CM

## 2025-03-24 LAB
ABO GROUP BLD: NORMAL
ALBUMIN SERPL BCG-MCNC: 4 G/DL (ref 3.5–5)
ALP SERPL-CCNC: 137 U/L (ref 34–104)
ALT SERPL W P-5'-P-CCNC: 39 U/L (ref 7–52)
ANION GAP SERPL CALCULATED.3IONS-SCNC: 12 MMOL/L (ref 4–13)
APTT PPP: 39 SECONDS (ref 23–34)
APTT PPP: 42 SECONDS (ref 23–34)
AST SERPL W P-5'-P-CCNC: 17 U/L (ref 13–39)
BASOPHILS # BLD AUTO: 0.05 THOUSANDS/ÂΜL (ref 0–0.1)
BASOPHILS # BLD AUTO: 0.06 THOUSANDS/ÂΜL (ref 0–0.1)
BASOPHILS NFR BLD AUTO: 0 % (ref 0–1)
BASOPHILS NFR BLD AUTO: 1 % (ref 0–1)
BILIRUB SERPL-MCNC: 0.44 MG/DL (ref 0.2–1)
BLD GP AB SCN SERPL QL: NEGATIVE
BUN SERPL-MCNC: 42 MG/DL (ref 5–25)
CALCIUM SERPL-MCNC: 9.2 MG/DL (ref 8.4–10.2)
CHLORIDE SERPL-SCNC: 101 MMOL/L (ref 96–108)
CO2 SERPL-SCNC: 22 MMOL/L (ref 21–32)
CREAT SERPL-MCNC: 1.57 MG/DL (ref 0.6–1.3)
EOSINOPHIL # BLD AUTO: 0.2 THOUSAND/ÂΜL (ref 0–0.61)
EOSINOPHIL # BLD AUTO: 0.28 THOUSAND/ÂΜL (ref 0–0.61)
EOSINOPHIL NFR BLD AUTO: 2 % (ref 0–6)
EOSINOPHIL NFR BLD AUTO: 3 % (ref 0–6)
ERYTHROCYTE [DISTWIDTH] IN BLOOD BY AUTOMATED COUNT: 13.9 % (ref 11.6–15.1)
ERYTHROCYTE [DISTWIDTH] IN BLOOD BY AUTOMATED COUNT: 13.9 % (ref 11.6–15.1)
GFR SERPL CREATININE-BSD FRML MDRD: 47 ML/MIN/1.73SQ M
GLUCOSE SERPL-MCNC: 141 MG/DL (ref 65–140)
HCT VFR BLD AUTO: 36.7 % (ref 36.5–49.3)
HCT VFR BLD AUTO: 37.8 % (ref 36.5–49.3)
HGB BLD-MCNC: 12.3 G/DL (ref 12–17)
HGB BLD-MCNC: 12.4 G/DL (ref 12–17)
IMM GRANULOCYTES # BLD AUTO: 0.09 THOUSAND/UL (ref 0–0.2)
IMM GRANULOCYTES # BLD AUTO: 0.12 THOUSAND/UL (ref 0–0.2)
IMM GRANULOCYTES NFR BLD AUTO: 1 % (ref 0–2)
IMM GRANULOCYTES NFR BLD AUTO: 1 % (ref 0–2)
INR PPP: 1.13 (ref 0.85–1.19)
INR PPP: 1.14 (ref 0.85–1.19)
LYMPHOCYTES # BLD AUTO: 1.28 THOUSANDS/ÂΜL (ref 0.6–4.47)
LYMPHOCYTES # BLD AUTO: 1.63 THOUSANDS/ÂΜL (ref 0.6–4.47)
LYMPHOCYTES NFR BLD AUTO: 10 % (ref 14–44)
LYMPHOCYTES NFR BLD AUTO: 18 % (ref 14–44)
MCH RBC QN AUTO: 31 PG (ref 26.8–34.3)
MCH RBC QN AUTO: 31.1 PG (ref 26.8–34.3)
MCHC RBC AUTO-ENTMCNC: 32.8 G/DL (ref 31.4–37.4)
MCHC RBC AUTO-ENTMCNC: 33.5 G/DL (ref 31.4–37.4)
MCV RBC AUTO: 93 FL (ref 82–98)
MCV RBC AUTO: 95 FL (ref 82–98)
MONOCYTES # BLD AUTO: 1.05 THOUSAND/ÂΜL (ref 0.17–1.22)
MONOCYTES # BLD AUTO: 1.26 THOUSAND/ÂΜL (ref 0.17–1.22)
MONOCYTES NFR BLD AUTO: 14 % (ref 4–12)
MONOCYTES NFR BLD AUTO: 8 % (ref 4–12)
NEUTROPHILS # BLD AUTO: 10.75 THOUSANDS/ÂΜL (ref 1.85–7.62)
NEUTROPHILS # BLD AUTO: 5.65 THOUSANDS/ÂΜL (ref 1.85–7.62)
NEUTS SEG NFR BLD AUTO: 63 % (ref 43–75)
NEUTS SEG NFR BLD AUTO: 79 % (ref 43–75)
NRBC BLD AUTO-RTO: 0 /100 WBCS
NRBC BLD AUTO-RTO: 0 /100 WBCS
PLATELET # BLD AUTO: 360 THOUSANDS/UL (ref 149–390)
PLATELET # BLD AUTO: 365 THOUSANDS/UL (ref 149–390)
PMV BLD AUTO: 10.3 FL (ref 8.9–12.7)
PMV BLD AUTO: 10.5 FL (ref 8.9–12.7)
POTASSIUM SERPL-SCNC: 4 MMOL/L (ref 3.5–5.3)
PROT C AG ACT/NOR PPP IA: 136 % OF NORMAL (ref 60–150)
PROT S ACT/NOR PPP: 64 % (ref 71–117)
PROT SERPL-MCNC: 7.2 G/DL (ref 6.4–8.4)
PROTHROMBIN TIME: 14.8 SECONDS (ref 12.3–15)
PROTHROMBIN TIME: 14.9 SECONDS (ref 12.3–15)
RBC # BLD AUTO: 3.96 MILLION/UL (ref 3.88–5.62)
RBC # BLD AUTO: 4 MILLION/UL (ref 3.88–5.62)
RH BLD: POSITIVE
SODIUM SERPL-SCNC: 135 MMOL/L (ref 135–147)
SPECIMEN EXPIRATION DATE: NORMAL
WBC # BLD AUTO: 13.46 THOUSAND/UL (ref 4.31–10.16)
WBC # BLD AUTO: 8.96 THOUSAND/UL (ref 4.31–10.16)

## 2025-03-24 PROCEDURE — 80053 COMPREHEN METABOLIC PANEL: CPT

## 2025-03-24 PROCEDURE — 96374 THER/PROPH/DIAG INJ IV PUSH: CPT

## 2025-03-24 PROCEDURE — 86920 COMPATIBILITY TEST SPIN: CPT

## 2025-03-24 PROCEDURE — 85610 PROTHROMBIN TIME: CPT

## 2025-03-24 PROCEDURE — 0JC50ZZ EXTIRPATION OF MATTER FROM LEFT NECK SUBCUTANEOUS TISSUE AND FASCIA, OPEN APPROACH: ICD-10-PCS | Performed by: SURGERY

## 2025-03-24 PROCEDURE — 99285 EMERGENCY DEPT VISIT HI MDM: CPT | Performed by: EMERGENCY MEDICINE

## 2025-03-24 PROCEDURE — 96361 HYDRATE IV INFUSION ADD-ON: CPT

## 2025-03-24 PROCEDURE — 85025 COMPLETE CBC W/AUTO DIFF WBC: CPT

## 2025-03-24 PROCEDURE — 86850 RBC ANTIBODY SCREEN: CPT

## 2025-03-24 PROCEDURE — 70498 CT ANGIOGRAPHY NECK: CPT

## 2025-03-24 PROCEDURE — 86900 BLOOD TYPING SEROLOGIC ABO: CPT

## 2025-03-24 PROCEDURE — 96376 TX/PRO/DX INJ SAME DRUG ADON: CPT

## 2025-03-24 PROCEDURE — 21501 I&D DP ABSC/HMTMA SFT TS NCK: CPT | Performed by: SURGERY

## 2025-03-24 PROCEDURE — 0W360ZZ CONTROL BLEEDING IN NECK, OPEN APPROACH: ICD-10-PCS | Performed by: SURGERY

## 2025-03-24 PROCEDURE — 99024 POSTOP FOLLOW-UP VISIT: CPT | Performed by: PHYSICIAN ASSISTANT

## 2025-03-24 PROCEDURE — 99285 EMERGENCY DEPT VISIT HI MDM: CPT

## 2025-03-24 PROCEDURE — 36415 COLL VENOUS BLD VENIPUNCTURE: CPT

## 2025-03-24 PROCEDURE — 85730 THROMBOPLASTIN TIME PARTIAL: CPT

## 2025-03-24 PROCEDURE — 99024 POSTOP FOLLOW-UP VISIT: CPT | Performed by: SURGERY

## 2025-03-24 PROCEDURE — 70496 CT ANGIOGRAPHY HEAD: CPT

## 2025-03-24 PROCEDURE — 86901 BLOOD TYPING SEROLOGIC RH(D): CPT

## 2025-03-24 RX ORDER — HYDROMORPHONE HCL/PF 1 MG/ML
0.5 SYRINGE (ML) INJECTION ONCE
Refills: 0 | Status: COMPLETED | OUTPATIENT
Start: 2025-03-24 | End: 2025-03-24

## 2025-03-24 RX ORDER — METOCLOPRAMIDE HYDROCHLORIDE 5 MG/ML
10 INJECTION INTRAMUSCULAR; INTRAVENOUS ONCE AS NEEDED
Status: DISCONTINUED | OUTPATIENT
Start: 2025-03-24 | End: 2025-03-24 | Stop reason: HOSPADM

## 2025-03-24 RX ORDER — ALBUTEROL SULFATE 0.83 MG/ML
2.5 SOLUTION RESPIRATORY (INHALATION) ONCE AS NEEDED
Status: DISCONTINUED | OUTPATIENT
Start: 2025-03-24 | End: 2025-03-24 | Stop reason: HOSPADM

## 2025-03-24 RX ORDER — CEFAZOLIN SODIUM 1 G/3ML
INJECTION, POWDER, FOR SOLUTION INTRAMUSCULAR; INTRAVENOUS AS NEEDED
Status: DISCONTINUED | OUTPATIENT
Start: 2025-03-24 | End: 2025-03-24 | Stop reason: HOSPADM

## 2025-03-24 RX ORDER — OXYCODONE HYDROCHLORIDE 5 MG/1
5 TABLET ORAL EVERY 4 HOURS PRN
Refills: 0 | Status: DISCONTINUED | OUTPATIENT
Start: 2025-03-24 | End: 2025-03-28 | Stop reason: HOSPADM

## 2025-03-24 RX ORDER — ASPIRIN 81 MG/1
81 TABLET ORAL DAILY
Status: DISCONTINUED | OUTPATIENT
Start: 2025-03-24 | End: 2025-03-28 | Stop reason: HOSPADM

## 2025-03-24 RX ORDER — ONDANSETRON 2 MG/ML
4 INJECTION INTRAMUSCULAR; INTRAVENOUS ONCE
Status: COMPLETED | OUTPATIENT
Start: 2025-03-24 | End: 2025-03-24

## 2025-03-24 RX ORDER — MIDAZOLAM HYDROCHLORIDE 2 MG/2ML
INJECTION, SOLUTION INTRAMUSCULAR; INTRAVENOUS AS NEEDED
Status: DISCONTINUED | OUTPATIENT
Start: 2025-03-24 | End: 2025-03-24

## 2025-03-24 RX ORDER — CEFAZOLIN SODIUM 2 G/50ML
2000 SOLUTION INTRAVENOUS ONCE
Status: COMPLETED | OUTPATIENT
Start: 2025-03-24 | End: 2025-03-24

## 2025-03-24 RX ORDER — SENNOSIDES 8.6 MG
8.6 TABLET ORAL
Status: DISCONTINUED | OUTPATIENT
Start: 2025-03-24 | End: 2025-03-28 | Stop reason: HOSPADM

## 2025-03-24 RX ORDER — AMLODIPINE BESYLATE 5 MG/1
5 TABLET ORAL DAILY
Status: DISCONTINUED | OUTPATIENT
Start: 2025-03-24 | End: 2025-03-25

## 2025-03-24 RX ORDER — CLOPIDOGREL BISULFATE 75 MG/1
75 TABLET ORAL DAILY
Status: DISCONTINUED | OUTPATIENT
Start: 2025-03-24 | End: 2025-03-28 | Stop reason: HOSPADM

## 2025-03-24 RX ORDER — FENTANYL CITRATE 50 UG/ML
INJECTION, SOLUTION INTRAMUSCULAR; INTRAVENOUS AS NEEDED
Status: DISCONTINUED | OUTPATIENT
Start: 2025-03-24 | End: 2025-03-24

## 2025-03-24 RX ORDER — LIDOCAINE HYDROCHLORIDE 10 MG/ML
INJECTION, SOLUTION EPIDURAL; INFILTRATION; INTRACAUDAL; PERINEURAL AS NEEDED
Status: DISCONTINUED | OUTPATIENT
Start: 2025-03-24 | End: 2025-03-24 | Stop reason: HOSPADM

## 2025-03-24 RX ORDER — LIDOCAINE HYDROCHLORIDE 10 MG/ML
INJECTION, SOLUTION EPIDURAL; INFILTRATION; INTRACAUDAL; PERINEURAL AS NEEDED
Status: DISCONTINUED | OUTPATIENT
Start: 2025-03-24 | End: 2025-03-24

## 2025-03-24 RX ORDER — SODIUM CHLORIDE, SODIUM LACTATE, POTASSIUM CHLORIDE, CALCIUM CHLORIDE 600; 310; 30; 20 MG/100ML; MG/100ML; MG/100ML; MG/100ML
INJECTION, SOLUTION INTRAVENOUS CONTINUOUS PRN
Status: DISCONTINUED | OUTPATIENT
Start: 2025-03-24 | End: 2025-03-24

## 2025-03-24 RX ORDER — ATORVASTATIN CALCIUM 40 MG/1
40 TABLET, FILM COATED ORAL DAILY
Status: DISCONTINUED | OUTPATIENT
Start: 2025-03-24 | End: 2025-03-28 | Stop reason: HOSPADM

## 2025-03-24 RX ORDER — CHLORHEXIDINE GLUCONATE ORAL RINSE 1.2 MG/ML
15 SOLUTION DENTAL ONCE
Status: COMPLETED | OUTPATIENT
Start: 2025-03-24 | End: 2025-03-24

## 2025-03-24 RX ORDER — ONDANSETRON 2 MG/ML
4 INJECTION INTRAMUSCULAR; INTRAVENOUS ONCE AS NEEDED
Status: DISCONTINUED | OUTPATIENT
Start: 2025-03-24 | End: 2025-03-24 | Stop reason: HOSPADM

## 2025-03-24 RX ORDER — CARVEDILOL 25 MG/1
25 TABLET ORAL 2 TIMES DAILY WITH MEALS
Status: DISCONTINUED | OUTPATIENT
Start: 2025-03-24 | End: 2025-03-28 | Stop reason: HOSPADM

## 2025-03-24 RX ORDER — HYDROMORPHONE HCL IN WATER/PF 6 MG/30 ML
0.2 PATIENT CONTROLLED ANALGESIA SYRINGE INTRAVENOUS EVERY 4 HOURS PRN
Status: DISCONTINUED | OUTPATIENT
Start: 2025-03-24 | End: 2025-03-24

## 2025-03-24 RX ORDER — DEXAMETHASONE SODIUM PHOSPHATE 10 MG/ML
INJECTION, SOLUTION INTRAMUSCULAR; INTRAVENOUS AS NEEDED
Status: DISCONTINUED | OUTPATIENT
Start: 2025-03-24 | End: 2025-03-24

## 2025-03-24 RX ORDER — NICOTINE 21 MG/24HR
1 PATCH, TRANSDERMAL 24 HOURS TRANSDERMAL DAILY
Status: DISCONTINUED | OUTPATIENT
Start: 2025-03-24 | End: 2025-03-28 | Stop reason: HOSPADM

## 2025-03-24 RX ORDER — HYDROMORPHONE HCL/PF 1 MG/ML
1 SYRINGE (ML) INJECTION ONCE
Refills: 0 | Status: COMPLETED | OUTPATIENT
Start: 2025-03-24 | End: 2025-03-24

## 2025-03-24 RX ORDER — PROPOFOL 10 MG/ML
INJECTION, EMULSION INTRAVENOUS AS NEEDED
Status: DISCONTINUED | OUTPATIENT
Start: 2025-03-24 | End: 2025-03-24

## 2025-03-24 RX ORDER — SODIUM CHLORIDE 9 MG/ML
INJECTION, SOLUTION INTRAVENOUS CONTINUOUS PRN
Status: DISCONTINUED | OUTPATIENT
Start: 2025-03-24 | End: 2025-03-24

## 2025-03-24 RX ORDER — LABETALOL HYDROCHLORIDE 5 MG/ML
5 INJECTION, SOLUTION INTRAVENOUS EVERY 6 HOURS PRN
Status: DISCONTINUED | OUTPATIENT
Start: 2025-03-24 | End: 2025-03-28 | Stop reason: HOSPADM

## 2025-03-24 RX ORDER — HYDROMORPHONE HCL IN WATER/PF 6 MG/30 ML
0.2 PATIENT CONTROLLED ANALGESIA SYRINGE INTRAVENOUS EVERY 4 HOURS PRN
Status: DISCONTINUED | OUTPATIENT
Start: 2025-03-24 | End: 2025-03-25

## 2025-03-24 RX ORDER — ROCURONIUM BROMIDE 10 MG/ML
INJECTION, SOLUTION INTRAVENOUS AS NEEDED
Status: DISCONTINUED | OUTPATIENT
Start: 2025-03-24 | End: 2025-03-24

## 2025-03-24 RX ORDER — SUCCINYLCHOLINE/SOD CL,ISO/PF 100 MG/5ML
SYRINGE (ML) INTRAVENOUS AS NEEDED
Status: DISCONTINUED | OUTPATIENT
Start: 2025-03-24 | End: 2025-03-24

## 2025-03-24 RX ORDER — EPHEDRINE SULFATE 50 MG/ML
INJECTION INTRAVENOUS AS NEEDED
Status: DISCONTINUED | OUTPATIENT
Start: 2025-03-24 | End: 2025-03-24

## 2025-03-24 RX ORDER — FENTANYL CITRATE/PF 50 MCG/ML
25 SYRINGE (ML) INJECTION
Refills: 0 | Status: DISCONTINUED | OUTPATIENT
Start: 2025-03-24 | End: 2025-03-24 | Stop reason: HOSPADM

## 2025-03-24 RX ADMIN — EPHEDRINE SULFATE 5 MG: 50 INJECTION, SOLUTION INTRAVENOUS at 11:55

## 2025-03-24 RX ADMIN — HYDROMORPHONE HYDROCHLORIDE 1 MG: 1 INJECTION, SOLUTION INTRAMUSCULAR; INTRAVENOUS; SUBCUTANEOUS at 03:04

## 2025-03-24 RX ADMIN — MIDAZOLAM 1 MG: 1 INJECTION INTRAMUSCULAR; INTRAVENOUS at 10:39

## 2025-03-24 RX ADMIN — LIDOCAINE HYDROCHLORIDE 50 MG: 10 INJECTION, SOLUTION EPIDURAL; INFILTRATION; INTRACAUDAL at 11:57

## 2025-03-24 RX ADMIN — PROPOFOL 170 MG: 10 INJECTION, EMULSION INTRAVENOUS at 10:45

## 2025-03-24 RX ADMIN — SENNOSIDES 8.6 MG: 8.6 TABLET, FILM COATED ORAL at 21:36

## 2025-03-24 RX ADMIN — LIDOCAINE HYDROCHLORIDE 50 MG: 10 INJECTION, SOLUTION EPIDURAL; INFILTRATION; INTRACAUDAL at 10:45

## 2025-03-24 RX ADMIN — HYDROMORPHONE HYDROCHLORIDE 0.5 MG: 0.5 INJECTION, SOLUTION INTRAMUSCULAR; INTRAVENOUS; SUBCUTANEOUS at 04:41

## 2025-03-24 RX ADMIN — PHENYLEPHRINE HYDROCHLORIDE 40 MCG/MIN: 10 INJECTION INTRAVENOUS at 10:58

## 2025-03-24 RX ADMIN — SUGAMMADEX 200 MG: 100 INJECTION, SOLUTION INTRAVENOUS at 12:00

## 2025-03-24 RX ADMIN — SODIUM CHLORIDE, SODIUM LACTATE, POTASSIUM CHLORIDE, AND CALCIUM CHLORIDE: .6; .31; .03; .02 INJECTION, SOLUTION INTRAVENOUS at 10:36

## 2025-03-24 RX ADMIN — ROCURONIUM 50 MG: 50 INJECTION, SOLUTION INTRAVENOUS at 10:50

## 2025-03-24 RX ADMIN — ONDANSETRON 4 MG: 2 INJECTION INTRAMUSCULAR; INTRAVENOUS at 11:55

## 2025-03-24 RX ADMIN — AMLODIPINE BESYLATE 5 MG: 5 TABLET ORAL at 13:14

## 2025-03-24 RX ADMIN — SODIUM CHLORIDE 1000 ML: 0.9 INJECTION, SOLUTION INTRAVENOUS at 03:06

## 2025-03-24 RX ADMIN — FENTANYL CITRATE 25 MCG: 50 INJECTION INTRAMUSCULAR; INTRAVENOUS at 11:10

## 2025-03-24 RX ADMIN — CEFAZOLIN SODIUM 2000 MG: 2 SOLUTION INTRAVENOUS at 10:49

## 2025-03-24 RX ADMIN — CARVEDILOL 25 MG: 25 TABLET, FILM COATED ORAL at 16:54

## 2025-03-24 RX ADMIN — CLOPIDOGREL BISULFATE 75 MG: 75 TABLET, FILM COATED ORAL at 13:15

## 2025-03-24 RX ADMIN — FENTANYL CITRATE 50 MCG: 50 INJECTION INTRAMUSCULAR; INTRAVENOUS at 11:48

## 2025-03-24 RX ADMIN — OXYCODONE HYDROCHLORIDE 5 MG: 5 TABLET ORAL at 08:42

## 2025-03-24 RX ADMIN — HYDROMORPHONE HYDROCHLORIDE 0.5 MG: 0.5 INJECTION, SOLUTION INTRAMUSCULAR; INTRAVENOUS; SUBCUTANEOUS at 03:47

## 2025-03-24 RX ADMIN — DEXAMETHASONE SODIUM PHOSPHATE 10 MG: 10 INJECTION INTRAMUSCULAR; INTRAVENOUS at 11:18

## 2025-03-24 RX ADMIN — CHLORHEXIDINE GLUCONATE 15 ML: 1.2 SOLUTION ORAL at 07:40

## 2025-03-24 RX ADMIN — FENTANYL CITRATE 25 MCG: 50 INJECTION INTRAMUSCULAR; INTRAVENOUS at 10:45

## 2025-03-24 RX ADMIN — IOHEXOL 85 ML: 350 INJECTION, SOLUTION INTRAVENOUS at 04:03

## 2025-03-24 RX ADMIN — ATORVASTATIN CALCIUM 40 MG: 40 TABLET, FILM COATED ORAL at 13:15

## 2025-03-24 RX ADMIN — Medication 100 MG: at 10:45

## 2025-03-24 RX ADMIN — EPHEDRINE SULFATE 5 MG: 50 INJECTION, SOLUTION INTRAVENOUS at 11:10

## 2025-03-24 RX ADMIN — SODIUM CHLORIDE: 0.9 INJECTION, SOLUTION INTRAVENOUS at 10:47

## 2025-03-24 RX ADMIN — CARVEDILOL 25 MG: 25 TABLET, FILM COATED ORAL at 07:40

## 2025-03-24 RX ADMIN — ASPIRIN 81 MG: 81 TABLET, COATED ORAL at 13:15

## 2025-03-24 NOTE — ANESTHESIA POSTPROCEDURE EVALUATION
Post-Op Assessment Note    CV Status:  Stable  Pain Score: 0    Pain management: adequate       Mental Status:  Arousable and sleepy   Hydration Status:  Euvolemic and stable   PONV Controlled:  Controlled   Airway Patency:  Patent and adequate     Post Op Vitals Reviewed: Yes    No anethesia notable event occurred.    Staff: CRNA       Last Filed PACU Vitals:  Vitals Value Taken Time   Temp     Pulse 65 03/24/25 1218   /91 03/24/25 1218   Resp     SpO2 98 % 03/24/25 1218   Vitals shown include unfiled device data.

## 2025-03-24 NOTE — ED ATTENDING ATTESTATION
3/24/2025  IDeborah MD, saw and evaluated the patient. I have discussed the patient with the resident/non-physician practitioner and agree with the resident's/non-physician practitioner's findings, Plan of Care, and MDM as documented in the resident's/non-physician practitioner's note, except where noted. All available labs and Radiology studies were reviewed.  I was present for key portions of any procedure(s) performed by the resident/non-physician practitioner and I was immediately available to provide assistance.       At this point I agree with the current assessment done in the Emergency Department.  I have conducted an independent evaluation of this patient a history and physical is as follows:    Chief Complaint   Patient presents with    Post-op Problem     Pt had surgery on his neck x1 week ago. Increased swelling and pain since surgery.      59 y.o. male presenting with post-operative complication.  Patient underwent left carotid endarterectomy with our vascular surgeon 1 week ago on 3/17/2025 with Dr. Calderón and has been convalescing at [facility].  He has left side of the neck where he had the surgery has been gradually expanding and becoming more and more painful.  Patient does not have any difficulty swallowing or breathing and denies any changes in his voice.  No fevers.  No chest pain.  He is referred to the emergency department due to expanding hematoma.  Pain is quite severe at present.    ED Triage Vitals   Temperature Pulse Respirations Blood Pressure SpO2   03/24/25 0522 03/24/25 0209 03/24/25 0209 03/24/25 0209 03/24/25 0209   (!) 97.3 °F (36.3 °C) 71 16 154/84 93 %      Temp Source Heart Rate Source Patient Position - Orthostatic VS BP Location FiO2 (%)   03/24/25 0522 03/24/25 0209 03/24/25 0209 03/24/25 0209 --   Oral Monitor Sitting Left arm       Pain Score       03/24/25 0304       10 - Worst Possible Pain           Vital signs and nursing notes reviewed    CONSTITUTIONAL:  male appearing stated age resting in bed, in no acute distress  HEENT: atraumatic, normocephalic. Sclera anicteric, conjunctiva are not injected. Moist oral mucosa.  There is a large area of edema to left lateral neck with some serosanguineous oozing from the surgical incision.  The area is not erythematous or warm to touch but it is quite tender to palpation.  Patient does have a slight hoarseness to his voice, but is otherwise able to vocalize and there is no stridor.  CARDIOVASCULAR/CHEST: RRR, no M/R/G. 2+ radial pulses  PULMONARY: Breathing comfortably on RA. Breath sounds are equal and clear to auscultation  ABDOMEN: non-distended. BS present, normoactive. Non-tender  MSK: moves all extremities, no deformities, no peripheral edema, no calf asymmetry  NEURO: Awake, alert, and oriented x 3. Face symmetric. Moves all extremities spontaneously. No focal neurologic deficits  SKIN: Mildly diaphoretic  MENTAL STATUS: Normal affect    Labs Reviewed   CBC AND DIFFERENTIAL - Abnormal       Result Value Ref Range Status    WBC 8.96  4.31 - 10.16 Thousand/uL Final    RBC 3.96  3.88 - 5.62 Million/uL Final    Hemoglobin 12.3  12.0 - 17.0 g/dL Final    Hematocrit 36.7  36.5 - 49.3 % Final    MCV 93  82 - 98 fL Final    MCH 31.1  26.8 - 34.3 pg Final    MCHC 33.5  31.4 - 37.4 g/dL Final    RDW 13.9  11.6 - 15.1 % Final    MPV 10.3  8.9 - 12.7 fL Final    Platelets 365  149 - 390 Thousands/uL Final    nRBC 0  /100 WBCs Final    Segmented % 63  43 - 75 % Final    Immature Grans % 1  0 - 2 % Final    Lymphocytes % 18  14 - 44 % Final    Monocytes % 14 (*) 4 - 12 % Final    Eosinophils Relative 3  0 - 6 % Final    Basophils Relative 1  0 - 1 % Final    Absolute Neutrophils 5.65  1.85 - 7.62 Thousands/µL Final    Absolute Immature Grans 0.09  0.00 - 0.20 Thousand/uL Final    Absolute Lymphocytes 1.63  0.60 - 4.47 Thousands/µL Final    Absolute Monocytes 1.26 (*) 0.17 - 1.22 Thousand/µL Final    Eosinophils Absolute 0.28  0.00 -  0.61 Thousand/µL Final    Basophils Absolute 0.05  0.00 - 0.10 Thousands/µL Final   COMPREHENSIVE METABOLIC PANEL - Abnormal    Sodium 135  135 - 147 mmol/L Final    Potassium 4.0  3.5 - 5.3 mmol/L Final    Chloride 101  96 - 108 mmol/L Final    CO2 22  21 - 32 mmol/L Final    ANION GAP 12  4 - 13 mmol/L Final    BUN 42 (*) 5 - 25 mg/dL Final    Creatinine 1.57 (*) 0.60 - 1.30 mg/dL Final    Comment: Standardized to IDMS reference method    Glucose 141 (*) 65 - 140 mg/dL Final    Comment: If the patient is fasting, the ADA then defines impaired fasting glucose as > 100 mg/dL and diabetes as > or equal to 123 mg/dL.    Calcium 9.2  8.4 - 10.2 mg/dL Final    AST 17  13 - 39 U/L Final    ALT 39  7 - 52 U/L Final    Comment: Specimen collection should occur prior to Sulfasalazine administration due to the potential for falsely depressed results.     Alkaline Phosphatase 137 (*) 34 - 104 U/L Final    Total Protein 7.2  6.4 - 8.4 g/dL Final    Albumin 4.0  3.5 - 5.0 g/dL Final    Total Bilirubin 0.44  0.20 - 1.00 mg/dL Final    Comment: Use of this assay is not recommended for patients undergoing treatment with eltrombopag due to the potential for falsely elevated results.  N-acetyl-p-benzoquinone imine (metabolite of Acetaminophen) will generate erroneously low results in samples for patients that have taken an overdose of Acetaminophen.    eGFR 47  ml/min/1.73sq m Final    Narrative:     National Kidney Disease Foundation guidelines for Chronic Kidney Disease (CKD):     Stage 1 with normal or high GFR (GFR > 90 mL/min/1.73 square meters)    Stage 2 Mild CKD (GFR = 60-89 mL/min/1.73 square meters)    Stage 3A Moderate CKD (GFR = 45-59 mL/min/1.73 square meters)    Stage 3B Moderate CKD (GFR = 30-44 mL/min/1.73 square meters)    Stage 4 Severe CKD (GFR = 15-29 mL/min/1.73 square meters)    Stage 5 End Stage CKD (GFR <15 mL/min/1.73 square meters)  Note: GFR calculation is accurate only with a steady state creatinine    APTT - Abnormal    PTT 42 (*) 23 - 34 seconds Final    Comment: Therapeutic Heparin Range =  60-90 seconds   PROTIME-INR - Normal    Protime 14.9  12.3 - 15.0 seconds Final    INR 1.14  0.85 - 1.19 Final    Narrative:     INR Therapeutic Range    Indication                                             INR Range      Atrial Fibrillation                                               2.0-3.0  Hypercoagulable State                                    2.0.2.3  Left Ventricular Asist Device                            2.0-3.0  Mechanical Heart Valve                                  -    Aortic(with afib, MI, embolism, HF, LA enlargement,    and/or coagulopathy)                                     2.0-3.0 (2.5-3.5)     Mitral                                                             2.5-3.5  Prosthetic/Bioprosthetic Heart Valve               2.0-3.0  Venous thromboembolism (VTE: VT, PE        2.0-3.0   CBC AND DIFFERENTIAL   PROTIME-INR   APTT   TYPE AND SCREEN     CTA head and neck with and without contrast   Final Result      No acute intracranial process is seen.      Large heterogeneous hematoma in the soft tissues throughout the left neck which appears to originate in the carotid space.  The hematoma measures approximately 10.6 x 6.1 x 5.6 cm in CC, transverse, and AP dimensions. Several focal areas of hyperdensity    anteriorly within the hematoma are noted (axial image 369, series 6, for example) consistent with areas of active bleeding.      Some irregularity of the left common carotid artery is seen just proximal to the bifurcation (axial image 278 and 279, series 6), possibly related to recent intervention/endarterectomy.   No significant stenosis within either internal carotid artery. Less than 50% stenosis by NASCET criteria.      Patent bilateral vertebral arteries.      Degenerative changes of the cervical spine.      Other findings as above.      I personally discussed this study with SHARYN CARRERA on  3/24/2025 4:47 AM.            Workstation performed: [id number]               ED Course     Medications   chlorhexidine (PERIDEX) 0.12 % oral rinse 15 mL (has no administration in time range)   ceFAZolin (ANCEF) IVPB (premix in dextrose) 2,000 mg 50 mL (has no administration in time range)   nicotine (NICODERM CQ) 14 mg/24hr TD 24 hr patch 1 patch (has no administration in time range)   amLODIPine (NORVASC) tablet 5 mg (has no administration in time range)   aspirin (ECOTRIN LOW STRENGTH) EC tablet 81 mg (has no administration in time range)   atorvastatin (LIPITOR) tablet 40 mg (has no administration in time range)   carvedilol (COREG) tablet 25 mg (has no administration in time range)   clopidogrel (PLAVIX) tablet 75 mg (has no administration in time range)   senna (SENOKOT) tablet 8.6 mg (has no administration in time range)   HYDROmorphone (DILAUDID) injection 1 mg (1 mg Intravenous Given 3/24/25 0304)   sodium chloride 0.9 % bolus 1,000 mL (1,000 mL Intravenous New Bag 3/24/25 0306)   HYDROmorphone (DILAUDID) injection 0.5 mg (0.5 mg Intravenous Given 3/24/25 0347)   iohexol (OMNIPAQUE) 350 MG/ML injection (SINGLE-DOSE) 100 mL (85 mL Intravenous Given 3/24/25 0403)   HYDROmorphone (DILAUDID) injection 0.5 mg (0.5 mg Intravenous Given 3/24/25 0441)     59-year-old male presenting with postoperative hematoma after left carotid endarterectomy.  Vital signs reviewed, within normal limits.  At present, patient is protecting his airway.  We are pursuing CT angiography of the neck vessels to rule out active extravasation.  Reveal CMP with an essentially baseline renal function and a stable hemoglobin, PTT mildly prolonged 42, INR WNL.  CT scan does reveal a hematoma with areas of active extravasation. Patient admitted to Vascular Surgery with plan for OR tonight.

## 2025-03-24 NOTE — ED PROVIDER NOTES
Time reflects when diagnosis was documented in both MDM as applicable and the Disposition within this note       Time User Action Codes Description Comment    3/24/2025  5:13 AM Nhi Montemayor [Z98.890] S/P carotid endarterectomy     3/24/2025  5:13 AM Nhi Montemayor [R22.1] Localized swelling, mass or lump of neck     3/24/2025  5:20 AM Nhi Montemayor [T14.8XXA] Hematoma           ED Disposition       None          Assessment & Plan       Medical Decision Making  Given bleeding from left endarterectomy site, vascular surgery immediately notified, CBC, CMP, coags, type and screen, CTA head and neck admit to vascular surgery service    Amount and/or Complexity of Data Reviewed  Labs: ordered.  Radiology: ordered.    Risk  Prescription drug management.             Medications   chlorhexidine (PERIDEX) 0.12 % oral rinse 15 mL (has no administration in time range)   ceFAZolin (ANCEF) IVPB (premix in dextrose) 2,000 mg 50 mL (has no administration in time range)   nicotine (NICODERM CQ) 14 mg/24hr TD 24 hr patch 1 patch (has no administration in time range)   amLODIPine (NORVASC) tablet 5 mg (has no administration in time range)   aspirin (ECOTRIN LOW STRENGTH) EC tablet 81 mg (has no administration in time range)   atorvastatin (LIPITOR) tablet 40 mg (has no administration in time range)   carvedilol (COREG) tablet 25 mg (has no administration in time range)   clopidogrel (PLAVIX) tablet 75 mg (has no administration in time range)   senna (SENOKOT) tablet 8.6 mg (has no administration in time range)   HYDROmorphone (DILAUDID) injection 1 mg (1 mg Intravenous Given 3/24/25 0304)   sodium chloride 0.9 % bolus 1,000 mL (1,000 mL Intravenous New Bag 3/24/25 0306)   HYDROmorphone (DILAUDID) injection 0.5 mg (0.5 mg Intravenous Given 3/24/25 0347)   iohexol (OMNIPAQUE) 350 MG/ML injection (SINGLE-DOSE) 100 mL (85 mL Intravenous Given 3/24/25 0403)   HYDROmorphone (DILAUDID) injection 0.5 mg (0.5 mg Intravenous  Given 3/24/25 0441)       ED Risk Strat Scores                                                History of Present Illness       Chief Complaint   Patient presents with    Post-op Problem     Pt had surgery on his neck x1 week ago. Increased swelling and pain since surgery.       Past Medical History:   Diagnosis Date    Hypertension       Past Surgical History:   Procedure Laterality Date    KNEE SURGERY  2008    MOLE REMOVAL  2024    Back    [place] TEAEC W/PATCH GRF CAROTID VERTB SUBCLAV NECK INC Left 3/17/2025    Procedure: ENDARTERECTOMY ARTERY CAROTID WITH BOVINE PATCH ANGIOPLASTY;  Surgeon: Joss Calderón MD;  Location: [facility];  Service: Vascular      Family History   Problem Relation Age of Onset    Hypertension Mother     Hypertension Father     Heart disease Father       Social History     Tobacco Use    Smoking status: Every Day     Current packs/day: 0.50     Average packs/day: 0.5 packs/day for 15.0 years (7.5 ttl pk-yrs)     Types: Cigars, Cigarettes    Smokeless tobacco: Never   Vaping Use    Vaping status: Never Used   Substance Use Topics    Alcohol use: Yes     Alcohol/week: 2.0 standard drinks of alcohol     Types: 2 Standard drinks or equivalent per week     Comment: social    Drug use: Not Currently     Types: Marijuana      E-Cigarette/Vaping    E-Cigarette Use Never User       E-Cigarette/Vaping Substances      I have reviewed and agree with the history as documented.     59-year-old male pmh left hemispheric stroke secondary to symptomatic left ICA stenosis s/p endarterectomy on 3/17/2025 with Dr Calderón, here with bleeding from surgical site. Currently hemostatic, was bleeding and painful earlier in the evening.  No airway impingement, change in phonation, hematoma not actively getting larger.  No falls trauma or other complaints on review of systems        Review of Systems   All other systems reviewed and are negative.          Objective       ED Triage Vitals   Temperature Pulse Blood  Pressure Respirations SpO2 Patient Position - Orthostatic VS   03/24/25 0522 03/24/25 0209 03/24/25 0209 03/24/25 0209 03/24/25 0209 03/24/25 0209   (!) 97.3 °F (36.3 °C) 71 154/84 16 93 % Sitting      Temp Source Heart Rate Source BP Location FiO2 (%) Pain Score    03/24/25 0522 03/24/25 0209 03/24/25 0209 -- 03/24/25 0304    Oral Monitor Left arm  10 - Worst Possible Pain      Vitals      Date and Time Temp Pulse SpO2 Resp BP Pain Score FACES Pain Rating User   03/24/25 0522 97.3 °F (36.3 °C) -- -- -- -- -- -- [facility]   03/24/25 0500 -- 53 93 % -- 162/82 -- -- [facility]   03/24/25 0441 -- -- -- -- -- 10 - Worst Possible Pain -- [facility]   03/24/25 0347 -- -- -- -- -- 9 -- [facility]   03/24/25 0304 -- -- -- -- -- 10 - Worst Possible Pain -- [facility]   03/24/25 0209 -- 71 93 % 16 154/84 -- -- [facility]            Physical Exam  Vitals and nursing note reviewed.   Constitutional:       General: He is not in acute distress.     Appearance: He is well-developed.   HENT:      Head: Normocephalic and atraumatic.      Comments: Hemostatic left neck, swollen, nonpulsatile, not expanding, tender  Eyes:      Conjunctiva/sclera: Conjunctivae normal.   Cardiovascular:      Rate and Rhythm: Regular rhythm. Bradycardia present.      Heart sounds: No murmur heard.  Pulmonary:      Effort: Pulmonary effort is normal. No respiratory distress.      Breath sounds: Normal breath sounds.   Abdominal:      Palpations: Abdomen is soft.      Tenderness: There is no abdominal tenderness.   Musculoskeletal:         General: No swelling.      Cervical back: Neck supple.   Skin:     General: Skin is warm and dry.      Capillary Refill: Capillary refill takes less than 2 seconds.   Neurological:      Mental Status: He is alert.   Psychiatric:         Mood and Affect: Mood normal.         Results Reviewed       Procedure Component Value Units Date/Time    CBC and differential [[id number]]     Lab Status: No result Specimen: Blood     Protime-INR [[id number]]     Lab Status: No  result Specimen: Blood     APTT [[id number]]     Lab Status: No result Specimen: Blood     Protime-INR [[id number]]  (Normal) Collected: 03/24/25 0230    Lab Status: Final result Specimen: Blood from Arm, Right Updated: 03/24/25 0312     Protime 14.9 seconds      INR 1.14    Narrative:      INR Therapeutic Range    Indication                                             INR Range      Atrial Fibrillation                                               2.0-3.0  Hypercoagulable State                                    2.0.2.3  Left Ventricular Asist Device                            2.0-3.0  Mechanical Heart Valve                                  -    Aortic(with afib, MI, embolism, HF, LA enlargement,    and/or coagulopathy)                                     2.0-3.0 (2.5-3.5)     Mitral                                                             2.5-3.5  Prosthetic/Bioprosthetic Heart Valve               2.0-3.0  Venous thromboembolism (VTE: VT, PE        2.0-3.0    APTT [[id number]]  (Abnormal) Collected: 03/24/25 0230    Lab Status: Final result Specimen: Blood from Arm, Right Updated: 03/24/25 0312     PTT 42 seconds     Comprehensive metabolic panel [[id number]]  (Abnormal) Collected: 03/24/25 0230    Lab Status: Final result Specimen: Blood from Arm, Right Updated: 03/24/25 0300     Sodium 135 mmol/L      Potassium 4.0 mmol/L      Chloride 101 mmol/L      CO2 22 mmol/L      ANION GAP 12 mmol/L      BUN 42 mg/dL      Creatinine 1.57 mg/dL      Glucose 141 mg/dL      Calcium 9.2 mg/dL      AST 17 U/L      ALT 39 U/L      Alkaline Phosphatase 137 U/L      Total Protein 7.2 g/dL      Albumin 4.0 g/dL      Total Bilirubin 0.44 mg/dL      eGFR 47 ml/min/1.73sq m     Narrative:      National Kidney Disease Foundation guidelines for Chronic Kidney Disease (CKD):     Stage 1 with normal or high GFR (GFR > 90 mL/min/1.73 square meters)    Stage 2 Mild CKD (GFR = 60-89 mL/min/1.73 square meters)    Stage 3A Moderate CKD (GFR =  45-59 mL/min/1.73 square meters)    Stage 3B Moderate CKD (GFR = 30-44 mL/min/1.73 square meters)    Stage 4 Severe CKD (GFR = 15-29 mL/min/1.73 square meters)    Stage 5 End Stage CKD (GFR <15 mL/min/1.73 square meters)  Note: GFR calculation is accurate only with a steady state creatinine    CBC and differential [[id number]]  (Abnormal) Collected: 03/24/25 0230    Lab Status: Final result Specimen: Blood from Arm, Right Updated: 03/24/25 0237     WBC 8.96 Thousand/uL      RBC 3.96 Million/uL      Hemoglobin 12.3 g/dL      Hematocrit 36.7 %      MCV 93 fL      MCH 31.1 pg      MCHC 33.5 g/dL      RDW 13.9 %      MPV 10.3 fL      Platelets 365 Thousands/uL      nRBC 0 /100 WBCs      Segmented % 63 %      Immature Grans % 1 %      Lymphocytes % 18 %      Monocytes % 14 %      Eosinophils Relative 3 %      Basophils Relative 1 %      Absolute Neutrophils 5.65 Thousands/µL      Absolute Immature Grans 0.09 Thousand/uL      Absolute Lymphocytes 1.63 Thousands/µL      Absolute Monocytes 1.26 Thousand/µL      Eosinophils Absolute 0.28 Thousand/µL      Basophils Absolute 0.05 Thousands/µL             CTA head and neck with and without contrast   Final Interpretation by Dewayne Cheung DO (03/24 0537)      No acute intracranial process is seen.      Large heterogeneous hematoma in the soft tissues throughout the left neck which appears to originate in the carotid space.  The hematoma measures approximately 10.6 x 6.1 x 5.6 cm in CC, transverse, and AP dimensions. Several focal areas of hyperdensity    anteriorly within the hematoma are noted (axial image 369, series 6, for example) consistent with areas of active bleeding.      Some irregularity of the left common carotid artery is seen just proximal to the bifurcation (axial image 278 and 279, series 6), possibly related to recent intervention/endarterectomy.   No significant stenosis within either internal carotid artery. Less than 50% stenosis by NASCET  criteria.      Patent bilateral vertebral arteries.      Degenerative changes of the cervical spine.      Other findings as above.      I personally discussed this study with SHARYN CARRERA on 3/24/2025 4:47 AM.            Workstation performed: [id number]             Procedures    ED Medication and Procedure Management   Prior to Admission Medications   Prescriptions Last Dose Informant Patient Reported? Taking?   ASPIRIN 81 PO   Yes No   Sig: Take 81 mg by mouth in the morning   acetaminophen (TYLENOL) 325 mg tablet   No No   Sig: Take 2 tablets (650 mg total) by mouth every 8 (eight) hours as needed for mild pain   amLODIPine (NORVASC) 5 mg tablet  Self Yes No   Si mg daily   atorvastatin (LIPITOR) 40 mg tablet  Self No No   Sig: Take 1 tablet (40 mg total) by mouth daily   carvedilol (COREG) 25 mg tablet   No No   Sig: Take 1 tablet (25 mg total) by mouth 2 (two) times a day with meals   clopidogrel (PLAVIX) 75 mg tablet   No No   Sig: Take 1 tablet (75 mg total) by mouth daily   hydroCHLOROthiazide 25 mg tablet  Self No No   Sig: Take 1 tablet (25 mg total) by mouth daily   polyethylene glycol (MIRALAX) 17 g packet   No No   Sig: Take 17 g by mouth daily as needed (Constipation) for up to 10 days   senna (SENOKOT) 8.6 mg   No No   Sig: Take 1 tablet (8.6 mg total) by mouth daily at bedtime for 3 days   spironolactone (ALDACTONE) 25 mg tablet  Self No No   Sig: Take 1 tablet (25 mg total) by mouth daily      Facility-Administered Medications: None     Current Discharge Medication List        CONTINUE these medications which have NOT CHANGED    Details   acetaminophen (TYLENOL) 325 mg tablet Take 2 tablets (650 mg total) by mouth every 8 (eight) hours as needed for mild pain    Associated Diagnoses: S/P carotid endarterectomy      amLODIPine (NORVASC) 5 mg tablet 5 mg daily      ASPIRIN 81 PO Take 81 mg by mouth in the morning      atorvastatin (LIPITOR) 40 mg tablet Take 1 tablet (40 mg total) by mouth  daily  Qty: 90 tablet, Refills: 3    Associated Diagnoses: Mixed hyperlipidemia; Smoking      carvedilol (COREG) 25 mg tablet Take 1 tablet (25 mg total) by mouth 2 (two) times a day with meals  Qty: 60 tablet, Refills: 11    Associated Diagnoses: Primary hypertension      clopidogrel (PLAVIX) 75 mg tablet Take 1 tablet (75 mg total) by mouth daily    Associated Diagnoses: Acute cerebrovascular accident (CVA) due to stenosis of left carotid artery (HCC); Stroke-like symptoms; CVA (cerebral vascular accident) (HCC)      hydroCHLOROthiazide 25 mg tablet Take 1 tablet (25 mg total) by mouth daily  Qty: 90 tablet, Refills: 3    Associated Diagnoses: Resistant hypertension      polyethylene glycol (MIRALAX) 17 g packet Take 17 g by mouth daily as needed (Constipation) for up to 10 days    Associated Diagnoses: Constipation      senna (SENOKOT) 8.6 mg Take 1 tablet (8.6 mg total) by mouth daily at bedtime for 3 days    Associated Diagnoses: Constipation      spironolactone (ALDACTONE) 25 mg tablet Take 1 tablet (25 mg total) by mouth daily  Qty: 90 tablet, Refills: 3    Associated Diagnoses: Resistant hypertension           No discharge procedures on file.  ED SEPSIS DOCUMENTATION   Time reflects when diagnosis was documented in both MDM as applicable and the Disposition within this note       Time User Action Codes Description Comment    3/24/2025  5:13 AM Nhi Montemayor [Z98.890] S/P carotid endarterectomy     3/24/2025  5:13 AM Nhi Montemayor [R22.1] Localized swelling, mass or lump of neck     3/24/2025  5:20 AM Nhi Montemayor [T14.8XXA] Hematoma                  Dewayne Shah MD  03/24/25 0624

## 2025-03-24 NOTE — OP NOTE
OPERATIVE REPORT  PATIENT NAME: Ez Dsouza    :  1965  MRN: [id number]  Pt Location: [facility] OR ROOM 18    SURGERY DATE: 3/24/2025    Surgeons and Role:     * Ez Villalobos MD - Primary    Preop Diagnosis:  S/P carotid endarterectomy [Z98.890]  Localized swelling, mass or lump of neck [R22.1]    Post-Op Diagnosis Codes:     * S/P carotid endarterectomy [Z98.890]     * Localized swelling, mass or lump of neck [R22.1]    Procedure(s):  Left - L neck exploration. hematoma evacuation    Specimen(s):  * No specimens in log *    Estimated Blood Loss:   Minimal    Drains:  * No LDAs found *    Anesthesia Type:   Choice    Operative Indications:  S/P carotid endarterectomy [Z98.890]  Localized swelling, mass or lump of neck [R22.1]  59-year-old male with symptomatic left carotid stenosis status post left carotid endarterectomy on 3/18/2025 complicated by left neck hematoma.  Patient presented with enlarging hematoma and CTA showing large hematoma with some blush within the hematoma but an intact carotid artery.  Concern for small vessel bleed will plan for a hematoma evacuation washout and hemostasis.    Operative Findings:  Large amount of hematoma evacuated  No sign of active bleeding vessel but there was some generalized oozing from the tissue beds.  Wound was irrigated and hemostatic agents used to assist in hemostasis.    Wound appeared hemostatic at the end of the case      Complications:   None    Procedure and Technique:  After informed consent was obtained, the patient was brought to the operating room and placed in the supine position. Perioperative IV antibiotics were given.  He was given anesthesia and endotracheally intubated.  He was then prepped and draped in the usual sterile fashion exposing the left neck.  A timeout was performed.     Left neck incision was reopened and sutures were removed from the platysma.  This exposed the hematoma this was evacuated and the wound was irrigated.  The wound  was then explored which showed no sign of an active bleeding vessel.  The patch appeared intact without any signs of bleeding.  The tissue beds did have some generalized oozing and electrocautery was used to assist in hemostasis.  Surgiflo was then used to help with hemostasis as well.  The wound was irrigated with antibiotic irrigation.  At the end the case I had anesthesia Valsalva twice and I was not able to again see any sign of an active bleeding vessel.  The wound then was closed using 3-0 Vicryl to close the platysma followed by 4 Monocryl for skin Exofin glue was placed over top of the wound.  Patient woke up without issues and was extubated.  Taken the PACU for recovery     I was present for the entire procedure.    Patient Disposition:  PACU              SIGNATURE: zE Villalobos MD  DATE: March 24, 2025  TIME: 1:12 PM

## 2025-03-24 NOTE — TELEPHONE ENCOUNTER
----- Message from Nery DELEON sent at 3/21/2025  2:01 PM EDT -----  Regarding: RE: elissa  Called patient and left message to call our office and ask for Nery who will explain details, instructions for application of Zio  patch and return.  Left direct  line to our office.  Will await his return call.  ----- Message -----  From: Ez Perkins DO  Sent: 3/21/2025   1:53 PM EDT  To: [facility] [place] [facility]  Subject: zio                                              Please arrange for outpatient 2-week Zio patch

## 2025-03-24 NOTE — ASSESSMENT & PLAN NOTE
59yoM with HTN, CAD, HLD, tobacco abuse, hx of fall '24 w/ rib fxs and R wrist fx, very small tunneled PFO on 3/20/25 Echo, recently s/p L CEA 3/21/25 for symptomatic carotid artery stenosis, had noted to have primarily L hemispheric strokes when he presented with R sided weakness. He now presents with L neck pain and some oozing from L CEA site that woke him up at [facility] 3/24/25 CTA with some evidence of active extravasation    3/24/25 CTA H/N  Large heterogeneous hematoma in the soft tissues throughout the left neck which appears to originate in the carotid space.  The hematoma measures approximately 10.6 x 6.1 x 5.6 cm in CC, transverse, and AP dimensions. Several focal areas of hyperdensity anteriorly within the hematoma are noted consistent with areas of active bleeding  - Some irregularity of the left common carotid artery is seen just proximal to the bifurcation     WBC 8.96  Hgb 12.3  Cr 1.57    Plan:  - Admit to vascular surgery service  - NPO with plan for L neck hematoma evacuation          - T&S  - Home diuretics held for MARIA TERESA risk reduction pending OR, mild MARIA TERESA on presentation, s/p 1L NS in ER  - Will discuss with Dr. Villalobos

## 2025-03-24 NOTE — UTILIZATION REVIEW
NOTIFICATION OF ADMISSION DISCHARGE   This is a Notification of Discharge from [facility]. Please be advised that this patient has been discharge from our facility. Below you will find the admission and discharge date and time including the patient’s disposition.   UTILIZATION REVIEW CONTACT:  Paola Westfall  Utilization   Network Utilization Review Department  Phone: [phone] x carefully listen to the prompts. All voicemails are confidential.  Email: [email or web address]     ADMISSION INFORMATION  PRESENTATION DATE: 3/13/2025 10:01 PM  OBERVATION ADMISSION DATE: N/A  INPATIENT ADMISSION DATE: 3/13/25 10:01 PM   DISCHARGE DATE: 3/21/2025  4:15 PM   DISPOSITION:Non [facility]    Network Utilization Review Department  ATTENTION: Please call with any questions or concerns to [phone] and carefully listen to the prompts so that you are directed to the right person. All voicemails are confidential.   For Discharge needs, contact Care Management DC Support Team at [phone] opt. 2  Send all requests for admission clinical reviews, approved or denied determinations and any other requests to dedicated fax number below belonging to the campus where the patient is receiving treatment. List of dedicated fax numbers for the Facilities:  FACILITY NAME UR FAX NUMBER   ADMISSION DENIALS (Administrative/Medical Necessity) [phone]   DISCHARGE SUPPORT TEAM ([facility]) [phone]   PARENT CHILD HEALTH (Maternity/NICU/Pediatrics) [phone]   [facility] [phone]   [facility] [phone]   [facility] [phone]   [facility] [phone]   [facility] [phone]   [facility] [phone]   [facility] [phone]   [facility]  [phone]   [facility] [phone]   [facility] [phone]   [facility] [phone]   [facility] [phone]

## 2025-03-24 NOTE — ANESTHESIA PREPROCEDURE EVALUATION
Procedure:  L neck exploration, hematoma evacuation (Left: Neck)    Relevant Problems   CARDIO   (+) Elevated coronary artery calcium score   (+) Hyperlipidemia   (+) Primary hypertension      /RENAL   (+) MARIA TERESA (acute kidney injury) (HCC)      NEURO/PSYCH   (+) Acute cerebrovascular accident (CVA) due to stenosis of left carotid artery (HCC)      PULMONARY   (+) Smoking      Confirmed NPO status  +n/v, will plan for RSI, emphasized risk of aspiration    Physical Exam    Airway  Comment: Small mouth opening - limited due to pain   Mallampati score: II  TM Distance: >3 FB  Neck ROM: full     Dental   No notable dental hx     Cardiovascular      Pulmonary      Other Findings        Anesthesia Plan  ASA Score- 3     Anesthesia Type- general with ASA Monitors.         Additional Monitors:     Airway Plan: ETT.           Plan Factors-Exercise tolerance (METS): >4 METS.    Chart reviewed.   Existing labs reviewed. Patient summary reviewed.                  Induction- intravenous and rapid sequence induction.    Postoperative Plan- Plan for postoperative opioid use. Planned trial extubation    Perioperative Resuscitation Plan - Level 1 - Full Code.       Informed Consent- Anesthetic plan and risks discussed with patient.  I personally reviewed this patient with the CRNA. Discussed and agreed on the Anesthesia Plan with the CRNA..      NPO Status:  Vitals Value Taken Time   Date of last liquid 03/24/25 03/24/25 1013   Time of last liquid 0000 03/24/25 1013   Date of last solid 03/24/25 03/24/25 1013   Time of last solid 0000 03/24/25 1013

## 2025-03-24 NOTE — TELEPHONE ENCOUNTER
Several phone calls to patient but I notice recent admission.  Raúl ordered and I wanted to go over instructions for application and return but patient has not called back.  I left the direct line with option 4 for Nery.

## 2025-03-24 NOTE — ANESTHESIA POSTPROCEDURE EVALUATION
Post-Op Assessment Note            No anethesia notable event occurred.            Last Filed PACU Vitals:  Vitals Value Taken Time   Temp 97.5 °F (36.4 °C) 03/24/25 1215   Pulse 63 03/24/25 1235   /83 03/24/25 1230   Resp 18 03/24/25 1230   SpO2 99 % 03/24/25 1235   Vitals shown include unfiled device data.    Modified Shraddha:     Vitals Value Taken Time   Activity 2 03/24/25 1230   Respiration 2 03/24/25 1230   Circulation 2 03/24/25 1230   Consciousness 2 03/24/25 1230   Oxygen Saturation 1 03/24/25 1230     Modified Shraddha Score: 9

## 2025-03-24 NOTE — H&P
H&P - Vascular Surgery   Name: Ez Dsouza 59 y.o. male I MRN: [id number]  Unit/Bed#: [facility] 513-01 I Date of Admission: 3/24/2025   Date of Service: 3/24/2025 I Hospital Day: 0     Assessment & Plan  Hematoma  59yoM with HTN, CAD, HLD, tobacco abuse, hx of fall '24 w/ rib fxs and R wrist fx, very small tunneled PFO on 3/20/25 Echo, recently s/p L CEA 3/21/25 for symptomatic carotid artery stenosis, had noted to have primarily L hemispheric strokes when he presented with R sided weakness. He now presents with L neck pain and some oozing from L CEA site that woke him up at [facility] 3[place] [facility]A with some evidence of active extravasation    3/24/25 CTA H/N  Large heterogeneous hematoma in the soft tissues throughout the left neck which appears to originate in the carotid space.  The hematoma measures approximately 10.6 x 6.1 x 5.6 cm in CC, transverse, and AP dimensions. Several focal areas of hyperdensity anteriorly within the hematoma are noted consistent with areas of active bleeding  - Some irregularity of the left common carotid artery is seen just proximal to the bifurcation     WBC 8.96  Hgb 12.3  Cr 1.57    Plan:  - Admit to vascular surgery service  - NPO with plan for L neck hematoma evacuation          - T&S  - Home diuretics held for MARIA TERESA risk reduction pending OR, mild MARIA TERESA on presentation, s/p 1L NS in ER  - Will discuss with Dr. Villalobos    History of Present Illness   Ez Dsouza is a 59 y.o. male who presents with L neck pain and oozing from CEA incision site that he states started midnight of 3/24 with pain that woke him from sleep. He presented to [facility] for further evaluation. In ED, some blood could be expressed from the incision site, without active rapid expansion, without noted dysphonia, dysphagia, airway compromise noted. Remains neurologically intact to patient baseline. Hemodynamically stable with stable hemoglobin    Review of Systems   Constitutional:  Negative for chills and fever.   HENT:  Negative  for trouble swallowing and voice change.    Respiratory:  Negative for shortness of breath.    Skin:         L neck hematoma   Neurological:  Negative for numbness.     Historical Information   Past Medical History:   Diagnosis Date    Hypertension      Past Surgical History:   Procedure Laterality Date    KNEE SURGERY  2008    MOLE REMOVAL  2024    Back    NC TEAEC W/PATCH GRF CAROTID VERTB SUBCLAV NECK INC Left 3/17/2025    Procedure: ENDARTERECTOMY ARTERY CAROTID WITH BOVINE PATCH ANGIOPLASTY;  Surgeon: Joss Claderón MD;  Location: [facility];  Service: Vascular     Social History     Tobacco Use    Smoking status: Every Day     Current packs/day: 0.50     Average packs/day: 0.5 packs/day for 15.0 years (7.5 ttl pk-yrs)     Types: Cigars, Cigarettes    Smokeless tobacco: Never   Vaping Use    Vaping status: Never Used   Substance and Sexual Activity    Alcohol use: Yes     Alcohol/week: 2.0 standard drinks of alcohol     Types: 2 Standard drinks or equivalent per week     Comment: social    Drug use: Not Currently     Types: Marijuana    Sexual activity: Yes     Partners: Female     Birth control/protection: None     E-Cigarette/Vaping    E-Cigarette Use Never User      E-Cigarette/Vaping Substances     Family History   Problem Relation Age of Onset    Hypertension Mother     Hypertension Father     Heart disease Father      Social History     Tobacco Use    Smoking status: Every Day     Current packs/day: 0.50     Average packs/day: 0.5 packs/day for 15.0 years (7.5 ttl pk-yrs)     Types: Cigars, Cigarettes    Smokeless tobacco: Never   Vaping Use    Vaping status: Never Used   Substance and Sexual Activity    Alcohol use: Yes     Alcohol/week: 2.0 standard drinks of alcohol     Types: 2 Standard drinks or equivalent per week     Comment: social    Drug use: Not Currently     Types: Marijuana    Sexual activity: Yes     Partners: Female     Birth control/protection: None     Patient has no known  allergies.    Objective :  Temp:  [97.3 °F (36.3 °C)-98.3 °F (36.8 °C)] 98.3 °F (36.8 °C)  HR:  [53-71] 53  BP: (154-162)/(80-84) 161/80  Resp:  [13-16] 13  SpO2:  [90 %-93 %] 90 %  O2 Device: Nasal cannula  Nasal Cannula O2 Flow Rate (L/min):  [2 L/min] 2 L/min    I/O       None            Physical Exam  Constitutional:       General: He is not in acute distress.     Appearance: He is not toxic-appearing.   HENT:      Head: Normocephalic and atraumatic.   Eyes:      Extraocular Movements: Extraocular movements intact.   Neck:      Comments: L neck hematoma without active oozing, hematoma able to be expressed at bedside, without significant rapid expansion noted  Cardiovascular:      Rate and Rhythm: Normal rate and regular rhythm.   Pulmonary:      Effort: Pulmonary effort is normal.   Skin:     General: Skin is warm and dry.   Neurological:      Mental Status: He is alert.      Comments: No tongue deviation, answering questions and command appropriately, intact bilateral gross UE and LE motor function, gross symmetric sensation to face and UE   Psychiatric:         Behavior: Behavior is cooperative.            Lab Results: I have reviewed the following results:  Recent Labs     03/24/25  0230   WBC 8.96   HGB 12.3   HCT 36.7      SODIUM 135   K 4.0      CO2 22   BUN 42*   CREATININE 1.57*   GLUC 141*   AST 17   ALT 39   ALB 4.0   TBILI 0.44   ALKPHOS 137*   PTT 42*   INR 1.14     Imaging reviewed as above    VTE Prophylaxis: Sequential compression device (Venodyne)

## 2025-03-25 ENCOUNTER — DOCUMENTATION (OUTPATIENT)
Dept: VASCULAR SURGERY | Facility: CLINIC | Age: 60
End: 2025-03-25

## 2025-03-25 ENCOUNTER — APPOINTMENT (INPATIENT)
Dept: RADIOLOGY | Facility: HOSPITAL | Age: 60
DRG: 908 | End: 2025-03-25
Payer: COMMERCIAL

## 2025-03-25 PROBLEM — I10 PRIMARY HYPERTENSION: Status: RESOLVED | Noted: 2024-06-22 | Resolved: 2025-03-25

## 2025-03-25 PROBLEM — R79.89 ELEVATED SERUM CREATININE: Status: ACTIVE | Noted: 2025-03-25

## 2025-03-25 PROBLEM — N18.30 CKD (CHRONIC KIDNEY DISEASE) STAGE 3, GFR 30-59 ML/MIN (HCC): Status: ACTIVE | Noted: 2025-03-25

## 2025-03-25 PROBLEM — I12.9 BENIGN HYPERTENSION WITH CKD (CHRONIC KIDNEY DISEASE) STAGE III (HCC): Status: ACTIVE | Noted: 2025-03-25

## 2025-03-25 PROBLEM — N18.30 BENIGN HYPERTENSION WITH CKD (CHRONIC KIDNEY DISEASE) STAGE III (HCC): Status: ACTIVE | Noted: 2025-03-25

## 2025-03-25 LAB
ANION GAP SERPL CALCULATED.3IONS-SCNC: 10 MMOL/L (ref 4–13)
B2 GLYCOPROT1 IGA SERPL IA-ACNC: 1.4
B2 GLYCOPROT1 IGG SERPL IA-ACNC: <0.8
B2 GLYCOPROT1 IGM SERPL IA-ACNC: <2.4
BACTERIA UR QL AUTO: ABNORMAL /HPF
BILIRUB UR QL STRIP: NEGATIVE
BUN SERPL-MCNC: 38 MG/DL (ref 5–25)
C4 SERPL-MCNC: 37 MG/DL (ref 19–52)
CALCIUM SERPL-MCNC: 8.8 MG/DL (ref 8.4–10.2)
CARDIOLIPIN IGA SER IA-ACNC: 5.2
CARDIOLIPIN IGG SER IA-ACNC: 2.9
CARDIOLIPIN IGM SER IA-ACNC: 36
CHLORIDE SERPL-SCNC: 103 MMOL/L (ref 96–108)
CLARITY UR: CLEAR
CO2 SERPL-SCNC: 22 MMOL/L (ref 21–32)
COLOR UR: YELLOW
CREAT SERPL-MCNC: 1.63 MG/DL (ref 0.6–1.3)
ERYTHROCYTE [DISTWIDTH] IN BLOOD BY AUTOMATED COUNT: 14 % (ref 11.6–15.1)
F5 GENE MUT ANL BLD/T: NORMAL
GFR SERPL CREATININE-BSD FRML MDRD: 45 ML/MIN/1.73SQ M
GLUCOSE SERPL-MCNC: 126 MG/DL (ref 65–140)
GLUCOSE UR STRIP-MCNC: NEGATIVE MG/DL
HCT VFR BLD AUTO: 33.6 % (ref 36.5–49.3)
HGB BLD-MCNC: 11 G/DL (ref 12–17)
HGB UR QL STRIP.AUTO: ABNORMAL
HYALINE CASTS #/AREA URNS LPF: ABNORMAL /LPF
KETONES UR STRIP-MCNC: NEGATIVE MG/DL
LDH SERPL-CCNC: 208 U/L (ref 140–271)
LEUKOCYTE ESTERASE UR QL STRIP: NEGATIVE
Lab: NORMAL
MCH RBC QN AUTO: 30.7 PG (ref 26.8–34.3)
MCHC RBC AUTO-ENTMCNC: 32.7 G/DL (ref 31.4–37.4)
MCV RBC AUTO: 94 FL (ref 82–98)
NITRITE UR QL STRIP: NEGATIVE
NON-SQ EPI CELLS URNS QL MICRO: ABNORMAL /HPF
PH UR STRIP.AUTO: 5.5 [PH]
PLATELET # BLD AUTO: 367 THOUSANDS/UL (ref 149–390)
PMV BLD AUTO: 10.3 FL (ref 8.9–12.7)
POTASSIUM SERPL-SCNC: 4.4 MMOL/L (ref 3.5–5.3)
PROT UR STRIP-MCNC: ABNORMAL MG/DL
RBC # BLD AUTO: 3.58 MILLION/UL (ref 3.88–5.62)
RBC #/AREA URNS AUTO: ABNORMAL /HPF
SODIUM SERPL-SCNC: 135 MMOL/L (ref 135–147)
SP GR UR STRIP.AUTO: 1.02 (ref 1–1.03)
UROBILINOGEN UR STRIP-ACNC: <2 MG/DL
WBC # BLD AUTO: 15.13 THOUSAND/UL (ref 4.31–10.16)
WBC #/AREA URNS AUTO: ABNORMAL /HPF

## 2025-03-25 PROCEDURE — 76775 US EXAM ABDO BACK WALL LIM: CPT

## 2025-03-25 PROCEDURE — 86162 COMPLEMENT TOTAL (CH50): CPT | Performed by: INTERNAL MEDICINE

## 2025-03-25 PROCEDURE — 99254 IP/OBS CNSLTJ NEW/EST MOD 60: CPT | Performed by: INTERNAL MEDICINE

## 2025-03-25 PROCEDURE — 86160 COMPLEMENT ANTIGEN: CPT | Performed by: INTERNAL MEDICINE

## 2025-03-25 PROCEDURE — 99024 POSTOP FOLLOW-UP VISIT: CPT | Performed by: SURGERY

## 2025-03-25 PROCEDURE — 83615 LACTATE (LD) (LDH) ENZYME: CPT | Performed by: INTERNAL MEDICINE

## 2025-03-25 PROCEDURE — 85027 COMPLETE CBC AUTOMATED: CPT | Performed by: SURGERY

## 2025-03-25 PROCEDURE — 81001 URINALYSIS AUTO W/SCOPE: CPT | Performed by: INTERNAL MEDICINE

## 2025-03-25 PROCEDURE — 80048 BASIC METABOLIC PNL TOTAL CA: CPT | Performed by: SURGERY

## 2025-03-25 RX ORDER — AMLODIPINE BESYLATE 5 MG/1
5 TABLET ORAL DAILY
Status: DISCONTINUED | OUTPATIENT
Start: 2025-03-26 | End: 2025-03-27

## 2025-03-25 RX ORDER — ACETAMINOPHEN 325 MG/1
975 TABLET ORAL EVERY 8 HOURS PRN
Status: DISCONTINUED | OUTPATIENT
Start: 2025-03-25 | End: 2025-03-28 | Stop reason: HOSPADM

## 2025-03-25 RX ORDER — SODIUM CHLORIDE, SODIUM GLUCONATE, SODIUM ACETATE, POTASSIUM CHLORIDE, MAGNESIUM CHLORIDE, SODIUM PHOSPHATE, DIBASIC, AND POTASSIUM PHOSPHATE .53; .5; .37; .037; .03; .012; .00082 G/100ML; G/100ML; G/100ML; G/100ML; G/100ML; G/100ML; G/100ML
75 INJECTION, SOLUTION INTRAVENOUS CONTINUOUS
Status: DISCONTINUED | OUTPATIENT
Start: 2025-03-25 | End: 2025-03-26

## 2025-03-25 RX ADMIN — CARVEDILOL 25 MG: 25 TABLET, FILM COATED ORAL at 08:36

## 2025-03-25 RX ADMIN — ATORVASTATIN CALCIUM 40 MG: 40 TABLET, FILM COATED ORAL at 08:36

## 2025-03-25 RX ADMIN — SODIUM CHLORIDE, SODIUM GLUCONATE, SODIUM ACETATE, POTASSIUM CHLORIDE, MAGNESIUM CHLORIDE, SODIUM PHOSPHATE, DIBASIC, AND POTASSIUM PHOSPHATE 75 ML/HR: .53; .5; .37; .037; .03; .012; .00082 INJECTION, SOLUTION INTRAVENOUS at 14:00

## 2025-03-25 RX ADMIN — CLOPIDOGREL BISULFATE 75 MG: 75 TABLET, FILM COATED ORAL at 08:36

## 2025-03-25 RX ADMIN — AMLODIPINE BESYLATE 5 MG: 5 TABLET ORAL at 08:36

## 2025-03-25 RX ADMIN — SODIUM CHLORIDE 1000 ML: 0.9 INJECTION, SOLUTION INTRAVENOUS at 08:37

## 2025-03-25 RX ADMIN — CARVEDILOL 25 MG: 25 TABLET, FILM COATED ORAL at 17:23

## 2025-03-25 RX ADMIN — ACETAMINOPHEN 975 MG: 325 TABLET, FILM COATED ORAL at 08:47

## 2025-03-25 RX ADMIN — ASPIRIN 81 MG: 81 TABLET, COATED ORAL at 08:36

## 2025-03-25 NOTE — DISCHARGE INSTR - OTHER ORDERS
General Adult HPI





- General


Chief complaint: Fall


Stated complaint: lt elbow injury


Time Seen by Provider: 10/25/19 18:01


Source: patient, RN notes reviewed, old records reviewed


Mode of arrival: ambulatory


Limitations: no limitations





- History of Present Illness


Initial comments: 


9-year-old male patient in CT complaint left elbow injury.  Patient does report

that he has history of a left elbow fracture approximately 3 years ago.  Patient

reports that he was playing catch with a baseball, though for a ball and landed 

on his left elbow.  Patient dianna he has pain in the medial epicondyle region. 

Denies any other injury.  Denies any other complaints.





Systemic: Pt denies fatigue, fever/chills, rash. Pt denies weakness, night 

sweats, weight loss. 


Neuro: Pt denies headache, visual disturbances, syncope or pre-syncope.


HEENT: Pt denies ocular discharge or irritation, otalgia, rhinorrhea, 

pharyngitis or notable lymphadenopathy. 


Cardiopulmonary: Pt denies chest pain, SOB, heart palpitations, dyspnea on 

exertion.  


Abdominal/GI: Pt denies abdominal pain, n/v/d. 


: Pt denies dysuria, burning w/ urination, frequency/urgency. Denies new onset

urinary or bowel incontinence.  


MSK: Pt denies loss of strength or function in extremities. 


Neuro: Pt denies new onset weakness, paresthesias. 








- Related Data


                                Home Medications











 Medication  Instructions  Recorded  Confirmed


 


Cetirizine HCl [Zyrtec] 10 mg PO QAM 05/10/16 05/10/16











                                    Allergies











Allergy/AdvReac Type Severity Reaction Status Date / Time


 


amoxicillin Allergy  Rash/Hives Verified 10/25/19 17:56


 


Penicillins Allergy  Rash/Hives Verified 10/25/19 17:56














Review of Systems


ROS Statement: 


Those systems with pertinent positive or pertinent negative responses have been 

documented in the HPI.





ROS Other: All systems not noted in ROS Statement are negative.





Past Medical History


Past Medical History: No Reported History


History of Any Multi-Drug Resistant Organisms: None Reported


Past Surgical History: No Surgical Hx Reported


Past Psychological History: No Psychological Hx Reported


Smoking Status: Never smoker


Past Alcohol Use History: None Reported


Past Drug Use History: None Reported





General Exam





- General Exam Comments


Initial Comments: 


Constitutional: NAD, AOX3, Pt has pleasant affect. 


HEENT: NC/AT, trachea midline, neck supple, no lymphadenopathy. Posterior 

pharynx non erythematous, without exudates. External ears appear normal, without

 discharge. Mucous membranes moist. Eyes PERRLA, EOM intact. There is no scleral

 icterus. No pallor noted. 


Cardiopulmonary: RRR, no murmurs, rubs or gallops, no JVD noted. Lungs CTAB in 

anterior and posterior fields. No peripheral edema. 


Abdominal exam: Abdomen soft and non-distended. Abdomen non-tender to palpation 

in all 4 quadrants. Bowel sounds active in LLQ. No hepatosplenomegaly. No 

ecchymosis


Neuro: CN II-XII grossly intact. No nuchal rigidity. No raccon eyes, no fu 

sign, no hemotympanum. No cervical spinal tenderness. 


MSK: Medial epicondyles of left elbow mildly tender to palpation.  No skin 

changes.  Flexion extension intact.  Neurovascularly intact.  No posterior calf 

tenderness bilaterally, homans sign negative bilaterally. Posterior tibialis and

 radial pulse +2 bilaterally. Sensation intact in upper and lower extremities. 

Full active ROM in upper and lower extremities, 5/5 stregnth. 





Limitations: no limitations





Course


                                   Vital Signs











  10/25/19





  17:50


 


Temperature 98.8 F


 


Pulse Rate 75


 


Respiratory 18





Rate 


 


Blood Pressure 114/65


 


O2 Sat by Pulse 98





Oximetry 














Medical Decision Making





- Medical Decision Making


29-year-old male patient presented each chief complaint of fall on left elbow, 

left elbow pain.  Patient vital signs are stable, afebrile.  Physical exam 

slightly medial epicondyle bear mildly tender to palpation.  No skin changes.  

Full active range of motion.  Vascular intact.  Plain film did not display acute

 process.  Patient likely experiencing elbow sprain.  Patient discharged with 

outpatient follow-up with primary care provider.  Return to ER if condition 

worsens.  Case discussed with Dr. Hayward. 








Disposition


Clinical Impression: 


 Elbow sprain





Disposition: HOME SELF-CARE


Condition: Stable


Instructions (If sedation given, give patient instructions):  Elbow Sprain (ED)


Additional Instructions: 


Patient to adhere to previously discussed treatment plan and will take 

medication(s) as directed. Patient to follow up with PCP in 1-2 days. Patient to

 return to ED if symptoms do not improve. 





Use Tylenol or Motrin as needed.  Use ice as needed for pain and inflammation.  

Follow up with primary care provider tomorrow.  [facility] with [facility] if symptoms continue or worsen.


Is patient prescribed a controlled substance at d/c from ED?: No


Referrals: 


Crow Chandra Jr, DO [Primary Care Provider] - 1-2 days Incision site:  --Wash with soap and water daily, pat dry thoroughly  --Drain in neck will be removed at your follow up appointment, can place gauze around drain site if leakage  --Allow glue at incision site to slough off over time, do not scrub or pick  --Monitor for worsening swelling, increased pain, bleeding, oozing, or bulging. If present, please call the Vascular office    ROBERTO drain care:  Routine care: Empty and record output daily and prn. Keep log for review at f/u appt.  Strip drain q shift and prn

## 2025-03-25 NOTE — UTILIZATION REVIEW
Initial Clinical Review    Admission: Date/Time/Statement:   Admission Orders (From admission, onward)       Ordered        03/24/25 0520  Inpatient Admission  Once                          Orders Placed This Encounter   Procedures    Inpatient Admission     Standing Status:   Standing     Number of Occurrences:   1     Level of Care:   Level 1 Stepdown [13]     Estimated length of stay:   More than 2 Midnights     Certification:   I certify that inpatient services are medically necessary for this patient for a duration of greater than two midnights. See H&P and MD Progress Notes for additional information about the patient's course of treatment.     ED Arrival Information       Expected   -    Arrival   3/24/2025 02:07    Acuity   Urgent              Means of arrival   Ambulance    Escorted by   [facility]    Service   Vascular Surgery    Admission type   Emergency              Arrival complaint   post op bleed             Chief Complaint   Patient presents with    Post-op Problem     Pt had surgery on his neck x1 week ago. Increased swelling and pain since surgery.       Initial Presentation:   59 yom to ER from home via EMS. Pt s/p endarterectomy on 3/17/2025, here with bleeding from surgical site. Hx HTN, CAD, HLD, tobacco abuse, hx of fall '24 w/ rib fxs and R wrist fx, very small tunneled PFO on 3/20/25 Echo, recently s/p L CEA 3/21/25. Presents currently hemostatic, was bleeding and painful earlier in the evening. Admission CTA head & neck: Large heterogeneous hematoma in the soft tissues throughout the left neck which appears to originate in the carotid space.  The hematoma measures approximately 10.6 x 6.1 x 5.6 cm in CC, transverse, and AP dimensions. Several focal areas of hyperdensity anteriorly within the hematoma are noted consistent with areas of active bleeding. Some irregularity of the left common carotid artery is seen just proximal to the bifurcation, possibly related to recent  intervention/endarterectomy. No significant stenosis within either internal carotid artery. Less than 50% stenosis by NASCET criteria. Patent bilateral vertebral arteries. Labs: WBC 13.46, BUN 42, Cr 1.57, alk phos 137, gluc 141, PTT 42.  Admitted to inpatient status for hematoma. Plan: surgical intervention for L neck hematoma evacuation.    To OR for: Left - L neck exploration. hematoma evacuation   Anesthesia Type: Choice  Operative Findings:   Large amount of hematoma evacuated  No sign of active bleeding vessel but there was some generalized oozing from the tissue beds. Wound was irrigated and hemostatic agents used to assist in hemostasis. Wound appeared hemostatic at the end of the case        Date: 3/25/25   Day 2:   POD#1: L neck exploration and washout   L neck incision c/d/I with moderate swelling, improved from presentation, expected ecchymosis, no fluctuance. Continue pertinent home meds including aspirin, plavix, statin. Pain controlled. Continue serial incisional exams, pain mgt, monitor VS, follow labs.       ED Treatment-Medication Administration from 03/24/2025 0207 to 03/24/2025 0552         Date/Time Order Dose Route Action     03/24/2025 0304 HYDROmorphone (DILAUDID) injection 1 mg 1 mg Intravenous Given     03/24/2025 0306 sodium chloride 0.9 % bolus 1,000 mL 1,000 mL Intravenous New Bag     03/24/2025 0347 HYDROmorphone (DILAUDID) injection 0.5 mg 0.5 mg Intravenous Given     03/24/2025 0403 iohexol (OMNIPAQUE) 350 MG/ML injection (SINGLE-DOSE) 100 mL 85 mL Intravenous Given     03/24/2025 0441 HYDROmorphone (DILAUDID) injection 0.5 mg 0.5 mg Intravenous Given            Scheduled Medications:  amLODIPine, 5 mg, Oral, Daily  aspirin, 81 mg, Oral, Daily  atorvastatin, 40 mg, Oral, Daily  carvedilol, 25 mg, Oral, BID With Meals  clopidogrel, 75 mg, Oral, Daily  nicotine, 1 patch, Transdermal, Daily  senna, 8.6 mg, Oral, HS      Continuous IV Infusions:     PRN Meds:  acetaminophen, 975 mg, Oral,  Q8H PRN  labetalol, 5 mg, Intravenous, Q6H PRN  oxyCODONE, 2.5 mg, Oral, Q4H PRN   Or  oxyCODONE, 5 mg, Oral, Q4H PRN      ED Triage Vitals   Temperature Pulse Respirations Blood Pressure SpO2 Pain Score   03/24/25 0522 03/24/25 0209 03/24/25 0209 03/24/25 0209 03/24/25 0209 03/24/25 0304   (!) 97.3 °F (36.3 °C) 71 16 154/84 93 % 10 - Worst Possible Pain     Weight (last 2 days)       Date/Time Weight    03/25/25 0945 91.6 (201.94)    03/24/25 0736 90.9 (200.4)            Vital Signs (last 3 days)       Date/Time Temp Pulse Resp BP MAP (mmHg) SpO2 Calculated FIO2 (%) - Nasal Cannula O2 Flow Rate (L/min) Nasal Cannula O2 Flow Rate (L/min) O2 Device Cardiac (WDL) Patient Position - Orthostatic VS Kinza Coma Scale Score Pain    03/25/25 0847 -- -- -- -- -- -- -- -- -- -- -- -- -- 2    03/25/25 07:09:59 98.2 °F (36.8 °C) 86 16 140/88 105 93 % -- -- -- None (Room air) -- Lying -- No Pain    03/25/25 0500 -- -- -- -- -- -- -- -- -- -- -- -- 15 --    03/25/25 03:18:58 98.3 °F (36.8 °C) 88 18 129/80 96 94 % -- -- -- -- -- Lying -- --    03/24/25 2345 -- -- -- -- -- -- -- -- -- -- -- -- 15 --    03/24/25 2115 -- -- -- -- -- -- -- -- -- None (Room air) -- -- 15 No Pain    03/24/25 2000 -- 78 15 131/67 91 96 % -- -- -- -- -- -- -- --    03/24/25 1900 -- 74 18 126/72 93 94 % -- -- -- -- -- -- -- --    03/24/25 1800 -- 77 16 127/72 94 96 % -- -- -- -- -- -- -- --    03/24/25 1654 98.9 °F (37.2 °C) 81 20 139/83 106 96 % -- -- -- -- -- -- -- --    03/24/25 1600 -- 68 12 127/68 92 95 % -- -- -- -- -- -- 15 No Pain    03/24/25 1500 -- 66 13 129/67 92 94 % -- -- -- -- -- -- -- --    03/24/25 1430 -- 75 19 128/67 89 94 % -- -- -- -- -- -- -- --    03/24/25 1400 -- 71 15 142/85 109 94 % -- -- -- -- -- -- -- --    03/24/25 1345 -- 62 15 124/83 99 91 % -- -- -- -- -- -- -- --    03/24/25 1330 -- 62 14 156/96 121 93 % -- -- -- -- -- -- -- --    03/24/25 1314 -- 67 13 143/95 118 94 % -- -- -- -- -- -- -- --    03/24/25 1300 -- 65 17  143/95 115 94 % -- -- -- -- -- -- 15 No Pain    03/24/25 1256 98.8 °F (37.1 °C) -- -- -- -- -- -- -- -- -- -- -- -- --    03/24/25 1240 -- -- -- -- -- -- -- -- -- -- WDL -- 15 --    03/24/25 1230 -- 64 18 158/83 114 99 % 28 -- 2 L/min Nasal cannula -- -- -- No Pain    03/24/25 1215 97.5 °F (36.4 °C) 64 18 160/92 120 100 % -- 6 L/min -- Simple mask WDL -- 15 No Pain    03/24/25 1000 -- 57 25 -- -- 96 % -- -- -- -- -- -- -- --    03/24/25 0900 -- 53 14 139/76 99 97 % -- -- -- -- -- -- -- --    03/24/25 0842 -- -- -- -- -- -- -- -- -- -- -- -- -- 7    03/24/25 0800 -- 53 13 162/93 119 98 % -- -- -- -- -- -- 15 --    03/24/25 0736 98.5 °F (36.9 °C) 57 13 159/85 116 96 % -- -- -- -- -- -- -- 7    03/24/25 0700 -- 51 15 163/74 107 95 % -- -- -- -- -- -- -- --    03/24/25 0615 98.3 °F (36.8 °C) 53 13 161/80 110 90 % 28 -- 2 L/min Nasal cannula -- Lying 15 --    03/24/25 0522 97.3 °F (36.3 °C) -- -- -- -- -- -- -- -- -- -- -- -- --    03/24/25 0500 -- 53 -- 162/82 114 93 % 28 -- 2 L/min Nasal cannula -- -- -- --    03/24/25 0441 -- -- -- -- -- -- -- -- -- -- -- -- -- 10 - Worst Possible Pain    03/24/25 0347 -- -- -- -- -- -- -- -- -- -- -- -- -- 9    03/24/25 0304 -- -- -- -- -- -- -- -- -- -- -- -- -- 10 - Worst Possible Pain    03/24/25 0209 -- 71 16 154/84 -- 93 % -- -- -- None (Room air) -- Sitting -- --              Pertinent Labs/Diagnostic Test Results:   Radiology:  CTA head and neck with and without contrast   Final Interpretation by Dewayne Cheung DO (03/24 0537)      No acute intracranial process is seen.      Large heterogeneous hematoma in the soft tissues throughout the left neck which appears to originate in the carotid space.  The hematoma measures approximately 10.6 x 6.1 x 5.6 cm in CC, transverse, and AP dimensions. Several focal areas of hyperdensity    anteriorly within the hematoma are noted (axial image 369, series 6, for example) consistent with areas of active bleeding.      Some  "irregularity of the left common carotid artery is seen just proximal to the bifurcation (axial image 278 and 279, series 6), possibly related to recent intervention/endarterectomy.   No significant stenosis within either internal carotid artery. Less than 50% stenosis by NASCET criteria.      Patent bilateral vertebral arteries.      Degenerative changes of the cervical spine.      Other findings as above.      I personally discussed this study with SHARYN CARRERA on 3/24/2025 4:47 AM.            Workstation performed: [id number]           Cardiology:  No orders to display     GI:  No orders to display           Results from last 7 days   Lab Units 03/25/25  0514 03/24/25  0631 03/24/25  0230 03/21/25  0542 03/20/25  0504   WBC Thousand/uL 15.13* 13.46* 8.96 9.11 8.39   HEMOGLOBIN g/dL 11.0* 12.4 12.3 12.2 12.5   HEMATOCRIT % 33.6* 37.8 36.7 36.0* 36.6   PLATELETS Thousands/uL 367 360 365 288 303   TOTAL NEUT ABS Thousands/µL  --  10.75* 5.65  --   --          Results from last 7 days   Lab Units 03/25/25  0514 03/24/25  0230 03/21/25  0542 03/20/25  0504   SODIUM mmol/L 135 135 135 136   POTASSIUM mmol/L 4.4 4.0 4.1 3.7   CHLORIDE mmol/L 103 101 101 103   CO2 mmol/L 22 22 26 25   ANION GAP mmol/L 10 12 8 8   BUN mg/dL 38* 42* 26* 19   CREATININE mg/dL 1.63* 1.57* 1.27 1.25   EGFR ml/min/1.73sq m 45 47 61 62   CALCIUM mg/dL 8.8 9.2 9.4 9.2   MAGNESIUM mg/dL  --   --  1.9  --      Results from last 7 days   Lab Units 03/24/25  0230   AST U/L 17   ALT U/L 39   ALK PHOS U/L 137*   TOTAL PROTEIN g/dL 7.2   ALBUMIN g/dL 4.0   TOTAL BILIRUBIN mg/dL 0.44     Results from last 7 days   Lab Units 03/21/25  1507 03/21/25  1137 03/21/25  0620 03/20/25  2052 03/20/25  1533   POC GLUCOSE mg/dl 148* 108 128 131 120     Results from last 7 days   Lab Units 03/25/25  0514 03/24/25  0230 03/21/25  0542 03/20/25  0504   GLUCOSE RANDOM mg/dL 126 141* 120 98             No results found for: \"BETA-HYDROXYBUTYRATE\"                         "   Results from last 7 days   Lab Units 03/24/25  0631 03/24/25  0230 03/20/25  0504   PROTIME seconds 14.8 14.9  --    INR  1.13 1.14  --    PTT seconds 39* 42* 44*                                     Results from last 7 days   Lab Units 03/24/25  1227   UNIT PRODUCT CODE  V6024Q57  V7280F34   UNIT NUMBER  [id number]  [id number]   UNITABO  A  A   UNITRH  POS  POS   CROSSMATCH  Compatible  Compatible   UNIT DISPENSE STATUS  Crossmatched  Crossmatched   UNIT PRODUCT VOL mL 350  350                                                                           Past Medical History:   Diagnosis Date    Hypertension      Present on Admission:   Hematoma      Admitting Diagnosis: Hematoma [T14.8XXA]  S/P carotid endarterectomy [Z98.890]  Localized swelling, mass or lump of neck [R22.1]  Age/Sex: 59 y.o. male    Network Utilization Review Department  ATTENTION: Please call with any questions or concerns to [phone] and carefully listen to the prompts so that you are directed to the right person. All voicemails are confidential.   For Discharge needs, contact Care Management DC Support Team at [phone] opt. 2  Send all requests for admission clinical reviews, approved or denied determinations and any other requests to dedicated fax number below belonging to the campus where the patient is receiving treatment. List of dedicated fax numbers for the Facilities:  FACILITY NAME UR FAX NUMBER   ADMISSION DENIALS (Administrative/Medical Necessity) [phone]   DISCHARGE SUPPORT TEAM (NETWORK) [phone]   PARENT CHILD HEALTH (Maternity/NICU/Pediatrics) [phone]   [facility] [phone]   [facility] [phone]   [facility] [phone]   [facility] [phone]   [facility] [phone]   [facility] [phone]   [facility]  [place] [phone]   [facility] [phone]   [facility] [phone]   [facility] [phone]   [facility] [phone]   [facility] [phone]

## 2025-03-25 NOTE — UTILIZATION REVIEW
NOTIFICATION OF INPATIENT ADMISSION   AUTHORIZATION REQUEST   SERVICING FACILITY:   [facility]  Address: [address] [place] [address]  Tax ID: [id number]  NPI: [id number] ATTENDING PROVIDER:  Attending Name and NPI#: Ez Villalobos Md [[id number]]  Address: [address] [place] [address]  Phone: [phone]   ADMISSION INFORMATION:  Place of Service: Inpatient [facility]  Place of Service Code: 21  Inpatient Admission Date/Time: 3/24/25  5:20 AM  Discharge Date/Time: No discharge date for patient encounter.  Admitting Diagnosis Code/Description:  Hematoma [T14.8XXA]  S/P carotid endarterectomy [Z98.890]  Localized swelling, mass or lump of neck [R22.1]     UTILIZATION REVIEW CONTACT:  Chas Sanchez Utilization   Network Utilization Review Department  Phone: [phone]  Fax: [phone]  Email: [email or web address]  Contact for approvals/pending authorizations, clinical reviews, and discharge.     PHYSICIAN ADVISORY SERVICES:  Medical Necessity Denial & Ccmp-ub-Ungr Review  Phone: [phone]  Fax: [phone]  Email: [email or web address]     DISCHARGE SUPPORT TEAM:  For Patients Discharge Needs & Updates  Phone: [phone] opt. 2 Fax: [phone]  Email: [email or web address]

## 2025-03-25 NOTE — ASSESSMENT & PLAN NOTE
The patient's baseline creatinine is approximately 1.3  He does not have MARIA TERESA per KDIGO criteria but his creatinine is above baseline and has worsened over the last 24 hours  There is no obvious reason for his acute kidney injury such as clinically detectable volume depletion, hypotension, nephrotoxic medication usage, urinary retention, systemic allergic reaction etc.  Workup: Postvoid residual 10 mL/urinalysis revealed trace protein but in the setting of a specific gravity of 1.025-small heme with 10-20 RBCs per high-power field-0-3 hyaline casts per low power field  Plavix can cause TTP but he has no thrombocytopenia or other stigmata of TTP  Will infuse intravenous fluids to treat any subclinical volume depletion as his weight is down  Doubt cholesterol emboli after carotid endarterectomy but he does have hematuria.  Will check complement levels  Check renal ultrasound for completeness sake as there is no obvious reason for his elevated creatinine  Repeat BMP in a.m.  Avoid nephrotoxic medications and relative hypotension  Change hold parameter Norvasc to hold for less than systolic of 130  Will check an LDH and hemolysis smear for completeness sake

## 2025-03-25 NOTE — PROGRESS NOTES
"Progress Note - Vascular Surgery   Ez Dsouza 59 y.o. male MRN: [id number]  Unit/Bed#: [facility] 508-01 Encounter: [id number]    Assessment:  58 yo M h/o symptomatic L ICA stenosis underwent L CEA on 3/17 and presented to the ED w/ new worsening L neck hematoma.    3/24 L neck exploration and washout    Plan:  PO pain control PRN  Regular diet  Continue pertinent home meds including aspirin, plavix, statin  Encourage ambulation  Dispo today vs tomorrow pending clinical course    ** Please contact the [facility] Vascular Surgery Resident/AP role for any questions or concerns that might arise     Subjective/Objective    Subjective: No acute events overnight. Pain is well controlled. Denies N/V, SOB, CP, headache, dysphagia, dysphonia.    Objective:     Blood pressure 129/80, pulse 88, temperature 98.3 °F (36.8 °C), temperature source Oral, resp. rate 18, height 6' 2\" (1.88 m), weight 90.9 kg (200 lb 6.4 oz), SpO2 94%.,Body mass index is 25.73 kg/m².      Intake/Output Summary (Last 24 hours) at 3/25/2025 0645  Last data filed at 3/25/2025 0318  Gross per 24 hour   Intake 2800 ml   Output 1800 ml   Net 1000 ml       Invasive Devices       Peripheral Intravenous Line  Duration             Peripheral IV 03/24/25 Left;Proximal;Ventral (anterior) Forearm 1 day    Peripheral IV 03/24/25 Proximal;Right;Ventral (anterior) Forearm 1 day    Peripheral IV 03/24/25 Left Hand <1 day                    Physical Exam:   GEN: NAD  HEENT: NCAT, EOMI. L neck incision c/d/I with moderate swelling, improved from presentation, expected ecchymosis, no fluctuance  CV: Regular rate, normotensive  Lung: Normal effort, saturating well on room air  Ab: Soft, NT/ND  Extrem: No CCE   Neuro: A+Ox3, M/S intact    Lab, Imaging and other studies:I have personally reviewed pertinent lab results.     VTE Mechanical Prophylaxis: sequential compression device    Recent Results (from the past 36 hours)   CBC and differential    Collection Time: 03/24/25  2:30 AM "   Result Value Ref Range    WBC 8.96 4.31 - 10.16 Thousand/uL    RBC 3.96 3.88 - 5.62 Million/uL    Hemoglobin 12.3 12.0 - 17.0 g/dL    Hematocrit 36.7 36.5 - 49.3 %    MCV 93 82 - 98 fL    MCH 31.1 26.8 - 34.3 pg    MCHC 33.5 31.4 - 37.4 g/dL    RDW 13.9 11.6 - 15.1 %    MPV 10.3 8.9 - 12.7 fL    Platelets 365 149 - 390 Thousands/uL    nRBC 0 /100 WBCs    Segmented % 63 43 - 75 %    Immature Grans % 1 0 - 2 %    Lymphocytes % 18 14 - 44 %    Monocytes % 14 (H) 4 - 12 %    Eosinophils Relative 3 0 - 6 %    Basophils Relative 1 0 - 1 %    Absolute Neutrophils 5.65 1.85 - 7.62 Thousands/µL    Absolute Immature Grans 0.09 0.00 - 0.20 Thousand/uL    Absolute Lymphocytes 1.63 0.60 - 4.47 Thousands/µL    Absolute Monocytes 1.26 (H) 0.17 - 1.22 Thousand/µL    Eosinophils Absolute 0.28 0.00 - 0.61 Thousand/µL    Basophils Absolute 0.05 0.00 - 0.10 Thousands/µL   Comprehensive metabolic panel    Collection Time: 03/24/25  2:30 AM   Result Value Ref Range    Sodium 135 135 - 147 mmol/L    Potassium 4.0 3.5 - 5.3 mmol/L    Chloride 101 96 - 108 mmol/L    CO2 22 21 - 32 mmol/L    ANION GAP 12 4 - 13 mmol/L    BUN 42 (H) 5 - 25 mg/dL    Creatinine 1.57 (H) 0.60 - 1.30 mg/dL    Glucose 141 (H) 65 - 140 mg/dL    Calcium 9.2 8.4 - 10.2 mg/dL    AST 17 13 - 39 U/L    ALT 39 7 - 52 U/L    Alkaline Phosphatase 137 (H) 34 - 104 U/L    Total Protein 7.2 6.4 - 8.4 g/dL    Albumin 4.0 3.5 - 5.0 g/dL    Total Bilirubin 0.44 0.20 - 1.00 mg/dL    eGFR 47 ml/min/1.73sq m   Protime-INR    Collection Time: 03/24/25  2:30 AM   Result Value Ref Range    Protime 14.9 12.3 - 15.0 seconds    INR 1.14 0.85 - 1.19   APTT    Collection Time: 03/24/25  2:30 AM   Result Value Ref Range    PTT 42 (H) 23 - 34 seconds   CBC and differential    Collection Time: 03/24/25  6:31 AM   Result Value Ref Range    WBC 13.46 (H) 4.31 - 10.16 Thousand/uL    RBC 4.00 3.88 - 5.62 Million/uL    Hemoglobin 12.4 12.0 - 17.0 g/dL    Hematocrit 37.8 36.5 - 49.3 %    MCV 95  82 - 98 fL    MCH 31.0 26.8 - 34.3 pg    MCHC 32.8 31.4 - 37.4 g/dL    RDW 13.9 11.6 - 15.1 %    MPV 10.5 8.9 - 12.7 fL    Platelets 360 149 - 390 Thousands/uL    nRBC 0 /100 WBCs    Segmented % 79 (H) 43 - 75 %    Immature Grans % 1 0 - 2 %    Lymphocytes % 10 (L) 14 - 44 %    Monocytes % 8 4 - 12 %    Eosinophils Relative 2 0 - 6 %    Basophils Relative 0 0 - 1 %    Absolute Neutrophils 10.75 (H) 1.85 - 7.62 Thousands/µL    Absolute Immature Grans 0.12 0.00 - 0.20 Thousand/uL    Absolute Lymphocytes 1.28 0.60 - 4.47 Thousands/µL    Absolute Monocytes 1.05 0.17 - 1.22 Thousand/µL    Eosinophils Absolute 0.20 0.00 - 0.61 Thousand/µL    Basophils Absolute 0.06 0.00 - 0.10 Thousands/µL   Type and screen    Collection Time: 03/24/25  6:31 AM   Result Value Ref Range    ABO Grouping A     Rh Factor Positive     Antibody Screen Negative     Specimen Expiration Date [id number]    Protime-INR    Collection Time: 03/24/25  6:31 AM   Result Value Ref Range    Protime 14.8 12.3 - 15.0 seconds    INR 1.13 0.85 - 1.19   APTT    Collection Time: 03/24/25  6:31 AM   Result Value Ref Range    PTT 39 (H) 23 - 34 seconds   Prepare Leukoreduced RBC: 2 Units    Collection Time: 03/24/25 12:27 PM   Result Value Ref Range    Unit Product Code Z7466Y02     Unit Number [id number]     Unit ABO A     Unit RH POS     Crossmatch Compatible     Unit Dispense Status Crossmatched     Unit Product Volume 350 mL    Unit Product Code A1462F72     Unit Number [id number]     Unit ABO A     Unit RH POS     Crossmatch Compatible     Unit Dispense Status Crossmatched     Unit Product Volume 350 mL   CBC    Collection Time: 03/25/25  5:14 AM   Result Value Ref Range    WBC 15.13 (H) 4.31 - 10.16 Thousand/uL    RBC 3.58 (L) 3.88 - 5.62 Million/uL    Hemoglobin 11.0 (L) 12.0 - 17.0 g/dL    Hematocrit 33.6 (L) 36.5 - 49.3 %    MCV 94 82 - 98 fL    MCH 30.7 26.8 - 34.3 pg    MCHC 32.7 31.4 - 37.4 g/dL    RDW 14.0 11.6 - 15.1 %    Platelets 367 149 -  390 Thousands/uL    MPV 10.3 8.9 - 12.7 fL   Basic metabolic panel    Collection Time: 03/25/25  5:14 AM   Result Value Ref Range    Sodium 135 135 - 147 mmol/L    Potassium 4.4 3.5 - 5.3 mmol/L    Chloride 103 96 - 108 mmol/L    CO2 22 21 - 32 mmol/L    ANION GAP 10 4 - 13 mmol/L    BUN 38 (H) 5 - 25 mg/dL    Creatinine 1.63 (H) 0.60 - 1.30 mg/dL    Glucose 126 65 - 140 mg/dL    Calcium 8.8 8.4 - 10.2 mg/dL    eGFR 45 ml/min/1.73sq m

## 2025-03-25 NOTE — ASSESSMENT & PLAN NOTE
Baseline creatinine of approximately 1.3  Likely secondary to hypertension and possible renal vascular disease  He does have a hematuria and would recheck urinalysis as an outpatient with further workup as warranted

## 2025-03-25 NOTE — CONSULTS
Consultation - Nephrology   Ez Dsouza 59 y.o. male MRN: [id number]  Unit/Bed#: [facility] 508-01 Encounter: [id number]  Assessment & Plan  Elevated serum creatinine  The patient's baseline creatinine is approximately 1.3  He does not have MARIA TERESA per KDIGO criteria but his creatinine is above baseline and has worsened over the last 24 hours  There is no obvious reason for his acute kidney injury such as clinically detectable volume depletion, hypotension, nephrotoxic medication usage, urinary retention, systemic allergic reaction etc.  Workup: Postvoid residual 10 mL/urinalysis revealed trace protein but in the setting of a specific gravity of 1.025-small heme with 10-20 RBCs per high-power field-0-3 hyaline casts per low power field  Plavix can cause TTP but he has no thrombocytopenia or other stigmata of TTP  Will infuse intravenous fluids to treat any subclinical volume depletion as his weight is down  Doubt cholesterol emboli after carotid endarterectomy but he does have hematuria.  Will check complement levels  Check renal ultrasound for completeness sake as there is no obvious reason for his elevated creatinine  Repeat BMP in a.m.  Avoid nephrotoxic medications and relative hypotension  Change hold parameter Norvasc to hold for less than systolic of 130  Will check an LDH and hemolysis smear for completeness sake  CKD (chronic kidney disease) stage 3, GFR 30-59 ml/min ([facility])  Baseline creatinine of approximately 1.3  Likely secondary to hypertension and possible renal vascular disease  He does have a hematuria and would recheck urinalysis as an outpatient with further workup as warranted  Benign hypertension with CKD (chronic kidney disease) stage III (HCC)  Blood pressure is currently acceptable  Avoid relative hypotension to maintain renal perfusion  Trend blood pressure on current regimen  Hematoma  Status post recent left carotid endarterectomy  Status post hematoma evacuation 3/24  Follow-up and management per  vascular surgery                     History of Present Illness   Physician Requesting Consult: Ez Villalobos MD  Reason for Consult / Principal Problem -acute kidney injury  HPI- Ez Dsouza is a 59 y.o. y.o. male who we are asked to see because of acute kidney injury.  The patient recently had a left carotid endarterectomy on 3/21 for symptomatic carotid artery stenosis.  He previously had a left hemispheric stroke and presented with right-sided weakness.  He now Jeevan presents to the emergency room with pain about his left neck and oozing noted from the left CEA site.  A hematoma was noted.  The patient went to the operating room on 3/24 for left neck exploration.  He had hematoma evacuation.  There was no active bleeding from the blood vessel but there was generalized oozing noted from tissue beds.  Hemostatic agents were applied.  The patient was seen earlier this morning.  He generally felt well except for some neck pain.  He denied any outpatient nonsteroidal use, skin rash, sore throat, fevers, sweats, recent infections, difficulty urinating, feeling of incomplete evacuation, diarrhea, constipation, vomiting, over-the-counter medication usage, loss of appetite.  History obtained from chart review and the patient    Inpatient consult to Nephrology  Consult performed by: Sunil Sims MD  Consult ordered by: uCate Osborne PA-C          Review of Systems   Constitutional:  Negative for appetite change, chills, fatigue and fever.   HENT:  Negative for sinus pressure.    Eyes:  Negative for redness and visual disturbance.   Respiratory:  Negative for cough, shortness of breath and wheezing.    Cardiovascular:  Negative for chest pain, palpitations and leg swelling.   Gastrointestinal:  Negative for abdominal pain, constipation, diarrhea, nausea and vomiting.   Endocrine: Negative for polyuria.   Genitourinary:  Negative for decreased urine volume, difficulty urinating, dysuria, flank pain,  frequency, hematuria and urgency.   Musculoskeletal:  Negative for arthralgias, back pain and joint swelling.   Skin:  Negative for rash.   Neurological:  Negative for dizziness, syncope and headaches.   Hematological:  Does not bruise/bleed easily.   Psychiatric/Behavioral:  Negative for confusion and sleep disturbance.        Historical Information   Patient Active Problem List   Diagnosis    Elevated blood pressure reading    Fall    Acute pain due to trauma    Closed fracture of multiple ribs of left side    Closed fracture of right wrist    MARIA TERESA (acute kidney injury) ([facility])    Primary hypertension    Scalp abrasion    Closed fracture of right distal radius and ulna    Closed fracture of distal ends of right radius and ulna, initial encounter    Closed fracture of right wrist with routine healing, subsequent encounter    Hyperlipidemia    Acute cerebrovascular accident (CVA) due to stenosis of left carotid artery (HCC)    Elevated coronary artery calcium score    Pre-operative cardiovascular examination    Smoking    Localized swelling, mass or lump of neck    S/P carotid endarterectomy    Hematoma     Past Medical History:   Diagnosis Date    Hypertension      Past Surgical History:   Procedure Laterality Date    EVACUATION OF HEMATOMA Left 3/24/2025    Procedure: L neck exploration, hematoma evacuation;  Surgeon: Ez Villalobos MD;  Location:  MAIN OR;  Service: Vascular    KNEE SURGERY  2008    MOLE REMOVAL  2024    Back    WV TEAEC W/PATCH GRF CAROTID VERTB SUBCLAV NECK INC Left 3/17/2025    Procedure: ENDARTERECTOMY ARTERY CAROTID WITH BOVINE PATCH ANGIOPLASTY;  Surgeon: Joss Calderón MD;  Location: [facility] Main [facility];  Service: Vascular     Social History   Social History     Substance and Sexual Activity   Alcohol Use Yes    Alcohol/week: 2.0 standard drinks of alcohol    Types: 2 Standard drinks or equivalent per week    Comment: social     Social History     Substance and Sexual Activity   Drug  Use Not Currently    Types: Marijuana     Social History     Tobacco Use   Smoking Status Every Day    Current packs/day: 0.50    Average packs/day: 0.5 packs/day for 15.0 years (7.5 ttl pk-yrs)    Types: Cigars, Cigarettes   Smokeless Tobacco Never     Family History   Problem Relation Age of Onset    Hypertension Mother     Hypertension Father     Heart disease Father        Meds/Allergies   all current active meds have been reviewed, current meds:   Current Facility-Administered Medications:     acetaminophen (TYLENOL) tablet 975 mg, Q8H PRN    amLODIPine (NORVASC) tablet 5 mg, Daily    aspirin (ECOTRIN LOW STRENGTH) EC tablet 81 mg, Daily    atorvastatin (LIPITOR) tablet 40 mg, Daily    carvedilol (COREG) tablet 25 mg, BID With Meals    clopidogrel (PLAVIX) tablet 75 mg, Daily    labetalol (NORMODYNE) injection 5 mg, Q6H PRN    nicotine (NICODERM CQ) 14 mg/24hr TD 24 hr patch 1 patch, Daily    oxyCODONE (ROXICODONE) split tablet 2.5 mg, Q4H PRN **OR** oxyCODONE (ROXICODONE) IR tablet 5 mg, Q4H PRN    senna (SENOKOT) tablet 8.6 mg, HS, and PTA meds:   Prior to Admission Medications   Prescriptions Last Dose Informant Patient Reported? Taking?   ASPIRIN 81 PO   Yes No   Sig: Take 81 mg by mouth in the morning   acetaminophen (TYLENOL) 325 mg tablet   No No   Sig: Take 2 tablets (650 mg total) by mouth every 8 (eight) hours as needed for mild pain   amLODIPine (NORVASC) 5 mg tablet  Self Yes No   Si mg daily   atorvastatin (LIPITOR) 40 mg tablet  Self No No   Sig: Take 1 tablet (40 mg total) by mouth daily   carvedilol (COREG) 25 mg tablet   No No   Sig: Take 1 tablet (25 mg total) by mouth 2 (two) times a day with meals   clopidogrel (PLAVIX) 75 mg tablet   No No   Sig: Take 1 tablet (75 mg total) by mouth daily   hydroCHLOROthiazide 25 mg tablet  Self No No   Sig: Take 1 tablet (25 mg total) by mouth daily   polyethylene glycol (MIRALAX) 17 g packet   No No   Sig: Take 17 g by mouth daily as needed  "(Constipation) for up to 10 days   senna (SENOKOT) 8.6 mg   No No   Sig: Take 1 tablet (8.6 mg total) by mouth daily at bedtime for 3 days   spironolactone (ALDACTONE) 25 mg tablet  Self No No   Sig: Take 1 tablet (25 mg total) by mouth daily      Facility-Administered Medications: None       Scheduled Meds:  Current Facility-Administered Medications   Medication Dose Route Frequency Provider Last Rate    acetaminophen  975 mg Oral Q8H PRN Deisy Eldridge PA-C      amLODIPine  5 mg Oral Daily Ez Villalobos MD      aspirin  81 mg Oral Daily Ez Villalobos MD      atorvastatin  40 mg Oral Daily Ez Villalobos MD      carvedilol  25 mg Oral BID With Meals Ez Villalobos MD      clopidogrel  75 mg Oral Daily Ez Villalobos MD      labetalol  5 mg Intravenous Q6H PRN Ez Villalobos MD      nicotine  1 patch Transdermal Daily Ez Villalobos MD      oxyCODONE  2.5 mg Oral Q4H PRN Ez Villalobos MD      Or    oxyCODONE  5 mg Oral Q4H PRN Ez Villalobos MD      senna  8.6 mg Oral HS Ez Villalobos MD         PRN Meds:.  acetaminophen    labetalol    oxyCODONE **OR** oxyCODONE    Continuous Infusions:     No Known Allergies          /83 (BP Location: Left arm)   Pulse 67   Temp (!) 97.2 °F (36.2 °C) (Oral)   Resp 16   Ht 6' 2\" (1.88 m)   Wt 91.6 kg (201 lb 15.1 oz)   SpO2 95%   BMI 25.93 kg/m²   Temp (24hrs), Av.3 °F (36.8 °C), Min:97.2 °F (36.2 °C), Max:98.9 °F (37.2 °C)      Objective     Intake/Output Summary (Last 24 hours) at 3/25/2025 1244  Last data filed at 3/25/2025 1022  Gross per 24 hour   Intake 600 ml   Output 1483 ml   Net -883 ml       I/O last 24 hours:  In: 3100 [P.O.:600; I.V.:1450; IV Piggyback:1050]  Out: 2333 [Urine:2333]      Current Weight: Weight - Scale: 91.6 kg (201 lb 15.1 oz)  First Weight: Weight - Scale: 90.9 kg (200 lb 6.4 oz)    PHYSICAL EXAM:     General -      the patient was awake, alert, pleasant, " "cooperative and in no apparent distress  HENT  -        normal cephalic/atraumatic  Eyes    -        no scleral icterus was noted  Neck    -        no overt jugular venous distention was noted, supple  Chest  -         clear to auscultation and percussion  CVS  -           S1-S2, no pericardial friction rub S3 appreciated  Abdomen -    soft, nontender, no rebound or guarding noted, there was dullness over the bladder on percussion  Extremities-   no cyanosis clubbing or edema, no livedo reticularis noted  Skin   -           no hives on exposed areas  Neuro   -        alert and answering questions appropriately  Psych   -        mood and affect were appropriate      Invasive Devices       Peripheral Intravenous Line  Duration             Peripheral IV 03/24/25 Left;Proximal;Ventral (anterior) Forearm 1 day    Peripheral IV 03/24/25 Proximal;Right;Ventral (anterior) Forearm 1 day                    Lab Results:    Results from last 7 days   Lab Units 03/25/25  0514 03/24/25  0631 03/24/25  0230 03/21/25  0542 03/20/25  0504   WBC Thousand/uL 15.13* 13.46* 8.96 9.11 8.39   HEMOGLOBIN g/dL 11.0* 12.4 12.3 12.2 12.5   HEMATOCRIT % 33.6* 37.8 36.7 36.0* 36.6   PLATELETS Thousands/uL 367 360 365 288 303   SEGS PCT %  --  79* 63  --   --    LYMPHO PCT %  --  10* 18  --   --    MONO PCT %  --  8 14*  --   --    EOS PCT %  --  2 3  --   --    POTASSIUM mmol/L 4.4  --  4.0 4.1 3.7   CHLORIDE mmol/L 103  --  101 101 103   CO2 mmol/L 22  --  22 26 25   BUN mg/dL 38*  --  42* 26* 19   CREATININE mg/dL 1.63*  --  1.57* 1.27 1.25   CALCIUM mg/dL 8.8  --  9.2 9.4 9.2   ALK PHOS U/L  --   --  137*  --   --    ALT U/L  --   --  39  --   --    AST U/L  --   --  17  --   --    MAGNESIUM mg/dL  --   --   --  1.9  --        CUTURES:  No results found for: \"BLOODCX\", \"URINECX\"      Pertinent studies were reviewed          Portions of the record may have been created with voice recognition software. Occasional wrong word or \"sound a like\" " substitutions may have occurred due to the inherent limitations of voice recognition software. Read the chart carefully and recognize, using context, where substitutions have occurred.If you have any questions, please contact the dictating provider.

## 2025-03-25 NOTE — ASSESSMENT & PLAN NOTE
Status post recent left carotid endarterectomy  Status post hematoma evacuation 3/24  Follow-up and management per vascular surgery

## 2025-03-25 NOTE — CASE MANAGEMENT
Case Management Discharge Planning Note    Patient name Ez Dsouza  Location [facility] 508/[facility] 508-01 MRN [id number]  : 1965 Date 3/25/2025       Current Admission Date: 3/24/2025  Current Admission Diagnosis:Hematoma   Patient Active Problem List    Diagnosis Date Noted Date Diagnosed    Hematoma 2025     Localized swelling, mass or lump of neck 2025     S/P carotid endarterectomy 2025     Smoking 2025     Hyperlipidemia 03/10/2025     Acute cerebrovascular accident (CVA) due to stenosis of left carotid artery (HCC) 03/10/2025     Elevated coronary artery calcium score 03/10/2025     Pre-operative cardiovascular examination 03/10/2025     Closed fracture of right distal radius and ulna 2024     Closed fracture of distal ends of right radius and ulna, initial encounter 2024     Closed fracture of right wrist with routine healing, subsequent encounter 2024     Fall 2024     Acute pain due to trauma 2024     Closed fracture of multiple ribs of left side 2024     Closed fracture of right wrist 2024     MARIA TERESA (acute kidney injury) (HCC) 2024     Primary hypertension 2024     Scalp abrasion 2024     Elevated blood pressure reading 2021       LOS (days): 1  Geometric Mean LOS (GMLOS) (days):   Days to GMLOS:     OBJECTIVE:  Risk of Unplanned Readmission Score: 13.67         Current admission status: Inpatient   Preferred Pharmacy:   [facility] ([place])  [place] - [address]  [address]  [place] [address]  Phone: [phone] Fax: [phone]    Primary Care Provider: Jakob Higgins MD    Primary Insurance: BLUE CROSS  Secondary Insurance: [employer]    DISCHARGE DETAILS:    Discharge planning discussed with:: met w/ pt at bedside  [place] of Choice: Yes  Comments - [place] of Choice: pt would like to return to [facility] for STR. Referral sent via Aidin  CM contacted family/caregiver?: No-  see comments (declined)     Contacts  Patient Contacts: Nery Dsouza (spouse) [phone]      [facility]?  : No - Declined    Treatment Team Recommendation: Acute Rehab        Additional Comments: pt is readmitted w/ large L neck hematoma s/p evacuation 3/24    SC message sent to vascular to order PT/OT evals

## 2025-03-25 NOTE — ASSESSMENT & PLAN NOTE
Blood pressure is currently acceptable  Avoid relative hypotension to maintain renal perfusion  Trend blood pressure on current regimen

## 2025-03-25 NOTE — PROGRESS NOTES
"Vascular Nurse Navigator Post Op Education    Met with patient at bedside to introduce myself as Vascular Nurse Navigator and explained my role.  Patient is appropriate and accepting to education. Patient was educated with Review of written materials provided, Teachback, Explanation, Demonstration, and Question & Answer on expectations of post op care and recovery on L neck exploration and washout. Patient is a smoker  (5 cigars per day x \"too many\" yrs), as such Smoking effects on the lungs, tobacco triggers, and Smoking cessation was reviewed. Education provided to patient on infection prevention, activity limitations, when to call the office, importance of follow up, and incisional care.  Discharge instruction handout provided to patient to review.        Tobacco use is a significant patient-modifiable risk factor for this patient’s vascular disease with multiple vascular comorbidities, and a significant risk factor for failure of and complications from any endovascular or surgical interventions.    I explained to the patient the effects of smoking including peripheral artery disease, coronary artery disease, cerebrovascular disease as well as cancer and chronic obstructive pulmonary disease. I asked the patient to stop smoking immediately. It is never too late to quit, and many studies show significant health benefits as well as economical savings after smoking cessation. I offered to the patient nicotine replacement therapy as well as referral to the smoking cessation program and access to the quit line 1-539-IREALIA or ambulatory referral to our network smoking cessation program.    Based on our conversation, this patient appears motivated to quit  And declined my offer of nicotine replacement or tobacco cessation medications    The patient set a quit date of 3 weeks ago .     I spent approximately 5 minutes on tobacco cessation counseling with this patient.    "

## 2025-03-25 NOTE — DISCHARGE INSTR - AVS FIRST PAGE
DISCHARGE INSTRUCTIONS  INCISIONAL WASHOUT OUT SURGERY    ACTIVITY:  Limit your physical activity to walking for the first week and then increase your activity as tolerated.  Walking up steps and normal activities may be resumed as you feel ready.  Avoid strenuous activity such as vigorous exercise.  Avoid heavy lifting (do not lift more than 15 pounds) for the first four weeks after surgery.  You should not drive a car for at least one week following discharge from the hospital and you are off all narcotic pain medication.  You may ride in a car.  If you have had a stroke or mini-stroke, please consult with your neurologist prior to driving.    DO NOT START OR RESUME ANY PREVIOUSLY PLANNED THERAPY (PHYSICAL, CARDIAC, REHAB, ETC…) UNTIL YOU DISCUSS WITH YOUR SURGEON AND THEY FEEL IT IS SAFE TO ENGAGE IN THERAPY.    DIET:  Resume your normal diet.  Good nutrition is important for healing of your incision.  You can expect to have a sore throat and trouble swallowing after surgery which should improve quickly.  If you feel like you are choking, please call your doctor.    RECURRENT SYMPTOMS: If you develop any new numbness, weakness, vision changes, drooping of the face, or difficulty with speech after discharge, CALL 911 or go to the nearest Emergency department immediately.    INCISION SITE:  You may have surgical glue at your incision site.  There are stitches present under the skin which will absorb on their own.  The glue is used to cover the incision, assist in closure, and prevent contamination. This adhesive will darken and peel away on its own within one to two weeks. Do not pick at it.  If you have a bandage, please remove this on the second day after surgery.    You should shower daily.  Wash incision daily with soap and water, but do not rub or scrub the incision; rinse thoroughly and pat dry.  Do not bathe in a tub or swim for the first 4 weeks following surgery or if you have any open wounds.  It is  normal to have some bruising, swelling or discoloration around the incision.  IF increasing redness, pain, bleeding or a bulge develops, call our office immediately.    If you notice any active bleeding at the site, apply pressure to the site and call our office ([phone]) or [phone].    FOLLOW UP STUDIES: Doppler ultrasound studies are very important to your post-operative care. Your surgeon will arrange them at your first postoperative visit. The first study will be 3 months after surgery.    FOLLOW UP APPOINTMENTS:  Making and keeping follow up appointments and ultrasound tests are important to your recovery.  If you have difficulty making it to or keeping your follow up appointments, call the office.    If you have increased pain, fever >101.5, increased drainage, redness or a bad smell at your surgery site, new coldness/numbness of your arm or leg, please call us immediately and GO directly to the ER.    Appt w/ Dr. Ez Villalobos: 4/3/2025 at 9:00am, [place] office      PLEASE CALL THE OFFICE IF YOU HAVE ANY QUESTIONS  [phone]  [phone] [address], [address], [place] [address]  [address] [place] [address]  [address] [place] [address]  [address] [facility], [address] [address]  [address] [facility], [address], [place] [address]  [address] [facility], [address], [place] [address]  [address] [facility], Entrance [address], 2nd Floor, [place] [address]  [address] [facility], 1st Floor, [address] [address]  [address] [facility], [facility] [place] [address]  [address] [facility], [address], [place] [address]

## 2025-03-25 NOTE — ASSESSMENT & PLAN NOTE
60 yo M h/o symptomatic L ICA stenosis, recent CVA w/ residual Right hemiparesis arm>leg, underwent L CEA on 3/17 and presented to the ED w/ new worsening L neck hematoma.     3/24 L neck exploration and washout     Plan:  Pt w/ reaccumulation of Left neck hematoma, no respiratory compromise, swallowing intact, d/w Dr. Elise will monitor and D/W Dr. Villalobos   NPO for possible intervention   Continue aspirin, plavix, statin  PT/OT, Right hemiparesis, presented from [facility]  Case Mgt for dispo planning  Continue Regular diet  Pain Control

## 2025-03-25 NOTE — PROGRESS NOTES
Vascular Surgery     Post-op Check:    POD #0 Left neck exploration, hematoma evacuation    Pt doing well, no complaints    AVSS    Neuro stable   Left neck, CDI, no hematoma

## 2025-03-26 ENCOUNTER — ANESTHESIA (INPATIENT)
Dept: PERIOP | Facility: HOSPITAL | Age: 60
DRG: 908 | End: 2025-03-26
Payer: COMMERCIAL

## 2025-03-26 ENCOUNTER — ANESTHESIA EVENT (INPATIENT)
Dept: PERIOP | Facility: HOSPITAL | Age: 60
DRG: 908 | End: 2025-03-26
Payer: COMMERCIAL

## 2025-03-26 LAB
ABO GROUP BLD BPU: NORMAL
ABO GROUP BLD BPU: NORMAL
ALBUMIN SERPL BCG-MCNC: 3.6 G/DL (ref 3.5–5)
ALP SERPL-CCNC: 116 U/L (ref 34–104)
ALT SERPL W P-5'-P-CCNC: 20 U/L (ref 7–52)
ANION GAP SERPL CALCULATED.3IONS-SCNC: 7 MMOL/L (ref 4–13)
AST SERPL W P-5'-P-CCNC: 14 U/L (ref 13–39)
BASOPHILS # BLD AUTO: 0.06 THOUSANDS/ÂΜL (ref 0–0.1)
BASOPHILS NFR BLD AUTO: 1 % (ref 0–1)
BILIRUB SERPL-MCNC: 0.37 MG/DL (ref 0.2–1)
BLD SMEAR INTERP: NORMAL
BPU ID: NORMAL
BPU ID: NORMAL
BUN SERPL-MCNC: 30 MG/DL (ref 5–25)
C3 SERPL-MCNC: 176 MG/DL (ref 87–200)
CALCIUM SERPL-MCNC: 8.7 MG/DL (ref 8.4–10.2)
CH50 SERPL-ACNC: >60 U/ML
CHLORIDE SERPL-SCNC: 105 MMOL/L (ref 96–108)
CO2 SERPL-SCNC: 26 MMOL/L (ref 21–32)
CREAT SERPL-MCNC: 1.31 MG/DL (ref 0.6–1.3)
CROSSMATCH: NORMAL
CROSSMATCH: NORMAL
EOSINOPHIL # BLD AUTO: 0.14 THOUSAND/ÂΜL (ref 0–0.61)
EOSINOPHIL NFR BLD AUTO: 2 % (ref 0–6)
ERYTHROCYTE [DISTWIDTH] IN BLOOD BY AUTOMATED COUNT: 14.2 % (ref 11.6–15.1)
GFR SERPL CREATININE-BSD FRML MDRD: 58 ML/MIN/1.73SQ M
GLUCOSE SERPL-MCNC: 92 MG/DL (ref 65–140)
HCT VFR BLD AUTO: 33.1 % (ref 36.5–49.3)
HGB BLD-MCNC: 10.9 G/DL (ref 12–17)
IMM GRANULOCYTES # BLD AUTO: 0.05 THOUSAND/UL (ref 0–0.2)
IMM GRANULOCYTES NFR BLD AUTO: 1 % (ref 0–2)
LYMPHOCYTES # BLD AUTO: 1.58 THOUSANDS/ÂΜL (ref 0.6–4.47)
LYMPHOCYTES NFR BLD AUTO: 19 % (ref 14–44)
MCH RBC QN AUTO: 31.1 PG (ref 26.8–34.3)
MCHC RBC AUTO-ENTMCNC: 32.9 G/DL (ref 31.4–37.4)
MCV RBC AUTO: 94 FL (ref 82–98)
MONOCYTES # BLD AUTO: 0.94 THOUSAND/ÂΜL (ref 0.17–1.22)
MONOCYTES NFR BLD AUTO: 12 % (ref 4–12)
NEUTROPHILS # BLD AUTO: 5.41 THOUSANDS/ÂΜL (ref 1.85–7.62)
NEUTS SEG NFR BLD AUTO: 65 % (ref 43–75)
NRBC BLD AUTO-RTO: 0 /100 WBCS
PLATELET # BLD AUTO: 384 THOUSANDS/UL (ref 149–390)
PMV BLD AUTO: 10.6 FL (ref 8.9–12.7)
POTASSIUM SERPL-SCNC: 3.9 MMOL/L (ref 3.5–5.3)
PROT SERPL-MCNC: 6.9 G/DL (ref 6.4–8.4)
RBC # BLD AUTO: 3.51 MILLION/UL (ref 3.88–5.62)
SODIUM SERPL-SCNC: 138 MMOL/L (ref 135–147)
UNIT DISPENSE STATUS: NORMAL
UNIT DISPENSE STATUS: NORMAL
UNIT PRODUCT CODE: NORMAL
UNIT PRODUCT CODE: NORMAL
UNIT PRODUCT VOLUME: 350 ML
UNIT PRODUCT VOLUME: 350 ML
UNIT RH: NORMAL
UNIT RH: NORMAL
WBC # BLD AUTO: 8.18 THOUSAND/UL (ref 4.31–10.16)

## 2025-03-26 PROCEDURE — 0J9530Z DRAINAGE OF LEFT NECK SUBCUTANEOUS TISSUE AND FASCIA WITH DRAINAGE DEVICE, PERCUTANEOUS APPROACH: ICD-10-PCS | Performed by: SURGERY

## 2025-03-26 PROCEDURE — NC001 PR NO CHARGE

## 2025-03-26 PROCEDURE — 99024 POSTOP FOLLOW-UP VISIT: CPT | Performed by: SURGERY

## 2025-03-26 PROCEDURE — 85025 COMPLETE CBC W/AUTO DIFF WBC: CPT | Performed by: PHYSICIAN ASSISTANT

## 2025-03-26 PROCEDURE — 35701 EXPL N/FLWD SURG NECK ART: CPT | Performed by: SURGERY

## 2025-03-26 PROCEDURE — 99232 SBSQ HOSP IP/OBS MODERATE 35: CPT | Performed by: INTERNAL MEDICINE

## 2025-03-26 PROCEDURE — 80053 COMPREHEN METABOLIC PANEL: CPT | Performed by: INTERNAL MEDICINE

## 2025-03-26 RX ORDER — DEXAMETHASONE SODIUM PHOSPHATE 10 MG/ML
INJECTION, SOLUTION INTRAMUSCULAR; INTRAVENOUS AS NEEDED
Status: DISCONTINUED | OUTPATIENT
Start: 2025-03-26 | End: 2025-03-26

## 2025-03-26 RX ORDER — ONDANSETRON 2 MG/ML
4 INJECTION INTRAMUSCULAR; INTRAVENOUS ONCE AS NEEDED
Status: DISCONTINUED | OUTPATIENT
Start: 2025-03-26 | End: 2025-03-26 | Stop reason: HOSPADM

## 2025-03-26 RX ORDER — CHLORHEXIDINE GLUCONATE ORAL RINSE 1.2 MG/ML
15 SOLUTION DENTAL ONCE
Status: COMPLETED | OUTPATIENT
Start: 2025-03-26 | End: 2025-03-26

## 2025-03-26 RX ORDER — SODIUM CHLORIDE, SODIUM LACTATE, POTASSIUM CHLORIDE, CALCIUM CHLORIDE 600; 310; 30; 20 MG/100ML; MG/100ML; MG/100ML; MG/100ML
INJECTION, SOLUTION INTRAVENOUS CONTINUOUS PRN
Status: DISCONTINUED | OUTPATIENT
Start: 2025-03-26 | End: 2025-03-26

## 2025-03-26 RX ORDER — METOCLOPRAMIDE HYDROCHLORIDE 5 MG/ML
10 INJECTION INTRAMUSCULAR; INTRAVENOUS ONCE AS NEEDED
Status: DISCONTINUED | OUTPATIENT
Start: 2025-03-26 | End: 2025-03-26 | Stop reason: HOSPADM

## 2025-03-26 RX ORDER — FENTANYL CITRATE/PF 50 MCG/ML
50 SYRINGE (ML) INJECTION
Status: DISCONTINUED | OUTPATIENT
Start: 2025-03-26 | End: 2025-03-26 | Stop reason: HOSPADM

## 2025-03-26 RX ORDER — CEFAZOLIN SODIUM 2 G/50ML
2000 SOLUTION INTRAVENOUS ONCE
OUTPATIENT
Start: 2025-03-26 | End: 2025-03-26

## 2025-03-26 RX ORDER — SODIUM CHLORIDE, SODIUM LACTATE, POTASSIUM CHLORIDE, CALCIUM CHLORIDE 600; 310; 30; 20 MG/100ML; MG/100ML; MG/100ML; MG/100ML
50 INJECTION, SOLUTION INTRAVENOUS CONTINUOUS
Status: DISCONTINUED | OUTPATIENT
Start: 2025-03-26 | End: 2025-03-26

## 2025-03-26 RX ORDER — EPHEDRINE SULFATE 50 MG/ML
INJECTION INTRAVENOUS AS NEEDED
Status: DISCONTINUED | OUTPATIENT
Start: 2025-03-26 | End: 2025-03-26

## 2025-03-26 RX ORDER — ONDANSETRON 2 MG/ML
INJECTION INTRAMUSCULAR; INTRAVENOUS AS NEEDED
Status: DISCONTINUED | OUTPATIENT
Start: 2025-03-26 | End: 2025-03-26

## 2025-03-26 RX ORDER — ROCURONIUM BROMIDE 10 MG/ML
INJECTION, SOLUTION INTRAVENOUS AS NEEDED
Status: DISCONTINUED | OUTPATIENT
Start: 2025-03-26 | End: 2025-03-26

## 2025-03-26 RX ORDER — HYDROMORPHONE HCL/PF 1 MG/ML
SYRINGE (ML) INJECTION AS NEEDED
Status: DISCONTINUED | OUTPATIENT
Start: 2025-03-26 | End: 2025-03-26

## 2025-03-26 RX ORDER — CEFAZOLIN SODIUM 1 G/3ML
INJECTION, POWDER, FOR SOLUTION INTRAMUSCULAR; INTRAVENOUS AS NEEDED
Status: DISCONTINUED | OUTPATIENT
Start: 2025-03-26 | End: 2025-03-26

## 2025-03-26 RX ORDER — PROPOFOL 10 MG/ML
INJECTION, EMULSION INTRAVENOUS AS NEEDED
Status: DISCONTINUED | OUTPATIENT
Start: 2025-03-26 | End: 2025-03-26

## 2025-03-26 RX ORDER — FENTANYL CITRATE 50 UG/ML
INJECTION, SOLUTION INTRAMUSCULAR; INTRAVENOUS AS NEEDED
Status: DISCONTINUED | OUTPATIENT
Start: 2025-03-26 | End: 2025-03-26

## 2025-03-26 RX ADMIN — CLOPIDOGREL BISULFATE 75 MG: 75 TABLET, FILM COATED ORAL at 08:36

## 2025-03-26 RX ADMIN — EPHEDRINE SULFATE 10 MG: 50 INJECTION INTRAVENOUS at 11:54

## 2025-03-26 RX ADMIN — HYDROMORPHONE HYDROCHLORIDE 0.5 MG: 1 INJECTION, SOLUTION INTRAMUSCULAR; INTRAVENOUS; SUBCUTANEOUS at 12:27

## 2025-03-26 RX ADMIN — HYDROMORPHONE HYDROCHLORIDE 0.5 MG: 1 INJECTION, SOLUTION INTRAMUSCULAR; INTRAVENOUS; SUBCUTANEOUS at 12:33

## 2025-03-26 RX ADMIN — SODIUM CHLORIDE, SODIUM LACTATE, POTASSIUM CHLORIDE, AND CALCIUM CHLORIDE 50 ML/HR: .6; .31; .03; .02 INJECTION, SOLUTION INTRAVENOUS at 13:39

## 2025-03-26 RX ADMIN — DEXAMETHASONE SODIUM PHOSPHATE 10 MG: 10 INJECTION INTRAMUSCULAR; INTRAVENOUS at 11:48

## 2025-03-26 RX ADMIN — ONDANSETRON 4 MG: 2 INJECTION INTRAMUSCULAR; INTRAVENOUS at 11:48

## 2025-03-26 RX ADMIN — EPHEDRINE SULFATE 5 MG: 50 INJECTION INTRAVENOUS at 11:45

## 2025-03-26 RX ADMIN — ROCURONIUM BROMIDE 50 MG: 10 INJECTION, SOLUTION INTRAVENOUS at 11:21

## 2025-03-26 RX ADMIN — ASPIRIN 81 MG: 81 TABLET, COATED ORAL at 08:34

## 2025-03-26 RX ADMIN — CEFAZOLIN 2000 MG: 1 INJECTION, POWDER, FOR SOLUTION INTRAMUSCULAR; INTRAVENOUS at 11:32

## 2025-03-26 RX ADMIN — AMLODIPINE BESYLATE 5 MG: 5 TABLET ORAL at 08:35

## 2025-03-26 RX ADMIN — CARVEDILOL 25 MG: 25 TABLET, FILM COATED ORAL at 16:53

## 2025-03-26 RX ADMIN — CHLORHEXIDINE GLUCONATE 15 ML: 1.2 SOLUTION ORAL at 10:25

## 2025-03-26 RX ADMIN — FENTANYL CITRATE 100 MCG: 50 INJECTION INTRAMUSCULAR; INTRAVENOUS at 11:21

## 2025-03-26 RX ADMIN — SODIUM CHLORIDE, SODIUM GLUCONATE, SODIUM ACETATE, POTASSIUM CHLORIDE, MAGNESIUM CHLORIDE, SODIUM PHOSPHATE, DIBASIC, AND POTASSIUM PHOSPHATE 75 ML/HR: .53; .5; .37; .037; .03; .012; .00082 INJECTION, SOLUTION INTRAVENOUS at 17:39

## 2025-03-26 RX ADMIN — ATORVASTATIN CALCIUM 40 MG: 40 TABLET, FILM COATED ORAL at 08:36

## 2025-03-26 RX ADMIN — SUGAMMADEX 200 MG: 100 INJECTION, SOLUTION INTRAVENOUS at 12:15

## 2025-03-26 RX ADMIN — PROPOFOL 200 MG: 10 INJECTION, EMULSION INTRAVENOUS at 11:21

## 2025-03-26 RX ADMIN — CARVEDILOL 25 MG: 25 TABLET, FILM COATED ORAL at 08:36

## 2025-03-26 RX ADMIN — PHENYLEPHRINE HYDROCHLORIDE 40 MCG/MIN: 10 INJECTION INTRAVENOUS at 11:30

## 2025-03-26 RX ADMIN — SODIUM CHLORIDE, SODIUM LACTATE, POTASSIUM CHLORIDE, AND CALCIUM CHLORIDE: .6; .31; .03; .02 INJECTION, SOLUTION INTRAVENOUS at 12:05

## 2025-03-26 NOTE — OP NOTE
OPERATIVE REPORT  PATIENT NAME: Ez Dsouza    :  1965  MRN: [id number]  Pt Location: BE OR ROOM 03    SURGERY DATE: 3/26/2025    Surgeons and Role:     * Ez Villalobos MD - Primary    Preop Diagnosis:  S/P carotid endarterectomy [Z98.890]  Localized swelling, mass or lump of neck [R22.1]    Post-Op Diagnosis Codes:     * S/P carotid endarterectomy [Z98.890]     * Localized swelling, mass or lump of neck [R22.1]    Procedure(s):  Left - LEFT NECK EXPLORATION/WASHOUT    Specimen(s):  * No specimens in log *    Estimated Blood Loss:   Minimal    Drains:  Closed/Suction Drain Left Neck Other (Comment) (Active)   Number of days: 0       Anesthesia Type:   Choice    Operative Indications:  S/P carotid endarterectomy [Z98.890]  Localized swelling, mass or lump of neck [R22.1]  59-year-old male status post left carotid endarterectomy complicated by left neck hematoma.  Patient underwent a washout on 3/24.  Unfortunately in less than 48 hours due to reaccumulation of fluid collection of the left neck.  Will plan for washout again today and drain placement    Operative Findings:  No active bleeding from the wound bed.  Fluid collection appear to be seroma.  This was washed out and irrigated with antibiotic irrigation.  Surgicel powder was sprayed in the field in case there was any further generalized oozing.    A 7 ROBERTO drain was placed in the wound attached to bulb      Complications:   None    Procedure and Technique:  After informed consent was obtained, the patient was brought to the operating room and placed in the supine position. Perioperative IV antibiotics were given.  He was given anesthesia and endotracheally intubated.  He was then prepped and draped in the usual sterile fashion exposing the left neck.  A timeout was performed.     The incision was reopened and sutures were removed.  When I reopened the platysma serosanguineous fluid was released.  The wound was irrigated out with antibiotic saline  and explored for any active bleeding.  There was no generalized oozing or any active bleeds.  The wound was probed as well as the patch which again showed no sign of bleeding.  The wound was filled with antibiotic saline and anesthesia performed a Valsalva maneuver twice.  Each time there was no sign again of any active bleeds.  The wound bed was then dried and Surgicel powder was sprayed throughout the wound in case there were any small areas that I was unable to see that was bleeding.  A 7 Moroccan ROBERTO drain was placed in the wound and tunneled inferolaterally from the incision.  This was sutured in place with a 3-0 nylon on the skin.    The wound then was closed starting with the platysma layer using a 3-0 Vicryl in a running continuous stitch and subcuticular layer closed with a 4-0 Monocryl.  Exofin glue was placed over top of the wound.  A drain sponge was placed over the exit point of the ROBERTO drain.  Patient was extubated and taken the PACU for recovery     I was present for the entire procedure.    Patient Disposition:  PACU              SIGNATURE: Ez Villalobos MD  DATE: March 26, 2025  TIME: 12:27 PM

## 2025-03-26 NOTE — PROGRESS NOTES
Progress Note - Nephrology   Ez Dsouza 59 y.o. male MRN: [id number]  Unit/Bed#: [facility] 508-01 Encounter: [id number]      Assessment & Plan  Elevated serum creatinine  The patient's baseline creatinine is approximately 1.3  He does not have MARIA TERESA per KDIGO criteria but his creatinine is above baseline and has worsened over the last 24 hours  There is no obvious reason for his acute kidney injury such as clinically detectable volume depletion, hypotension, nephrotoxic medication usage, urinary retention, systemic allergic reaction etc. however, renal function has improved after intravenous fluids and is currently back to baseline  Workup: Postvoid residual 10 mL/urinalysis revealed trace protein but in the setting of a specific gravity of 1.025-small heme with 10-20 RBCs per high-power field-0-3 hyaline casts per low power field/renal ultrasound revealed the right kidney to be 12 cm and the left 11.4 without any hydronephrosis noted  Plavix can cause TTP but he has no thrombocytopenia or other stigmata of TTP-LDH normal and hemolysis smear negative  Will continue intravenous fluids given n.p.o. status  Doubt cholesterol emboli after carotid endarterectomy but he does have hematuria.  Will check complement levels-C3 and C4 normal/total complement pending  Repeat BMP in a.m.  Avoid nephrotoxic medications and relative hypotension including during surgery  Change hold parameter Norvasc to hold for less than systolic of 130  CKD (chronic kidney disease) stage 3, GFR 30-59 ml/min ([facility])  Baseline creatinine of approximately 1.3  Likely secondary to hypertension and possible renal vascular disease  He does have hematuria-recheck urinalysis as an outpatient.  C3 and C4 normal  Benign hypertension with CKD (chronic kidney disease) stage III ([facility])  Blood sugar is currently acceptable to slightly elevated this a.m.  Avoid relative hypotension to maintain renal perfusion  Trend blood pressure on current regimen and adjust  "regimen as needed  Hematoma  Status post recent left carotid endarterectomy  Status post hematoma evacuation 3/24  Follow-up and management per vascular surgery-possible reexploration today               Subjective:   The patient was seen and examined earlier this morning.  His wife was at bedside.  He denied shortness of breath, chest pain or pressure, abdominal pain, vomiting, diarrhea, sweats, chills.  He did have some throat soreness when swallowing but was able to get food and liquid down.      Objective:     Vitals: Blood pressure 156/90, pulse 58, temperature 97.5 °F (36.4 °C), temperature source Oral, resp. rate 16, height 6' 2\" (1.88 m), weight 91.6 kg (201 lb 15.1 oz), SpO2 97%.,Body mass index is 25.93 kg/m².Temp (24hrs), Av.7 °F (36.5 °C), Min:97.2 °F (36.2 °C), Max:98.2 °F (36.8 °C)      Temp:  [97.2 °F (36.2 °C)-98.2 °F (36.8 °C)] 97.5 °F (36.4 °C)  HR:  [58-77] 58  Resp:  [15-19] 16  BP: (130-158)/(78-90) 156/90  SpO2:  [95 %-97 %] 97 %    Weight (last 2 days)       Date/Time Weight    25 0945 91.6 (201.94)    25 0736 90.9 (200.4)                 I/O last 24 hours:  In: 2469.3 [P.O.:418; I.V.:1051.3; IV Piggyback:1000]  Out: 3183 [Urine:3183]        Physical Exam    /90 (BP Location: Left arm)   Pulse 58   Temp 97.5 °F (36.4 °C) (Oral)   Resp 16   Ht 6' 2\" (1.88 m)   Wt 91.6 kg (201 lb 15.1 oz)   SpO2 97%   BMI 25.93 kg/m²   Vital signs were reviewed  Constitutional: The patient was awake, alert, pleasant, cooperative and in no apparent distress  Cardiovascular: No overt jugular venous tension was noted, S1-S2, no pericardial friction rub or S3 appreciated, no peripheral edema noted  Pulmonary: Adequate inspiratory effort, lungs were clear                Invasive Devices       Peripheral Intravenous Line  Duration             Peripheral IV 25 Left;Proximal;Ventral (anterior) Forearm 2 days    Peripheral IV 25 Proximal;Right;Ventral (anterior) Forearm 2 days "                    Medications:    Scheduled Meds:  Current Facility-Administered Medications   Medication Dose Route Frequency Provider Last Rate    acetaminophen  975 mg Oral Q8H PRN Deisy Eldridge PA-C      amLODIPine  5 mg Oral Daily Sunil Sims MD      aspirin  81 mg Oral Daily Ez Villalobos MD      atorvastatin  40 mg Oral Daily Ez Villalobos MD      carvedilol  25 mg Oral BID With Meals Ez Villalobos MD      clopidogrel  75 mg Oral Daily Ez Villalobos MD      labetalol  5 mg Intravenous Q6H PRN Ez Villalobos MD      multi-electrolyte  75 mL/hr Intravenous Continuous Sunil Sims MD 75 mL/hr (03/25/25 1400)    nicotine  1 patch Transdermal Daily Ez Villalobos MD      oxyCODONE  2.5 mg Oral Q4H PRN Ez Villalobos MD      Or    oxyCODONE  5 mg Oral Q4H PRN Ez Villalobos MD      senna  8.6 mg Oral HS Ez Villalobos MD         PRN Meds:.  acetaminophen    labetalol    oxyCODONE **OR** oxyCODONE    Continuous Infusions:multi-electrolyte, 75 mL/hr, Last Rate: 75 mL/hr (03/25/25 1400)            LAB RESULTS:      Results from last 7 days   Lab Units 03/26/25  0512 03/25/25  0514 03/24/25  0631 03/24/25  0230 03/21/25  0542 03/20/25  0504   WBC Thousand/uL 8.18 15.13* 13.46* 8.96 9.11 8.39   HEMOGLOBIN g/dL 10.9* 11.0* 12.4 12.3 12.2 12.5   HEMATOCRIT % 33.1* 33.6* 37.8 36.7 36.0* 36.6   PLATELETS Thousands/uL 384 367 360 365 288 303   SEGS PCT % 65  --  79* 63  --   --    LYMPHO PCT % 19  --  10* 18  --   --    MONO PCT % 12  --  8 14*  --   --    EOS PCT % 2  --  2 3  --   --    POTASSIUM mmol/L 3.9 4.4  --  4.0 4.1 3.7   CHLORIDE mmol/L 105 103  --  101 101 103   CO2 mmol/L 26 22  --  22 26 25   BUN mg/dL 30* 38*  --  42* 26* 19   CREATININE mg/dL 1.31* 1.63*  --  1.57* 1.27 1.25   CALCIUM mg/dL 8.7 8.8  --  9.2 9.4 9.2   ALK PHOS U/L 116*  --   --  137*  --   --    ALT U/L 20  --   --  39  --   --    AST U/L 14  --    "--  17  --   --    MAGNESIUM mg/dL  --   --   --   --  1.9  --        CUTURES:  No results found for: \"BLOODCX\", \"URINECX\"              PLEASE NOTE:  This encounter was completed utilizing the [employer] Direct Speech Voice Recognition Software. Grammatical errors, random word insertions, pronoun errors and incomplete sentences are occasional consequences of the system due to software limitations, ambient noise and hardware issues.These may be missed by proof reading prior to affixing electronic signature. Any questions or concerns about the content, text or information contained within the body of this dictation should be directly addressed to the physician for clarification. Please do not hesitate to call me directly if you have any any questions or concerns.  "

## 2025-03-26 NOTE — CASE MANAGEMENT
Case Management Discharge Planning Note    Patient name Ez Dsouza  Location [facility] 508/[facility] 508-01 MRN [id number]  : 1965 Date 3/26/2025       Current Admission Date: 3/24/2025  Current Admission Diagnosis:Hematoma   Patient Active Problem List    Diagnosis Date Noted Date Diagnosed    Elevated serum creatinine 2025     CKD (chronic kidney disease) stage 3, GFR 30-59 ml/min ([facility]) 2025     Benign hypertension with CKD (chronic kidney disease) stage III ([facility]) 2025     Hematoma 2025     Localized swelling, mass or lump of neck 2025     S/P carotid endarterectomy 2025     Smoking 2025     Hyperlipidemia 03/10/2025     Acute cerebrovascular accident (CVA) due to stenosis of left carotid artery ([facility]) 03/10/2025     Elevated coronary artery calcium score 03/10/2025     Pre-operative cardiovascular examination 03/10/2025     Closed fracture of right distal radius and ulna 2024     Closed fracture of distal ends of right radius and ulna, initial encounter 2024     Closed fracture of right wrist with routine healing, subsequent encounter 2024     Fall 2024     Acute pain due to trauma 2024     Closed fracture of multiple ribs of left side 2024     Closed fracture of right wrist 2024     MARIA TERESA (acute kidney injury) ([facility]) 2024     Scalp abrasion 2024     Elevated blood pressure reading 2021       LOS (days): 2  Geometric Mean LOS (GMLOS) (days):   Days to GMLOS:     OBJECTIVE:  Risk of Unplanned Readmission Score: 14.18         Current admission status: Inpatient   Preferred Pharmacy:   [facility] [place] - [address]  [address]  [place] [address]  Phone: [phone] Fax: [phone]    Primary Care Provider: Jakob Higgins MD    Primary Insurance: BLUE CROSS  Secondary Insurance: [employer]    DISCHARGE DETAILS:    Discharge planning discussed with:: pt and wife  at bedside  Freedom of Choice: Yes  Comments - Freedom of Choice: Wife asking to meet w/ CM post patient's procedure today. Discussed DCP. Pt and wife requesting return to [facility] upon d/c. CM informed them pt has been accepted at [facility] for ASPEN. Therapy evals pending since pt had to return to OR for procedure. Will follow. Will need auth for return to   CM contacted family/caregiver?: Yes  Were Treatment Team discharge recommendations reviewed with patient/caregiver?: Yes  Did patient/caregiver verbalize understanding of patient care needs?: Yes  Were patient/caregiver advised of the risks associated with not following Treatment Team discharge recommendations?: Yes    Contacts  Patient Contacts: Nery Dsouza (spouse) [phone]  Relationship to Patient:: Family  Contact Method: In Person  Reason/Outcome: Discharge Planning, Referral    Other Referral/Resources/Interventions Provided:  Interventions: Acute Rehab    Discharge Destination Plan:: Acute Rehab

## 2025-03-26 NOTE — ASSESSMENT & PLAN NOTE
Blood sugar is currently acceptable to slightly elevated this a.m.  Avoid relative hypotension to maintain renal perfusion  Trend blood pressure on current regimen and adjust regimen as needed

## 2025-03-26 NOTE — ASSESSMENT & PLAN NOTE
Status post recent left carotid endarterectomy  Status post hematoma evacuation 3/24  Follow-up and management per vascular surgery-possible reexploration today

## 2025-03-26 NOTE — ASSESSMENT & PLAN NOTE
Lab Results   Component Value Date    EGFR 45 03/25/2025    EGFR 47 03/24/2025    EGFR 61 03/21/2025    CREATININE 1.63 (H) 03/25/2025    CREATININE 1.57 (H) 03/24/2025    CREATININE 1.27 03/21/2025

## 2025-03-26 NOTE — PROGRESS NOTES
Progress Note - Vascular Surgery   Name: Ez Dsouza 59 y.o. male I MRN: [id number]  Unit/Bed#: [facility] 508-01 I Date of Admission: 3/24/2025   Date of Service: 3/26/2025 I Hospital Day: 2    Assessment & Plan  Hematoma  60 yo M h/o symptomatic L ICA stenosis, recent CVA w/ residual Right hemiparesis arm>leg, underwent L CEA on 3/17 and presented to the ED w/ new worsening L neck hematoma.     3/24 L neck exploration and washout     Plan:  Pt w/ reaccumulation of Left neck hematoma, no respiratory compromise, swallowing intact, d/w Dr. Elise will monitor and D/W Dr. Villalobos   NPO for possible intervention   Continue aspirin, plavix, statin  PT/OT, Right hemiparesis, presented from [facility]  Case Mgt for dispo planning  Continue Regular diet  Pain Control    Elevated serum creatinine  Baseline Cr 1.3, up to 1.63 post-op    Plan:  Nephrology following, w/u in progress  F/U AM labs  CKD (chronic kidney disease) stage 3, GFR 30-59 ml/min ([facility])  Lab Results   Component Value Date    EGFR 45 03/25/2025    EGFR 47 03/24/2025    EGFR 61 03/21/2025    CREATININE 1.63 (H) 03/25/2025    CREATININE 1.57 (H) 03/24/2025    CREATININE 1.27 03/21/2025     Benign hypertension with CKD (chronic kidney disease) stage III ([facility])  Lab Results   Component Value Date    EGFR 45 03/25/2025    EGFR 47 03/24/2025    EGFR 61 03/21/2025    CREATININE 1.63 (H) 03/25/2025    CREATININE 1.57 (H) 03/24/2025    CREATININE 1.27 03/21/2025         Subjective : Left neck swelling, denies pain, SOB, difficulty breathing or swallowing    Objective :  Temp:  [97.2 °F (36.2 °C)-98.2 °F (36.8 °C)] 97.7 °F (36.5 °C)  HR:  [67-86] 69  BP: (130-140)/(78-88) 133/82  Resp:  [16-19] 19  SpO2:  [93 %-97 %] 95 %  O2 Device: None (Room air)     I/O         03/24 0701  03/25 0700 03/25 0701 03/26 0700    P.O. 300 418    I.V. (mL/kg) 1450 (16) 1051.3 (11.5)    IV Piggyback 1050 1000    Total Intake(mL/kg) 2800 (30.8) 2469.3 (27)    Urine (mL/kg/hr) 1800 (0.8) 2158 (1)     "Total Output 1800 2158    Net +1000 +311.3                  Physical Exam  Vitals reviewed.   Constitutional:       General: He is not in acute distress.     Appearance: Normal appearance. He is not ill-appearing.   Cardiovascular:      Rate and Rhythm: Normal rate and regular rhythm.      Pulses: Normal pulses.   Pulmonary:      Effort: Pulmonary effort is normal. No respiratory distress.      Breath sounds: Normal breath sounds. No stridor.   Abdominal:      General: Bowel sounds are normal. There is no distension.      Palpations: Abdomen is soft.      Tenderness: There is no abdominal tenderness.   Musculoskeletal:         General: No swelling.   Skin:     General: Skin is warm and dry.      Capillary Refill: Capillary refill takes less than 2 seconds.   Neurological:      Mental Status: He is alert. Mental status is at baseline.      Cranial Nerves: No cranial nerve deficit.      Comments: Right arm hemiparesis           Lab Results: I have reviewed the following results:  CBC with diff:   Lab Results   Component Value Date    WBC 15.13 (H) 03/25/2025    HGB 11.0 (L) 03/25/2025    HCT 33.6 (L) 03/25/2025    MCV 94 03/25/2025     03/25/2025    RBC 3.58 (L) 03/25/2025    MCH 30.7 03/25/2025    MCHC 32.7 03/25/2025    RDW 14.0 03/25/2025    MPV 10.3 03/25/2025    NRBC 0 03/24/2025   ,   BMP/CMP:  Lab Results   Component Value Date    SODIUM 135 03/25/2025    K 4.4 03/25/2025     03/25/2025    CO2 22 03/25/2025    CO2 29 06/22/2024    BUN 38 (H) 03/25/2025    CREATININE 1.63 (H) 03/25/2025    GLUCOSE 87 06/22/2024    CALCIUM 8.8 03/25/2025    AST 17 03/24/2025    ALT 39 03/24/2025    ALKPHOS 137 (H) 03/24/2025    EGFR 45 03/25/2025   ,   Lipid Panel: No results found for: \"CHOL\",   Coags:   Lab Results   Component Value Date    PTT 39 (H) 03/24/2025    INR 1.13 03/24/2025   ,   Blood Culture: No results found for: \"BLOODCX\",   Urinalysis:   Lab Results   Component Value Date    COLORU Yellow 03/25/2025 " "   CLARITYU Clear 03/25/2025    SPECGRAV 1.025 03/25/2025    PHUR 5.5 03/25/2025    LEUKOCYTESUR Negative 03/25/2025    NITRITE Negative 03/25/2025    GLUCOSEU Negative 03/25/2025    KETONESU Negative 03/25/2025    BILIRUBINUR Negative 03/25/2025    BLOODU Small (A) 03/25/2025   ,   Urine Culture: No results found for: \"URINECX\",   Wound Culure: No results found for: \"WOUNDCULT\"    Imaging Results Review: No pertinent imaging studies reviewed.  Other Study Results Review: No additional pertinent studies reviewed.      "

## 2025-03-26 NOTE — PROGRESS NOTES
Patient:    MRN:  [id number]    Ethan Request ID:  [id number]    Level of care reserved:  Inpatient Rehab Facility    Partner Reserved:  [place] [id number] [phone]    Clinical needs requested:    Geography searched:  10 miles around [id number]    Start of Service:    Request sent:  12:18pm EDT on 3/25/2025 by Shey Ames    Partner reserved:  9:38am EDT on 3/26/2025 by Shey Ames    Choice list shared:  9:38am EDT on 3/26/2025 by Shey Ames

## 2025-03-26 NOTE — ASSESSMENT & PLAN NOTE
Baseline creatinine of approximately 1.3  Likely secondary to hypertension and possible renal vascular disease  He does have hematuria-recheck urinalysis as an outpatient.  C3 and C4 normal

## 2025-03-26 NOTE — ASSESSMENT & PLAN NOTE
The patient's baseline creatinine is approximately 1.3  He does not have MARIA TERESA per KDIGO criteria but his creatinine is above baseline and has worsened over the last 24 hours  There is no obvious reason for his acute kidney injury such as clinically detectable volume depletion, hypotension, nephrotoxic medication usage, urinary retention, systemic allergic reaction etc. however, renal function has improved after intravenous fluids and is currently back to baseline  Workup: Postvoid residual 10 mL/urinalysis revealed trace protein but in the setting of a specific gravity of 1.025-small heme with 10-20 RBCs per high-power field-0-3 hyaline casts per low power field/renal ultrasound revealed the right kidney to be 12 cm and the left 11.4 without any hydronephrosis noted  Plavix can cause TTP but he has no thrombocytopenia or other stigmata of TTP-LDH normal and hemolysis smear negative  Will continue intravenous fluids given n.p.o. status  Doubt cholesterol emboli after carotid endarterectomy but he does have hematuria.  Will check complement levels-C3 and C4 normal/total complement pending  Repeat BMP in a.m.  Avoid nephrotoxic medications and relative hypotension including during surgery  Change hold parameter Norvasc to hold for less than systolic of 130

## 2025-03-26 NOTE — OCCUPATIONAL THERAPY NOTE
Occupational Therapy refusal        Patient Name: Ez Dsouza  Today's Date: 3/26/2025             03/26/25 1010   OT Last Visit   OT Visit Date 03/26/25   Note Type   Note type Cancelled Session   Cancel Reasons Refusal         ALINE Lacey, OTR/L

## 2025-03-26 NOTE — ANESTHESIA POSTPROCEDURE EVALUATION
Post-Op Assessment Note            No anethesia notable event occurred.    Comments: uneventful post procedure course    Reason for prolonged intubation > 24 hours:  Extubated in ORReason for prolonged intubation > 48 hours:  Extubated in OR      Last Filed PACU Vitals:  Vitals Value Taken Time   Temp 97.6 °F (36.4 °C) 03/26/25 1242   Pulse 65 03/26/25 1320   /67 03/26/25 1315   Resp 11 03/26/25 1320   SpO2 91 % 03/26/25 1320   Vitals shown include unfiled device data.    Modified Shraddha:     Vitals Value Taken Time   Activity 2 03/26/25 1315   Respiration 2 03/26/25 1315   Circulation 2 03/26/25 1315   Consciousness 2 03/26/25 1300   Oxygen Saturation 2 03/26/25 1300     Modified Shraddha Score: 10

## 2025-03-26 NOTE — PHYSICAL THERAPY NOTE
Physical Therapy Cancellation Note         03/26/25 1011   PT Last Visit   PT Visit Date 03/26/25   Note Type   Note type Cancelled Session   Cancel Reasons Refusal   Additional Comments Pt seen by PT/OT team, pt wanted to have procedure completed prior to seeing therapy.     PT orders seen and acknowledged. PT and OT attempted joint evaluation of pt today, he stated he wanted to have his procedure performed and would work with therapy after. PT will readdress pt at a later date when he is medically stable after procedure.     Taj Wagner, SPT

## 2025-03-26 NOTE — PLAN OF CARE
Problem: Knowledge Deficit  Goal: Patient/family/caregiver demonstrates understanding of disease process, treatment plan, medications, and discharge instructions  Description: Complete learning assessment and assess knowledge base.Interventions:- Provide teaching at level of understanding- Provide teaching via preferred learning methods  Outcome: Progressing     Problem: SAFETY ADULT  Goal: Maintain or return to baseline ADL function  Description: INTERVENTIONS:-  Assess patient's ability to carry out ADLs; assess patient's baseline for ADL function and identify physical deficits which impact ability to perform ADLs (bathing, care of mouth/teeth, toileting, grooming, dressing, etc.)- Assess/evaluate cause of self-care deficits - Assess range of motion- Assess patient's mobility; develop plan if impaired- Assess patient's need for assistive devices and provide as appropriate- Encourage maximum independence but intervene and supervise when necessary- Involve family in performance of ADLs- Assess for home care needs following discharge - Consider OT consult to assist with ADL evaluation and planning for discharge- Provide patient education as appropriate  Outcome: Progressing

## 2025-03-26 NOTE — ANESTHESIA PREPROCEDURE EVALUATION
Procedure:  LEFT NECK EXPLORATION/WASHOUT (Left: Neck)    Relevant Problems   CARDIO   (+) Elevated coronary artery calcium score   (+) Hyperlipidemia      /RENAL   (+) MARIA TERESA (acute kidney injury) (HCC)   (+) Benign hypertension with CKD (chronic kidney disease) stage III (HCC)   (+) CKD (chronic kidney disease) stage 3, GFR 30-59 ml/min (HCC)      NEURO/PSYCH   (+) Acute cerebrovascular accident (CVA) due to stenosis of left carotid artery (HCC)      PULMONARY   (+) Smoking        Physical Exam    Airway    Mallampati score: II  TM Distance: >3 FB  Neck ROM: full     Dental       Cardiovascular      Pulmonary      Other Findings  Left neck swelling      Anesthesia Plan  ASA Score- 3 Emergent    Anesthesia Type- general with ASA Monitors.         Additional Monitors:     Airway Plan: ETT.    Comment: General anesthesia, endotracheal tube; standard ASA monitors. Risks and benefits discussed with patient; patient consented and agrees to proceed.    I saw and evaluated the patient. If seen with CRNA, we have discussed the anesthetic plan and I am in agreement that the plan is appropriate for the patient.  Emelia.       Plan Factors-    Chart reviewed.   Existing labs reviewed.                   Induction- intravenous.    Postoperative Plan- Plan for postoperative opioid use. Planned trial extubation    Perioperative Resuscitation Plan - Level 1 - Full Code.       Informed Consent- Anesthetic plan and risks discussed with patient.  I personally reviewed this patient with the CRNA. Discussed and agreed on the Anesthesia Plan with the CRNA..      NPO Status:  Vitals Value Taken Time   Date of last liquid 03/24/25 03/24/25 1013   Time of last liquid 0000 03/24/25 1013   Date of last solid 03/24/25 03/24/25 1013   Time of last solid 0000 03/24/25 1013

## 2025-03-26 NOTE — ANESTHESIA POSTPROCEDURE EVALUATION
Post-Op Assessment Note    CV Status:  Stable  Pain Score: 0    Pain management: adequate       Mental Status:  Alert   Hydration Status:  Stable   PONV Controlled:  None   Airway Patency:  Patent  There is a medical reason for not screening for obstructive sleep apnea and/or for not using two or more mitigation strategies   Post Op Vitals Reviewed: Yes    No anethesia notable event occurred.    Staff: CRNA           Last Filed PACU Vitals:  Vitals Value Taken Time   Temp 98.1    Pulse 60 03/26/25 1242   /81    Resp 21 03/26/25 1242   SpO2 96 % 03/26/25 1242

## 2025-03-27 LAB
ANION GAP SERPL CALCULATED.3IONS-SCNC: 7 MMOL/L (ref 4–13)
BUN SERPL-MCNC: 30 MG/DL (ref 5–25)
CALCIUM SERPL-MCNC: 8.7 MG/DL (ref 8.4–10.2)
CHLORIDE SERPL-SCNC: 104 MMOL/L (ref 96–108)
CO2 SERPL-SCNC: 27 MMOL/L (ref 21–32)
CREAT SERPL-MCNC: 1.26 MG/DL (ref 0.6–1.3)
ERYTHROCYTE [DISTWIDTH] IN BLOOD BY AUTOMATED COUNT: 14 % (ref 11.6–15.1)
GFR SERPL CREATININE-BSD FRML MDRD: 61 ML/MIN/1.73SQ M
GLUCOSE SERPL-MCNC: 125 MG/DL (ref 65–140)
HCT VFR BLD AUTO: 33.1 % (ref 36.5–49.3)
HGB BLD-MCNC: 10.8 G/DL (ref 12–17)
MCH RBC QN AUTO: 30.7 PG (ref 26.8–34.3)
MCHC RBC AUTO-ENTMCNC: 32.6 G/DL (ref 31.4–37.4)
MCV RBC AUTO: 94 FL (ref 82–98)
PLATELET # BLD AUTO: 400 THOUSANDS/UL (ref 149–390)
PMV BLD AUTO: 10.4 FL (ref 8.9–12.7)
POTASSIUM SERPL-SCNC: 3.9 MMOL/L (ref 3.5–5.3)
RBC # BLD AUTO: 3.52 MILLION/UL (ref 3.88–5.62)
SODIUM SERPL-SCNC: 138 MMOL/L (ref 135–147)
WBC # BLD AUTO: 12.15 THOUSAND/UL (ref 4.31–10.16)

## 2025-03-27 PROCEDURE — 97163 PT EVAL HIGH COMPLEX 45 MIN: CPT

## 2025-03-27 PROCEDURE — 99024 POSTOP FOLLOW-UP VISIT: CPT | Performed by: SURGERY

## 2025-03-27 PROCEDURE — 99232 SBSQ HOSP IP/OBS MODERATE 35: CPT | Performed by: INTERNAL MEDICINE

## 2025-03-27 PROCEDURE — 80048 BASIC METABOLIC PNL TOTAL CA: CPT | Performed by: SURGERY

## 2025-03-27 PROCEDURE — 85027 COMPLETE CBC AUTOMATED: CPT | Performed by: SURGERY

## 2025-03-27 PROCEDURE — 97167 OT EVAL HIGH COMPLEX 60 MIN: CPT

## 2025-03-27 RX ORDER — AMLODIPINE BESYLATE 5 MG/1
5 TABLET ORAL EVERY 12 HOURS
Status: DISCONTINUED | OUTPATIENT
Start: 2025-03-27 | End: 2025-03-28 | Stop reason: HOSPADM

## 2025-03-27 RX ADMIN — ATORVASTATIN CALCIUM 40 MG: 40 TABLET, FILM COATED ORAL at 09:32

## 2025-03-27 RX ADMIN — CARVEDILOL 25 MG: 25 TABLET, FILM COATED ORAL at 16:41

## 2025-03-27 RX ADMIN — SENNOSIDES 8.6 MG: 8.6 TABLET, FILM COATED ORAL at 20:52

## 2025-03-27 RX ADMIN — AMLODIPINE BESYLATE 5 MG: 5 TABLET ORAL at 09:32

## 2025-03-27 RX ADMIN — CARVEDILOL 25 MG: 25 TABLET, FILM COATED ORAL at 09:32

## 2025-03-27 RX ADMIN — CLOPIDOGREL BISULFATE 75 MG: 75 TABLET, FILM COATED ORAL at 09:32

## 2025-03-27 RX ADMIN — AMLODIPINE BESYLATE 5 MG: 5 TABLET ORAL at 20:51

## 2025-03-27 RX ADMIN — ASPIRIN 81 MG: 81 TABLET, COATED ORAL at 09:32

## 2025-03-27 NOTE — PROGRESS NOTES
Progress Note - Vascular Surgery   Name: Ez Dsouza 59 y.o. male I MRN: [id number]  [facility] I Date of Admission: 3/24/2025   Date of Service: 3/27/2025 I Hospital Day: 3    Assessment & Plan  Hematoma  60 yo M h/o symptomatic L ICA stenosis, recent CVA w/ residual Right hemiparesis arm>leg, underwent L CEA on 3/17 and presented to the ED w/ new worsening L neck hematoma.     3/24 L neck exploration and washout  3/26 - Repeat L neck exploration and washout, surgicel powder applied, ROBERTO drain placed    ROBERTO drain with minimal dark gray output, likely residual from surgicel, non-bloody. L neck with moderate edema, no active ooze, no dysphagia or trouble breathing  Tolerating PO diet, pain controlled, N/V intact  WBC 12.1, Hgb 10.8, Creat 1.26     Plan:  S/p repeat L neck washout, ROBERTO drain placed  Continue to monitor ROBERTO output  Continue aspirin, plavix, statin  Continue IS  PT/OT, Right hemiparesis, presented from [facility]  Nephrology for hematuria workup, grossly negative, will follow up outpatient to recheck UA  Continue Regular diet  Pain Control  OOB  Case Mgt for dispo planning  Plan to follow up in 1 week for post-op Vascular appointment with Dr. Calderón    Elevated serum creatinine  Baseline Cr 1.3, up to 1.63 post-op    Plan:  Nephrology following, w/u in progress  F/U AM labs  CKD (chronic kidney disease) stage 3, GFR 30-59 ml/min ([facility])  Lab Results   Component Value Date    EGFR 61 03/27/2025    EGFR 58 03/26/2025    EGFR 45 03/25/2025    CREATININE 1.26 03/27/2025    CREATININE 1.31 (H) 03/26/2025    CREATININE 1.63 (H) 03/25/2025     Benign hypertension with CKD (chronic kidney disease) stage III ([facility])  Lab Results   Component Value Date    EGFR 61 03/27/2025    EGFR 58 03/26/2025    EGFR 45 03/25/2025    CREATININE 1.26 03/27/2025    CREATININE 1.31 (H) 03/26/2025    CREATININE 1.63 (H) 03/25/2025       24 Hour Events : None  Subjective : Pt resting in hospital bed, tolerating PO diet, voiding  independently. Reports no oozing from neck overnight, remains with moderate swelling to L neck.     Objective :  Temp:  [97.6 °F (36.4 °C)-98.5 °F (36.9 °C)] 98.3 °F (36.8 °C)  HR:  [57-79] 74  BP: (124-169)/() 163/100  Resp:  [8-21] 17  SpO2:  [91 %-97 %] 96 %  O2 Device: None (Room air)     I/O         03/25 0701  03/26 0700 03/26 0701  03/27 0700 03/27 0701 03/28 0700    P.O. 418 480     I.V. (mL/kg) 1051.3 (11.5) 2250.8 (24.6)     NG/GT  0     IV Piggyback 1000      Total Intake(mL/kg) 2469.3 (27) 2730.8 (29.9)     Urine (mL/kg/hr) 2158 (1) 2800 (1.3) 700 (10.2)    Drains  0     Total Output 2158 2800 700    Net +311.3 -69.2 -700                 Lines/Drains/Airways       Active Status       Name Placement date Placement time Site Days    Closed/Suction Drain Left Neck Other (Comment) 03/26/25  1200  Neck  less than 1                  Physical Exam  Vitals and nursing note reviewed.   Constitutional:       General: He is not in acute distress.     Appearance: He is well-developed.   HENT:      Head: Normocephalic and atraumatic.   Eyes:      Conjunctiva/sclera: Conjunctivae normal.   Neck:      Comments: Moderate swelling to L neck, no active ooze  Cardiovascular:      Rate and Rhythm: Normal rate and regular rhythm.   Pulmonary:      Effort: Pulmonary effort is normal. No respiratory distress.   Abdominal:      Palpations: Abdomen is soft.      Tenderness: There is no abdominal tenderness.   Musculoskeletal:         General: Swelling present.      Cervical back: Tenderness present.   Skin:     General: Skin is warm and dry.      Capillary Refill: Capillary refill takes less than 2 seconds.   Neurological:      Mental Status: He is alert and oriented to person, place, and time. Mental status is at baseline.      Sensory: No sensory deficit.   Psychiatric:         Mood and Affect: Mood normal.           Lab Results: I have reviewed the following results:  CBC with diff:   Lab Results   Component Value Date  "   WBC 12.15 (H) 03/27/2025    HGB 10.8 (L) 03/27/2025    HCT 33.1 (L) 03/27/2025    MCV 94 03/27/2025     (H) 03/27/2025    RBC 3.52 (L) 03/27/2025    MCH 30.7 03/27/2025    MCHC 32.6 03/27/2025    RDW 14.0 03/27/2025    MPV 10.4 03/27/2025    NRBC 0 03/26/2025   ,   BMP/CMP:  Lab Results   Component Value Date    SODIUM 138 03/27/2025    K 3.9 03/27/2025     03/27/2025    CO2 27 03/27/2025    CO2 29 06/22/2024    BUN 30 (H) 03/27/2025    CREATININE 1.26 03/27/2025    GLUCOSE 87 06/22/2024    CALCIUM 8.7 03/27/2025    AST 14 03/26/2025    ALT 20 03/26/2025    ALKPHOS 116 (H) 03/26/2025    EGFR 61 03/27/2025   ,   Lipid Panel: No results found for: \"CHOL\",   Coags:   Lab Results   Component Value Date    PTT 39 (H) 03/24/2025    INR 1.13 03/24/2025   ,   Blood Culture: No results found for: \"BLOODCX\",   Urinalysis:   Lab Results   Component Value Date    COLORU Yellow 03/25/2025    CLARITYU Clear 03/25/2025    SPECGRAV 1.025 03/25/2025    PHUR 5.5 03/25/2025    LEUKOCYTESUR Negative 03/25/2025    NITRITE Negative 03/25/2025    GLUCOSEU Negative 03/25/2025    KETONESU Negative 03/25/2025    BILIRUBINUR Negative 03/25/2025    BLOODU Small (A) 03/25/2025   ,   Urine Culture: No results found for: \"URINECX\",   Wound Culure: No results found for: \"WOUNDCULT\"    Imaging Results Review: No pertinent imaging studies reviewed.  Other Study Results Review: No additional pertinent studies reviewed.    VTE Prophylaxis: VTE covered by:    None     "

## 2025-03-27 NOTE — PROGRESS NOTES
"Post-Op Check - Vascular Surgery   Ez Dsouza 59 y.o. male MRN: [id number]  Unit/Bed#: [facility] 508-01 Encounter: [id number]    Assessment:  59yoM s/p L neck incisional site washout, evacuation of seroma, antibiotic irrigation, surgicel powder, LETHA drain placement for recurrent L neck swelling    Plan:  - Incentive spirometry  - Diet as tolerated  - PRN analgesia, anti-emetics  - I/Os  - ASA/Plavix  - OOB/ambulate  - SCDs for VTE prophylaxis  - Neurovascular checks    Subjective: Patient feels well without acute complaints, without significant plain to incision site    Vitals:  /85   Pulse 71   Temp 98.2 °F (36.8 °C) (Oral)   Resp 16   Ht 6' 2\" (1.88 m)   Wt 91.6 kg (201 lb 15.1 oz)   SpO2 94%   BMI 25.93 kg/m²     Physical Exam:  General appearance: alert and oriented, in no acute distress  Neurologic: Neurologically intact to baseline  Neck: supple, symmetrical, trachea midline. L neck with soft swelling, letha in place with some dark output likely from hemostatic agents intra-operatively  Lungs:  Normal work of breathing, no accessory muscle use  Heart:  Regular rate  Abdomen:  Soft, nondistended abdomen  Extremities:  Bilateral UE warm and dry. Bilateral UE and LE M/S intact to baseline    I/Os:  I/O last 3 completed shifts:  In: 5200.1 [P.O.:898; I.V.:3302.1; IV Piggyback:1000]  Out: 4258 [Urine:4258]  No intake/output data recorded.    Invasive Lines/Tubes:  Invasive Devices       Peripheral Intravenous Line  Duration             Peripheral IV 03/24/25 Left;Proximal;Ventral (anterior) Forearm 2 days    Peripheral IV 03/24/25 Proximal;Right;Ventral (anterior) Forearm 2 days              Drain  Duration             Closed/Suction Drain Left Neck Other (Comment) <1 day                    VTE Prophylaxis: Sequential compression device (Venodyne)     Nhi Montemayor PA-C  3/26/2025    "

## 2025-03-27 NOTE — PLAN OF CARE
Problem: OCCUPATIONAL THERAPY ADULT  Goal: Performs self-care activities at highest level of function for planned discharge setting.  See evaluation for individualized goals.  Description: Treatment Interventions: ADL retraining, Functional transfer training, UE strengthening/ROM, Endurance training, Cognitive reorientation, Patient/family training, Equipment evaluation/education, Compensatory technique education, Energy conservation, Activityengagement          See flowsheet documentation for full assessment, interventions and recommendations.   Note: Limitation: Decreased ADL status, Decreased UE strength, Decreased UE ROM, Decreased Safe judgement during ADL, Decreased cognition, Decreased endurance, Decreased fine motor control, Decreased high-level ADLs, Decreased self-care trans  Prognosis: Good  Assessment: Pt is a 59 y.o. male  admitted 3/24/2025 with Hematoma (T14.8XXA)  S/P carotid endarterectomy (Z98.890)  Localized swelling, mass or lump of neck (R22.1)  Pt underwent L neck re-exploration and washout 3/26/25, pod 1 w active OT eval and treat orders. PMH includes  has a past medical history of Hypertension.. Pt lives w spouse in a 2sh, see above for home details. Pta, pt was independent w/ ADL/IADL and functional mobility, was  driving and was not using any DME at baseline- this was prior to CVA; pt has been @ [facility]. Currently, pt is Min Ax1 for UB ADL, Mod Ax1 for LB ADL, and completed transfers/FM w Min Ax1. Pt is limited at this time 2* decreased endurance/activtiy tolerance, decreased cognition, decreased ADL/High-level ADL status, decreased self-care trans, decreased safety awareness, limited home support and is a fall risk. This impacts pt's ability to complete UB and LB dressing and bathing, toileting, transfers, functional mobility, community mobility, home and health maintenance, and safe engagement in typical daily routine. The patient's raw score on the AM-PAC Daily Activity inpatient short  form is 17, standardized score is 37.26, less than 39.4. Patients at this level are likely to benefit from discharge to post-acute rehabilitation services. Please refer to the recommendation of the Occupational Therapist for safe discharge planning.  From OT standpoint, pt should D/C to level 1 when medically stable. Pt will benefit from continued acute OT services 2-3 x/wk for 10-14 days to meet goals.     Rehab Resource Intensity Level, OT: I (Maximum Resource Intensity)

## 2025-03-27 NOTE — ASSESSMENT & PLAN NOTE
60 yo M h/o symptomatic L ICA stenosis, recent CVA w/ residual Right hemiparesis arm>leg, underwent L CEA on 3/17 and presented to the ED w/ new worsening L neck hematoma.     3/24 L neck exploration and washout  3/26 - Repeat L neck exploration and washout, surgicel powder applied, ROBERTO drain placed    ROBERTO drain with minimal dark gray output, likely residual from surgicel, non-bloody. L neck with moderate edema, no active ooze, no dysphagia or trouble breathing  Tolerating PO diet, pain controlled, N/V intact  WBC 12.1, Hgb 10.8, Creat 1.26     Plan:  S/p repeat L neck washout, ROBERTO drain placed  Continue to monitor ROBERTO output  Continue aspirin, plavix, statin  Continue IS  PT/OT, Right hemiparesis, presented from [facility]  Nephrology for hematuria workup, grossly negative, will follow up outpatient to recheck UA  Continue Regular diet  Pain Control  OOB  Case Mgt for dispo planning  Plan to follow up in 1 week for post-op Vascular appointment with Dr. Calderón

## 2025-03-27 NOTE — PLAN OF CARE
Problem: PAIN - ADULT  Goal: Verbalizes/displays adequate comfort level or baseline comfort level  Description: Interventions:- Encourage patient to monitor pain and request assistance- Assess pain using appropriate pain scale- Administer analgesics based on type and severity of pain and evaluate response- Implement non-pharmacological measures as appropriate and evaluate response- Consider cultural and social influences on pain and pain management- Notify physician/advanced practitioner if interventions unsuccessful or patient reports new pain  Outcome: Progressing     Problem: SAFETY ADULT  Goal: Maintain or return to baseline ADL function  Description: INTERVENTIONS:-  Assess patient's ability to carry out ADLs; assess patient's baseline for ADL function and identify physical deficits which impact ability to perform ADLs (bathing, care of mouth/teeth, toileting, grooming, dressing, etc.)- Assess/evaluate cause of self-care deficits - Assess range of motion- Assess patient's mobility; develop plan if impaired- Assess patient's need for assistive devices and provide as appropriate- Encourage maximum independence but intervene and supervise when necessary- Involve family in performance of ADLs- Assess for home care needs following discharge - Consider OT consult to assist with ADL evaluation and planning for discharge- Provide patient education as appropriate  Outcome: Progressing     Problem: DISCHARGE PLANNING  Goal: Discharge to home or other facility with appropriate resources  Description: INTERVENTIONS:- Identify barriers to discharge w/patient and caregiver- Arrange for needed discharge resources and transportation as appropriate- Identify discharge learning needs (meds, wound care, etc.)- Arrange for interpretive services to assist at discharge as needed- Refer to Case Management Department for coordinating discharge planning if the patient needs post-hospital services based on physician/advanced  practitioner order or complex needs related to functional status, cognitive ability, or social support system  Outcome: Progressing     Problem: Knowledge Deficit  Goal: Patient/family/caregiver demonstrates understanding of disease process, treatment plan, medications, and discharge instructions  Description: Complete learning assessment and assess knowledge base.Interventions:- Provide teaching at level of understanding- Provide teaching via preferred learning methods  Outcome: Progressing

## 2025-03-27 NOTE — OCCUPATIONAL THERAPY NOTE
Occupational Therapy Evaluation     Patient Name: Ez Dsouza  Today's Date: 3/27/2025  Problem List  Principal Problem:    Hematoma  Active Problems:    Elevated serum creatinine    CKD (chronic kidney disease) stage 3, GFR 30-59 ml/min (HCC)    Benign hypertension with CKD (chronic kidney disease) stage III (HCC)    Past Medical History  Past Medical History:   Diagnosis Date    Hypertension      Past Surgical History  Past Surgical History:   Procedure Laterality Date    EVACUATION OF HEMATOMA Left 3/24/2025    Procedure: L neck exploration, hematoma evacuation;  Surgeon: Ez Villalobos MD;  [facility];  Service: Vascular    KNEE SURGERY  2008    MOLE REMOVAL  2024    Back    MT TEAEC W/PATCH GRF CAROTID VERTB SUBCLAV NECK INC Left 3/17/2025    Procedure: ENDARTERECTOMY ARTERY CAROTID WITH BOVINE PATCH ANGIOPLASTY;  Surgeon: Joss Calderón MD;  [facility];  Service: Vascular            03/27/25 0835   OT Last Visit   OT Visit Date 03/27/25   Note Type   Note type Evaluation   Pain Assessment   Pain Assessment Tool 0-10   Pain Score No Pain   Restrictions/Precautions   Weight Bearing Precautions Per Order No   Other Precautions Cognitive;Chair Alarm;Bed Alarm;Fall Risk   Home Living   Type of Home House   Home Layout Two level   Bathroom Shower/Tub Tub/shower unit   Bathroom Toilet Standard   Bathroom Accessibility Accessible   Prior Function   Level of [place] Independent with functional mobility;Independent with ADLs;Independent with IADLS   Lives With Spouse   Receives Help From Family   IADLs Independent with driving;Independent with meal prep;Independent with medication management   Falls in the last 6 months 1 to 4  (1)   Vocational Full time employment   Lifestyle   Autonomy pta, pt was I w ADL/IADL, no ad mobility. + . pt was admitted from [facility], where he was working with therapy, using rw for mobility.   Reciprocal Relationships supportive spouse   Service  "to Others  @ [employer]   Subjective   Subjective \"What authority do you have to be here?\"- pt w dec inhibition   ADL   Where Assessed Edge of bed   Eating Assistance 5  Supervision/Setup   Grooming Assistance 4  Minimal Assistance   UB Bathing Assistance 4  Minimal Assistance   LB Bathing Assistance 3  Moderate Assistance   UB Dressing Assistance 4  Minimal Assistance   LB Dressing Assistance 3  Moderate Assistance   Toileting Assistance  3  Moderate Assistance   Functional Assistance 3  Moderate Assistance   Bed Mobility   Supine to Sit 5  Supervision   Additional items Increased time required;Verbal cues;LE management   Additional Comments found in bed, left in chair w all needs in reach and alarm on   Transfers   Sit to Stand 4  Minimal assistance   Additional items Assist x 1;Increased time required;Verbal cues   Stand to Sit 4  Minimal assistance   Additional items Assist x 1;Increased time required;Verbal cues   Additional Comments rw   Functional Mobility   Functional Mobility 4  Minimal assistance   Additional Comments ax1, household distance. RLE drags a bit, pt w somewhat scissored gait.   Additional items Rolling walker   Balance   Static Sitting Good   Dynamic Sitting Fair   Static Standing Fair -   Dynamic Standing Poor +   Ambulatory Poor +   Activity Tolerance   Activity Tolerance Patient tolerated treatment well   Medical Staff Made Aware dpt   Nurse Made Aware cleared   RUE Assessment   RUE Assessment X  ( 2/5, elbow about 3+/5, shoulder 3+/5. pt uses shoulder mobility to lift UE, has difficulty with reaching/volitional mvmt)   LUE Assessment   LUE Assessment WFL   Hand Function   Gross Motor Coordination Functional   Fine Motor Coordination Functional   Hand Function Comments on L, impaired on R. able to oppose all digits on r, unable to sustain this. pt is r hand dom   Cognition   Overall Cognitive Status Impaired   Arousal/Participation Responsive;Alert   Attention Attends with cues to " redirect   Orientation Level Oriented X4   Memory Decreased recall of precautions   Following Commands Follows one step commands with increased time or repetition   Comments pt w dec inhibition, a bit agitated and requires encouragement/education reagarding need for therapy. overall decreased safety awareness/insight to limitations   Assessment   Limitation Decreased ADL status;Decreased UE strength;Decreased UE ROM;Decreased Safe judgement during ADL;Decreased cognition;Decreased endurance;Decreased fine motor control;Decreased high-level ADLs;Decreased self-care trans   Prognosis Good   Assessment Pt is a 59 y.o. male  admitted 3/24/2025 with Hematoma (T14.8XXA)  S/P carotid endarterectomy (Z98.890)  Localized swelling, mass or lump of neck (R22.1)  Pt underwent L neck re-exploration and washout 3/26/25, pod 1 w active OT eval and treat orders. PMH includes  has a past medical history of Hypertension.. Pt lives w spouse in a 2sh, see above for home details. Pta, pt was independent w/ ADL/IADL and functional mobility, was  driving and was not using any DME at baseline- this was prior to CVA; pt has been @ [facility]. Currently, pt is Min Ax1 for UB ADL, Mod Ax1 for LB ADL, and completed transfers/FM w Min Ax1. Pt is limited at this time 2* decreased endurance/activtiy tolerance, decreased cognition, decreased ADL/High-level ADL status, decreased self-care trans, decreased safety awareness, limited home support and is a fall risk. This impacts pt's ability to complete UB and LB dressing and bathing, toileting, transfers, functional mobility, community mobility, home and health maintenance, and safe engagement in typical daily routine. The patient's raw score on the AM-PAC Daily Activity inpatient short form is 17, standardized score is 37.26, less than 39.4. Patients at this level are likely to benefit from discharge to post-acute rehabilitation services. Please refer to the recommendation of the Occupational Therapist  for safe discharge planning.  From OT standpoint, pt should D/C to level 1 when medically stable. Pt will benefit from continued acute OT services 2-3 x/wk for 10-14 days to meet goals.   Goals   Patient Goals get to [facility]   LT Time Frame 10-14   Plan   Treatment Interventions ADL retraining;Functional transfer training;UE strengthening/ROM;Endurance training;Cognitive reorientation;Patient/family training;Equipment evaluation/education;Compensatory technique education;Energy conservation;Activityengagement   Goal Expiration Date 04/10/25   OT Frequency 2-3x/wk   Discharge Recommendation   Rehab Resource Intensity Level, OT I (Maximum Resource Intensity)   AM-PAC Daily Activity Inpatient   Lower Body Dressing 2   Bathing 2   Toileting 3   Upper Body Dressing 3   Grooming 3   Eating 4   Daily Activity Raw Score 17   Daily Activity Standardized Score (Calc for Raw Score >=11) 37.26   AM-PAC Applied Cognition Inpatient   Following a Speech/Presentation 4   Understanding Ordinary Conversation 3   Taking Medications 2   Remembering Where Things Are Placed or Put Away 2   Remembering List of 4-5 Errands 2   Taking Care of Complicated Tasks 2   Applied Cognition Raw Score 15   Applied Cognition Standardized Score 33.54   Barthel Index   Feeding 5   Bathing 0   Grooming Score 0   Dressing Score 5   Bladder Score 10   Bowels Score 10   Toilet Use Score 5   Transfers (Bed/Chair) Score 10   Mobility (Level Surface) Score 10   Stairs Score 5   Barthel Index Score 60   Modified Kerry Scale   Modified Lee Scale 4   End of Consult   Education Provided Yes;Family or social support of family present for education by provider   Patient Position at End of Consult Bedside chair;All needs within reach;Bed/Chair alarm activated   Nurse Communication Nurse aware of consult   \  Pt will complete functional mobility with Mod I using appropriate DME as needed.     Pt will complete UB dressing and bathing with Mod I using appropriate DME  as needed.     Pt will complete LB dressing and bathing with Mod I using appropriate DME as needed.    Pt will complete transfers with Mod I using appropriate DME as needed.     Pt will complete toileting with Mod I using appropriate DME as needed.     Pt will complete home maintenance task with Mod I using appropriate DME as needed.     Pt will utilize energy conservation techniques throughout functional activity/ADL s/p skilled education.     Pt will demonstrate increased safety awareness during functional tasks/ADL's s/p skilled education.     Pt will increase activity tolerance to 30 minutes in order to complete ADL's/ functional tasks, using appropriate DME as needed.     Pt will engage in ongoing cog assessment w/ G participation to assist with safe d/c planning.     Pt will inc RUE ROM +10 degrees b/l in order to increase overall ability to engage in typical daily routine.     Pt will inc RUE strength +1 MMT in order to increase overall ability to engage in typical daily routine.         Emeli Amador, MOT, OTR/L

## 2025-03-27 NOTE — ASSESSMENT & PLAN NOTE
Lab Results   Component Value Date    EGFR 61 03/27/2025    EGFR 58 03/26/2025    EGFR 45 03/25/2025    CREATININE 1.26 03/27/2025    CREATININE 1.31 (H) 03/26/2025    CREATININE 1.63 (H) 03/25/2025

## 2025-03-27 NOTE — DISCHARGE SUPPORT
Case Management Assessment & Discharge Planning Note    Patient name Ez Dsouza  Location [facility] 508/[facility] 508-01 MRN [id number]  : 1965 Date 3/27/2025       Current Admission Date: 3/24/2025  Current Admission Diagnosis:Hematoma   Patient Active Problem List    Diagnosis Date Noted Date Diagnosed    Elevated serum creatinine 2025     CKD (chronic kidney disease) stage 3, GFR 30-59 ml/min ([facility]) 2025     Benign hypertension with CKD (chronic kidney disease) stage III ([facility]) 2025     Hematoma 2025     Localized swelling, mass or lump of neck 2025     S/P carotid endarterectomy 2025     Smoking 2025     Hyperlipidemia 03/10/2025     Acute cerebrovascular accident (CVA) due to stenosis of left carotid artery ([facility]) 03/10/2025     Elevated coronary artery calcium score 03/10/2025     Pre-operative cardiovascular examination 03/10/2025     Closed fracture of right distal radius and ulna 2024     Closed fracture of distal ends of right radius and ulna, initial encounter 2024     Closed fracture of right wrist with routine healing, subsequent encounter 2024     Fall 2024     Acute pain due to trauma 2024     Closed fracture of multiple ribs of left side 2024     Closed fracture of right wrist 2024     MARIA TERESA (acute kidney injury) ([facility]) 2024     Scalp abrasion 2024     Elevated blood pressure reading 2021       LOS (days): 3  Geometric Mean LOS (GMLOS) (days):   Days to GMLOS:   Facility Authorization Initiated  [facility] received request for auth from : Shey CARBAJAL  Authorization Request Submitted for: Acute Rehab  Requested Start of Care Date: 25  Facility Name: [facility]  Facility NPI: [id number]  Facility MD: Dr Armando Prescott  [facility] MD NPI: [id number]  Authorization initiated by contacting insurance: [place] ([place])  Via: [employer]  Clinicals submitted via: Portal  Attachment  Pending reference #: [id number]   notified: Shey CARBAJAL     Updates to authorization status will be noted in chart.    Please reach out to CM for updates on any clinical information.

## 2025-03-27 NOTE — ASSESSMENT & PLAN NOTE
Blood pressure is somewhat elevated.  He has been off of his diuretics as he had volume depletion contributing to his MARIA TERESA  Continue off of HCTZ/spironolactone  Will increase Norvasc to 5 mg p.o. twice daily.  Once his p.o. intake is sustained and volume depletion is no longer concern, could reinstitute his HCTZ/spironolactone and decrease Norvasc back down to 5 mg daily as an outpatient  Avoid relative hypotension to maintain renal perfusion  Trend blood pressure with increase in Norvasc today

## 2025-03-27 NOTE — ASSESSMENT & PLAN NOTE
Status post recent left carotid endarterectomy  Status post hematoma evacuation 3/24 and reexploration 3/26.  Currently has a drain in place  Follow-up and management per vascular surgery-possible reexploration today

## 2025-03-27 NOTE — PHYSICAL THERAPY NOTE
Physical Therapy Evaluation     Patient's Name: Ez Dsouza    Admitting Diagnosis  Hematoma [T14.8XXA]  S/P carotid endarterectomy [Z98.890]  Localized swelling, mass or lump of neck [R22.1]    Problem List  Patient Active Problem List   Diagnosis    Elevated blood pressure reading    Fall    Acute pain due to trauma    Closed fracture of multiple ribs of left side    Closed fracture of right wrist    MARIA TERESA (acute kidney injury) ([facility])    Scalp abrasion    Closed fracture of right distal radius and ulna    Closed fracture of distal ends of right radius and ulna, initial encounter    Closed fracture of right wrist with routine healing, subsequent encounter    Hyperlipidemia    Acute cerebrovascular accident (CVA) due to stenosis of left carotid artery ([facility])    Elevated coronary artery calcium score    Pre-operative cardiovascular examination    Smoking    Localized swelling, mass or lump of neck    S/P carotid endarterectomy    Hematoma    Elevated serum creatinine    CKD (chronic kidney disease) stage 3, GFR 30-59 ml/min ([facility])    Benign hypertension with CKD (chronic kidney disease) stage III ([facility])       Past Medical History  Past Medical History:   Diagnosis Date    Hypertension        Past Surgical History  Past Surgical History:   Procedure Laterality Date    EVACUATION OF HEMATOMA Left 3/24/2025    Procedure: L neck exploration, hematoma evacuation;  Surgeon: Ez Villalobos MD;  [facility];  Service: Vascular    KNEE SURGERY  2008    MOLE REMOVAL  2024    Back    CT TEAEC W/PATCH GRF CAROTID VERTB SUBCLAV NECK INC Left 3/17/2025    Procedure: ENDARTERECTOMY ARTERY CAROTID WITH BOVINE PATCH ANGIOPLASTY;  Surgeon: Joss Calderón MD;  [facility];  Service: Vascular          03/27/25 0834   PT Last Visit   PT Visit Date 03/27/25   Note Type   Note type Evaluation   Pain Assessment   Pain Assessment Tool 0-10   Pain Score No Pain   Restrictions/Precautions   Weight Bearing  Precautions Per Order No   Other Precautions Fall Risk;Bed Alarm;Chair Alarm;Cognitive  (hx CVA with R sided deficits UE>LE)   Home Living   Type of Home House   Home Layout Two level;Access;Stairs to enter with rails;Bed/bath upstairs  (2STE -- FFOS to 2nd floor b/b)   Bathroom Shower/Tub Tub/shower unit   Bathroom Toilet Standard   Bathroom Accessibility Accessible   Additional Comments admitted from [facility] -- above is home set up   Prior Function   Level of [place] Independent with ADLs;Independent with functional mobility;Independent with IADLS   Lives With Spouse   Receives Help From Family   IADLs Independent with driving;Independent with medication management;Independent with meal prep   Falls in the last 6 months 1 to 4  (x1)   Vocational Full time employment   Comments admitted from [facility], requiring more assistance recently, but Independent at baseline   General   Family/Caregiver Present Yes  (spouse)   Cognition   Overall Cognitive Status Impaired   Arousal/Participation Alert   Orientation Level Oriented X4   Memory Decreased recall of precautions   Following Commands Follows one step commands without difficulty   Comments Patient cooperative. Mildly impulsive. Requires cues for safety. Decreased insight.   Subjective   Subjective Patient agreeable to PT eval   RUE Assessment   RUE Assessment   (decreased, see OT assessment)   LUE Assessment   LUE Assessment WFL   RLE Assessment   RLE Assessment X   Strength RLE   RLE Overall Strength 4-/5   LLE Assessment   LLE Assessment WFL   Bed Mobility   Supine to Sit 4  Minimal assistance   Additional items Assist x 1;Increased time required;Verbal cues;HOB elevated;Bedrails   Transfers   Sit to Stand 4  Minimal assistance   Additional items Assist x 1;Increased time required;Verbal cues   Stand to Sit 4  Minimal assistance   Additional items Assist x 1;Increased time required;Verbal cues   Additional Comments RW   Ambulation/Elevation   Gait pattern Decreased  foot clearance;R Foot drag;Short stride;Excessively slow   Gait Assistance 4  Minimal assist   Additional items Assist x 1;Verbal cues   Assistive Device Rolling walker   Distance 60'x2   Balance   Static Sitting Fair +   Dynamic Sitting Fair   Static Standing Fair -   Dynamic Standing Poor +   Ambulatory Poor +   Endurance Deficit   Endurance Deficit Yes   Endurance Deficit Description fatigue, weakness   Activity Tolerance   Activity Tolerance Patient limited by fatigue   Medical Staff Made Aware OT   Nurse Made Aware RN cleared   Assessment   Prognosis Good   Problem List Decreased strength;Decreased endurance;Impaired balance;Decreased mobility;Decreased safety awareness;Impaired judgement;Decreased cognition;Decreased coordination   Assessment Pt is a 59 y.o. male seen for a high complexity PT evaluation due to Ongoing medical management for primary dx, Increased reliance on more restrictive AD compared to baseline, Decreased activity tolerance compared to baseline, Fall risk, Increased assistance needed from caregiver at current time, Cog status. Patient is s/p admit to [facility] on 3/24/2025 for Hematoma (T14.8XXA)  S/P carotid endarterectomy (Z98.890)  Localized swelling, mass or lump of neck (R22.1). Patient  has a past medical history of Hypertension..     PT now consulted to assess functional mobility and needs for safe d/c planning. Prior to admission, pt independent with functional mobility, independent ADLs, and independent IADLs. Personal factors affecting status include hx of falls, fall risk, steps to enter home, steps to negotiate within home, decreased insight / safety awareness, cognitive deficits, and medical status     Currently pt requires minimal assistance x1 for bed mobility, minimal assistance x1 for functional transfers with rolling walker ; minimal assistance x1 for ambulation with rolling walker. Pt presents functioning below baseline and w/ overall mobility deficits 2* to:  decreased strength, decreased endurance, decreased mobility, impaired balance. These impairments place pt at risk for falls.     Pt will continue to benefit from skilled PT interventions to address stated impairments; to maximize functional potential; for ongoing pt/family education; and DME needs. The patient's [facility] Basic Mobility Inpatient Short Form Raw Score Is 17. PT is currently recommending Level 1 - Maximum Resource Intensity on d/c from hospital. Will continue to follow as able.   Barriers to Discharge Inaccessible home environment   Goals   Patient Goals to go home   STG Expiration Date 04/10/25   Short Term Goal #1 In 14 days, patient will 1) increase strength in BUE/BLE by 1/2 to 1 full grade for increased strength and stability needed for functional mobility 2) improve bed mobility to MI for improved mobility and decreased need for assist 3) sit EOB x30' with MI to facilitate trunk stability and safety for completion of ADL tasks 4) increase functional transfers to MI for improved safety and functional mobility 5) ambulate 250ft with MI using LRAD for increased endurance and safety ambulating home and community environments 6) improve balance by 1 grade for improved safety and stability and decreased risk for falls. 7) ascend/descend at least 10 stairs using HR with MI in order to safely access home environment   PT Treatment Day 0   Plan   Treatment/Interventions ADL retraining;Functional transfer training;LE strengthening/ROM;Therapeutic exercise;Endurance training;Patient/family training;Equipment eval/education;Bed mobility;Gait training;Spoke to nursing;Spoke to case management;OT;Elevations;Cognitive reorientation   PT Frequency 3-5x/wk   Discharge Recommendation   Rehab Resource Intensity Level, PT I (Maximum Resource Intensity)   AM-PAC Basic Mobility Inpatient   Turning in Flat Bed Without Bedrails 3   Lying on Back to Sitting on Edge of Flat Bed Without Bedrails 3   Moving Bed to Chair 3    Standing Up From Chair Using Arms 3   Walk in Room 3   Climb 3-5 Stairs With Railing 2   Basic Mobility Inpatient Raw Score 17   Basic Mobility Standardized Score 39.67   [facility] Highest Level Of Mobility   [facility] Goal 5: Stand one or more mins   -[facility] Achieved 7: Walk 25 feet or more   Modified Lee Scale   Modified Lee Scale 4   End of Consult   Patient Position at End of Consult All needs within reach;Bed/Chair alarm activated;Bedside chair         Jessika Ayala, PT, DPT

## 2025-03-27 NOTE — PROGRESS NOTES
Progress Note - Nephrology   Ez Dsouza 59 y.o. male MRN: [id number]  Unit/Bed#: [facility] 508-01 Encounter: [id number]      Assessment & Plan  Elevated serum creatinine  The patient's baseline creatinine is approximately 1.3  He does not have MARIA TERESA per KDIGO criteria but his creatinine is above baseline and has worsened over the last 24 hours  There is no obvious reason for his acute kidney injury such as clinically detectable volume depletion, hypotension, nephrotoxic medication usage, urinary retention, systemic allergic reaction etc. however, renal function has improved after intravenous fluids and is currently back to baseline consistent with an element of volume depletion  Creatinine has returned to baseline and continues to improve.-Cholesterol emboli as renal function has returned to baseline and complement levels normal  Workup: Postvoid residual 10 mL/urinalysis revealed trace protein but in the setting of a specific gravity of 1.025-small heme with 10-20 RBCs per high-power field-0-3 hyaline casts per low power field/renal ultrasound revealed the right kidney to be 12 cm and the left 11.4 without any hydronephrosis noted  Plavix can cause TTP but he has no thrombocytopenia or other stigmata of TTP-LDH normal and hemolysis smear negative  Continue off of intravenous fluids  Repeat BMP in a.m. if patient still in hospital, otherwise okay to go home from a renal standpoint  Continue to avoid nephrotoxic medications and relative hypotension  Change hold parameter Norvasc to hold for less than systolic of 130  CKD (chronic kidney disease) stage 3, GFR 30-59 ml/min ([facility])  Baseline creatinine of approximately 1.3  Likely secondary to hypertension and possible renal vascular disease  He does have hematuria-recheck urinalysis as an outpatient.  C3 and C4 normal  Benign hypertension with CKD (chronic kidney disease) stage III ([facility])  Blood pressure is somewhat elevated.  He has been off of his diuretics as he had  "volume depletion contributing to his MARIA TERESA  Continue off of HCTZ/spironolactone  Will increase Norvasc to 5 mg p.o. twice daily.  Once his p.o. intake is sustained and volume depletion is no longer concern, could reinstitute his HCTZ/spironolactone and decrease Norvasc back down to 5 mg daily as an outpatient  Avoid relative hypotension to maintain renal perfusion  Trend blood pressure with increase in Norvasc today  Hematoma  Status post recent left carotid endarterectomy  Status post hematoma evacuation 3/24 and reexploration 3/26.  Currently has a drain in place  Follow-up and management per vascular surgery-possible reexploration today               Subjective:   The patient was seen and examined early this morning.  Overall, he felt improved.  He denied any shortness of breath, difficulty swallowing, chills, sweats, chest pain, paroxysmal atrial dyspnea orthopnea      Objective:     Vitals: Blood pressure 158/100, pulse 75, temperature 98.3 °F (36.8 °C), resp. rate 17, height 6' 2\" (1.88 m), weight 91.4 kg (201 lb 8 oz), SpO2 96%.,Body mass index is 25.87 kg/m².Temp (24hrs), Av.2 °F (36.8 °C), Min:97.6 °F (36.4 °C), Max:98.5 °F (36.9 °C)      Temp:  [97.6 °F (36.4 °C)-98.5 °F (36.9 °C)] 98.3 °F (36.8 °C)  HR:  [57-79] 75  Resp:  [8-21] 17  BP: (124-169)/() 158/100  SpO2:  [91 %-96 %] 96 %    Weight (last 2 days)       Date/Time Weight    25 0500 91.4 (201.5)    25 0945 91.6 (201.94)                 I/O last 24 hours:  In: 2730.8 [P.O.:480; I.V.:2250.8]  Out: 3500 [Urine:3500]        Physical Exam    /100   Pulse 75   Temp 98.3 °F (36.8 °C)   Resp 17   Ht 6' 2\" (1.88 m)   Wt 91.4 kg (201 lb 8 oz)   SpO2 96%   BMI 25.87 kg/m²   Vital signs were reviewed  Constitutional: The patient was awake, alert, pleasant, cooperative and in no apparent distress  Cardiovascular: No overt jugular venous tension was noted, S1-S2, no pericardial friction rub S3 appreciated, no peripheral edema was " noted  Pulmonary: Adequate inspiratory effort, lungs were clear                Invasive Devices       Peripheral Intravenous Line  Duration             Peripheral IV 03/24/25 Left;Proximal;Ventral (anterior) Forearm 3 days    Peripheral IV 03/24/25 Proximal;Right;Ventral (anterior) Forearm 3 days              Drain  Duration             Closed/Suction Drain Left Neck Other (Comment) <1 day                    Medications:    Scheduled Meds:  Current Facility-Administered Medications   Medication Dose Route Frequency Provider Last Rate    acetaminophen  975 mg Oral Q8H PRN Ez Villalobos MD      amLODIPine  5 mg Oral Daily Ez Villalobos MD      aspirin  81 mg Oral Daily Ez Villalobos MD      atorvastatin  40 mg Oral Daily Ez Villalobos MD      carvedilol  25 mg Oral BID With Meals Ez Villalobos MD      clopidogrel  75 mg Oral Daily Ez Villalobos MD      labetalol  5 mg Intravenous Q6H PRN Ez Villalobos MD      nicotine  1 patch Transdermal Daily Ez Villalobos MD      oxyCODONE  2.5 mg Oral Q4H PRN Ez Villalobos MD      Or    oxyCODONE  5 mg Oral Q4H PRN Ez Villalobos MD      senna  8.6 mg Oral HS Ez Villalobos MD         PRN Meds:.  acetaminophen    labetalol    oxyCODONE **OR** oxyCODONE    Continuous Infusions:         LAB RESULTS:      Results from last 7 days   Lab Units 03/27/25  0504 03/26/25  0512 03/25/25  0514 03/24/25  0631 03/24/25  0230 03/21/25  0542   WBC Thousand/uL 12.15* 8.18 15.13* 13.46* 8.96 9.11   HEMOGLOBIN g/dL 10.8* 10.9* 11.0* 12.4 12.3 12.2   HEMATOCRIT % 33.1* 33.1* 33.6* 37.8 36.7 36.0*   PLATELETS Thousands/uL 400* 384 367 360 365 288   SEGS PCT %  --  65  --  79* 63  --    LYMPHO PCT %  --  19  --  10* 18  --    MONO PCT %  --  12  --  8 14*  --    EOS PCT %  --  2  --  2 3  --    POTASSIUM mmol/L 3.9 3.9 4.4  --  4.0 4.1   CHLORIDE mmol/L 104 105 103  --  101 101   CO2 mmol/L 27 26 22  --  22 26   BUN  "mg/dL 30* 30* 38*  --  42* 26*   CREATININE mg/dL 1.26 1.31* 1.63*  --  1.57* 1.27   CALCIUM mg/dL 8.7 8.7 8.8  --  9.2 9.4   ALK PHOS U/L  --  116*  --   --  137*  --    ALT U/L  --  20  --   --  39  --    AST U/L  --  14  --   --  17  --    MAGNESIUM mg/dL  --   --   --   --   --  1.9       CUTURES:  No results found for: \"BLOODCX\", \"URINECX\"              PLEASE NOTE:  This encounter was completed utilizing the [employer] Direct Speech Voice Recognition Software. Grammatical errors, random word insertions, pronoun errors and incomplete sentences are occasional consequences of the system due to software limitations, ambient noise and hardware issues.These may be missed by proof reading prior to affixing electronic signature. Any questions or concerns about the content, text or information contained within the body of this dictation should be directly addressed to the physician for clarification. Please do not hesitate to call me directly if you have any any questions or concerns.  "

## 2025-03-27 NOTE — RESTORATIVE TECHNICIAN NOTE
Restorative Technician Note      Patient Name: Ez Dsouza     Restorative Tech Visit Date: 03/27/25  Note Type: Mobility  Patient Position Upon Consult: Supine  Activity Performed: Ambulated  Assistive Device: Roller walker  Patient Position at End of Consult: Supine; All needs within reach; Bed/Chair alarm activated

## 2025-03-27 NOTE — DISCHARGE SUPPORT
Rec'd call from Claudia @ [facility] ([phone]) who stated case being sent to MD for review. CM notified: Shey CARBAJAL  For approval: F: [phone] (Be sure to put case # on [facility] clinicals)

## 2025-03-27 NOTE — CASE MANAGEMENT
Case Management Progress Note    Patient name Ez Dsouza  Location [facility] 508-01 MRN [id number]  : 1965 Date 3/27/2025       LOS (days): 3  Geometric Mean LOS (GMLOS) (days):   Days to GMLOS:        OBJECTIVE:        Current admission status: Inpatient  Preferred Pharmacy:   [facility] - [place] - [address]  [address]  [place] [address]  Phone: [phone] Fax: [phone]    Primary Care Provider: Jakob Higgins MD    Primary Insurance: BLUE CROSS  Secondary Insurance: [employer]    PROGRESS NOTE:    Per Vascular PA, Deisy Hair, pt is medically cleared for d/c to acute rehab. Pt has been accepted to return to [facility]. Pt and wife are agreeable to dcp as per bedside conversation yesterday. Tasked to discharge support team for auth.

## 2025-03-27 NOTE — PLAN OF CARE
Problem: PHYSICAL THERAPY ADULT  Goal: Performs mobility at highest level of function for planned discharge setting.  See evaluation for individualized goals.  Description: Treatment/Interventions: ADL retraining, Functional transfer training, LE strengthening/ROM, Therapeutic exercise, Endurance training, Patient/family training, Equipment eval/education, Bed mobility, Gait training, Spoke to nursing, Spoke to case management, OT, Elevations, Cognitive reorientation          See flowsheet documentation for full assessment, interventions and recommendations.  Outcome: Progressing  Note: Prognosis: Good  Problem List: Decreased strength, Decreased endurance, Impaired balance, Decreased mobility, Decreased safety awareness, Impaired judgement, Decreased cognition, Decreased coordination  Assessment: Pt is a 59 y.o. male seen for a high complexity PT evaluation due to Ongoing medical management for primary dx, Increased reliance on more restrictive AD compared to baseline, Decreased activity tolerance compared to baseline, Fall risk, Increased assistance needed from caregiver at current time, Cog status. Patient is s/p admit to [facility] on 3/24/2025 for Hematoma (T14.8XXA)  S/P carotid endarterectomy (Z98.890)  Localized swelling, mass or lump of neck (R22.1). Patient  has a past medical history of Hypertension..     PT now consulted to assess functional mobility and needs for safe d/c planning. Prior to admission, pt independent with functional mobility, independent ADLs, and independent IADLs. Personal factors affecting status include hx of falls, fall risk, steps to enter home, steps to negotiate within home, decreased insight / safety awareness, cognitive deficits, and medical status     Currently pt requires minimal assistance x1 for bed mobility, minimal assistance x1 for functional transfers with rolling walker ; minimal assistance x1 for ambulation with rolling walker. Pt presents functioning  below baseline and w/ overall mobility deficits 2* to: decreased strength, decreased endurance, decreased mobility, impaired balance. These impairments place pt at risk for falls.     Pt will continue to benefit from skilled PT interventions to address stated impairments; to maximize functional potential; for ongoing pt/family education; and DME needs. The patient's AM-PAC Basic Mobility Inpatient Short Form Raw Score Is 17. PT is currently recommending Level 1 - Maximum Resource Intensity on d/c from hospital. Will continue to follow as able.  Barriers to Discharge: Inaccessible home environment     Rehab Resource Intensity Level, PT: I (Maximum Resource Intensity)    See flowsheet documentation for full assessment.

## 2025-03-27 NOTE — ASSESSMENT & PLAN NOTE
The patient's baseline creatinine is approximately 1.3  He does not have MARIA TERESA per KDIGO criteria but his creatinine is above baseline and has worsened over the last 24 hours  There is no obvious reason for his acute kidney injury such as clinically detectable volume depletion, hypotension, nephrotoxic medication usage, urinary retention, systemic allergic reaction etc. however, renal function has improved after intravenous fluids and is currently back to baseline consistent with an element of volume depletion  Creatinine has returned to baseline and continues to improve.-Cholesterol emboli as renal function has returned to baseline and complement levels normal  Workup: Postvoid residual 10 mL/urinalysis revealed trace protein but in the setting of a specific gravity of 1.025-small heme with 10-20 RBCs per high-power field-0-3 hyaline casts per low power field/renal ultrasound revealed the right kidney to be 12 cm and the left 11.4 without any hydronephrosis noted  Plavix can cause TTP but he has no thrombocytopenia or other stigmata of TTP-LDH normal and hemolysis smear negative  Continue off of intravenous fluids  Repeat BMP in a.m. if patient still in hospital, otherwise okay to go home from a renal standpoint  Continue to avoid nephrotoxic medications and relative hypotension  Change hold parameter Norvasc to hold for less than systolic of 130

## 2025-03-28 VITALS
TEMPERATURE: 97.8 F | RESPIRATION RATE: 20 BRPM | SYSTOLIC BLOOD PRESSURE: 111 MMHG | BODY MASS INDEX: 25.64 KG/M2 | WEIGHT: 199.8 LBS | DIASTOLIC BLOOD PRESSURE: 74 MMHG | OXYGEN SATURATION: 97 % | HEART RATE: 69 BPM | HEIGHT: 74 IN

## 2025-03-28 LAB
ANION GAP SERPL CALCULATED.3IONS-SCNC: 9 MMOL/L (ref 4–13)
BUN SERPL-MCNC: 25 MG/DL (ref 5–25)
CALCIUM SERPL-MCNC: 8.6 MG/DL (ref 8.4–10.2)
CHLORIDE SERPL-SCNC: 103 MMOL/L (ref 96–108)
CO2 SERPL-SCNC: 26 MMOL/L (ref 21–32)
CREAT SERPL-MCNC: 1.24 MG/DL (ref 0.6–1.3)
ERYTHROCYTE [DISTWIDTH] IN BLOOD BY AUTOMATED COUNT: 14.1 % (ref 11.6–15.1)
GFR SERPL CREATININE-BSD FRML MDRD: 62 ML/MIN/1.73SQ M
GLUCOSE SERPL-MCNC: 105 MG/DL (ref 65–140)
HCT VFR BLD AUTO: 35 % (ref 36.5–49.3)
HGB BLD-MCNC: 11.6 G/DL (ref 12–17)
MCH RBC QN AUTO: 31 PG (ref 26.8–34.3)
MCHC RBC AUTO-ENTMCNC: 33.1 G/DL (ref 31.4–37.4)
MCV RBC AUTO: 94 FL (ref 82–98)
PLATELET # BLD AUTO: 414 THOUSANDS/UL (ref 149–390)
PMV BLD AUTO: 10.4 FL (ref 8.9–12.7)
POTASSIUM SERPL-SCNC: 4 MMOL/L (ref 3.5–5.3)
RBC # BLD AUTO: 3.74 MILLION/UL (ref 3.88–5.62)
SODIUM SERPL-SCNC: 138 MMOL/L (ref 135–147)
WBC # BLD AUTO: 10.27 THOUSAND/UL (ref 4.31–10.16)

## 2025-03-28 PROCEDURE — 99024 POSTOP FOLLOW-UP VISIT: CPT | Performed by: PHYSICIAN ASSISTANT

## 2025-03-28 PROCEDURE — 97530 THERAPEUTIC ACTIVITIES: CPT

## 2025-03-28 PROCEDURE — 85027 COMPLETE CBC AUTOMATED: CPT | Performed by: PHYSICIAN ASSISTANT

## 2025-03-28 PROCEDURE — 99232 SBSQ HOSP IP/OBS MODERATE 35: CPT | Performed by: INTERNAL MEDICINE

## 2025-03-28 PROCEDURE — 97116 GAIT TRAINING THERAPY: CPT

## 2025-03-28 PROCEDURE — 80048 BASIC METABOLIC PNL TOTAL CA: CPT | Performed by: PHYSICIAN ASSISTANT

## 2025-03-28 PROCEDURE — NC001 PR NO CHARGE

## 2025-03-28 RX ORDER — NICOTINE 21 MG/24HR
1 PATCH, TRANSDERMAL 24 HOURS TRANSDERMAL DAILY
Start: 2025-03-29

## 2025-03-28 RX ORDER — AMLODIPINE BESYLATE 5 MG/1
5 TABLET ORAL EVERY 12 HOURS
Start: 2025-03-28

## 2025-03-28 RX ORDER — HYDROCHLOROTHIAZIDE 25 MG/1
25 TABLET ORAL DAILY
Status: DISCONTINUED | OUTPATIENT
Start: 2025-03-28 | End: 2025-03-28 | Stop reason: HOSPADM

## 2025-03-28 RX ORDER — POLYETHYLENE GLYCOL 3350 17 G/17G
17 POWDER, FOR SOLUTION ORAL DAILY PRN
Start: 2025-03-28 | End: 2025-04-07

## 2025-03-28 RX ORDER — SPIRONOLACTONE 25 MG/1
25 TABLET ORAL DAILY
Status: DISCONTINUED | OUTPATIENT
Start: 2025-03-28 | End: 2025-03-28 | Stop reason: HOSPADM

## 2025-03-28 RX ORDER — SENNOSIDES 8.6 MG
8.6 TABLET ORAL
Start: 2025-03-28 | End: 2025-03-31

## 2025-03-28 RX ADMIN — SPIRONOLACTONE 25 MG: 25 TABLET ORAL at 12:04

## 2025-03-28 RX ADMIN — HYDROCHLOROTHIAZIDE 25 MG: 25 TABLET ORAL at 12:04

## 2025-03-28 RX ADMIN — CLOPIDOGREL BISULFATE 75 MG: 75 TABLET, FILM COATED ORAL at 09:57

## 2025-03-28 RX ADMIN — ATORVASTATIN CALCIUM 40 MG: 40 TABLET, FILM COATED ORAL at 09:57

## 2025-03-28 RX ADMIN — AMLODIPINE BESYLATE 5 MG: 5 TABLET ORAL at 09:57

## 2025-03-28 RX ADMIN — CARVEDILOL 25 MG: 25 TABLET, FILM COATED ORAL at 09:57

## 2025-03-28 RX ADMIN — ASPIRIN 81 MG: 81 TABLET, COATED ORAL at 09:57

## 2025-03-28 NOTE — ASSESSMENT & PLAN NOTE
Blood pressure is somewhat elevated.  He has been off of his diuretics as he had volume depletion contributing to his MARIA TERESA  Would resume hydrochlorothiazide and spironolactone given his elevated blood pressure  Continue Norvasc at 5 mg twice daily  Trend blood pressure response with changes as outlined above  Avoid relative hypotension to maintain renal perfusion

## 2025-03-28 NOTE — PLAN OF CARE
Problem: PHYSICAL THERAPY ADULT  Goal: Performs mobility at highest level of function for planned discharge setting.  See evaluation for individualized goals.  Description: Treatment/Interventions: ADL retraining, Functional transfer training, LE strengthening/ROM, Therapeutic exercise, Endurance training, Patient/family training, Equipment eval/education, Bed mobility, Gait training, Spoke to nursing, Spoke to case management, OT, Elevations, Cognitive reorientation          See flowsheet documentation for full assessment, interventions and recommendations.  Outcome: Progressing  Note: Prognosis: Good  Problem List: Decreased strength, Decreased endurance, Impaired balance, Decreased mobility, Decreased safety awareness, Impaired judgement, Decreased cognition, Decreased coordination  Assessment: PT initiated treatment session in order to assist patient in achieving goals to improve transfers, gait training, and overall activity tolerance. Patient demonstrated progress toward achieving functional mobility goals as evidenced by improving activity tolerance, ambulation, and overall mobility. Patient pleasant, cooperative, and agreeable to today's treatment session. Patient received supine in bed. Patient is currently supervision bed mobility, transfers, and MinAx1 ambulation. Throughout treatment session patient required both verbal and tactile cuing to improve safety, efficiency, and mechanics of mobility in addition to hands on assistance for all aspects of functional mobility. Additionally, pt required increased time to execute specific mobility tasks with rest breaks in between secondary to gross fatigue and weakness. Patient demonstrated the ability to ambulate 80'x3 with RW, requiring intermittent verbal cues for RW management and hallway navigation to improve patient safety and reduce risk of falls. Patient required intermittent standing rest breaks due to generalized LE weakness and decreased activity  tolerance. Patient left supine in bed (per patient request) with alarm and all needs met. Patient will benefit from continued skilled physical therapy to address gait / balance dysfunction, decreased activity tolerance, and generalized weakness. The patients AM-PAC Basic Mobility Inpatient Short From Raw Score is 16 .  Based on AM-PAC scoring and patient presentation, PT currently recommending Level I (Maximum Resource Intensity). Please also refer to the recommendation of the Physical Therapist for safe discharge planning.  Barriers to Discharge: Inaccessible home environment     Rehab Resource Intensity Level, PT: I (Maximum Resource Intensity)    See flowsheet documentation for full assessment.

## 2025-03-28 NOTE — CASE MANAGEMENT
Case Management Discharge Planning Note    Patient name Ez Dsouza  Location [facility] 508-01 MRN [id number]  : 1965 Date 3/28/2025       Current Admission Date: 3/24/2025  Current Admission Diagnosis:Hematoma   Patient Active Problem List    Diagnosis Date Noted Date Diagnosed    Elevated serum creatinine 2025     CKD (chronic kidney disease) stage 3, GFR 30-59 ml/min ([facility]) 2025     Benign hypertension with CKD (chronic kidney disease) stage III ([facility]) 2025     Hematoma 2025     Localized swelling, mass or lump of neck 2025     S/P carotid endarterectomy 2025     Smoking 2025     Hyperlipidemia 03/10/2025     Acute cerebrovascular accident (CVA) due to stenosis of left carotid artery ([facility]) 03/10/2025     Elevated coronary artery calcium score 03/10/2025     Pre-operative cardiovascular examination 03/10/2025     Closed fracture of right distal radius and ulna 2024     Closed fracture of distal ends of right radius and ulna, initial encounter 2024     Closed fracture of right wrist with routine healing, subsequent encounter 2024     Fall 2024     Acute pain due to trauma 2024     Closed fracture of multiple ribs of left side 2024     Closed fracture of right wrist 2024     MARIA TERESA (acute kidney injury) ([facility]) 2024     Scalp abrasion 2024     Elevated blood pressure reading 2021       LOS (days): 4  Geometric Mean LOS (GMLOS) (days): 3.7  Days to GMLOS:-0.4     OBJECTIVE:  Risk of Unplanned Readmission Score: 11.64         Current admission status: Inpatient   Pref[facility] Pharmacy:   [facility] ([place] - [address]  [address]  [place] [address]  Phone: [phone] Fax: [phone]    Primary Care Provider: Jakob Higgins MD    Primary Insurance: BLUE [employer]OSS  Secondary Insurance: [employer]    DISCHARGE DETAILS:   Received authorization for acute rehab at  [facility]. They are able to accept today. CM will set up transport for early afternoon. Deisy [place] Scaff aware.      Phone [phone]  Fax: [phone]

## 2025-03-28 NOTE — DISCHARGE SUPPORT
Case Management Assessment & Discharge Planning Note    Patient name Ez Dsouza  Location [facility] 508/[facility] 508-01 MRN [id number]  : 1965 Date 3/28/2025       Current Admission Date: 3/24/2025  Current Admission Diagnosis:Hematoma   Patient Active Problem List    Diagnosis Date Noted Date Diagnosed    Elevated serum creatinine 2025     CKD (chronic kidney disease) stage 3, GFR 30-59 ml/min ([facility]) 2025     Benign hypertension with CKD (chronic kidney disease) stage III ([facility]) 2025     Hematoma 2025     Localized swelling, mass or lump of neck 2025     S/P carotid endarterectomy 2025     Smoking 2025     Hyperlipidemia 03/10/2025     Acute cerebrovascular accident (CVA) due to stenosis of left carotid artery ([facility]) 03/10/2025     Elevated coronary artery calcium score 03/10/2025     Pre-operative cardiovascular examination 03/10/2025     Closed fracture of right distal radius and ulna 2024     Closed fracture of distal ends of right radius and ulna, initial encounter 2024     Closed fracture of right wrist with routine healing, subsequent encounter 2024     Fall 2024     Acute pain due to trauma 2024     Closed fracture of multiple ribs of left side 2024     Closed fracture of right wrist 2024     MARIA TERESA (acute kidney injury) ([facility]) 2024     Scalp abrasion 2024     Elevated blood pressure reading 2021       LOS (days): 4  Geometric Mean LOS (GMLOS) (days): 3.7  Days to GMLOS:-0.4   Facility Authorization Approved  [facility] received approved auth for: Acute Rehab  Insurance: [employer]  Authorization Obtained Via: Fax  Facility Name: [facility]  Approved Authorization Number: [id number]  Start of Care Date: 25  Next Review Date: 25  Continued Stay Care Coordinator Name: Claudia  Continued Stay Care Coordinator Phone #: [phone]  Submit Next Review to: [phone]   notified: Shey CARBAJAL      Updates to authorization status will be noted in chart.    Please reach out to CM for updates on any clinical information.

## 2025-03-28 NOTE — PROGRESS NOTES
Progress Note - Nephrology   Ez Dsouza 59 y.o. male MRN: [id number]  Unit/Bed#: [facility] 508-01 Encounter: [id number]      Assessment & Plan  Elevated serum creatinine  The patient's baseline creatinine is approximately 1.3  He did not have MARIA TERESA per KDIGO criteria but his creatinine is above baseline and has worsened over the last 24 hours  There is no obvious reason for his acute kidney injury such as clinically detectable volume depletion, hypotension, nephrotoxic medication usage, urinary retention, systemic allergic reaction etc. however, renal function has improved after intravenous fluids and is currently back to baseline consistent with volume depletion  Creatinine has returned to baseline and continues to improve.-Cholesterol emboli is unlikely renal function has returned to baseline and complement levels normal  Workup: Postvoid residual 10 mL/urinalysis revealed trace protein but in the setting of a specific gravity of 1.025-small heme with 10-20 RBCs per high-power field-0-3 hyaline casts per low power field/renal ultrasound revealed the right kidney to be 12 cm and the left 11.4 without any hydronephrosis noted  Plavix can cause TTP but he has no thrombocytopenia or other stigmata of TTP-LDH normal and hemolysis smear negative  Would recheck BMP sometime next week  Continue to avoid nephrotoxic medications and relative hypotension  Change hold parameter Norvasc to hold for less than systolic of 130  Okay to discharge from a renal standpoint  CKD (chronic kidney disease) stage 3, GFR 30-59 ml/min ([facility])  Baseline creatinine of approximately 1.3  Likely secondary to hypertension and possible renal vascular disease  He does have microhematuria-recheck urinalysis as an outpatient.  C3, C4 and total complement levels normal  Benign hypertension with CKD (chronic kidney disease) stage III ([facility])  Blood pressure is somewhat elevated.  He has been off of his diuretics as he had volume depletion contributing to his  "MARIA TERESA  Would resume hydrochlorothiazide and spironolactone given his elevated blood pressure  Continue Norvasc at 5 mg twice daily  Trend blood pressure response with changes as outlined above  Avoid relative hypotension to maintain renal perfusion  Hematoma  Status post recent left carotid endarterectomy  Status post hematoma evacuation 3/24 and reexploration 3/26.  Currently has a drain in place  Follow-up and management per vascular surgery               Subjective:   The patient was seen and examined earlier this morning.  Overall, he felt quite well.  He denied shortness of breath, chest pain or pressure, abdominal pain, vomiting, diarrhea, sweats, chills.  He did have some bloody drainage around his neck drain per bedside nurse report      Objective:     Vitals: Blood pressure 160/87, pulse 67, temperature 97.8 °F (36.6 °C), temperature source Oral, resp. rate 20, height 6' 2\" (1.88 m), weight 90.6 kg (199 lb 12.8 oz), SpO2 97%.,Body mass index is 25.65 kg/m².Temp (24hrs), Av.1 °F (36.7 °C), Min:97.8 °F (36.6 °C), Max:98.4 °F (36.9 °C)      Temp:  [97.8 °F (36.6 °C)-98.4 °F (36.9 °C)] 97.8 °F (36.6 °C)  HR:  [61-76] 67  Resp:  [17-20] 20  BP: (143-160)/(83-95) 160/87  SpO2:  [95 %-97 %] 97 %    Weight (last 2 days)       Date/Time Weight    25 0500 90.6 (199.8)    25 0500 91.4 (201.5)                 I/O last 24 hours:  In: 540 [P.O.:540]  Out: 2925 [Urine:2925]        Physical Exam    /87   Pulse 67   Temp 97.8 °F (36.6 °C) (Oral)   Resp 20   Ht 6' 2\" (1.88 m)   Wt 90.6 kg (199 lb 12.8 oz)   SpO2 97%   BMI 25.65 kg/m²   Vital signs were reviewed  Constitutional: The patient was awake, alert, pleasant, cooperative and in no apparent distress  Cardiovascular: No overt jugular venous distention was noted, S1-S2, no pericardial friction rub S3 were appreciated, no peripheral edema was noted  Pulmonary: Adequate inspiratory effort, lungs were clear                Invasive Devices       " Peripheral Intravenous Line  Duration             Peripheral IV 03/24/25 Proximal;Right;Ventral (anterior) Forearm 4 days              Drain  Duration             Closed/Suction Drain Left Neck Other (Comment) 1 day                    Medications:    Scheduled Meds:  Current Facility-Administered Medications   Medication Dose Route Frequency Provider Last Rate    acetaminophen  975 mg Oral Q8H PRN Ez Villalobos MD      amLODIPine  5 mg Oral Q12H Sunil Sims MD      aspirin  81 mg Oral Daily Ez Villalobos MD      atorvastatin  40 mg Oral Daily Ez Villalobos MD      carvedilol  25 mg Oral BID With Meals Ez Villalobos MD      clopidogrel  75 mg Oral Daily Ez Villalobos MD      labetalol  5 mg Intravenous Q6H PRN Ez Villalobos MD      nicotine  1 patch Transdermal Daily Ez Villalobos MD      oxyCODONE  2.5 mg Oral Q4H PRN Ez Villalobos MD      Or    oxyCODONE  5 mg Oral Q4H PRN Ez Villalobos MD      senna  8.6 mg Oral HS Ez Villalobos MD         PRN Meds:.  acetaminophen    labetalol    oxyCODONE **OR** oxyCODONE    Continuous Infusions:         LAB RESULTS:      Results from last 7 days   Lab Units 03/28/25  0506 03/27/25  0504 03/26/25  0512 03/25/25  0514 03/24/25  0631 03/24/25  0230   WBC Thousand/uL 10.27* 12.15* 8.18 15.13* 13.46* 8.96   HEMOGLOBIN g/dL 11.6* 10.8* 10.9* 11.0* 12.4 12.3   HEMATOCRIT % 35.0* 33.1* 33.1* 33.6* 37.8 36.7   PLATELETS Thousands/uL 414* 400* 384 367 360 365   SEGS PCT %  --   --  65  --  79* 63   LYMPHO PCT %  --   --  19  --  10* 18   MONO PCT %  --   --  12  --  8 14*   EOS PCT %  --   --  2  --  2 3   POTASSIUM mmol/L 4.0 3.9 3.9 4.4  --  4.0   CHLORIDE mmol/L 103 104 105 103  --  101   CO2 mmol/L 26 27 26 22  --  22   BUN mg/dL 25 30* 30* 38*  --  42*   CREATININE mg/dL 1.24 1.26 1.31* 1.63*  --  1.57*   CALCIUM mg/dL 8.6 8.7 8.7 8.8  --  9.2   ALK PHOS U/L  --   --  116*  --   --  137*   ALT  "U/L  --   --  20  --   --  39   AST U/L  --   --  14  --   --  17       CUTURES:  No results found for: \"BLOODCX\", \"URINECX\"              PLEASE NOTE:  This encounter was completed utilizing the [employer]/Flu[employer] Direct Speech Voice Recognition Software. Grammatical errors, random word insertions, pronoun errors and incomplete sentences are occasional consequences of the system due to software limitations, ambient noise and hardware issues.These may be missed by proof reading prior to affixing electronic signature. Any questions or concerns about the content, text or information contained within the body of this dictation should be directly addressed to the physician for clarification. Please do not hesitate to call me directly if you have any any questions or concerns.  "

## 2025-03-28 NOTE — ASSESSMENT & PLAN NOTE
58 yo M h/o symptomatic L ICA stenosis, recent CVA w/ residual Right hemiparesis arm>leg, underwent L CEA on 3/17 and presented to the ED w/ new worsening L neck hematoma.     3/24 L neck exploration and washout  3/26 - Repeat L neck exploration and washout, surgicel powder applied, ROBERTO drain placed    ROBERTO drain with dark gray output, residual from surgicel, non-bloody. L neck with moderate edema, no active ooze, no dysphagia or trouble breathing  Tolerating PO diet, pain controlled, N/V intact     Plan:  S/p repeat L neck washout, ROBERTO drain placed  Continue to monitor ROBERTO output, will be discharged with drain in place.   Plan to follow up with Vascular office in 1 week  Continue aspirin, plavix, statin  Continue IS  PT/OT, Right hemiparesis, presented from [facility]  Nephrology for hematuria workup, grossly negative, will follow up outpatient to recheck UA  Continue Regular diet  Pain Control  OOB  Medically stable for discharge, Case Mgt for dispo planning - has been accepted to [facility], case being sent for review to MD

## 2025-03-28 NOTE — DISCHARGE SUMMARY
Discharge Summary   Ez Dsouza 59 y.o. male MRN: [id number]  Unit/Bed#: [facility] 508-01 Encounter: [id number]    Admission Date: 3/24/2025     Discharge Date:03/28/25    Attending:Ez Villalobos MD     Consultants: Nephrology    Admitting Diagnosis: Hematoma [T14.8XXA]  S/P carotid endarterectomy [Z98.890]  Localized swelling, mass or lump of neck [R22.1]      Principle/ Secondary Diagnosis:  Postoperative left neck hematoma  Postoperative left neck seroma  Elevated creatinine   Postoperative acute blood loss anemia secondary to procedural and dilutional loss    Past Medical History:   Diagnosis Date    Hypertension      Past Surgical History:   Procedure Laterality Date    EVACUATION OF HEMATOMA Left 3/24/2025    Procedure: L neck exploration, hematoma evacuation;  Surgeon: Ez Villalobos MD;  Location: [facility];  Service: Vascular    KNEE SURGERY  2008    MOLE REMOVAL  2024    Back    NECK EXPLORATION Left 3/26/2025    Procedure: LEFT NECK EXPLORATION/WASHOUT;  Surgeon: Ez Villalobos MD;  Location: [facility];  Service: Vascular    UT TEAEC W/PATCH GRF CAROTID VERTB SUBCLAV NECK INC Left 3/17/2025    Procedure: ENDARTERECTOMY ARTERY CAROTID WITH BOVINE PATCH ANGIOPLASTY;  Surgeon: Joss Calderón MD;  Location: [facility];  Service: Vascular       Procedures Performed:   3/24/2025 left neck exploration.  Hematoma evacuation-[facility]  3/26/2025 redo left neck exploration and washout with seroma evacuation.  [facility]    Laboratory data at discharge:   Results from last 7 days   Lab Units 03/28/25  0506 03/27/25  0504 03/26/25  0512   WBC Thousand/uL 10.27* 12.15* 8.18   HEMOGLOBIN g/dL 11.6* 10.8* 10.9*   HEMATOCRIT % 35.0* 33.1* 33.1*   PLATELETS Thousands/uL 414* 400* 384     Results from last 7 days   Lab Units 03/28/25  0506 03/27/25  0504 03/26/25  0512   POTASSIUM mmol/L 4.0 3.9 3.9   CHLORIDE mmol/L 103 104 105   CO2 mmol/L 26 27 26   BUN mg/dL 25 30* 30*    CREATININE mg/dL 1.24 1.26 1.31*   CALCIUM mg/dL 8.6 8.7 8.7     Results from last 7 days   Lab Units 03/24/25  0631 03/24/25  0230   INR  1.13 1.14   PTT seconds 39* 42*           Discharge instructions/Information to patient and family:   See after visit summary for information provided to patient and family.   Carotid surgery instructions    Discharge Medications:  See after visit summary for reconciled discharge medications provided to patient and family.   Continued dual antiplatelet therapy-aspirin, Plavix  Continued statin-Lipitor  Up titration of Norvasc to 5 mg twice daily      Hospital Course:   Ez Dsouza is a 59-year-old male with hypertension, hyperlipidemia, CAD, tobacco abuse who is known to the vascular center for symptomatic left carotid stenosis (right sided weakness with left hemispheric CAMRYN to MCA and right parietal ischemia with small PFO found by echo 3/20/2025) who underwent left carotid artery endarterectomy by Dr. Calderón on 3/17/2025.  He had been on anticoagulation preoperatively which was stopped postoperatively at which time dual antiplatelet therapy was initiated.  He presented to [facility] emergency department on 3/24/2025 with complaints of increased left neck pain and some oozing from his incision site that woke him up.  CTA of the head neck was performed noting the carotid to be intact but with small blush within hematoma concerning for potential small vessel bleed.  He was admitted to the hospital and placed n.p.o. with plan for surgical intervention.    On 3/24/2025, the patient was taken to the operating room at which time he underwent left neck exploration and hematoma evacuation by Dr. Villalobos.  Operative findings included a large amount of hematoma evacuated.  There were no signs of active bleeding vessel but some generalized oozing from the tissue beds.    Postoperatively, he was maintained in the PACU then transferred to [facility]  surgical/telemetry/stepdown floor where he continued to convalesce.  Unfortunately, over the course of the next 48 hours, the patient had reaccumulation in the left neck.  Recommendation was for repeat washout and likely drain placement.    On 3/26/2025, the patient was returned to the operating room at which time he underwent redo left neck exploration and washout with seroma evacuation and placement of a 7 [place] drain by Dr. Villalobos.  Again, no active bleeding was found from the wound bed.  The fluid collection appeared to be a seroma.  The wound was copiously irrigated with antibiotic solution.  Surgicel powder was sprayed in the field in case there was any further generalized oozing.    Postoperatively, again he was maintained in the PACU then returned to a medical surgical/stepdown floor where he continued to convalesce.  Drain output was monitored.  Output did have a brown/black chalk consistency most likely due to Surgicel powder placement at the time of operative intervention.  He continued on dual antiplatelet therapy along with statin therapy.  He was monitored closely from a neurovascular standpoint.    By the date of discharge, the patient remained neurovascularly intact.  His wound was stable.  Drain output was with brownish colored fluid.  He was tolerating a diet.  His pain was controlled with minimal use of Tylenol and no use of narcotic for several days.  He was voiding spontaneously.  He was ambulatory.  He was evaluated by physical therapy and Occupational Therapy who deemed him with maximal needs given his residual hemiparesis from his prior stroke.  With the assistance of our case management colleagues, arrangements were made for the patient's patient to transition to [facility] rehab for short-term rehab after appropriate insurance authorization obtained.  The patient was deemed appropriate and stable for discharge on 3/28/2025.  The patient will require outpatient follow-up in 1 week for  incision check and drain removal.    Hospital course was complicated by the following:  Elevated creatinine  Upon admission, the patient was noted to be with elevated creatinine.  He did receive fluid bolus resuscitation.  Unfortunately, renal function continued to worsen.  Consultation was placed to our nephrology colleagues for assistance in management.  From their perspective, it was felt there was no reason for MARIA TERESA such as volume depletion, hypotension, nephrotoxic medication usage, urinary retention, nor systemic allergic reaction etc.  Postvoid residuals were tracked.  UA was performed revealing trace protein but in the setting of a specific gravity of 1.025, small heme with 10-20 RBCs per high-power field, 0-3 hyaline cast per lower power field.  Consideration was given for TTP but there was no evidence of thrombocytopenia.  Cholesterol emboli was considered but less likely with carotid endarterectomy.  Complement levels were checked.  Renal ultrasound was performed and negative.  He was given additional IV fluid resuscitation.  Nephrotoxic agents were held.  Hypotension was avoided.  He did have evidence of hematuria for which recommendation was for recheck of the urinalysis as an outpatient with further workup is warranted.  Renal function was closely monitored and returned to baseline.  At the time of discharge, recommendation was for repeat BMP in approximately 1 week.  With hypertensive vital signs, Norvasc was uptitrated to 5 mg twice daily.  The patient was resumed on hydrochlorothiazide and spironolactone.      Prior to discharge, the patient was given instructions for outpatient care and follow-up.  The patient has been instructed to call w/ any questions, changes, or concerns for the medical condition.    For further details of the hospitalization, please refer to the medical record.    Condition at Discharge: stable     Provisions for Follow-Up Care:  See after visit summary for information related  to follow-up care and any pertinent home health orders.      Disposition: Short-term rehab at [facility]    Planned Readmission: No    Discharge Statement   I spent 30 minutes discharging the patient. This time was spent on the day of discharge. I had direct contact with the patient on the day of discharge. Additional documentation is required if more than 30 minutes were spent on discharge.     Deisy Eldridge PA-C  3/28/2025      This text is generated with voice recognition software. There may be translation, syntax,  or grammatical errors. If you have any questions, please contact the dictating provider.

## 2025-03-28 NOTE — ASSESSMENT & PLAN NOTE
Baseline creatinine of approximately 1.3  Likely secondary to hypertension and possible renal vascular disease  He does have microhematuria-recheck urinalysis as an outpatient.  C3, C4 and total complement levels normal

## 2025-03-28 NOTE — ASSESSMENT & PLAN NOTE
Status post recent left carotid endarterectomy  Status post hematoma evacuation 3/24 and reexploration 3/26.  Currently has a drain in place  Follow-up and management per vascular surgery

## 2025-03-28 NOTE — ASSESSMENT & PLAN NOTE
The patient's baseline creatinine is approximately 1.3  He did not have MARIA TERESA per KDIGO criteria but his creatinine is above baseline and has worsened over the last 24 hours  There is no obvious reason for his acute kidney injury such as clinically detectable volume depletion, hypotension, nephrotoxic medication usage, urinary retention, systemic allergic reaction etc. however, renal function has improved after intravenous fluids and is currently back to baseline consistent with volume depletion  Creatinine has returned to baseline and continues to improve.-Cholesterol emboli is unlikely renal function has returned to baseline and complement levels normal  Workup: Postvoid residual 10 mL/urinalysis revealed trace protein but in the setting of a specific gravity of 1.025-small heme with 10-20 RBCs per high-power field-0-3 hyaline casts per low power field/renal ultrasound revealed the right kidney to be 12 cm and the left 11.4 without any hydronephrosis noted  Plavix can cause TTP but he has no thrombocytopenia or other stigmata of TTP-LDH normal and hemolysis smear negative  Would recheck BMP sometime next week  Continue to avoid nephrotoxic medications and relative hypotension  Change hold parameter Norvasc to hold for less than systolic of 130  Okay to discharge from a renal standpoint

## 2025-03-28 NOTE — PROGRESS NOTES
Progress Note - Vascular Surgery   Name: Ez Dsouza 59 y.o. male I MRN: [id number]  [facility] I Date of Admission: 3/24/2025   Date of Service: 3/28/2025 I Hospital Day: 4    Assessment & Plan  Hematoma  60 yo M h/o symptomatic L ICA stenosis, recent CVA w/ residual Right hemiparesis arm>leg, underwent L CEA on 3/17 and presented to the ED w/ new worsening L neck hematoma.     3/24 L neck exploration and washout  3/26 - Repeat L neck exploration and washout, surgicel powder applied, ROBERTO drain placed    ROBERTO drain with dark gray output, residual from surgicel, non-bloody. L neck with moderate edema, no active ooze, no dysphagia or trouble breathing  Tolerating PO diet, pain controlled, N/V intact     Plan:  S/p repeat L neck washout, ROBERTO drain placed  Continue to monitor ROBERTO output, will be discharged with drain in place.   Plan to follow up with Vascular office in 1 week  Continue aspirin, plavix, statin  Continue IS  PT/OT, Right hemiparesis, presented from [facility]  Nephrology for hematuria workup, grossly negative, will follow up outpatient to recheck UA  Continue Regular diet  Pain Control  OOB  Medically stable for discharge, Case Mgt for dispo planning - has been accepted to [facility], case being sent for review to MD    Elevated serum creatinine  Baseline Cr 1.3, up to 1.63 post-op    Plan:  Nephrology following, w/u in progress  F/U AM labs  CKD (chronic kidney disease) stage 3, GFR 30-59 ml/min ([facility])  Lab Results   Component Value Date    EGFR 62 03/28/2025    EGFR 61 03/27/2025    EGFR 58 03/26/2025    CREATININE 1.24 03/28/2025    CREATININE 1.26 03/27/2025    CREATININE 1.31 (H) 03/26/2025     Benign hypertension with CKD (chronic kidney disease) stage III ([facility])  Lab Results   Component Value Date    EGFR 62 03/28/2025    EGFR 61 03/27/2025    EGFR 58 03/26/2025    CREATININE 1.24 03/28/2025    CREATININE 1.26 03/27/2025    CREATININE 1.31 (H) 03/26/2025       24 Hour Events :  None    Subjective : Pt resting in hospital bed, anticipating discharge once a bed is available/authorization from [facility]. Drain intact with dark, non-bloody output.     Objective :  Temp:  [97.8 °F (36.6 °C)-98.4 °F (36.9 °C)] 97.8 °F (36.6 °C)  HR:  [61-76] 70  BP: (143-159)/() 143/89  Resp:  [17-20] 20  SpO2:  [95 %-97 %] 97 %  O2 Device: None (Room air)     I/O         03/26 0701 03/27 0700 03/27 0701  03/28 0700 03/28 0701 03/29 0700    P.O. 480 240     I.V. (mL/kg) 2250.8 (24.6)      NG/GT 0 0     IV Piggyback       Total Intake(mL/kg) 2730.8 (29.9) 240 (2.6)     Urine (mL/kg/hr) 2800 (1.3) 2425 (1.1)     Drains 0 0     Total Output 2800 2425     Net -69.2 -2185                  Lines/Drains/Airways       Active Status       Name Placement date Placement time Site Days    Closed/Suction Drain Left Neck Other (Comment) 03/26/25  1200  Neck  1                  Physical Exam  Vitals and nursing note reviewed.   Constitutional:       General: He is not in acute distress.     Appearance: He is well-developed.   HENT:      Head: Normocephalic and atraumatic.   Eyes:      Conjunctiva/sclera: Conjunctivae normal.   Neck:      Comments: L neck with edema, unchanged since yesterday  ROBERTO drain in place with dark, non-bloody output  Cardiovascular:      Rate and Rhythm: Normal rate.   Pulmonary:      Effort: Pulmonary effort is normal. No respiratory distress.   Abdominal:      Palpations: Abdomen is soft.      Tenderness: There is no abdominal tenderness.   Musculoskeletal:         General: No swelling.      Cervical back: Tenderness present. No rigidity.   Skin:     General: Skin is warm and dry.      Capillary Refill: Capillary refill takes 2 to 3 seconds.   Neurological:      Mental Status: He is alert. Mental status is at baseline.   Psychiatric:         Mood and Affect: Mood normal.           Lab Results: I have reviewed the following results:  CBC with diff:   Lab Results   Component Value Date  "   WBC 10.27 (H) 03/28/2025    HGB 11.6 (L) 03/28/2025    HCT 35.0 (L) 03/28/2025    MCV 94 03/28/2025     (H) 03/28/2025    RBC 3.74 (L) 03/28/2025    MCH 31.0 03/28/2025    MCHC 33.1 03/28/2025    RDW 14.1 03/28/2025    MPV 10.4 03/28/2025    NRBC 0 03/26/2025   ,   BMP/CMP:  Lab Results   Component Value Date    SODIUM 138 03/28/2025    K 4.0 03/28/2025     03/28/2025    CO2 26 03/28/2025    CO2 29 06/22/2024    BUN 25 03/28/2025    CREATININE 1.24 03/28/2025    GLUCOSE 87 06/22/2024    CALCIUM 8.6 03/28/2025    AST 14 03/26/2025    ALT 20 03/26/2025    ALKPHOS 116 (H) 03/26/2025    EGFR 62 03/28/2025   ,   Lipid Panel: No results found for: \"CHOL\",   Coags:   Lab Results   Component Value Date    PTT 39 (H) 03/24/2025    INR 1.13 03/24/2025   ,   Blood Culture: No results found for: \"BLOODCX\",   Urinalysis:   Lab Results   Component Value Date    COLORU Yellow 03/25/2025    CLARITYU Clear 03/25/2025    SPECGRAV 1.025 03/25/2025    PHUR 5.5 03/25/2025    LEUKOCYTESUR Negative 03/25/2025    NITRITE Negative 03/25/2025    GLUCOSEU Negative 03/25/2025    KETONESU Negative 03/25/2025    BILIRUBINUR Negative 03/25/2025    BLOODU Small (A) 03/25/2025   ,   Urine Culture: No results found for: \"URINECX\",   Wound Culure: No results found for: \"WOUNDCULT\"    Imaging Results Review: No pertinent imaging studies reviewed.  Other Study Results Review: No additional pertinent studies reviewed.    VTE Prophylaxis: VTE covered by:    None     "

## 2025-03-28 NOTE — ASSESSMENT & PLAN NOTE
Lab Results   Component Value Date    EGFR 62 03/28/2025    EGFR 61 03/27/2025    EGFR 58 03/26/2025    CREATININE 1.24 03/28/2025    CREATININE 1.26 03/27/2025    CREATININE 1.31 (H) 03/26/2025

## 2025-03-28 NOTE — PHYSICAL THERAPY NOTE
"                                                                                  PHYSICAL THERAPY NOTE          Patient Name: Ez Dsouza  Today's Date: 3/28/2025     03/28/25 0938   PT Last Visit   PT Visit Date 03/28/25   Note Type   Note Type Treatment   End of Consult   Patient Position at End of Consult Supine;All needs within reach;Bed/Chair alarm activated   Pain Assessment   Pain Assessment Tool 0-10   Pain Score No Pain   Restrictions/Precautions   Weight Bearing Precautions Per Order No   Other Precautions Chair Alarm;Cognitive;Bed Alarm;Multiple lines;Pain;Fall Risk  (hx of CVA (R residual deficits), ROBERTO drain L neck)   General   Chart Reviewed Yes   Response to Previous Treatment Patient with no complaints from previous session.   Family/Caregiver Present No   Cognition   Overall Cognitive Status Impaired   Arousal/Participation Responsive;Alert   Attention Attends with cues to redirect   Orientation Level Oriented X4   Memory Decreased recall of precautions   Following Commands Follows one step commands with increased time or repetition   Comments patient pleasant and cooperative, fair insight of functional deficits and safety awareness   Subjective   Subjective \"I am ready to get out of here and go home\"   Bed Mobility   Supine to Sit 5  Supervision   Additional items HOB elevated;Bedrails;Increased time required;Verbal cues   Sit to Supine 5  Supervision   Additional items HOB elevated;Bedrails;Increased time required;Verbal cues   Additional Comments patient found and left supine in bed with alarm and all needs met   Transfers   Sit to Stand 5  Supervision   Additional items Increased time required;Verbal cues   Stand to Sit 5  Supervision   Additional items Increased time required;Verbal cues   Additional Comments x2 STS with RW   Ambulation/Elevation   Gait pattern Improper Weight shift;Forward Flexion;Decreased foot clearance;R Foot drag;Shuffling;Inconsistent nicholas;Short stride;Step " to;Excessively slow   Gait Assistance 4  Minimal assist   Additional items Assist x 1;Verbal cues;Tactile cues   Assistive Device Rolling walker   Distance 80'x3   Stair Management Assistance Not tested   Ambulation/Elevation Additional Comments Patient demonstrated the ability to ambulate 80'x3 with RW, requiring intermittent verbal cues for RW management and hallway navigation to improve patient safety and reduce risk of falls. Patient required intermittent standing rest breaks due to generalized LE weakness and decreased activity tolerance.   Balance   Static Sitting Good   Dynamic Sitting Fair +   Static Standing Fair   Dynamic Standing Fair -   Ambulatory Poor +   Endurance Deficit   Endurance Deficit Yes   Endurance Deficit Description generalized weakness, fatigue, cog   Activity Tolerance   Activity Tolerance Patient tolerated treatment well   Nurse Made Aware RN cleared and updated   Assessment   Prognosis Good   Problem List Decreased strength;Decreased endurance;Impaired balance;Decreased mobility;Decreased safety awareness;Impaired judgement;Decreased cognition;Decreased coordination   Assessment PT initiated treatment session in order to assist patient in achieving goals to improve transfers, gait training, and overall activity tolerance. Patient demonstrated progress toward achieving functional mobility goals as evidenced by improving activity tolerance, ambulation, and overall mobility. Patient pleasant, cooperative, and agreeable to today's treatment session. Patient received supine in bed. Patient is currently supervision bed mobility, transfers, and MinAx1 ambulation. Throughout treatment session patient required both verbal and tactile cuing to improve safety, efficiency, and mechanics of mobility in addition to hands on assistance for all aspects of functional mobility. Additionally, pt required increased time to execute specific mobility tasks with rest breaks in between secondary to gross  fatigue and weakness. Patient demonstrated the ability to ambulate 80'x3 with RW, requiring intermittent verbal cues for RW management and hallway navigation to improve patient safety and reduce risk of falls. Patient required intermittent standing rest breaks due to generalized LE weakness and decreased activity tolerance. Patient left supine in bed (per patient request) with alarm and all needs met.       Patient will benefit from continued skilled physical therapy to address gait / balance dysfunction, decreased activity tolerance, and generalized weakness. The patients AM-PAC Basic Mobility Inpatient Short From Raw Score is 16 .  Based on AM-PAC scoring and patient presentation, PT currently recommending Level I (Maximum Resource Intensity). Please also refer to the recommendation of the Physical Therapist for safe discharge planning.   Barriers to Discharge Inaccessible home environment   Goals   Patient Goals to return to rehab   STG Expiration Date 04/10/25   PT Treatment Day 1   Plan   Treatment/Interventions ADL retraining;Functional transfer training;LE strengthening/ROM;Elevations;Therapeutic exercise;Endurance training;Patient/family training;Bed mobility;Gait training;Spoke to nursing;OT   Progress Progressing toward goals   PT Frequency 3-5x/wk   Discharge Recommendation   Rehab Resource Intensity Level, PT I (Maximum Resource Intensity)   Equipment Recommended Walker   Walker Package Recommended Wheeled walker   Change/add to Walker Package? No   AM-PAC Basic Mobility Inpatient   Turning in Flat Bed Without Bedrails 3   Lying on Back to Sitting on Edge of Flat Bed Without Bedrails 3   Moving Bed to Chair 3   Standing Up From Chair Using Arms 3   Walk in Room 2   Climb 3-5 Stairs With Railing 2   Basic Mobility Inpatient Raw Score 16   Basic Mobility Standardized Score 38.32   [facility] Highest Level Of Mobility   -HLM Goal 5: Stand one or more mins   -HLM Achieved 7: Walk 25 feet or more    Education   Education Provided Mobility training;Assistive device   Patient Demonstrates acceptance/verbal understanding   End of Consult   Patient Position at End of Consult Supine;Bed/Chair alarm activated;All needs within reach     Dyan Paul PT, DPT

## 2025-03-31 ENCOUNTER — TELEPHONE (OUTPATIENT)
Dept: VASCULAR SURGERY | Facility: CLINIC | Age: 60
End: 2025-03-31

## 2025-03-31 NOTE — UTILIZATION REVIEW
NOTIFICATION OF ADMISSION DISCHARGE   This is a Notification of Discharge from [facility]. Please be advised that this patient has been discharge from our facility. Below you will find the admission and discharge date and time including the patient’s disposition.   UTILIZATION REVIEW CONTACT:  Utilization Review Assistants  Network Utilization Review Department  Phone: [phone] x carefully listen to the prompts. All voicemails are confidential.  Email: [email or web address]     ADMISSION INFORMATION  PRESENTATION DATE: 3/24/2025  2:07 AM  OBERVATION ADMISSION DATE: N/A  INPATIENT ADMISSION DATE: 3/24/25  5:20 AM   DISCHARGE DATE: 3/28/2025  3:29 PM   DISPOSITION:Non [facility] Acute Rehab    Network Utilization Review Department  ATTENTION: Please call with any questions or concerns to [phone] and carefully listen to the prompts so that you are directed to the right person. All voicemails are confidential.   For Discharge needs, contact Care Management DC Support Team at [phone] opt. 2  Send all requests for admission clinical reviews, approved or denied determinations and any other requests to dedicated fax number below belonging to the campus where the patient is receiving treatment. List of dedicated fax numbers for the Facilities:  FACILITY NAME UR FAX NUMBER   ADMISSION DENIALS (Administrative/Medical Necessity) [phone]   DISCHARGE SUPPORT TEAM ([facility]) [phone]   PARENT CHILD HEALTH (Maternity/NICU/Pediatrics) [phone]   [facility] [phone]   [facility] [phone]   [facility] [phone]   [facility] [phone]   [facility] [phone]   [facility] [phone]   [facility] [phone]   [facility] [phone]   [facility]  [facility] [phone]   [facility] [phone]   [facility] [phone]   [facility] [phone]

## 2025-03-31 NOTE — TELEPHONE ENCOUNTER
Vascular Nurse Navigator Post Op Call    Procedure: - Left carotid endarterectomy with bovine pericaridal patch angioplasty    Date of Procedure: 3/17/25    Surgeon:   * Joss Calderón MD - Primary     * Johny Elise DO - Fellow    Procedure: Left - L neck exploration. hematoma evacuation    Date of Procedure: 3/24/25    Surgeon: * Ez Villalobos MD - Primary     Procedure: Left - LEFT NECK EXPLORATION/WASHOUT    Date of Procedure: 3/26/25    Surgeon:    * Ez Villalobos MD - Primary    Discharge Date: 3/28/25    [facility]     Change in Vision?: No    Change in Speech?: No    Weakness?: No    Uncontrolled Pain?: No    Hoarseness?: No    Trouble Swallowing?: No    Incision Concerns?: No    Anticoagulation pt was discharged on post op?: Aspirin and Clopidogrel (Plavix)    Statin pt was discharged on post op?: Lipitor (atorvastatin)    Bleeding?: No    Fever/chills?: No      Reviewed discharge instructions and incision care with patient.      NEXT OFFICE VISIT SCHEDULED: 4/3/25 at 9 am with Dr. Villalobos at [facility]    Transportation Available?: Yes      Any further questions/concerns?    Spoke with Mario, nurse at [facility], in regards to above.  She stated patient is doing good since discharge.  She stated patient had 5 ml of brown drainage from his ROBERTO drain overnight.  She denies any signs of infection on patient's incision.  She stated his neck area is minimally swollen.  Reviewed discharge medications - Aspirin and Plavix.  All questions answered.  No concerns expressed at this time.

## 2025-03-31 NOTE — TELEPHONE ENCOUNTER
Nery, wife called, states patient had 2 more surgeries last week, is in [place] for due to stroke.  Asking if patient needs to wear monitor.    Nery, can you please call wife at [phone]

## 2025-04-02 NOTE — PROGRESS NOTES
"Name: Ez Dsouza      : 1965      MRN: [id number]  Encounter Provider: Ez Villalobos MD  Encounter Date: 4/3/2025   Encounter department: [facility]  :  Assessment & Plan  Carotid stenosis, symptomatic, with infarction (HCC)  Pt underwent a left CEA 3/17/25 due to right sided stroke sx. Following the procedure he developed a hematoma at the left incision and underwent washout 3/24/25 showing no signs of active bleeding. On 3/26/25 he underwent a second washout for a seroma and a 7fr drain placed. He is FU today for post op visit and drain removal. Pt still has difficulty with fine motor function of the right arm but this is improving with PT.   Drain output per pt is 10-15cc/day.   Incision site is C/D/I, no swelling, erythema, edema, or leakage.   Drain removed without issues, gauze bandage placed over drain site.   Remain on ASA, plavix, atorvastatin.   Carotid duplex ordered for 3 months.   FU visit in 3 months.   Referral to neurology for Post-op visit.   Orders:    VAS carotid complete study; Future    Ambulatory Referral to Neurology; Future    Carotid stenosis, asymptomatic, left    Orders:    Ambulatory Referral to Vascular Surgery        History of Present Illness   HPI  Ez Dsouza is a 59 y.o. male who presents for post-op visit of CEA 3/17/25 c/b hematoma needing washout 3/24/25 and seroma needing washout 3/26/25. He states his right arm strength and functionality is improving with PT, \"50% improvement over the last week\". He denies vision changes, difficulty speaking, confusion, and HA.     Pt here p/o for left CEA 3/17/25 and washout on 3/26/25. Pt denies trouble swallowing, hoarseness, sore throat, trouble speaking, fever or chills. Pt also denies TIA/Stroke. Pt still has right sided weakness from stroke on March 10., 2025. Pt stopped smoking 1 mo ago.   History obtained from: patient and patient's Significant Other    Review of Systems   Constitutional:  " Negative for chills and fever.   HENT:  Negative for ear pain and sore throat.    Eyes:  Negative for pain and visual disturbance.   Respiratory:  Negative for cough and shortness of breath.    Cardiovascular:  Negative for chest pain and palpitations.   Gastrointestinal:  Negative for abdominal pain and vomiting.   Genitourinary:  Negative for dysuria and hematuria.   Musculoskeletal:  Negative for arthralgias and back pain.   Skin:  Negative for color change and rash.   Neurological:  Positive for weakness. Negative for seizures and syncope.        Weakness in the right arm that is improving.    All other systems reviewed and are negative.    Current Outpatient Medications on File Prior to Visit   Medication Sig Dispense Refill    acetaminophen (TYLENOL) 325 mg tablet Take 2 tablets (650 mg total) by mouth every 8 (eight) hours as needed for mild pain      amLODIPine (NORVASC) 5 mg tablet Take 1 tablet (5 mg total) by mouth every 12 (twelve) hours      ASPIRIN 81 PO Take 81 mg by mouth in the morning      atorvastatin (LIPITOR) 40 mg tablet Take 1 tablet (40 mg total) by mouth daily 90 tablet 3    carvedilol (COREG) 25 mg tablet Take 1 tablet (25 mg total) by mouth 2 (two) times a day with meals 60 tablet 11    clopidogrel (PLAVIX) 75 mg tablet Take 1 tablet (75 mg total) by mouth daily      hydroCHLOROthiazide 25 mg tablet Take 1 tablet (25 mg total) by mouth daily 90 tablet 3    polyethylene glycol (MIRALAX) 17 g packet Take 17 g by mouth daily as needed (Constipation) for up to 10 days      spironolactone (ALDACTONE) 25 mg tablet Take 1 tablet (25 mg total) by mouth daily 90 tablet 3    nicotine (NICODERM CQ) 14 mg/24hr TD 24 hr patch Place 1 patch on the skin over 24 hours daily (Patient not taking: Reported on 4/3/2025)      senna (SENOKOT) 8.6 mg Take 1 tablet (8.6 mg total) by mouth daily at bedtime for 3 days       No current facility-administered medications on file prior to visit.         Objective    There were no vitals taken for this visit.     Physical Exam  Vitals and nursing note reviewed.   Constitutional:       General: He is not in acute distress.     Appearance: He is well-developed.      Comments: Arrived in a wheel chair with spouse.    HENT:      Head: Normocephalic and atraumatic.   Eyes:      Conjunctiva/sclera: Conjunctivae normal.   Neck:      Comments: Left CEA incision and drain site is C/D/I without erythema, edema, warmth, or leakage.   7Fr drain is intact, with ~5cc serosanguinous fluid.   Drain removed fully and intact without difficulty or complications.  Cardiovascular:      Rate and Rhythm: Normal rate and regular rhythm.      Pulses: Normal pulses.      Heart sounds: No murmur heard.  Pulmonary:      Effort: Pulmonary effort is normal. No respiratory distress.      Breath sounds: Normal breath sounds.   Abdominal:      Palpations: Abdomen is soft.      Tenderness: There is no abdominal tenderness.   Musculoskeletal:         General: No swelling.      Cervical back: Neck supple. No tenderness.   Skin:     General: Skin is warm and dry.      Capillary Refill: Capillary refill takes less than 2 seconds.   Neurological:      Mental Status: He is alert.      Motor: Weakness present.      Comments: Generalized mild right arm/hand weakness.   Psychiatric:         Mood and Affect: Mood normal.         Behavior: Behavior normal.         Thought Content: Thought content normal.         Judgment: Judgment normal.

## 2025-04-03 ENCOUNTER — OFFICE VISIT (OUTPATIENT)
Dept: VASCULAR SURGERY | Facility: CLINIC | Age: 60
End: 2025-04-03

## 2025-04-03 VITALS
BODY MASS INDEX: 25.65 KG/M2 | OXYGEN SATURATION: 97 % | SYSTOLIC BLOOD PRESSURE: 120 MMHG | HEART RATE: 74 BPM | DIASTOLIC BLOOD PRESSURE: 74 MMHG | HEIGHT: 74 IN

## 2025-04-03 DIAGNOSIS — I63.239 CAROTID STENOSIS, SYMPTOMATIC, WITH INFARCTION (HCC): Primary | ICD-10-CM

## 2025-04-03 DIAGNOSIS — I65.22 CAROTID STENOSIS, ASYMPTOMATIC, LEFT: ICD-10-CM

## 2025-04-03 PROCEDURE — 99024 POSTOP FOLLOW-UP VISIT: CPT | Performed by: SURGERY

## 2025-04-03 NOTE — ASSESSMENT & PLAN NOTE
Pt underwent a left CEA 3/17/25 due to right sided stroke sx. Following the procedure he developed a hematoma at the left incision and underwent washout 3/24/25 showing no signs of active bleeding. On 3/26/25 he underwent a second washout for a seroma and a 7fr drain placed. He is FU today for post op visit and drain removal. Pt still has difficulty with fine motor function of the right arm but this is improving with PT.   Drain output per pt is 10-15cc/day.   Incision site is C/D/I, no swelling, erythema, edema, or leakage.   Drain removed without issues, gauze bandage placed over drain site.   Remain on ASA, plavix, atorvastatin.   Carotid duplex ordered for 3 months.   FU visit in 3 months.   Referral to neurology for Post-op visit.   Orders:    VAS carotid complete study; Future    Ambulatory Referral to Neurology; Future

## 2025-04-04 ENCOUNTER — HOME HEALTH ADMISSION (OUTPATIENT)
Dept: HOME HEALTH SERVICES | Facility: HOME HEALTHCARE | Age: 60
End: 2025-04-04
Payer: COMMERCIAL

## 2025-04-07 ENCOUNTER — TELEPHONE (OUTPATIENT)
Dept: NEUROLOGY | Facility: CLINIC | Age: 60
End: 2025-04-07

## 2025-04-07 NOTE — TELEPHONE ENCOUNTER
Post CVA Discharge Follow Up  Hospitalization: 3/13/25-3/21/25, readmit on 3/24/25-3/28/25    According to chart, patient discharged to [facility]  Called facility and requested to speak with the patient's nurse to obtain an update. Spoke with Eusebia.  Denies any new or worsening stroke-like symptoms since discharge.    Ambulation / ADLs:  Patient mobilizing via cane.  He is independent with ADLs.    Patient maintained on a regular consistency diet.  He is continent at this time.     Medication Review:  Reviewed neurology-related medications with them. No reported missed doses, medication side effects, or signs of bleeding.     Last reported /71    Appointments:  Stroke hospital follow up appointment was scheduled by the neurology scheduling staff, Charis.    She denies any questions or concerns at this time.

## 2025-04-09 ENCOUNTER — HOME CARE VISIT (OUTPATIENT)
Dept: HOME HEALTH SERVICES | Facility: HOME HEALTHCARE | Age: 60
End: 2025-04-09

## 2025-04-10 ENCOUNTER — HOME CARE VISIT (OUTPATIENT)
Dept: HOME HEALTH SERVICES | Facility: HOME HEALTHCARE | Age: 60
End: 2025-04-10
Payer: COMMERCIAL

## 2025-04-10 VITALS
RESPIRATION RATE: 17 BRPM | BODY MASS INDEX: 25.52 KG/M2 | OXYGEN SATURATION: 98 % | HEART RATE: 69 BPM | DIASTOLIC BLOOD PRESSURE: 80 MMHG | WEIGHT: 198.8 LBS | SYSTOLIC BLOOD PRESSURE: 130 MMHG | TEMPERATURE: 98.8 F

## 2025-04-10 PROCEDURE — 400013 VN SOC

## 2025-04-10 PROCEDURE — G0299 HHS/HOSPICE OF RN EA 15 MIN: HCPCS

## 2025-04-11 NOTE — TELEPHONE ENCOUNTER
4/11/25 - Called and spoke with pt's wife, Nery, provided sooner appt for 6/11/25 at 930 w/ [address] in [place] - Provided address and asked for pt to arrive 15 mins early - Pt garret remains on Wait List

## 2025-04-15 ENCOUNTER — HOME CARE VISIT (OUTPATIENT)
Dept: HOME HEALTH SERVICES | Facility: HOME HEALTHCARE | Age: 60
End: 2025-04-15
Payer: COMMERCIAL

## 2025-04-15 VITALS — HEART RATE: 72 BPM | SYSTOLIC BLOOD PRESSURE: 124 MMHG | DIASTOLIC BLOOD PRESSURE: 74 MMHG

## 2025-04-15 VITALS
DIASTOLIC BLOOD PRESSURE: 74 MMHG | RESPIRATION RATE: 16 BRPM | HEART RATE: 78 BPM | TEMPERATURE: 97.2 F | SYSTOLIC BLOOD PRESSURE: 124 MMHG | OXYGEN SATURATION: 90 %

## 2025-04-15 PROCEDURE — G0151 HHCP-SERV OF PT,EA 15 MIN: HCPCS

## 2025-04-15 PROCEDURE — G0152 HHCP-SERV OF OT,EA 15 MIN: HCPCS

## 2025-04-15 PROCEDURE — G0299 HHS/HOSPICE OF RN EA 15 MIN: HCPCS

## 2025-04-16 ENCOUNTER — HOME CARE VISIT (OUTPATIENT)
Dept: HOME HEALTH SERVICES | Facility: HOME HEALTHCARE | Age: 60
End: 2025-04-16
Payer: COMMERCIAL

## 2025-04-16 VITALS — SYSTOLIC BLOOD PRESSURE: 118 MMHG | DIASTOLIC BLOOD PRESSURE: 80 MMHG

## 2025-04-17 ENCOUNTER — HOME CARE VISIT (OUTPATIENT)
Dept: HOME HEALTH SERVICES | Facility: HOME HEALTHCARE | Age: 60
End: 2025-04-17
Payer: COMMERCIAL

## 2025-04-17 ENCOUNTER — TELEPHONE (OUTPATIENT)
Dept: HOME HEALTH SERVICES | Facility: HOME HEALTHCARE | Age: 60
End: 2025-04-17

## 2025-04-20 NOTE — CASE COMMUNICATION
This message is informative only.  No action required.     Phone call to Pt 4.17.25 to schedule HH Sp tx eval.  He reportedly did not need sp tx at present.  Suggested that he inform Nursing and or tx. if he needed sp tx in the future.

## 2025-04-21 ENCOUNTER — HOME CARE VISIT (OUTPATIENT)
Dept: HOME HEALTH SERVICES | Facility: HOME HEALTHCARE | Age: 60
End: 2025-04-21
Payer: COMMERCIAL

## 2025-04-21 VITALS — HEART RATE: 66 BPM | OXYGEN SATURATION: 98 % | DIASTOLIC BLOOD PRESSURE: 82 MMHG | SYSTOLIC BLOOD PRESSURE: 118 MMHG

## 2025-04-21 PROCEDURE — G0152 HHCP-SERV OF OT,EA 15 MIN: HCPCS

## 2025-04-22 ENCOUNTER — HOME CARE VISIT (OUTPATIENT)
Dept: HOME HEALTH SERVICES | Facility: HOME HEALTHCARE | Age: 60
End: 2025-04-22
Payer: COMMERCIAL

## 2025-04-22 VITALS
HEART RATE: 72 BPM | SYSTOLIC BLOOD PRESSURE: 122 MMHG | RESPIRATION RATE: 18 BRPM | DIASTOLIC BLOOD PRESSURE: 70 MMHG | OXYGEN SATURATION: 99 % | TEMPERATURE: 97.8 F

## 2025-04-22 PROCEDURE — G0300 HHS/HOSPICE OF LPN EA 15 MIN: HCPCS

## 2025-04-24 ENCOUNTER — HOME CARE VISIT (OUTPATIENT)
Dept: HOME HEALTH SERVICES | Facility: HOME HEALTHCARE | Age: 60
End: 2025-04-24
Payer: COMMERCIAL

## 2025-04-24 VITALS
RESPIRATION RATE: 16 BRPM | HEART RATE: 76 BPM | TEMPERATURE: 97.7 F | SYSTOLIC BLOOD PRESSURE: 106 MMHG | DIASTOLIC BLOOD PRESSURE: 70 MMHG | OXYGEN SATURATION: 94 %

## 2025-04-24 PROCEDURE — G0299 HHS/HOSPICE OF RN EA 15 MIN: HCPCS

## 2025-04-30 ENCOUNTER — HOME CARE VISIT (OUTPATIENT)
Dept: HOME HEALTH SERVICES | Facility: HOME HEALTHCARE | Age: 60
End: 2025-04-30
Payer: COMMERCIAL

## 2025-04-30 VITALS
OXYGEN SATURATION: 96 % | RESPIRATION RATE: 16 BRPM | SYSTOLIC BLOOD PRESSURE: 142 MMHG | DIASTOLIC BLOOD PRESSURE: 80 MMHG | HEART RATE: 65 BPM | TEMPERATURE: 97 F

## 2025-04-30 PROCEDURE — G0299 HHS/HOSPICE OF RN EA 15 MIN: HCPCS

## 2025-05-14 ENCOUNTER — EVALUATION (OUTPATIENT)
Facility: CLINIC | Age: 60
End: 2025-05-14
Payer: COMMERCIAL

## 2025-05-14 ENCOUNTER — APPOINTMENT (OUTPATIENT)
Dept: LAB | Facility: CLINIC | Age: 60
End: 2025-05-14
Attending: STUDENT IN AN ORGANIZED HEALTH CARE EDUCATION/TRAINING PROGRAM
Payer: COMMERCIAL

## 2025-05-14 ENCOUNTER — RESULTS FOLLOW-UP (OUTPATIENT)
Dept: NON INVASIVE DIAGNOSTICS | Facility: HOSPITAL | Age: 60
End: 2025-05-14

## 2025-05-14 DIAGNOSIS — I10 PRIMARY HYPERTENSION: ICD-10-CM

## 2025-05-14 DIAGNOSIS — I63.9 CEREBROVASCULAR ACCIDENT (CVA), UNSPECIFIED MECHANISM (HCC): Primary | Chronic | ICD-10-CM

## 2025-05-14 DIAGNOSIS — E78.2 MIXED HYPERLIPIDEMIA: ICD-10-CM

## 2025-05-14 DIAGNOSIS — I1A.0 RESISTANT HYPERTENSION: ICD-10-CM

## 2025-05-14 LAB
ANION GAP SERPL CALCULATED.3IONS-SCNC: 10 MMOL/L (ref 4–13)
BUN SERPL-MCNC: 38 MG/DL (ref 5–25)
CALCIUM SERPL-MCNC: 9.2 MG/DL (ref 8.4–10.2)
CHLORIDE SERPL-SCNC: 105 MMOL/L (ref 96–108)
CHOLEST SERPL-MCNC: 174 MG/DL (ref ?–200)
CO2 SERPL-SCNC: 25 MMOL/L (ref 21–32)
CREAT SERPL-MCNC: 1.76 MG/DL (ref 0.6–1.3)
GFR SERPL CREATININE-BSD FRML MDRD: 41 ML/MIN/1.73SQ M
GLUCOSE P FAST SERPL-MCNC: 130 MG/DL (ref 65–99)
HDLC SERPL-MCNC: 34 MG/DL
LDLC SERPL CALC-MCNC: 106 MG/DL (ref 0–100)
MAGNESIUM SERPL-MCNC: 1.8 MG/DL (ref 1.9–2.7)
POTASSIUM SERPL-SCNC: 4 MMOL/L (ref 3.5–5.3)
SODIUM SERPL-SCNC: 140 MMOL/L (ref 135–147)
TRIGL SERPL-MCNC: 171 MG/DL (ref ?–150)

## 2025-05-14 PROCEDURE — 97166 OT EVAL MOD COMPLEX 45 MIN: CPT

## 2025-05-14 PROCEDURE — 84244 ASSAY OF RENIN: CPT

## 2025-05-14 PROCEDURE — 80061 LIPID PANEL: CPT

## 2025-05-14 PROCEDURE — 80048 BASIC METABOLIC PNL TOTAL CA: CPT

## 2025-05-14 PROCEDURE — 83735 ASSAY OF MAGNESIUM: CPT

## 2025-05-14 PROCEDURE — 82088 ASSAY OF ALDOSTERONE: CPT

## 2025-05-14 PROCEDURE — 36415 COLL VENOUS BLD VENIPUNCTURE: CPT

## 2025-05-14 NOTE — PROGRESS NOTES
"OCCUPATIONAL THERAPY INITIAL EVALUATION    Today's Date: 2025  Patient Name: Ez Dsouza  : 1965  MRN: [id number]  Referring Provider: Jakob Higgins MD  Dx: Cerebrovascular accident (CVA), unspecified mechanism (HCC) [I63.9]      SKILLED ANALYSIS:  Pt is a R hand dominant 59 year old male presenting to OP OT s/p CVA on 3/10/25.  Pt is demonstrating deficits in the following domains: RUE ROM, FMC, dexterity, divided attention, and EF skills. Pt currently unable to work as a  due to deficits with FMC. Pt unable to participate in leisure activities including fishing due to bilateral coordination and FMC deficits. Pt demonstrated compensation patterns of trunk extension during shoulder flexion and trunk flexion/shoulder hiking during external rotation.  Deficits are noted based on the following assessments: ROM, Fugyl-Fry, 9 hole peg, functional dexterity, TM A and B, MOCA v8.1, and skilled clinical observation.  Recommend OP OT 2-3x/wk for 12 weeks with focus on aforementioned deficits to maximize functional recovery and improve QOL.  Findings and recommendations discussed with pt, and they are in agreement.    Assess MMT on next follow up session.     Discussed estimated cost of OT POC.  Pt acknowledges understanding and is in agreement      PLAN OF CARE START: 25  PLAN OF CARE END: 8/15/2025  FREQUENCY: 3x/week  PRECAUTIONS CKD, HTN, Falls    Subjective  Pain: Pt reports R shoulder pain and tightness during flexion and external rotation.    Mechanism of Injury  Pt presented to ED on 3/10/2025 with right-sided weakness and dysarthria. Pt was noted to have primary left hemispheric stroke secondary to symptomatic left ICA stenosis. On 3/17/2025, pt had carotid endarterectomy and was D/C on 3/20/2025.  Pt was at [facility] for a week and a half post CVA. Pt attended PT and OT before being referred to OP OT.     Occupational Profile    \"Pt lives with wife in 2-story home, bedroom and bathroom " "with tub/shower is on the 2nd floor. Pt is not currently using any DME and is independent in ADLs/IADLs. Pt has not driven since CVA. Pt is not currently working, works as a . Pt enjoys fishing.\"    PATIENT GOAL: \"Get back to driving, work, and fishing\"    HISTORY OF PRESENT ILLNESS:     Pt is a 59 y.o. male who was referred to Occupational Therapy s/p  Cerebrovascular accident (CVA), unspecified mechanism (HCC) [I63.9].     PMH:   Past Medical History:   Diagnosis Date    Hypertension        Past Surgical Hx:   Past Surgical History:   Procedure Laterality Date    EVACUATION OF HEMATOMA Left 3/24/2025    Procedure: L neck exploration, hematoma evacuation;  Surgeon: Ez Villalobos MD;  Location: [facility];  Service: Vascular    KNEE SURGERY  2008    MOLE REMOVAL  2024    Back    NECK EXPLORATION Left 3/26/2025    Procedure: LEFT NECK EXPLORATION/WASHOUT;  Surgeon: Ez Villalobos MD;  Location: [facility];  Service: Vascular    VA TEAEC W/PATCH GRF CAROTID VERTB SUBCLAV NECK INC Left 3/17/2025    Procedure: ENDARTERECTOMY ARTERY CAROTID WITH BOVINE PATCH ANGIOPLASTY;  Surgeon: Joss Calderón MD;  Location: [facility];  Service: Vascular          Objective    Upper Extremities:  Pt is R hand dominant                Range of Motion:  AROM:   R UE   - Shoulder flexion: 90% with compensation of trunk extension  - Shoulder extension: WFL  - Shoulder external rotation: 90% with pain and compensation of trunk flexion and shoulder hiking  - Elbow flexion: WFL  - Elbow extension: WFL  - Wrist flexion: WFL  - Wrist extension: WFL  - Pronation: WFL  - Supination: WFL  - Finger extension: WFL  - Finger flexion:  WFL    L UE: WFL     FUGYL CARVER ASSESSMENT OF MOTOR RECOVERY AFTER STROKE: a stroke-specific, performance-based impairment index designed to assess motor functioning.  Minimally clinically importance difference is 5 points.  Scores <19 imply severe impairment, >50 implies mild impairment.   "   Right UE:     Upper Extremity Seated 26/36   Wrist 9/10   Hand 14/14   Coordination/Speed 4/6   Overall Score 53/66          NINE-HOLE PEG TEST: assesses dexterity/fine motor coordination. Alternative scoring: the number of pegs placed in 50 or 100 seconds can be recorded.    R hand: 34.69 seconds   L hand: 25.15 seconds      Norms:   Sex Age Average R Average L   Male 21-25 16.41 17.53   Male 26-30 16.88 17.84   Male  31-35 17.54 18.47   Male 36-40 17.71 18.62   Male 41-45 18.54 18.49   Male 46-50 18.35 19.57   Male 51-55 18.93 19.84   Male 56-60 20.90 21.64   Male 61-65 20.87 21.60   Male 66-70 21.23 22.29   Male 71+ 25.79 25.95   Female 21-25 16.04 17.21   Female 26-30 15.90 16.97   Female 31-35 16.69 17.47   Female 36-40 16.74 18.16   Female 41-45 16.54 17.64   Female 46-50 17.36 17.96   Female 51-55 17.38 18.92   Female 56-60 17.86 19.48   Female 61-65 18.99 20.33   Female 66-70 19.90 21.44   Female 71+ 22.49 24.11     Pt demonstrates decreased FMC compared to norms for age/sex (>14.69 seconds).        Functional Dexterity Test: assesses patient's ability to use the hand for daily tasks requiring a 3-jaw crescencio prehension between the fingers and the thumb. Completed in a zig-zag pattern with unaffected UE first  5-second penalty (each):  -Supinating forearm to assist  -Touching the board for assist    10-second penalty:  -Dropping a peg    R UE: 38.49 seconds   L UE: 24.10 seconds     Norms based on age/sex: (Diego et. al., 2013)    Age Group Sex Non-Dominant (50th percentile) Dominant (50th percentile)   20-49 Male 23.2 24.1   20-49 Female 23.1 21.9   50-59 Male 27.1 24.5   50-59 Female 32.3 27.4   60-69 Male 31.7 30.4   60-69 Female 33.8 31.2   70-79 Male 38.2 31.2   70-79 Female 38.3 37     Pt performance on Functional Dexterity Test implies >7 seconds compared to norms for age/sex.      Functional Cognition:  Highest level of education: Associates Degree    [place] Cognitive Assessment Version 8.1  (MoCA V8.1)  Visuospatial/executive functionin/5  Naming:  3/3  Memory: 1st trial:  3/5, 2nd trial:    Attention/concentration: 2/2  List of letters:   Serial Seven Subtraction:  3/3 w/ 0 errors  Language/sentence repetition:  12  Language Fluency:  0/1 (7 words)  Abstract/Correlational Thinkin2  Delayed Recall:  3/5  Orientation:     Memory Index Score: 11/15  MoCA V1 8.1 Raw Score:  25/30, MIS:  11/15, indicative of mild neurocognitive impairments.    MoCA Scoring        Normal: 26+         Mild Cognitive Impairment: 18-25          Moderate Cognitive Impairment: 10-17         Severe Cognitive Impairment: <10    Trail making Part A and Part B:   Part A: 34.49 seconds   Part B: 1:19.03 seconds  Indicating deficits: Part B > 15 seconds     Age-related norms:   Part A: 31.32   Part B: 64.58    GOALS:   Short Term Goals 4-6 weeks:  Pt will increase MOCA score to 26/30 (initially 25/30) for participation in ADLs/IADLs.  Pt will decrease time on TM B to under 1:13 seconds (initially 1:19.03 seconds) for divided attention to return to driving.  Pt will decrease time on FD to under 30 seconds (initially 38.49 seconds) for FMC and return to work.  Pt will increase score for UE to 28/36 (initially 26/36) on Fugyl Rfy for participation in ADLs/IADLs    Long Term Goals: 12 weeks  Pt will increase MOCA score to 27/30 (initially 25/30) for participation in ADLs/IADLs.  Pt will decrease time on TM B to under 1:04 seconds (initially 1:19.03 seconds) for divided attention to return to driving.  Pt will decrease time on FD to under 25 seconds (initially 38.49 seconds) for FMC and return to work.  Pt will increase score for UE to 30/36 (initially 26/36) on Fugyl Fry for participation in ADLs/IADLs  Pt will increase score for coordination/speed to 5/6 (initially 4/6) on Fugyl Fry for participation in ADLs/IADLs.  Pt will be independent in HEP for participation in ADLs/IADLs.    OTHER PLANNED THERAPY  INTERVENTIONS:   Neuro re-education  AROM/AAROM/PROM  Strengthening  NMES/FES  FMC/prehension  Dexterity  Timed Trials  Manual tx  Motor planning  Sensory re-education  Seated functional reach, crossing midline  WBearing strategies   Closed chain activities  Open chain activities  Spacticity management  Splinting, orthotic fabrication  Internal and external memory aides  Multimatrix for saccades/ visual clutter/attention  Hypersensitivity strategies education  Multi-modal environment  Sustained/alternating/divided attention  Memory and mental manipulation  Auditory processing with immediate recall  Memory retention with immediate and delayed recall  Edu on cog/vision apps

## 2025-05-16 ENCOUNTER — OFFICE VISIT (OUTPATIENT)
Facility: CLINIC | Age: 60
End: 2025-05-16
Payer: COMMERCIAL

## 2025-05-16 DIAGNOSIS — I63.9 CEREBROVASCULAR ACCIDENT (CVA), UNSPECIFIED MECHANISM (HCC): Primary | ICD-10-CM

## 2025-05-16 PROCEDURE — 97530 THERAPEUTIC ACTIVITIES: CPT

## 2025-05-16 PROCEDURE — 97110 THERAPEUTIC EXERCISES: CPT

## 2025-05-16 NOTE — PROGRESS NOTES
Daily Note     Today's date: 2025  Patient name: Ez Dsouza  : 1965  MRN: [id number]  Referring provider: Jakob Higgins MD  Dx:   Encounter Diagnosis   Name Primary?    Cerebrovascular accident (CVA), unspecified mechanism (HCC) Yes       Start Time: 0800  Stop Time: 0845  Total time in clinic (min): 45 minutes    PLAN OF CARE START: 25  PLAN OF CARE END: 8/15/2025  FREQUENCY: 3x/week  PRECAUTIONS CKD, HTN, Falls    Subjective: Pt repots his right shoulder is tight.     Objective: See treatment below.    TE:   Bilateral ER with red Theraband for 3x10 sets. Towel placed between elbow and ribs to eliminate compensation with GH abduction.   Lat dorsi stretch with pt leaning over onto table with R hand in line with L shoulder and leaning torso to the R side.   Developed HEP and added the following exercises  R lat dorsi stretch  Quadruped push ups  Quadruped needle threads  External rotation with yellow theraband.     NMR:   Performed standing push ups for 3x10 for weight bearing, proprioceptive input, UE strength.   Performed needle threads with pt standing at parallel bards with 2 lb wrist weight on the R UE. Pt instructed to move slow and stretch while weight bearing thru the R UE for scapular mobilization. Performed 30 reps each side.   In quadruped pt completed alternating shoulder taps for weight bearing, core stability/postural control. Then advanced quadruped activity to pt pushing XL squigz onto the wall, then pulling all off.     Assessment: Tolerated treatment well. Session focused on development of HEP today, pt was provided with handout and yellow Theraband to add exercises to HEP. Pt demonstrating in session R proximal weakness, impaired UE coordination, FMC, dexterity, and endurance. Pt would benefit from continued OT services to address R UE function.       Plan: Continued skilled OT per POC.

## 2025-05-19 LAB
ALDOST SERPL-MCNC: 14 NG/DL (ref 0–30)
ALDOST SERPL-MCNC: 16.1 NG/DL (ref 0–30)
ALDOST/RENIN PLAS-RTO: 15.3 {RATIO} (ref 0–30)
RENIN PLAS-CCNC: 0.91 NG/ML/HR (ref 0.17–5.38)

## 2025-05-20 ENCOUNTER — OFFICE VISIT (OUTPATIENT)
Facility: CLINIC | Age: 60
End: 2025-05-20
Payer: COMMERCIAL

## 2025-05-20 DIAGNOSIS — I63.9 CEREBROVASCULAR ACCIDENT (CVA), UNSPECIFIED MECHANISM (HCC): Primary | ICD-10-CM

## 2025-05-20 PROCEDURE — 97112 NEUROMUSCULAR REEDUCATION: CPT | Performed by: OCCUPATIONAL THERAPIST

## 2025-05-20 NOTE — PROGRESS NOTES
Daily Note     Today's date: 2025  Patient name: Ez Dsouza  : 1965  MRN: [id number]  Referring provider: Jakob Higgins MD  Dx:   Encounter Diagnosis   Name Primary?    Cerebrovascular accident (CVA), unspecified mechanism (HCC) Yes                  PLAN OF CARE START: 25  PLAN OF CARE END: 8/15/2025  FREQUENCY: 3x/week  PRECAUTIONS CKD, HTN, Falls    Subjective: Pt repots his right shoulder is tight. He thinks he hurt it prior to his CVA.    Objective: See treatment below.  BP checked prior to tx- 130/90    NMR:   -Priming exercise- sled pushes with 50lbs added to sled x4 minutes  Significant scapular wining noted during R shoulder flexion, therefore the following exercises were performed to improve scap stability for reaching:  -Prone T's 2x10 x3 second holds  -Prone I's 2x10 x5 second holds  -ER with yellow theraband with BUE, then RUE only x10 reps each way with tactile cues throughout to decrease shoulder hiking and improve scap retraction and depression  -Scapular rows on cable machine with 14lbs focusing on RUE motor control, bimanual coordination  -Screw board activity with board on slant board, 1lb wrist weight to RUE. Pt removed, then replaced screws with divided attention naming task to address FMC. Required cues for recall of 2/3 categories.    Assessment: Tolerated treatment well. Session focused on proximal strengthening and FMC with dual tasking. Pt demonstrating in session R proximal weakness, impaired UE coordination, FMC, dexterity, and endurance. Pt would benefit from continued OT services to address R UE function.       Plan: Continued skilled OT per POC.

## 2025-05-23 ENCOUNTER — OFFICE VISIT (OUTPATIENT)
Facility: CLINIC | Age: 60
End: 2025-05-23
Payer: COMMERCIAL

## 2025-05-23 DIAGNOSIS — I63.9 CEREBROVASCULAR ACCIDENT (CVA), UNSPECIFIED MECHANISM (HCC): Primary | ICD-10-CM

## 2025-05-23 PROCEDURE — 97112 NEUROMUSCULAR REEDUCATION: CPT

## 2025-05-23 NOTE — PROGRESS NOTES
Daily Note     Today's date: 2025  Patient name: Ez Dsouza  : 1965  MRN: [id number]  Referring provider: Jakob Higgins MD  Dx:   Encounter Diagnosis   Name Primary?    Cerebrovascular accident (CVA), unspecified mechanism (HCC) Yes         Start Time: 803  Stop Time: 845  Total time in clinic (min): 42 minutes    PLAN OF CARE START: 25  PLAN OF CARE END: 8/15/2025  FREQUENCY: 3x/week  PRECAUTIONS CKD, HTN, Falls    Subjective: Pt reports he contacted the doctor regarding his nose bleeds and they told him that they are happening because he is on blood thinners. Pt got a nose bleed during today's session. Lasted briefly, ~30 seconds and was stopped with paper towels. Pt with no reported headache, dizziness, changes in vision, or pain.     Objective: See treatment below.    NMR:     Pt engaged in scap stability and mobilization exercises d/t observed scapular winging and to increase proximal stability for distal mobility during everyday activities.     - Prone W's- 1x20, then 1x15 with progression to shoulder flexion. Compensatory movement of shoulder IR noted on R UE, provided tactile cues throughout.   - Yellow resistance band walls slides 2x20. Pt benefited from cue to keep thumb up to avoid IR of shoulders.   - Scapular protraction/retraction with black therapy ball b/l x20. Then addition of horizontal abduction and adduction x20.   - Scapular elevation/depression with black therapy ball b/l x20.   - Scapular stabilization with black therapy ball b/l making circles x20.   -Shoulder external rotation with black therapy ball b/l x20. Benefited from putting towel between UE and trunk to decrease compensatory abduction movement.     Pt engaged in FMC/Dexterity task with red theraband donned on R UE for proprioceptive input and to provide resistance into forearm pronation.   - Picked up 3 coins, one at a time, from anterior table top. Then reached forward to thread into slot, focusing on scapular  protraction/retraction, elbow extension/flexion, and shoulder flexion. Completed x30 coins. Required verbal cues to eliminate compensatory movements throughout. Pt with ~5 drops throughout.     Assessment: Tolerated treatment well. Session focused on proximal strengthening and stability for distal mobility. Pt benefited from tactile and verbal cues throughout session to avoid compensatory movements. Pt demonstrating in session R proximal weakness, impaired UE coordination, FMC, dexterity, and endurance. Pt would benefit from continued OT services to address R UE function.     Plan: Continued skilled OT per POC.

## 2025-05-27 ENCOUNTER — OFFICE VISIT (OUTPATIENT)
Facility: CLINIC | Age: 60
End: 2025-05-27
Payer: COMMERCIAL

## 2025-05-27 DIAGNOSIS — I63.9 CEREBROVASCULAR ACCIDENT (CVA), UNSPECIFIED MECHANISM (HCC): Primary | ICD-10-CM

## 2025-05-27 PROCEDURE — 97112 NEUROMUSCULAR REEDUCATION: CPT

## 2025-05-27 NOTE — PROGRESS NOTES
Daily Note     Today's date: 2025  Patient name: Ez Dsouza  : 1965  MRN: [id number]  Referring provider: Jakob Higgins MD  Dx:   Encounter Diagnosis   Name Primary?    Cerebrovascular accident (CVA), unspecified mechanism (HCC) Yes                    PLAN OF CARE START: 25  PLAN OF CARE END: 8/15/2025  FREQUENCY: 3x/week  PRECAUTIONS CKD, HTN, Falls    Subjective: Pt reports he is more fatigued since his CVA with returning to daily activities. Discussed referral to neuro physical therapy, which he was in agreement with. Pt scheduled for PT evaluation on 25.     Objective: See treatment below.    NMR:   Standing push ups at mat with 2 lb dumb bell in the R hand and performing horizontal abduction with scap retraction in between each push up. R UE strength, scapular stability, proprioceptive input.   Pt standing at wall with arm extended and rolling ball with drawing out alphabet to work on R UE motor control, endurance.   Completed overhead ball bouncing at wall with bilateral UE, 1 set to fatigue, working on B/L coordination, muscular endurance.   Pt seated at table completed [employer] board activity with pt picking up pieces with the R UE. Working on R hand FMC, dexterity, and EF skills/problem solving.     Assessment: Tolerated treatment well. Did well with sodoku puzzle bud did require increased time to complete. Recommended pt complete FMC with puzzles at home to encourage cognitive component with FM tasks. Pt demonstrating in session R proximal weakness, impaired UE coordination, FMC, dexterity, and endurance. Pt would benefit from continued OT services to address R UE function.     Plan: Continued skilled OT per POC.

## 2025-05-29 ENCOUNTER — EVALUATION (OUTPATIENT)
Facility: CLINIC | Age: 60
End: 2025-05-29
Payer: COMMERCIAL

## 2025-05-29 ENCOUNTER — OFFICE VISIT (OUTPATIENT)
Facility: CLINIC | Age: 60
End: 2025-05-29
Payer: COMMERCIAL

## 2025-05-29 DIAGNOSIS — I63.9 CEREBROVASCULAR ACCIDENT (CVA), UNSPECIFIED MECHANISM (HCC): Primary | ICD-10-CM

## 2025-05-29 PROCEDURE — 97110 THERAPEUTIC EXERCISES: CPT

## 2025-05-29 PROCEDURE — 97162 PT EVAL MOD COMPLEX 30 MIN: CPT | Performed by: PHYSICAL THERAPIST

## 2025-05-29 PROCEDURE — 97112 NEUROMUSCULAR REEDUCATION: CPT

## 2025-05-29 NOTE — PROGRESS NOTES
Daily Note     Today's date: 2025  Patient name: Ez Dsouza  : 1965  MRN: [id number]  Referring provider: Jakob Higgins MD  Dx:   Encounter Diagnosis   Name Primary?    Cerebrovascular accident (CVA), unspecified mechanism (HCC) Yes           Start Time: 845  Stop Time: 930  Total time in clinic (min): 45 minutes    PLAN OF CARE START: 25  PLAN OF CARE END: 8/15/2025  FREQUENCY: 3x/week  PRECAUTIONS CKD, HTN, Falls    Subjective: Pt reports that he tries to use his R hand during ADLs, IADLs to carryover skills he uses here at home.     Objective: See treatment below.    NMR/TE:   Bent over single arm rows at // bars with 3lb dumbbells. 2x15 b/l. Focus on WB through UE, proprioceptive input, and b/l UE strengthening.   Squat to overhead press with 10lb weighted ball. 2x15. Pt with reported R shoulder pain past ~100*. Educated pt to stop at that point.  Bicep curls with 15lb tidal tank. 2x15. Pt required Min verbal cues for correct form throughout.   Pt seated at table to complete purdue pegboard. Initially worked on picking up 1 peg at a time, translating it to palm and then bringing it to finger tips. Completed x10 items. Pt then upgraded to picking up 2 items to increase dexterity and in-hand manipulation demands. Completed x25 items. Required Mod verbal cues to eliminate compensatory shoulder and elbow movements throughout.   Pt completed screws/bolt bold x15 items for focus on intrinsic hand muscle strengthening and coordination. Required Min verbal cues to eliminate compensatory movements.     Assessment: Tolerated treatment well. Demonstrated decreased R UE proximal weakness and coordination during today's session. Pt observed to have functional R hand skills to manipulate screws/bolts/pegs but decreased FMC and dexterity led to increased compensatory movements today. Pt would benefit from continued OT services to address R UE function.     Plan: Continued skilled OT per POC.

## 2025-05-29 NOTE — PROGRESS NOTES
PT Evaluation          POC expires Unit limit Auth Expiration date PT/OT + Visit Limit? Co-Insurance   25 no No auth req bomn No                                       Today's date: 2025  Patient name: Ez Dsouza  : 1965  MRN: [id number]  Referring provider: Jakob Higgins MD  Dx:   Encounter Diagnosis     ICD-10-CM    1. Cerebrovascular accident (CVA), unspecified mechanism (HCC)  I63.9             Assessment  Assessment details: Patient is a 59 y.o. Male who presents to skilled outpatient PT with diagnosis of CVA in 2025. He spent 1 week at [facility] for inpatient rehab, then a few weeks of home PT and OT, and currently attending this location for past few weeks for OT to address RUE impairments. Pt referred to PT by OT to address endurance concerns. Pt with overall normal BLE strength, with the exception being R hip 4/5. Sensation intact. RUE coordination impaired which is being addressed by OT. He has minimal balance concerns and he does score as low fall risk per all outcome measures below. His dual task cost is 24% which could place him at higher risk for falls when performing dual task activities. His 6MWT score is below age norm values. Gait deviations include occasional R foot drag which could be due to weaker hip flexors or possibly more of a proprioceptive impairment, but foot drag could lead to tripping and falling if not addressed. Pt will benefit from skilled OP PT to address below impairments and meet goals in order to return to PLOF.          Impairments: Abnormal coordination, Abnormal gait, Abnormal muscle tone, Abnormal or restricted ROM, Activity intolerance, Impaired balance, Impaired physical strength, Lacks appropriate HEP, Poor posture, Poor body mechanics, Pain with function, Safety issue, Weight-bearing intolerance, Abnormal movement, Difficulty understanding, Abnormal muscle firing  Understanding of  Dx/Px/POC: Excellent  Prognosis: Excellent      Patient verbalized understanding of POC.         Please contact me if you have any questions or recommendations. Thank you for the referral and the opportunity to share in Ez Dsouza's care.        Plan  Plan details: High intensity gait training/ high intensity training   Patient would benefit from: Skilled PT  Planned modality interventions: Biofeedback, Cryotherapy, TENS, Thermotherapy  Planned therapy interventions: Abdominal trunk stabilization, ADL training, Balance, Balance/WB training, Breathing training, Body mechanics training, Coordination, Functional ROM exercises, Gait training, HEP, Joint Mobilization, Manual Therapy, Mora taping, Motor coordination training, Neuromuscular re-education, Patient education, Postural training, Strengthening, Stretching, Therapeutic activities, Therapeutic exercises, Therapeutic training, Transfer training, Activity modification, Work reintegration  Frequency: 1-2x/wk  Duration in weeks: 8 weeks  Plan of Care beginning date: 5/29/2025  Plan of Care expiration date: 8 weeks - 7/24/2025  Treatment plan discussed with: Patient         Goals  Short Term Goals (4 weeks):    - Patient will be independent in basic HEP 2-3 weeks  - Patient will improve 6 Minute Walk Test score by 190 feet to promote improved cardiovascular endurance    Long Term Goals (8 weeks):  - Patient will be independent in a comprehensive home exercise program  - Patient will improve 6 Minute Walk Test score to at least 1800 feet to promote improved cardiovascular endurance and meet age-matched norm values.  - Patient will reduce dual task cost to < 10% to reduce fall risk    Cut off score    All date taken from APTA Neuro Section or Rehab Measures      Han:  Noni et al., 2018  MDC: 2.7 pts    Guru et al., 2011  Cut-off score: 45/56    Chronic CVA  < 44/56 high risk for falls (Noni et al., 2018)  < 47.5/56 slow walker status (Cathie et al.,  2011) 5xSTS: Ivette 2010  MDC: 2.3 sec  Age Norms:  60-69: 11.1 sec  70-79: 12.6 sec  80-89: 14.8 sec    Jamey, 2010, Chronic Stroke  Chronic CVA: 12 sec   TUG  Sarah., 2005  MDC: 2.9 sec    Cut off score:  >13.5 sec community dwelling adults  >32.2 frail elderly  <20 I for basic transfers  >30 dependent on transfers 10 Meter Walk Test:   Agnieszka lovelace al., 2006  Small meaningful change: 0.06 m/s  Substantial meaningful change: 0.14 m/s  MCID: 0.16 m/s    < 0.4 m/s household ambulators  0.4 - 0.8 m/s limited community ambulators  > 0.8 m/s community ambulators   FGA: Laura et al., 2010  MCID: 4.2 pts  Geriatrics/community < 22/30 fall risk  Geriatrics/community < 20/30 unexplained falls DGI  MDC: vestibular - 4 pts  MDC: geriatric/community - 3 pts  Falls risk <19/24   6 Minute Walk Test  Agnieszka et al., 2006  MDC: 60.98 m (200.01 ft)    Ricco Colon, & Nay, 2012  MCID: 34.4 m    Age Norms  60-69: M - 1876 ft   F - 1765 ft  70-79: M - 1729 ft   F - 1545 ft  80-89: M - 1368 ft   F - 1286 ft Modified Gumaro  0: No increase in tone  1: Catch and release or min resistance at end range  1+: Catch f/b min resistance throughout remainder (< half ROM)  2: Easily moved, but more marked tone throughout most ROM  3: Significant tone, PROM difficult  4: Rigid   MiniBest: Shanae et al., 2013  CVA < 17.5 fall risk Pass (Acute CVA)  MDC: 1.8 points (acute), 3.2 points (chronic)         Subjective    History of Present Illness  Mechanism of injury: Pt had CVA on March 10th, spent 2 weeks in hospital and 1 week at [facility] for rehab. He had home PT, and has been coming here for outpatient OT. He presents now for PT. His hobbies include hunting, fishing, he likes to be outdoors. Feels like he is having some balance issues and endurance issues.   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63 sec    24% dual task cost      10 meter 1.57 m/s  Fast pace      FGA 27/30      DGI 23/24      6MWT 1140 ft                         Precautions:   Past Medical History[1]          [1]   Past Medical History:  Diagnosis Date    Hypertension

## 2025-05-30 ENCOUNTER — OFFICE VISIT (OUTPATIENT)
Facility: CLINIC | Age: 60
End: 2025-05-30
Payer: COMMERCIAL

## 2025-05-30 DIAGNOSIS — I63.9 CEREBROVASCULAR ACCIDENT (CVA), UNSPECIFIED MECHANISM (HCC): Primary | ICD-10-CM

## 2025-05-30 PROCEDURE — 97110 THERAPEUTIC EXERCISES: CPT

## 2025-05-30 PROCEDURE — 97112 NEUROMUSCULAR REEDUCATION: CPT

## 2025-05-30 NOTE — PROGRESS NOTES
"Daily Note     Today's date: 2025  Patient name: Ez Dsouza  : 1965  MRN: [id number]  Referring provider: Jakob Higgins MD  Dx:   Encounter Diagnosis   Name Primary?    Cerebrovascular accident (CVA), unspecified mechanism (HCC) Yes             Start Time: 845  Stop Time: 930  Total time in clinic (min): 45 minutes    PLAN OF CARE START: 25  PLAN OF CARE END: 8/15/2025  FREQUENCY: 3x/week  PRECAUTIONS CKD, HTN, Falls    Subjective: \"My back is a little sore from fishing yesterday\"    Objective: See treatment below.    NMR/TE:   -Pt performed squats with isometric holds and alternating punches followed by jump at high intensity for 2x3 minutes in order to increase heart rate to drive neuroplastic changes following CVA. ~20-25 reps each set. Pt required min verbal cues for correct form throughout. 2lb b/l wrist weights donned.   Prone shoulder abduction on mat table with 2lb wrist weight R UE. Pt only able to get through ~1/2 full ROM, 2x20. Benefited from tactile cues to eliminate compensatory trunk movements.   Prone shoulder flexion on mat table with 3lb wrist weight R UE. Pt able to get through ~3/4 full ROM, 2x20.   Threading fishing line onto 1inch hooks and tying various fishing knots (cinch, blood, surgeon knots) seated at table top with use of R UE as mobilizer and L UE as stabilizer. Focus on R UE FMC, dexterity, and in-hand manipulator for carry over into leisure activities/IADLs. Completed x10.  Pt stacked dice, picking up 4 at a time, bringing them from palm to finger tips to make stacks of 4 by numbers. Completed x20 with 2-3 verbal cues to eliminate compensatory supination of forearm throughout.     Assessment: Tolerated treatment well. Demonstrated decreased R UE proximal weakness and coordination during today's session.   Overall, pt continuing to demonstrate deficits proximally > distally. Additionally, pt benefited from priming exercise today to increase HR to drive neuroplastic " changes at beginning of session. Pt would benefit from continued OT services to address R UE function.     Plan: Continued skilled OT per POC.

## 2025-06-03 ENCOUNTER — OFFICE VISIT (OUTPATIENT)
Facility: CLINIC | Age: 60
End: 2025-06-03
Payer: COMMERCIAL

## 2025-06-03 DIAGNOSIS — I63.9 CEREBROVASCULAR ACCIDENT (CVA), UNSPECIFIED MECHANISM (HCC): Primary | ICD-10-CM

## 2025-06-03 PROCEDURE — 97112 NEUROMUSCULAR REEDUCATION: CPT | Performed by: PHYSICAL THERAPIST

## 2025-06-03 PROCEDURE — 97110 THERAPEUTIC EXERCISES: CPT

## 2025-06-03 PROCEDURE — 97112 NEUROMUSCULAR REEDUCATION: CPT

## 2025-06-03 NOTE — PROGRESS NOTES
Daily Note     Today's date: 6/3/2025  Patient name: Ez Dsouza  : 1965  MRN: [id number]  Referring provider: Jakob Higgins MD  Dx:   Encounter Diagnosis   Name Primary?    Cerebrovascular accident (CVA), unspecified mechanism (HCC) Yes     Start Time: 1630  Stop Time:   Total time in clinic (min): 45 minutes    PLAN OF CARE START: 25  PLAN OF CARE END: 8/15/2025  FREQUENCY: 3x/week  PRECAUTIONS CKD, HTN, Falls    Subjective: No significant reports today.     Objective: See treatment below.    NMR/TE:   Putty press with dowel and green putty for ~4 minutes to work on R UE proximal strengthening,  strength  Pushing and pulling green putty for 3 minutes each direction to work on R UE strength, sustained grasp, motor control.   Squeezing/twisting/pulling green putty for 3 minutes for bilateral coordination, scapular retraction, strength.   Pt with 2 lb wrist weight donned on the R UE, reaches to L side at waist level to  3-5 pegs at a time, then reaches to the R side with peg board placed on slant board. Working on R UE coordination, proximal stability with distal control, FMC, dexterity.     Assessment: Tolerated treatment well. Demonstrated decreased R UE proximal weakness and coordination during today's session.   Overall, pt continuing to demonstrate deficits proximally > distally. Additionally, pt benefited from priming exercise today to increase HR to drive neuroplastic changes at beginning of session. Pt would benefit from continued OT services to address R UE function.     Plan: Continued skilled OT per POC.

## 2025-06-03 NOTE — PROGRESS NOTES
"Daily Note     Today's date: 6/3/2025  Patient name: Ez Dsouza  : 1965  MRN: [id number]  Referring provider: Jakob Higgins MD  Dx:   Encounter Diagnosis     ICD-10-CM    1. Cerebrovascular accident (CVA), unspecified mechanism (HCC)  I63.9                      Subjective: Patient reports no new changes      Objective: See treatment diary below    NMR: (16# vest)  - Treadmill: 10 min @ 1.5-2.5 mph, HR up to 115 bpm  - FWD hurdles: 10 laps x 12\" hurdles  - LAT hurdles: 10 laps x 12\" hurdles  - BWD hurdles: 10 laps x 12\" hurdles  - Step up: 20x ea, 6\" step  - Step up/through: 20x ea, 6-18\" step      Assessment: Patient tolerated treatment well. Challenged patient dynamic balance and mobility with hurdles in forward, lateral, and backward directions. Patient displays frequent RLE circumduction compensation, completes without LOB. Plan to progress as tolerated. Patient will benefit from skilled PT services to reduce his symptoms and help him return to his PLOF.       Plan: Continue per plan of care.      POC expires Unit limit Auth Expiration date PT/OT + Visit Limit? Co-Insurance   25 no No auth req bomn No                                   Outcome Measures Initial Eval  25      5xSTS 9.78 sec      TUG  TUG cog 6.92 sec  8.63 sec    24% dual task cost      10 meter 1.57 m/s  Fast pace      FGA       DGI       6MWT 1140 ft                         Precautions:   Past Medical History[1]                 [1]   Past Medical History:  Diagnosis Date    Hypertension      "

## 2025-06-03 NOTE — PROGRESS NOTES
[facility]  [address]  [place] [address]  Phone#  [phone]  Fax#  [phone]  [facility]'s Cardiology Office Follow-up Visit             NAME: Ez Dsouza  AGE: 59 y.o. SEX: male   : 1965   MRN: [id number]    DATE: 2025  TIME: 9:35 AM      Assessment & Plan  Cerebrovascular accident (CVA) due to stenosis of left carotid artery (HCC)  3/10/25 MRI  brain - multiple small acute infarcts (left)  3/10/25 vascular ultrasound - left > 70% stenosis with irregular plaque, right < 50% stenosis  3/21/25 Zio - rare SVT, no AF  3/20/25 echo - LVEF 55%, small PFO  We discussed the results of his echocardiogram as well as his ZIO monitor which did not show any evidence of atrial fibrillation.  We discussed potential utility of doing prolonged heart monitoring.  However, given the likely source of his stroke was due to left-sided carotid stenosis now status post CEA and medical therapy, limited utility and prolonged heart monitoring.  Defer at this time.  Will continue aspirin, Plavix. Timing per neurology instructions   Due to ASCVD, will now need aggressive LDL lowering with goal less than 70.  We discussed that he is unlikely to reach this was just increasing atorvastatin.  Will add Zetia 10 mg daily and reassess lipid levels in 6 to 8 weeks.  I did discuss that he may benefit from PCSK9 inhibitor, but he is not interested in that at this time.  Will readdress after lipid results return.  S/P carotid endarterectomy  As above  Mixed hyperlipidemia  Not at goal due to hx ASCVD.  25 Lipid panel showed total cholesterol 174, HDL 34, ,    Discussed LDL goal < 70  Recommended lifestyle changes including healthy diet and exercise  Continue aspirin 81 mg daily and atorvastatin 40 mg daily as above  Added Zetia as above  Resistant hypertension  Improving.  Review home BP logs today  Continue amlodipine 5 mg bid  Continue HCTZ 25 mg daily,  Continue spironolactone  25 mg daily  Continue carvedilol 25 mg bid  Continue home BP monitoring  Coronary artery disease involving native coronary artery of native heart without angina pectoris  Based on elevated coronary artery calcium score  Stable.  Patient presented for evaluation of elevated coronary artery calcium score as well as evidence of nonsignificant carotid artery stenosis found incidentally during hospitalization with trauma workup.  We discussed what coronary artery calcium scoring means in terms of atherosclerotic disease.    Continue aspirin 81 mg daily  Continue atorvastatin 40 mg daily  Added Zetia 10 mg daily due to recent CVA - recheck in 6-8 weeks   Former smoker  Quit after CVA!      Follow up 3 mo for CVA, lipid      -----    Chief Complaint   Patient presents with    Follow-up     No active cardiac complaints.       HPI:    Ez Dsouza is a 59 y.o.-year-old male who presents to the cardiology clinic for follow up for the above-listed problems.     Past medical history significant for hypertension and CACS 696.    Initially established care in our office 2024. Blood pressures elevated to the 180s to 200s systolics.  Has undergone a lot of medication changes with his primary care physician, and is unable to find me with details.  He states that he has been increasing his dosage of valsartan, and he does not think that it is helping.  He very rarely checks his blood pressures at home.  It is unclear if he is taking all of his medications as prescribed.     He reports that this coronary artery calcium scan was done due to the incidental findings of carotid stenosis on his trauma scans from a hospitalization back in 2024.  He denies any chest pain, chest discomfort, shortness of breath at rest, or dyspnea on exertion, lightheadedness, dizziness, palpitations.  He reports that he is fairly active, and does not notice any limitations     His maternal grandfather  of a heart attack at age 50.  He has uncles  on his mother side that also had coronary artery disease.  His father had open heart surgery at age 72.     Since last appointment, he was hospitalized 3/19/25 for stroke. I reviewed the notes, labs, imaging from that hospital stay. Stroke due to left carotid stenosis, now s/p CEA 3/17/25 c/d hematoma development requiring washout.     Completed inpatient rehab. Continues to work with PT/OT and notes continued neuro improvement. No chest pain, chest discomfort, shortness of breath.    -----    I personally reviewed the patient's pertinent cardiac imaging and labs in Epic:    3/21/25 Zio - rare SVT, no AF    5/14/25 Lipid panel showed total cholesterol 174, HDL 34, ,      3/20/25 echo - LVEF 55%, small PFO    3/10/25 MRI  brain - multiple small acute infarcts (left)  3/10/25 vascular ultrasound - left > 70% stenosis with irregular plaque, right < 50% stenosis    7/03/24 CACS 696  9/16/24 renal duplex - no MEDARDO, >70% mesenteric artery stenosis  9/13/2024 TTE -ventricular systolic function with an EF of 60% grade 1 diastolic dysfunction, normal RV function, mild MAC    -----    Past history, family history, social history, current medications, vital signs, recent lab and imaging studies and  prior cardiology studies reviewed independently on this visit.    No Known Allergies    Current Outpatient Medications:     amLODIPine (NORVASC) 5 mg tablet, Take 1 tablet (5 mg total) by mouth every 12 (twelve) hours, Disp: 60 tablet, Rfl: 11    ASPIRIN 81 PO, Take 81 mg by mouth in the morning, Disp: , Rfl:     atorvastatin (LIPITOR) 40 mg tablet, Take 1 tablet (40 mg total) by mouth daily, Disp: 90 tablet, Rfl: 3    carvedilol (COREG) 25 mg tablet, Take 1 tablet (25 mg total) by mouth 2 (two) times a day with meals, Disp: 180 tablet, Rfl: 3    clopidogrel (PLAVIX) 75 mg tablet, Take 1 tablet (75 mg total) by mouth daily, Disp: , Rfl:     ezetimibe (ZETIA) 10 mg tablet, Take 1 tablet (10 mg total) by mouth daily,  Disp: 90 tablet, Rfl: 3    hydroCHLOROthiazide 25 mg tablet, Take 1 tablet (25 mg total) by mouth daily, Disp: 90 tablet, Rfl: 3    spironolactone (ALDACTONE) 25 mg tablet, Take 1 tablet (25 mg total) by mouth daily, Disp: 90 tablet, Rfl: 3    nicotine (NICODERM CQ) 14 mg/24hr TD 24 hr patch, Place 1 patch on the skin over 24 hours daily (Patient not taking: Reported on 4/3/2025), Disp: , Rfl:     Review of Systems   Respiratory:  Negative for chest tightness and shortness of breath.    Cardiovascular:  Negative for chest pain, palpitations and leg swelling.       Objective:     Vitals:    06/04/25 0751   BP: 118/70   Pulse: 67   SpO2: 97%       Wt Readings from Last 3 Encounters:   06/04/25 95.9 kg (211 lb 6.4 oz)   04/10/25 90.2 kg (198 lb 12.8 oz)   03/28/25 90.6 kg (199 lb 12.8 oz)     Pulse Readings from Last 3 Encounters:   06/04/25 67   04/30/25 65   04/24/25 76     BP Readings from Last 3 Encounters:   06/04/25 118/70   04/30/25 142/80   04/24/25 106/70     Physical Exam  Vitals reviewed.   Constitutional:       General: He is not in acute distress.     Appearance: Normal appearance. He is well-developed.   HENT:      Head: Normocephalic and atraumatic.     Cardiovascular:      Rate and Rhythm: Normal rate and regular rhythm.      Heart sounds: No murmur heard.  Pulmonary:      Effort: Pulmonary effort is normal. No respiratory distress.      Breath sounds: Normal breath sounds.   Abdominal:      General: There is no distension.     Musculoskeletal:         General: No swelling.     Skin:     General: Skin is warm and dry.     Neurological:      Mental Status: He is alert.     Psychiatric:         Mood and Affect: Mood normal.         Pertinent Laboratory/Diagnostic Studies:    Laboratory studies reviewed personally by Eloise Mccurdy MD    BMP:   Lab Results   Component Value Date    SODIUM 140 05/14/2025    K 4.0 05/14/2025     05/14/2025    CO2 25 05/14/2025    BUN 38 (H) 05/14/2025    CREATININE  "1.76 (H) 05/14/2025    GLUC 105 03/28/2025    CALCIUM 9.2 05/14/2025     CBC:  Lab Results   Component Value Date    WBC 10.27 (H) 03/28/2025    HGB 11.6 (L) 03/28/2025    HCT 35.0 (L) 03/28/2025    MCV 94 03/28/2025     (H) 03/28/2025     Lipid Profile:   Lab Results   Component Value Date    HDL 34 (L) 05/14/2025     Lab Results   Component Value Date    LDLCALC 106 (H) 05/14/2025     Lab Results   Component Value Date    TRIG 171 (H) 05/14/2025      Other labs:  Lab Results   Component Value Date    DTK2PBEDCACT 0.543 03/10/2025     Lab Results   Component Value Date    ALT 20 03/26/2025    AST 14 03/26/2025     Lab Results   Component Value Date    HGBA1C 6.9 (H) 03/10/2025         Imaging Studies:     Pertinent imaging studies and cardiac studies were independently reviewed on this visit and findings summarized.    Eloise Mccurdy MD, PhD    Portions of the record may have been created with voice recognition software.  Occasional wrong word or \"sound alike\" substitutions may have occurred due to the inherent limitations of voice recognition software.  Read the chart carefully and recognize, using context, where substitutions have occurred. Please reach out to me directly for any clarifications.   "

## 2025-06-04 ENCOUNTER — OFFICE VISIT (OUTPATIENT)
Dept: CARDIOLOGY CLINIC | Facility: CLINIC | Age: 60
End: 2025-06-04
Payer: COMMERCIAL

## 2025-06-04 VITALS
HEIGHT: 74 IN | SYSTOLIC BLOOD PRESSURE: 118 MMHG | DIASTOLIC BLOOD PRESSURE: 70 MMHG | WEIGHT: 211.4 LBS | BODY MASS INDEX: 27.13 KG/M2 | HEART RATE: 67 BPM | OXYGEN SATURATION: 97 %

## 2025-06-04 DIAGNOSIS — Z87.891 FORMER SMOKER: ICD-10-CM

## 2025-06-04 DIAGNOSIS — I25.10 CORONARY ARTERY DISEASE INVOLVING NATIVE CORONARY ARTERY OF NATIVE HEART WITHOUT ANGINA PECTORIS: ICD-10-CM

## 2025-06-04 DIAGNOSIS — I10 PRIMARY HYPERTENSION: ICD-10-CM

## 2025-06-04 DIAGNOSIS — I63.232 CEREBROVASCULAR ACCIDENT (CVA) DUE TO STENOSIS OF LEFT CAROTID ARTERY (HCC): Primary | ICD-10-CM

## 2025-06-04 DIAGNOSIS — Z98.890 S/P CAROTID ENDARTERECTOMY: ICD-10-CM

## 2025-06-04 DIAGNOSIS — E78.2 MIXED HYPERLIPIDEMIA: ICD-10-CM

## 2025-06-04 DIAGNOSIS — I1A.0 RESISTANT HYPERTENSION: ICD-10-CM

## 2025-06-04 PROCEDURE — 99214 OFFICE O/P EST MOD 30 MIN: CPT | Performed by: STUDENT IN AN ORGANIZED HEALTH CARE EDUCATION/TRAINING PROGRAM

## 2025-06-04 RX ORDER — AMLODIPINE BESYLATE 5 MG/1
5 TABLET ORAL EVERY 12 HOURS
Qty: 60 TABLET | Refills: 11 | Status: SHIPPED | OUTPATIENT
Start: 2025-06-04

## 2025-06-04 RX ORDER — CARVEDILOL 25 MG/1
25 TABLET ORAL 2 TIMES DAILY WITH MEALS
Qty: 180 TABLET | Refills: 3 | Status: SHIPPED | OUTPATIENT
Start: 2025-06-04

## 2025-06-04 RX ORDER — HYDROCHLOROTHIAZIDE 25 MG/1
25 TABLET ORAL DAILY
Qty: 90 TABLET | Refills: 3 | Status: SHIPPED | OUTPATIENT
Start: 2025-06-04

## 2025-06-04 RX ORDER — SPIRONOLACTONE 25 MG/1
25 TABLET ORAL DAILY
Qty: 90 TABLET | Refills: 3 | Status: SHIPPED | OUTPATIENT
Start: 2025-06-04

## 2025-06-04 RX ORDER — ATORVASTATIN CALCIUM 40 MG/1
40 TABLET, FILM COATED ORAL DAILY
Qty: 90 TABLET | Refills: 3 | Status: SHIPPED | OUTPATIENT
Start: 2025-06-04

## 2025-06-04 RX ORDER — EZETIMIBE 10 MG/1
10 TABLET ORAL DAILY
Qty: 90 TABLET | Refills: 3 | Status: SHIPPED | OUTPATIENT
Start: 2025-06-04

## 2025-06-04 NOTE — PATIENT INSTRUCTIONS
Start Zetia (ezetimibe) 10 mg daily  Check cholesterol labs in 2 months (fasting)     Check blood counts and BMP now (not fasting)

## 2025-06-04 NOTE — ASSESSMENT & PLAN NOTE
Not at goal due to hx ASCVD.  5/14/25 Lipid panel showed total cholesterol 174, HDL 34, ,    Discussed LDL goal < 70  Recommended lifestyle changes including healthy diet and exercise  Continue aspirin 81 mg daily and atorvastatin 40 mg daily as above  Added Zetia as above

## 2025-06-05 ENCOUNTER — OFFICE VISIT (OUTPATIENT)
Facility: CLINIC | Age: 60
End: 2025-06-05
Payer: COMMERCIAL

## 2025-06-05 DIAGNOSIS — I63.9 CEREBROVASCULAR ACCIDENT (CVA), UNSPECIFIED MECHANISM (HCC): Primary | ICD-10-CM

## 2025-06-05 PROCEDURE — 97110 THERAPEUTIC EXERCISES: CPT

## 2025-06-05 PROCEDURE — 97112 NEUROMUSCULAR REEDUCATION: CPT

## 2025-06-05 NOTE — PROGRESS NOTES
Daily Note     Today's date: 2025  Patient name: Ez Dsouza  : 1965  MRN: [id number]  Referring provider: Jakob Higgins MD  Dx:   Encounter Diagnosis   Name Primary?    Cerebrovascular accident (CVA), unspecified mechanism (HCC) Yes       Start Time: 0800  Stop Time: 0845  Total time in clinic (min): 45 minutes    PLAN OF CARE START: 25  PLAN OF CARE END: 8/15/2025  FREQUENCY: 3x/week  PRECAUTIONS CKD, HTN, Falls    Subjective: Pt reports that he mowed his lawn last night with a push mower and his R shoulder is feeling sore today.     Objective: See treatment below.    NMR/TE:   - Bicep curls with 20lb tidal tank while simultaneously walking up and down therapy gym. Completed for 2 min, then 30 second rest. X3. Performed as primer to increase heart rate to drive neuroplastic changes following CVA.   - Scapular protraction/retraction with small yellow therapy ball b/l x25 R UE.   - Scapular elevation/depression with small yellow therapy ball b/l x25 R UE.   - Scapular stabilization with small yellow therapy ball b/l making circles x50 R UE.   -Shoulder external rotation with small yellow therapy ball b/l x25 R UE. Benefited from putting towel between UE and trunk to decrease compensatory abduction movement.   - Pt picked up 3 1/2 inch pegs and placed them into small peg board one at a time, stating words for each item placed with next word starting with second letter of previous word. Focus on R UE dexterity, in-hand manipulation, specifically palm to finger tip translation, divided attention, working memory, and direction following. Pt required min verbal cues for direction following throughout. ~5 drops noted.   - Pt completed rubber band finger abduction exercise, putting ~25 rubber bands onto cylindrical can. Required 2-3 verbal cues for correct form.     Assessment: Tolerated treatment well. Demonstrated decreased R UE proximal weakness and coordination during today's session.   Pt with improved  scapular movement and stabilization observed during today's initial exercises. Overall, pt continuing to demonstrate deficits proximally > distally, however would benefit from higher level dexterity exercises for carryover into everyday activities. Pt would benefit from continued OT services to address R UE function.     Plan: Continued skilled OT per POC.

## 2025-06-06 ENCOUNTER — OFFICE VISIT (OUTPATIENT)
Facility: CLINIC | Age: 60
End: 2025-06-06
Payer: COMMERCIAL

## 2025-06-06 DIAGNOSIS — I63.9 CEREBROVASCULAR ACCIDENT (CVA), UNSPECIFIED MECHANISM (HCC): Primary | ICD-10-CM

## 2025-06-06 PROCEDURE — 97112 NEUROMUSCULAR REEDUCATION: CPT

## 2025-06-06 NOTE — PROGRESS NOTES
"Daily Note     Today's date: 2025  Patient name: Ez Dsouza  : 1965  MRN: [id number]  Referring provider: Jakob Higgins MD  Dx:   Encounter Diagnosis   Name Primary?    Cerebrovascular accident (CVA), unspecified mechanism (HCC) Yes                  PLAN OF CARE START: 25  PLAN OF CARE END: 8/15/2025  FREQUENCY: 3x/week  PRECAUTIONS CKD, HTN, Falls    Subjective: Pt states he finished doing his lawn and cleaning his pool, so he is sore today.     Objective: See treatment below.    NMR:   Standing push ups at parallel bars with lateral rotation to promote scap retraction. UE strength, proprioceptive input, proximal stability.   Lacrosse ball bouncing and tossing with the therapist to work on UE coordination, hand eye coordination, UE endurance.    XL squigz with the squigz on the R side, pt grabs then pushes high onto wall on the L side. UE coordination, strength, crossing midline. Pt then picks up 2 smaller squigz at a time with the R hand and pushes onto mirror.   Calibrated gripper with 50 lbs resistance, picks up 1\" foam blocks and places into container on table. Performed for ~50 reps with rest periods as needed.   FMC/intrinsic control exercises with marbles with the pt's R hand. Adduction, in hand manipulation, and using blue clothes pin to  and place into container provided.     Assessment: Tolerated treatment well. Demonstrated decreased R UE proximal weakness and coordination during today's session.   Pt with improved scapular movement and stabilization observed during today's initial exercises. Overall, pt continuing to demonstrate deficits proximally > distally, however would benefit from higher level dexterity exercises for carryover into everyday activities. Pt would benefit from continued OT services to address R UE function.     Plan: Continued skilled OT per POC.        "

## 2025-06-09 ENCOUNTER — TELEPHONE (OUTPATIENT)
Dept: NEUROLOGY | Facility: CLINIC | Age: 60
End: 2025-06-09

## 2025-06-09 NOTE — TELEPHONE ENCOUNTER
Pt claude appt on 6/11/25 at the [facility] with  . Thank you for choosing [facility] for your care

## 2025-06-10 ENCOUNTER — OFFICE VISIT (OUTPATIENT)
Facility: CLINIC | Age: 60
End: 2025-06-10
Payer: COMMERCIAL

## 2025-06-10 DIAGNOSIS — I63.9 CEREBROVASCULAR ACCIDENT (CVA), UNSPECIFIED MECHANISM (HCC): Primary | ICD-10-CM

## 2025-06-10 PROCEDURE — 97112 NEUROMUSCULAR REEDUCATION: CPT | Performed by: PHYSICAL THERAPIST

## 2025-06-10 PROCEDURE — 97112 NEUROMUSCULAR REEDUCATION: CPT

## 2025-06-10 NOTE — PROGRESS NOTES
Daily Note     Today's date: 6/10/2025  Patient name: Ez Dsouza  : 1965  MRN: [id number]  Referring provider: Jakob Higgins MD  Dx:   Encounter Diagnosis   Name Primary?    Cerebrovascular accident (CVA), unspecified mechanism (HCC) Yes         Start Time: 1630  Stop Time: 1713  Total time in clinic (min): 43 minutes    PLAN OF CARE START: 25  PLAN OF CARE END: 8/15/2025  FREQUENCY: 3x/week  PRECAUTIONS CKD, HTN, Falls    Subjective: Pt states he had a good weekend. Pt states he is following up with neurology tomorrow. Pt inquired about returning to driving, therapist recommended to discuss with his neurologist, and how he could take the fit-to-drive test if his neurologist finds it necessary.     Objective: See treatment below.    NMR:   Nu step completed for 5 minutes for proprioceptive input and motor control.   Completed PNF D1 flexion/extension with green Theraband for 4x8 reps. UE proximal strength, motor control.   Completed PNF D2 flexion/extension with red Theraband for 4x8 reps. UE proximal strength, motor control.   Completed lat pull downs for 3x10 sets with blue Theraband. UE proximal strength, motor control.   Pt seated with 2 lb wrist weight donned completed FMC/dexterity exercises with pt chaining links together for ~40 reps, then using red clothes pin to  small dice and stack them onto each other up to 4-5 dice high.     Assessment: Tolerated treatment well. Demonstrated decreased R UE proximal weakness and coordination during today's session.   Pt with improved scapular movement and stabilization observed during today's initial exercises. Overall, pt continuing to demonstrate deficits proximally > distally, however would benefit from higher level dexterity exercises for carryover into everyday activities. Pt would benefit from continued OT services to address R UE function.     Plan: Continued skilled OT per POC.

## 2025-06-10 NOTE — PROGRESS NOTES
Daily Note     Today's date: 6/10/2025  Patient name: Ez Dsouza  : 1965  MRN: [id number]  Referring provider: Jakob Higgins MD  Dx:   Encounter Diagnosis     ICD-10-CM    1. Cerebrovascular accident (CVA), unspecified mechanism (HCC)  I63.9                      Subjective: Patient reports no new changes      Objective: See treatment diary below    NMR: (16# vest)  - Treadmill: 10 min @ 1.5-2.5 mph, HR up to 115 bpm  - FWD lunge on BOSU: 20x   - FWD step up on BOSU: 20x  - Squats on BOSU: 20x 2 sets  - Step up w/ high knee drive on BOSU: 20x  - Sled push/pull: 25 ft x 5 laps, 2 sets, 85#      Assessment: Patient tolerated treatment well. Challenged patient dynamic balance with BOSU today, progressing difficulty of unstable surface. During BOSU exercises patient displays occasional instability that he is able to self correct with appropriate stepping reaction or UE support. Plan to progress as tolerated. Patient will benefit from skilled PT services to reduce his symptoms and help him return to his PLOF.       Plan: Continue per plan of care.      POC expires Unit limit Auth Expiration date PT/OT + Visit Limit? Co-Insurance   25 no No auth req bomn No                                   Outcome Measures Initial Eval  25      5xSTS 9.78 sec      TUG  TUG cog 6.92 sec  8.63 sec    24% dual task cost      10 meter 1.57 m/s  Fast pace      FGA       DGI       6MWT 1140 ft                         Precautions:   Past Medical History[1]                   [1]   Past Medical History:  Diagnosis Date    Hypertension

## 2025-06-11 ENCOUNTER — OFFICE VISIT (OUTPATIENT)
Dept: NEUROLOGY | Facility: CLINIC | Age: 60
End: 2025-06-11
Payer: COMMERCIAL

## 2025-06-11 VITALS
BODY MASS INDEX: 27.34 KG/M2 | SYSTOLIC BLOOD PRESSURE: 118 MMHG | OXYGEN SATURATION: 98 % | TEMPERATURE: 97.8 F | DIASTOLIC BLOOD PRESSURE: 72 MMHG | WEIGHT: 213 LBS | HEIGHT: 74 IN | HEART RATE: 67 BPM

## 2025-06-11 DIAGNOSIS — Z91.89 DRIVING SAFETY ISSUE: ICD-10-CM

## 2025-06-11 DIAGNOSIS — Z98.890 S/P CAROTID ENDARTERECTOMY: ICD-10-CM

## 2025-06-11 DIAGNOSIS — Z86.73 HISTORY OF CVA (CEREBROVASCULAR ACCIDENT): Primary | ICD-10-CM

## 2025-06-11 DIAGNOSIS — R06.83 SNORING: ICD-10-CM

## 2025-06-11 PROCEDURE — 99215 OFFICE O/P EST HI 40 MIN: CPT | Performed by: PSYCHIATRY & NEUROLOGY

## 2025-06-11 NOTE — PROGRESS NOTES
"Name: Ez Dsouza      : 1965      MRN: [id number]  Encounter Provider: Shira Cisneros MD  Encounter Date: 2025   Encounter department: [facility]  :  Assessment & Plan  History of CVA (cerebrovascular accident)    Secondary Prevention -   -for medications - recommend continuation of combination of aspirin and atorvastatin  -Blood Pressure goal < 130/80, BP is currently at goal currently   -LDL goal <70, last LDL is 106, will continue current atorvastatin for now.   -SHANNEN screening - place referral for sleep medicine.   - cardiac workup - is getting zio patch, and if negative, will need ILR placed.     Therapy needs  Currently in PT, and OT at this time     Counseling/stroke education -   -I advised patient to avoid using NSAIDs for headaches or other pain and to stick to tylenol if needed  -Recommend lifestyle modifications such as mediterranean diet & regular exercise regimen (brisk walking) atleast 4-5 times a week for 20-30 minutes.   -I educated patient/family regarding medication compliance  -encourage control of hypertension; defer management to primary        Driving safety issue  Refer patient to OT driving test   Orders:    Ambulatory Referral to OT  Adaptability Evaluation; Future    S/P carotid endarterectomy         Snoring  Sleep medicine referral placed   Orders:    Ambulatory Referral to Sleep Medicine; Future    Follow up in 6 months     I would be happy to see the patient sooner if any new questions/concerns arise.  Patient/Guardian was advised to the call the office if they have any questions and concerns in the meantime.     We discussed \"red flag\" headache symptoms including symptoms such as facial droop on one side, weakness/paralysis on either side, speech trouble, numbness on one side, balance issues, any vision changes, extreme dizziness or significant increase in severity of headache or a sudden onset severe headache, patient is to call " "9-1-1 immediately or to proceed to the nearest ER.           History of Present Illness   HPI     This is a 59 year old R-handed male with history of HTN and active smoker here as a hospital follow up from 3/10-3/21/25.    To summarize hospital stay - patient presented with RUE weakness and confusion. CT head negative for Left semi centrum ovale stroke. CTA head and neck showed severe stenosis in the proximal L ICA. CT CAP negative. MRI brain shows acute infarcts in the L cerebral hemisphere. Patient was cleared to undergo carotid revascularization, vascular surgery was consulted. Etiology was thought to be related to carotid stenosis on the L side. Patient underwent left CEA 3/17.     Maye had another event on 3/19 with similar stroke like symptoms. And he was loaded with aspirin, and plavix and was recommended to plan for L carotid artery CEA. Patient was recommended to continue DAPT for now.     He is here and doing well. Denies any new TIA/CVA Like symptoms. His wound is finally healing, and he denies any other concerns/questions at this time. He is complaint w/ his medications he is following vascular surgery as well. He did quit smoking, and does snore and is ok seeing sleep medicine. He wants to go back to driving.       Review of Systems   Constitutional: Negative.    HENT: Negative.     Eyes: Negative.    Respiratory: Negative.     Cardiovascular: Negative.    Gastrointestinal: Negative.    Endocrine: Negative.    Genitourinary: Negative.    Musculoskeletal: Negative.    Skin: Negative.    Allergic/Immunologic: Negative.    Neurological: Negative.    Hematological: Negative.    Psychiatric/Behavioral: Negative.     All other systems reviewed and are negative.   I have personally reviewed the MA's review of systems and made changes as necessary.         Objective   /72 (Patient Position: Sitting, Cuff Size: Standard)   Pulse 67   Temp 97.8 °F (36.6 °C) (Temporal)   Ht 6' 2\" (1.88 m)   Wt 96.6 kg " (213 lb)   SpO2 98%   BMI 27.35 kg/m²     Physical Exam  General - patient is alert, awake follows commands   Speech - no dysarthria noted, no aphasia noted.     Neuro:   Cranial nerves: PERRL, EOMI, facial sensation intact to soft touch in V1, V2 and V3, no facial asymmetry noted, uvula/palate midline, tongue midline.   Motor: 5/5 throughout, normal tone, no pronator drift noted.   Sensory - intact to soft touch throughout  Reflexes - 2+ throughout  Coordination - no ataxia/dysmetria noted  Gait - normal    Neurological Exam        Administrative Statements   I have spent a total time of 40 minutes in caring for this patient on the day of the visit/encounter including Risks and benefits of tx options, Instructions for management, Patient and family education, Counseling / Coordination of care, Documenting in the medical record, Reviewing/placing orders in the medical record (including tests, medications, and/or procedures), Obtaining or reviewing history  , and Communicating with other healthcare professionals .

## 2025-06-12 ENCOUNTER — OFFICE VISIT (OUTPATIENT)
Facility: CLINIC | Age: 60
End: 2025-06-12
Payer: COMMERCIAL

## 2025-06-12 DIAGNOSIS — I63.9 CEREBROVASCULAR ACCIDENT (CVA), UNSPECIFIED MECHANISM (HCC): Primary | ICD-10-CM

## 2025-06-12 PROCEDURE — 97112 NEUROMUSCULAR REEDUCATION: CPT

## 2025-06-12 PROCEDURE — 97110 THERAPEUTIC EXERCISES: CPT

## 2025-06-12 NOTE — PROGRESS NOTES
Daily Note     Today's date: 2025  Patient name: Ez Dsouza  : 1965  MRN: [id number]  Referring provider: Jakob Higgins MD  Dx:   Encounter Diagnosis   Name Primary?    Cerebrovascular accident (CVA), unspecified mechanism (HCC) Yes         Start Time: 0800  Stop Time: 0845  Total time in clinic (min): 45 minutes    PLAN OF CARE START: 25  PLAN OF CARE END: 8/15/2025  FREQUENCY: 3x/week  PRECAUTIONS CKD, HTN, Falls    Subjective: Pt states he followed up with neurology and was cleared to return to all activity other than driving, where he was provided with a prescription to take the [id number] test. Pt is set to take the FTD test tomorrow morning.     Objective: See treatment below.    TE:  Isometric holds of ER pushing into ball on wall for 2x10 sets.  Bilateral rows with green band on wall for 30 reps  Bilateral chest press with green band on wall for 30 reps  Completed lat pull downs for 3x10 sets with blue Theraband.  Overhead reach and stretch with pt rolling R UE on ball up wall. 10 reps with 10 sec holds.     NMR:   Nu step completed for 5 minutes for proprioceptive input and motor control.   Overhead bouncing ball on wall bilaterally for 2 sets to fatigue for UE endurance and coordination.  Pt standing at counter completes 2 IQ puzzler pro puzzles to work R hand FMC and  skills, EF skills. 2 lb wrist weight for proprioceptive input thru the R UE.     Assessment: Tolerated treatment well. Demonstrated decreased R UE proximal weakness and coordination during today's session.    Overall, pt continuing to demonstrate deficits proximally > distally, however would benefit from higher level dexterity exercises for carryover into everyday activities. Pt would benefit from continued OT services to address R UE function.     Plan: Continued skilled OT per POC.

## 2025-06-13 ENCOUNTER — PATIENT MESSAGE (OUTPATIENT)
Dept: NEUROLOGY | Facility: CLINIC | Age: 60
End: 2025-06-13

## 2025-06-13 ENCOUNTER — APPOINTMENT (OUTPATIENT)
Dept: LAB | Facility: CLINIC | Age: 60
End: 2025-06-13
Payer: COMMERCIAL

## 2025-06-13 ENCOUNTER — APPOINTMENT (OUTPATIENT)
Facility: CLINIC | Age: 60
End: 2025-06-13
Payer: COMMERCIAL

## 2025-06-13 ENCOUNTER — OFFICE VISIT (OUTPATIENT)
Facility: CLINIC | Age: 60
End: 2025-06-13
Payer: COMMERCIAL

## 2025-06-13 DIAGNOSIS — E78.2 MIXED HYPERLIPIDEMIA: ICD-10-CM

## 2025-06-13 DIAGNOSIS — I63.9 CEREBROVASCULAR ACCIDENT (CVA), UNSPECIFIED MECHANISM (HCC): Primary | ICD-10-CM

## 2025-06-13 DIAGNOSIS — I63.232 CEREBROVASCULAR ACCIDENT (CVA) DUE TO STENOSIS OF LEFT CAROTID ARTERY (HCC): ICD-10-CM

## 2025-06-13 LAB
ANION GAP SERPL CALCULATED.3IONS-SCNC: 10 MMOL/L (ref 4–13)
BUN SERPL-MCNC: 44 MG/DL (ref 5–25)
CALCIUM SERPL-MCNC: 9.4 MG/DL (ref 8.4–10.2)
CHLORIDE SERPL-SCNC: 104 MMOL/L (ref 96–108)
CO2 SERPL-SCNC: 24 MMOL/L (ref 21–32)
CREAT SERPL-MCNC: 2.23 MG/DL (ref 0.6–1.3)
GFR SERPL CREATININE-BSD FRML MDRD: 30 ML/MIN/1.73SQ M
GLUCOSE SERPL-MCNC: 128 MG/DL (ref 65–140)
POTASSIUM SERPL-SCNC: 5 MMOL/L (ref 3.5–5.3)
SODIUM SERPL-SCNC: 138 MMOL/L (ref 135–147)

## 2025-06-13 PROCEDURE — 80048 BASIC METABOLIC PNL TOTAL CA: CPT

## 2025-06-13 PROCEDURE — 96125 COGNITIVE TEST BY HC PRO: CPT | Performed by: OCCUPATIONAL THERAPIST

## 2025-06-13 PROCEDURE — 36415 COLL VENOUS BLD VENIPUNCTURE: CPT

## 2025-06-13 PROCEDURE — 97165 OT EVAL LOW COMPLEX 30 MIN: CPT | Performed by: OCCUPATIONAL THERAPIST

## 2025-06-13 NOTE — PROGRESS NOTES
This note was not shared with the patient due to reasonable likelihood of causing patient harm- not within OT scope of practice to disclose fitness to drive results      Occupational Therapy Fit to Drive Evaluation:      Attempt #1    Today's Date: 2025  Patient Name: Ez Dsouza  : 1965  MRN: [id number]  Referring Provider: Dr. Shira Cisneros   Dx: Cerebrovascular accident (CVA), unspecified mechanism (HCC) [I63.9]      DCAT/FITNESS TO DRIVE OUTCOMES:     PATIENT'S SCORE: 22 %                DCAT SCORE RANGES:    Low Risk: 1-39% (Passing Score)     Range of Inquiry: 40-69% (On-road test warranted)    High Risk: 70-99% (Failing Score)     DRIVING IMPLICATIONS PER DCAT: Pt is 59 y.o. male referred to Occupational Therapy for Fitness to Drive Evaluation to assess Pt's cognitive, visual, and motor abilities to drive safely in community environment s/p CVA in 2025. The DCAT is a screen that provides objective insight into a person's risk to drive. Radhas cognitive competence for driving should be considered within the same range as healthy, safe drivers.  Individuals scoring in this range have average on-road error points and potential serious errors  that are consistent with scores in the acceptable range during an on-road performance evaluation.  Below average scoring was noted during the simple trail task, which required pt to connect dots as they appeared on the screen. His performance on all other tasks on the DCAT was average or above average for his age. His times to complete the Trail Making Part A, Trail Making Part B, and [place] Maze Test were all WNL. Based on results of DCAT, Trail Making Tests, and [place] Maze Test, pt may return to driving per MD discretion.  MD to discuss results with Pt.     ADDITIONAL THERAPY NEEDS: continue OP OT to address RUE function      Assessment/Plan  Occupational Therapy Skilled Analysis Assessment and Plan of Care:  Pt is a 59 y.o. male referred  "to Occupational Therapy for Fitness to Drive Evaluation to assess pt’s cognitive, visual, and motor abilities to drive safely in community environment 2/2 CVA on 3/10/2025. Pt was recently cleared to return to work by his neurologist, and was referred for fitness to drive evaluation to assess cognitive function for driving.   D/C from OT FTD caseload.    Active Problem List: Problem List[1]  Past Medical Hx: Past Medical History[2]  Past Surgical Hx: Past Surgical History[3]     Pain Levels:   Restin/10    With Activity:  0/10      TIME:   OT eval: 15 minutes  OT DCAT 30 minutes to administer, 15 minutes to interpret    Subjective: \"I was cleared to work, but not to drive yet.\"    Patient Goal: to be cleared to drive so he can return to work and IADLs      History of Present Illness:  Pt is a 59 y.o. male seen for OT Fitness to Drive evaluation to assess pt’s cognitive, visual, and motor abilities to drive safely in community environment. Pt referred by Dr. Shira Cisneros from Neurology. Below is a summary of patients current or most recent driving history including vehicle type, roads traveled, and recent auto events.    Driving History:    Communication Status: English    Visual History:  Last Eye  Appointment 2 weeks ago   Glasses/Contacts:   Yes, describe: reading glasses   Cataracts:  No   Glaucoma:   No   Hemianopsia:   No         License Status: active    Age of Initial Licensure: 16    Vehicle Type:      Car, truck    Car Transfer: independent    Driving History:   GPS Use: Yes, describe: if driving to an unknown location   Recent Accidents:   No   History of Getting Lost While Driving: no    Tickets:   No   DUI:   No    Last Drove: <1 month ago    Driving Goals:   Local Roads, Highway/Major Roads, Daytime Driving, and Nighttime driving      Objective    DCAT: (see attached report for further details)   Pt scoring a 22% likelihood on failing on road see attached report for further " details.     Recommend On Road Assessment?:   No     Recommend to [employer] for Adaptive Equipment?: No      DCAT Mobile Battery Cognitive Skills Analysis  These scores are the participant's performance comparatively to the general driving population from a stratified sample of drivers ages  for their cognitive skills required for safe driving. If the participant's score is unusually poor for their age group, it will impact the overall driveable score.     For this analysis, scores in the low percentile range indicate a high risk  while a high percentile range indicate lower risk:    Mental Flexibility:     Simple Trails- below average  Trails A- average  Trails B- average  Lower scores show a decreased ability to switch between mental sets and connect sequential targets, showing increased driving risk    Judgement:    Collision Rate- average  Prematurity- above average  Lowers scores indicate decreased ability to respond quickly and accurate when provided with complex judgement is rquired and showing increased driving risk    Memory:   Draw Time- average  Shape Replication- average  Lowers scores indicate decreased working memory in the presence of distractions, showing increased driving risk    Control:    Collision Rate- above average  Time in Target- average  Lower scores indicate decreased FMC and executive function, showing increased driving risk      Physical findings:    Cervical rotation:  R WNL, L WNL   UE and LE AROM:  WFL  UE: 5/5 MMT BUE except R external rotators ~4/5        [place] making Part A and Part B:   Part A: 26.7 independently   Part B: 68.1 seconds with 1 VCs for error ID  Times predictive of unsafe driving s/p CVA: Part A deficit > 32 seconds and Part B deficit > 79 seconds     [place] Maze Test:  Time to Complete: 48.9 seconds  Errors: 0  Cutoff associated with impaired cognitive skills for drivin seconds or more, or 2 or more errors      INTERVENTION  COMMENTS:  Diagnosis: Cerebrovascular accident (CVA), unspecified mechanism ([facility]) [I63.9]  Precautions: standard  Insurance: Payor: BLUE CROSS / Plan: MISC BLUE CROSS / Product Type: Blue Fee for Service /           [1]   Patient Active Problem List  Diagnosis    Elevated blood pressure reading    Fall    Acute pain due to trauma    Closed fracture of multiple ribs of left side    Closed fracture of right wrist    MARIA TERESA (acute kidney injury) ([facility])    Scalp abrasion    Closed fracture of right distal radius and ulna    Closed fracture of distal ends of right radius and ulna, initial encounter    Closed fracture of right wrist with routine healing, subsequent encounter    Hyperlipidemia    Acute cerebrovascular accident (CVA) due to stenosis of left carotid artery ([facility])    Elevated coronary artery calcium score    Pre-operative cardiovascular examination    Smoking    Localized swelling, mass or lump of neck    S/P carotid endarterectomy    Hematoma    Elevated serum creatinine    CKD (chronic kidney disease) stage 3, GFR 30-59 ml/min ([facility])    Benign hypertension with CKD (chronic kidney disease) stage III ([facility])    Carotid stenosis, symptomatic, with infarction ([facility])   [2]   Past Medical History:  Diagnosis Date    Hypertension    [3]   Past Surgical History:  Procedure Laterality Date    EVACUATION OF HEMATOMA Left 3/24/2025    Procedure: L neck exploration, hematoma evacuation;  Surgeon: Ez Villalobos MD;  Location: [facility];  Service: Vascular    KNEE SURGERY  2008    MOLE REMOVAL  2024    Back    NECK EXPLORATION Left 3/26/2025    Procedure: LEFT NECK EXPLORATION/WASHOUT;  Surgeon: Ez Villalobos MD;  Location: [facility];  Service: Vascular    NM TEAEC W/PATCH GRF CAROTID VERTB SUBCLAV NECK INC Left 3/17/2025    Procedure: ENDARTERECTOMY ARTERY CAROTID WITH BOVINE PATCH ANGIOPLASTY;  Surgeon: Joss Calderón MD;  Location: [facility];  Service: Vascular

## 2025-06-16 ENCOUNTER — RESULTS FOLLOW-UP (OUTPATIENT)
Dept: NON INVASIVE DIAGNOSTICS | Facility: HOSPITAL | Age: 60
End: 2025-06-16

## 2025-06-17 ENCOUNTER — OFFICE VISIT (OUTPATIENT)
Dept: FAMILY MEDICINE CLINIC | Facility: CLINIC | Age: 60
End: 2025-06-17
Payer: COMMERCIAL

## 2025-06-17 VITALS
SYSTOLIC BLOOD PRESSURE: 122 MMHG | DIASTOLIC BLOOD PRESSURE: 72 MMHG | OXYGEN SATURATION: 98 % | BODY MASS INDEX: 27.34 KG/M2 | HEIGHT: 74 IN | WEIGHT: 213 LBS | HEART RATE: 92 BPM

## 2025-06-17 DIAGNOSIS — N18.32 STAGE 3B CHRONIC KIDNEY DISEASE (HCC): ICD-10-CM

## 2025-06-17 DIAGNOSIS — I63.232 ACUTE CEREBROVASCULAR ACCIDENT (CVA) DUE TO STENOSIS OF LEFT CAROTID ARTERY (HCC): Primary | ICD-10-CM

## 2025-06-17 DIAGNOSIS — I12.9 BENIGN HYPERTENSION WITH CKD (CHRONIC KIDNEY DISEASE) STAGE III (HCC): ICD-10-CM

## 2025-06-17 DIAGNOSIS — Z98.890 S/P CAROTID ENDARTERECTOMY: ICD-10-CM

## 2025-06-17 DIAGNOSIS — N18.30 BENIGN HYPERTENSION WITH CKD (CHRONIC KIDNEY DISEASE) STAGE III (HCC): ICD-10-CM

## 2025-06-17 DIAGNOSIS — E78.2 MIXED HYPERLIPIDEMIA: ICD-10-CM

## 2025-06-17 PROBLEM — S52.501A CLOSED FRACTURE OF RIGHT DISTAL RADIUS AND ULNA: Status: RESOLVED | Noted: 2024-07-03 | Resolved: 2025-06-17

## 2025-06-17 PROBLEM — S52.501A CLOSED FRACTURE OF DISTAL ENDS OF RIGHT RADIUS AND ULNA, INITIAL ENCOUNTER: Status: RESOLVED | Noted: 2024-07-03 | Resolved: 2025-06-17

## 2025-06-17 PROBLEM — S52.601A CLOSED FRACTURE OF DISTAL ENDS OF RIGHT RADIUS AND ULNA, INITIAL ENCOUNTER: Status: RESOLVED | Noted: 2024-07-03 | Resolved: 2025-06-17

## 2025-06-17 PROBLEM — S52.601A CLOSED FRACTURE OF RIGHT DISTAL RADIUS AND ULNA: Status: RESOLVED | Noted: 2024-07-03 | Resolved: 2025-06-17

## 2025-06-17 PROBLEM — S00.01XA SCALP ABRASION: Status: RESOLVED | Noted: 2024-06-22 | Resolved: 2025-06-17

## 2025-06-17 PROBLEM — S62.101D CLOSED FRACTURE OF RIGHT WRIST WITH ROUTINE HEALING, SUBSEQUENT ENCOUNTER: Status: RESOLVED | Noted: 2024-07-03 | Resolved: 2025-06-17

## 2025-06-17 PROBLEM — S62.101A CLOSED FRACTURE OF RIGHT WRIST: Status: RESOLVED | Noted: 2024-06-22 | Resolved: 2025-06-17

## 2025-06-17 PROBLEM — R22.1 LOCALIZED SWELLING, MASS OR LUMP OF NECK: Status: RESOLVED | Noted: 2025-03-19 | Resolved: 2025-06-17

## 2025-06-17 PROBLEM — S22.42XA CLOSED FRACTURE OF MULTIPLE RIBS OF LEFT SIDE: Status: RESOLVED | Noted: 2024-06-22 | Resolved: 2025-06-17

## 2025-06-17 PROBLEM — T14.8XXA HEMATOMA: Status: RESOLVED | Noted: 2025-03-24 | Resolved: 2025-06-17

## 2025-06-17 PROBLEM — Z01.810 PRE-OPERATIVE CARDIOVASCULAR EXAMINATION: Status: RESOLVED | Noted: 2025-03-10 | Resolved: 2025-06-17

## 2025-06-17 PROBLEM — IMO0001 SMOKING: Status: RESOLVED | Noted: 2025-03-16 | Resolved: 2025-06-17

## 2025-06-17 PROBLEM — R03.0 ELEVATED BLOOD PRESSURE READING: Status: RESOLVED | Noted: 2021-01-21 | Resolved: 2025-06-17
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
## 2025-06-17 PROBLEM — G89.11 ACUTE PAIN DUE TO TRAUMA: Status: RESOLVED | Noted: 2024-06-22 | Resolved: 2025-06-17

## 2025-06-17 PROBLEM — N17.9 AKI (ACUTE KIDNEY INJURY) (HCC): Status: RESOLVED | Noted: 2024-06-22 | Resolved: 2025-06-17

## 2025-06-17 PROCEDURE — 99396 PREV VISIT EST AGE 40-64: CPT | Performed by: FAMILY MEDICINE

## 2025-06-17 PROCEDURE — 99204 OFFICE O/P NEW MOD 45 MIN: CPT | Performed by: FAMILY MEDICINE

## 2025-06-17 NOTE — PROGRESS NOTES
New Patient Outpatient Note    HPI:     Ez Dsouza, 59 y.o. male  presents today as a new patient and to establish care.  Patient is looking for a new primary care provider.  Unfortunately there is just difficulty with coordination between [facility] in [facility] and his last primary care provider.  He was also having difficulty getting into see them when he needed to.  He presents today after having a stroke in March 2025.  He has followed up with both cardiology and neurology since the stroke.  They recommended several changes to medication management, and it is currently updated.  We are unsure whether or not he needs to continue on Plavix as well as aspirin, we will message Dr. Mccurdy to determine the next best steps.  Patient was affected on the right side physically from the stroke, as well as having some effect on his cognitive function.  Patient has followed up with [facility] initially for rehab/physical therapy, then it came into the home for several weeks, and now he is out to outpatient therapy.  He has been there for 4 weeks and has been making progress in his range of motion and function of the right side.    Family Hx  UTD in chart    Past Medical History[1]     Past Surgical History[2]     Current Medications[3]     SOCIAL:   Rent/Own:  Own  Currently living with: wife  Stable food: Yes  Safe At Home: Yes  Hobbies:  fishing, hunting  Profession/ employment:   Restriction to medical procedures: None    SEXUAL HISTORY:   Preference: Women  Sexually Active:  Yes  Birth Control:  post menopausal    Psychological History  Psychiatric history: None  History of inpatient:  None  Current Therapy/ Provider:  None  Current Medications:  None    Substance History  Smoking: Former, quit in March 2025, ~30- 35 years. 20 pack year history plus cigars and chewing tobacco for 10 years after.   Alcohol Use: none currently  Substance use:  Marijuana      ROS:   Review of Systems   Constitutional:  Negative for  chills, fever and unexpected weight change.   HENT:  Negative for congestion, ear discharge, ear pain, hearing loss, postnasal drip, rhinorrhea, sinus pressure, sinus pain and sore throat.    Respiratory:  Negative for cough, chest tightness and shortness of breath.    Cardiovascular:  Negative for chest pain and palpitations.   Gastrointestinal:  Negative for abdominal pain, blood in stool, constipation, diarrhea, nausea and vomiting.   Genitourinary:  Negative for dysuria and frequency.   Skin:  Negative for rash and wound.   Neurological:  Negative for dizziness, light-headedness and headaches.   Psychiatric/Behavioral:  Negative for self-injury and suicidal ideas.       OBJECTIVE  Vitals:    06/17/25 1104   BP: 122/72   Pulse: 92   SpO2: 98%        Physical Exam  Constitutional:       General: He is not in acute distress.     Appearance: Normal appearance. He is normal weight. He is not ill-appearing, toxic-appearing or diaphoretic.   HENT:      Head: Normocephalic and atraumatic.      Right Ear: Tympanic membrane, ear canal and external ear normal. There is no impacted cerumen.      Left Ear: Tympanic membrane, ear canal and external ear normal. There is no impacted cerumen.      Nose: Nose normal. No congestion or rhinorrhea.      Mouth/Throat:      Mouth: Mucous membranes are moist.      Pharynx: Oropharynx is clear. No oropharyngeal exudate or posterior oropharyngeal erythema.     Eyes:      General:         Right eye: No discharge.         Left eye: No discharge.      Pupils: Pupils are equal, round, and reactive to light.       Cardiovascular:      Rate and Rhythm: Normal rate and regular rhythm.      Heart sounds: Normal heart sounds. No murmur heard.     No friction rub. No gallop.   Pulmonary:      Effort: Pulmonary effort is normal. No respiratory distress.      Breath sounds: Normal breath sounds. No stridor. No wheezing, rhonchi or rales.   Abdominal:      General: Abdomen is flat. There is no  distension.      Tenderness: There is no abdominal tenderness.     Musculoskeletal:      Comments: Decreased Rom of abduction/ flexion of right shoulder.  Mild decrease in strength to flexion/ extension at knee, hip, and shoulder.  +4/5.  The left side is 5/5 in upper and lower extremities and normal ROM.      Skin:     Capillary Refill: Capillary refill takes less than 2 seconds.     Neurological:      Mental Status: He is alert.      Sensory: No sensory deficit.      Motor: Weakness present.      Deep Tendon Reflexes: Reflexes abnormal (3/4 on left side for S1, L4, C5, C6, 2/4 left side of same reflexes).            ASSESSMENT AND PLAN   Ez was seen today for establish care.    Diagnoses and all orders for this visit:    Acute cerebrovascular accident (CVA) due to stenosis of left carotid artery (HCC)  S/P carotid endarterectomy  History of stroke 2/2 to carotid stenosis.  S/p endarterectomy.  After stroke patient has been going through OT and PT and has had significant improvements in deficits.  Therefore I recommend continued work with PT/OT.  Additionally I have a message out to the cardiology group to see if patient needs to continue plavix and aspirin.  According to up to date, dual therapy is no longer recommended.  I am unsure with the surgery and history of stroke if necessary.  Dr. Mccurdy to respond and I will get back to the patient.     Mixed hyperlipidemia  Currently on atorvastatin 40mg, according to last note in cardiology cholesterol is still not optimized.  They recommended that the patient start Zetia with statin.  Will defer medication management/ titration to cardiology    Benign hypertension with CKD (chronic kidney disease) stage III ([facility])  Stage 3b chronic kidney disease (HCC)  Patient currently being treated by cardiology for blood pressure.  This has cause a reduction in kidney function and most recent lab fr GFR is reduced to around 30.  They have discontinued spironolactone and will  continue to follow up with monitoring.  He is on HCTZ that may be effecting kidneys.               Jeff Simpson DO  [facility]  6/17/2025 1:30 PM          [1]   Past Medical History:  Diagnosis Date    Acute pain due to trauma 06/22/2024    Closed fracture of distal ends of right radius and ulna, initial encounter 07/03/2024    Closed fracture of multiple ribs of left side 06/22/2024    Closed fracture of right distal radius and ulna 07/03/2024    Closed fracture of right wrist 06/22/2024    Closed fracture of right wrist with routine healing, subsequent encounter 07/03/2024    Elevated blood pressure reading 01/21/2021    Fall 06/22/2024    Hematoma 03/24/2025    Hypertension     Localized swelling, mass or lump of neck 03/19/2025    Scalp abrasion 06/22/2024    Smoking 03/16/2025   [2]   Past Surgical History:  Procedure Laterality Date    EVACUATION OF HEMATOMA Left 3/24/2025    Procedure: L neck exploration, hematoma evacuation;  Surgeon: Ez Villalobos MD;  Location: [facility];  Service: Vascular    KNEE SURGERY  2008    MOLE REMOVAL  2024    Back    NECK EXPLORATION Left 3/26/2025    Procedure: LEFT NECK EXPLORATION/WASHOUT;  Surgeon: Ez Villalobos MD;  Location: [facility];  Service: Vascular    TN TEAEC W/PATCH GRF CAROTID VERTB SUBCLAV NECK INC Left 3/17/2025    Procedure: ENDARTERECTOMY ARTERY CAROTID WITH BOVINE PATCH ANGIOPLASTY;  Surgeon: Joss Calderón MD;  Location: [facility];  Service: Vascular   [3]   Current Outpatient Medications:     amLODIPine (NORVASC) 5 mg tablet, Take 1 tablet (5 mg total) by mouth every 12 (twelve) hours, Disp: 60 tablet, Rfl: 11    ASPIRIN 81 PO, Take 81 mg by mouth in the morning, Disp: , Rfl:     atorvastatin (LIPITOR) 40 mg tablet, Take 1 tablet (40 mg total) by mouth daily, Disp: 90 tablet, Rfl: 3    carvedilol (COREG) 25 mg tablet, Take 1 tablet (25 mg total) by mouth 2 (two) times a day with meals, Disp: 180 tablet, Rfl: 3     clopidogrel (PLAVIX) 75 mg tablet, Take 1 tablet (75 mg total) by mouth daily, Disp: , Rfl:     ezetimibe (ZETIA) 10 mg tablet, Take 1 tablet (10 mg total) by mouth daily, Disp: 90 tablet, Rfl: 3    hydroCHLOROthiazide 25 mg tablet, Take 1 tablet (25 mg total) by mouth daily, Disp: 90 tablet, Rfl: 3

## 2025-06-17 NOTE — PATIENT COMMUNICATION
Patients spouse Nery called to follow up on fit to drive results; advised of providers response.  Nery is requesting return to work letter to include passing result and discharge to be uploaded here ASAP; stated patient is to return to work tomorrow 6/18/25.  Advised will forward request; patient would like a call once complete.    Please assist,    Thank you

## 2025-06-17 NOTE — PATIENT COMMUNICATION
June 17, 2025     Patient:Ez Dsouza  YOB: 1965        To Whom it May Concern:    Ez Dsouza is under my professional care. Ez may return to work on 6/18/25 under no restrictions. Ez Dsouza has successfully  passed his driving evaluation.    If you have any questions or concerns, please don't hesitate to call.         Sincerely,      Shira Cisneros MD

## 2025-06-20 ENCOUNTER — TELEPHONE (OUTPATIENT)
Dept: FAMILY MEDICINE CLINIC | Facility: CLINIC | Age: 60
End: 2025-06-20

## 2025-06-20 ENCOUNTER — APPOINTMENT (OUTPATIENT)
Facility: CLINIC | Age: 60
End: 2025-06-20
Payer: COMMERCIAL

## 2025-06-20 DIAGNOSIS — I63.9 CVA (CEREBRAL VASCULAR ACCIDENT) (HCC): ICD-10-CM

## 2025-06-20 DIAGNOSIS — R29.90 STROKE-LIKE SYMPTOMS: ICD-10-CM

## 2025-06-20 DIAGNOSIS — I63.232 ACUTE CEREBROVASCULAR ACCIDENT (CVA) DUE TO STENOSIS OF LEFT CAROTID ARTERY (HCC): ICD-10-CM

## 2025-06-20 RX ORDER — CLOPIDOGREL BISULFATE 75 MG/1
75 TABLET ORAL DAILY
Qty: 90 TABLET | Refills: 0 | Status: SHIPPED | OUTPATIENT
Start: 2025-06-20

## 2025-06-20 NOTE — TELEPHONE ENCOUNTER
----- Message from Joss Calderón MD sent at 6/19/2025  5:49 PM EDT -----  Thanks for reaching out,    This is a frequent point of discussion following CEA (and carotid stenting). Overall, clinical equipoise exists regarding the best approach based on the available data and there is consequently variability in practice patterns depending on who you ask. My algorithm is typically to continue DAPT following intervention indefinitely if there is contralateral disease present (which he has), but I also don't consider this in isolation. While most people tend to tolerate this pretty well, situations I have stopped it in the past typically are associated with recurrent bleeding events (can range from GI bleed to nose bleed, depending on the situation). Since he has contralateral disease and has had a stroke in the past, my preference would be to continue DAPT if possible. However, if in the future you feel he is being negatively impacted by this, more than happy to discuss further.    Best,  Joss Calderón  ----- Message -----  From: Jeff Simpson DO  Sent: 6/18/2025   6:38 AM EDT  To: Joss Calderón MD    Dear Dr. Calderón,    You will be seeing this patient in the office at the end of July.  Patient has history of stroke and went through an endarterectomy.  I am reviewing medication management/refills and the patient has both Plavix and aspirin on his med list.  I have reviewed both cardiology and neurology notes and reached out to Dr. Mccurdy.  Everyone seems to be stating that vascular or the other specialty should give final recommendations.  I am just seeing on vascular's behalf if the combination of plavix and aspirin is needed or if just aspirin is enough. After reviewing up-to-date it looks like dual therapy is not usually recommended, I do not know if this was only for certain length of time.  Please let me know if the Plavix should be continued or if we are just continuing with the aspirin moving  forward.  Any help would be greatly appreciated.       Sincerely,     Jeff Simpson   [facility]

## 2025-06-20 NOTE — TELEPHONE ENCOUNTER
Left message and will send a mychart message stating that it would be best to start the plavix again until following up with the vascular surgeon.      DO Triny Epstein [facility]  6/20/2025 4:33 PM

## 2025-06-24 ENCOUNTER — APPOINTMENT (OUTPATIENT)
Facility: CLINIC | Age: 60
End: 2025-06-24
Payer: COMMERCIAL

## 2025-06-26 ENCOUNTER — APPOINTMENT (OUTPATIENT)
Facility: CLINIC | Age: 60
End: 2025-06-26
Payer: COMMERCIAL

## 2025-06-27 ENCOUNTER — APPOINTMENT (OUTPATIENT)
Facility: CLINIC | Age: 60
End: 2025-06-27
Payer: COMMERCIAL

## 2025-06-30 ENCOUNTER — NURSE TRIAGE (OUTPATIENT)
Age: 60
End: 2025-06-30

## 2025-06-30 NOTE — TELEPHONE ENCOUNTER
"REASON FOR CONVERSATION: Leg Swelling  Pt's spouse Nery called due to the patient retaining fluid in his bilateral lower extremities up to his ankles. The patient has no symptoms of SOB or chest pain. She reports that this started soon after the pt started back to work 6/18 and she is concerned. The patient is not aware of the phone call. The patient is not eating as well as he had been, taking in more sodium and not drinking enough fluid. He is on his feet more and is fatigued when he returns home.     SYMPTOMS: Increased bilateral lower ext edema to ankle; denies SOB And chest pain     OTHER HEALTH INFORMATION: pt was last seen in office 6/4/25 s/p CVA, s/p carotid endarterectomy, HTN    Medications: HcTz 25mg daily; Amlodipine 5 mg daily, Carvedilol 25 mg BID     Next OV: 9/9/25       PROTOCOL DISPOSITION: See Within 3 Days in Office    CARE ADVICE PROVIDED: Advised elevation of lower extremities when able, avoidance of sodium and increasing hydration   Also advised would inform the provider of symptom and would call back with further recommendations     PRACTICE FOLLOW-UP: Please call Nery with further recommendations       Reason for Disposition   MILD swelling of both ankles (i.e., pedal edema) AND new-onset or getting worse    Answer Assessment - Initial Assessment Questions  1. ONSET: \"When did the swelling start?\" (e.g., minutes, hours, days)      Since 6/18- when went back to work   2. LOCATION: \"What part of the leg is swollen?\"  \"Are both legs swollen or just one leg?\"      Ankle swelling   3. SEVERITY: \"How bad is the swelling?\" (e.g., localized; mild, moderate, severe)      Just to ankle - moderate pitting edema   4. REDNESS: \"Does the swelling look red or infected?\"      Denies   5. PAIN: \"Is the swelling painful to touch?\" If Yes, ask: \"How painful is it?\"   (Scale 1-10; mild, moderate or severe)      Denies   7. CAUSE: \"What do you think is causing the leg swelling?\"      Unsure   8. MEDICAL " "HISTORY: \"Do you have a history of blood clots (e.g., DVT), cancer, heart failure, kidney disease, or liver failure?\"      CVA   9. RECURRENT SYMPTOM: \"Have you had leg swelling before?\" If Yes, ask: \"When was the last time?\" \"What happened that time?\"      Unsure   10. OTHER SYMPTOMS: \"Do you have any other symptoms?\" (e.g., chest pain, difficulty breathing)        Denies    Protocols used: Leg Swelling and Edema-Adult-[place]    "

## 2025-07-09 ENCOUNTER — HOSPITAL ENCOUNTER (OUTPATIENT)
Dept: VASCULAR ULTRASOUND | Facility: HOSPITAL | Age: 60
Discharge: HOME | End: 2025-07-09
Attending: SURGERY
Payer: COMMERCIAL

## 2025-07-09 ENCOUNTER — APPOINTMENT (OUTPATIENT)
Dept: LAB | Facility: CLINIC | Age: 60
End: 2025-07-09
Attending: STUDENT IN AN ORGANIZED HEALTH CARE EDUCATION/TRAINING PROGRAM
Payer: COMMERCIAL

## 2025-07-09 DIAGNOSIS — I63.239 CAROTID STENOSIS, SYMPTOMATIC, WITH INFARCTION (HCC): ICD-10-CM

## 2025-07-09 LAB
CHOLEST SERPL-MCNC: 142 MG/DL (ref ?–200)
HDLC SERPL-MCNC: 35 MG/DL
LDLC SERPL CALC-MCNC: 81 MG/DL (ref 0–100)
NONHDLC SERPL-MCNC: 107 MG/DL
TRIGL SERPL-MCNC: 132 MG/DL (ref ?–150)

## 2025-07-09 PROCEDURE — 93880 EXTRACRANIAL BILAT STUDY: CPT

## 2025-07-09 PROCEDURE — 93880 EXTRACRANIAL BILAT STUDY: CPT | Performed by: SURGERY

## 2025-07-09 PROCEDURE — 80061 LIPID PANEL: CPT

## 2025-07-23 ENCOUNTER — TELEPHONE (OUTPATIENT)
Age: 60
End: 2025-07-23

## 2025-07-23 NOTE — TELEPHONE ENCOUNTER
Patients wife called and wanted Dr. Simpson to know that patient went back to work in June and since then has had swollen ankles.  She made an appointment for Ez with Dr. Simpson on 8/8 as it was the soonest appointment with Dr. Simpson.  I did offer to schedule a sooner appointment with another provider in the office and patients wife declined and wants  to see Dr. Simpson.    She wanted to make Dr. Simpson aware that patient is back to work and his ankles will swell, now that he is back at work

## 2025-07-28 ENCOUNTER — OFFICE VISIT (OUTPATIENT)
Dept: FAMILY MEDICINE CLINIC | Facility: CLINIC | Age: 60
End: 2025-07-28
Payer: COMMERCIAL

## 2025-07-28 VITALS
SYSTOLIC BLOOD PRESSURE: 120 MMHG | DIASTOLIC BLOOD PRESSURE: 80 MMHG | BODY MASS INDEX: 27.85 KG/M2 | OXYGEN SATURATION: 96 % | HEART RATE: 77 BPM | WEIGHT: 217 LBS | HEIGHT: 74 IN

## 2025-07-28 DIAGNOSIS — M79.89 SWELLING OF BOTH LOWER EXTREMITIES: ICD-10-CM

## 2025-07-28 DIAGNOSIS — R79.89 ABNORMAL CBC: ICD-10-CM

## 2025-07-28 DIAGNOSIS — E83.42 HYPOMAGNESEMIA: ICD-10-CM

## 2025-07-28 DIAGNOSIS — N18.32 STAGE 3B CHRONIC KIDNEY DISEASE (HCC): Primary | ICD-10-CM

## 2025-07-28 PROCEDURE — 99214 OFFICE O/P EST MOD 30 MIN: CPT | Performed by: FAMILY MEDICINE

## 2025-08-01 ENCOUNTER — OFFICE VISIT (OUTPATIENT)
Dept: VASCULAR SURGERY | Facility: CLINIC | Age: 60
End: 2025-08-01
Payer: COMMERCIAL

## 2025-08-01 VITALS
HEART RATE: 71 BPM | WEIGHT: 221 LBS | DIASTOLIC BLOOD PRESSURE: 84 MMHG | OXYGEN SATURATION: 97 % | SYSTOLIC BLOOD PRESSURE: 138 MMHG | BODY MASS INDEX: 28.36 KG/M2 | HEIGHT: 74 IN

## 2025-08-01 DIAGNOSIS — I63.239 CAROTID STENOSIS, SYMPTOMATIC, WITH INFARCTION (HCC): Primary | ICD-10-CM

## 2025-08-01 PROCEDURE — 99214 OFFICE O/P EST MOD 30 MIN: CPT | Performed by: STUDENT IN AN ORGANIZED HEALTH CARE EDUCATION/TRAINING PROGRAM

## (undated) DEVICE — REM POLYHESIVE ADULT PATIENT RETURN ELECTRODE: Brand: VALLEYLAB

## (undated) DEVICE — PROVE COVER: Brand: UNBRANDED

## (undated) DEVICE — 3M™ IOBAN™ 2 ANTIMICROBIAL INCISE DRAPE 6650EZ: Brand: IOBAN™ 2

## (undated) DEVICE — SUT PROLENE 6-0 BV130 30 IN 8709H

## (undated) DEVICE — NEEDLE 25G X 1 1/2

## (undated) DEVICE — SUT MONOCRYL 4-0 PS-2 27 IN Y426H

## (undated) DEVICE — JACKSON-PRATT 100CC BULB RESERVOIR: Brand: CARDINAL HEALTH

## (undated) DEVICE — DECANTER: Brand: UNBRANDED

## (undated) DEVICE — GLOVE SRG BIOGEL PI ORTHOPEDIC 7

## (undated) DEVICE — EXOFIN PRECISION PEN HIGH VISCOSITY TOPICAL SKIN ADHESIVE: Brand: EXOFIN PRECISION PEN, 1G

## (undated) DEVICE — TIBURON SPLIT SHEET: Brand: CONVERTORS

## (undated) DEVICE — SURGICEL FIBRILLAR 1 X 2

## (undated) DEVICE — SUT MONOCRYL 3-0 SH 27 IN Y416H

## (undated) DEVICE — BETHL CAROTID ENDARTERECTOMY: Brand: CARDINAL HEALTH

## (undated) DEVICE — HEMOSTATIC MATRIX SURGIFLO 8ML W/THROMBIN

## (undated) DEVICE — ANTIBACTERIAL UNDYED BRAIDED (POLYGLACTIN 910), SYNTHETIC ABSORBABLE SUTURE: Brand: COATED VICRYL

## (undated) DEVICE — 40529 DERMAPROX PAD 11'' X 15'' X 1'': Brand: 40529 DERMAPROX PAD 11'' X 15'' X 1''

## (undated) DEVICE — CLIP APPLIER: Brand: PREMIUM SURGICLIP II

## (undated) DEVICE — PETRI DISH STERILE

## (undated) DEVICE — VESSEL CANNULA

## (undated) DEVICE — SUT SILK 2-0 18 IN A185H

## (undated) DEVICE — INSTRUMENT POUCH: Brand: CONVERTORS

## (undated) DEVICE — 3M™ IOBAN™ 2 ANTIMICROBIAL INCISE DRAPE 6640EZ: Brand: IOBAN™ 2

## (undated) DEVICE — SUT SILK 3-0 18 IN A184H

## (undated) DEVICE — JP PERF DRN SIL FLT 7MM FULL: Brand: CARDINAL HEALTH

## (undated) DEVICE — SUT MONOCRYL 4-0 PS-2 18 IN Y496G

## (undated) DEVICE — THYROID SHEET: Brand: CONVERTORS

## (undated) DEVICE — 3M™ STERI-STRIP™ REINFORCED ADHESIVE SKIN CLOSURES, R1547, 1/2 IN X 4 IN (12 MM X 100 MM), 6 STRIPS/ENVELOPE: Brand: 3M™ STERI-STRIP™

## (undated) DEVICE — SUT SILK 3-0 30 IN A304H

## (undated) DEVICE — CAROTID ARTERY SHUNT KIT,RADIOPAQUE LINE, STRAIGHT: Brand: ARGYLE

## (undated) DEVICE — DRAPE SHEET X-LG

## (undated) DEVICE — HEMOSTAT POWDER ADSORB SURGICEL 3GM

## (undated) DEVICE — GLOVE SRG BIOGEL 7

## (undated) DEVICE — SUT SILK 2-0 30 IN A305H